# Patient Record
Sex: FEMALE | Race: BLACK OR AFRICAN AMERICAN | NOT HISPANIC OR LATINO | Employment: FULL TIME | URBAN - METROPOLITAN AREA
[De-identification: names, ages, dates, MRNs, and addresses within clinical notes are randomized per-mention and may not be internally consistent; named-entity substitution may affect disease eponyms.]

---

## 2018-02-06 ENCOUNTER — LAB VISIT (OUTPATIENT)
Dept: LAB | Facility: HOSPITAL | Age: 63
End: 2018-02-06
Payer: COMMERCIAL

## 2018-02-06 ENCOUNTER — OFFICE VISIT (OUTPATIENT)
Dept: FAMILY MEDICINE | Facility: CLINIC | Age: 63
End: 2018-02-06
Payer: COMMERCIAL

## 2018-02-06 VITALS
TEMPERATURE: 96 F | HEIGHT: 64 IN | BODY MASS INDEX: 26.49 KG/M2 | WEIGHT: 155.19 LBS | OXYGEN SATURATION: 100 % | SYSTOLIC BLOOD PRESSURE: 119 MMHG | DIASTOLIC BLOOD PRESSURE: 78 MMHG | RESPIRATION RATE: 16 BRPM | HEART RATE: 76 BPM

## 2018-02-06 DIAGNOSIS — Z00.01 ENCOUNTER FOR GENERAL ADULT MEDICAL EXAMINATION WITH ABNORMAL FINDINGS: Primary | ICD-10-CM

## 2018-02-06 DIAGNOSIS — L65.9 HAIR LOSS: ICD-10-CM

## 2018-02-06 DIAGNOSIS — E88.819 INSULIN RESISTANCE: ICD-10-CM

## 2018-02-06 DIAGNOSIS — K21.9 GASTROESOPHAGEAL REFLUX DISEASE, ESOPHAGITIS PRESENCE NOT SPECIFIED: ICD-10-CM

## 2018-02-06 DIAGNOSIS — Z00.01 ENCOUNTER FOR GENERAL ADULT MEDICAL EXAMINATION WITH ABNORMAL FINDINGS: ICD-10-CM

## 2018-02-06 LAB
25(OH)D3+25(OH)D2 SERPL-MCNC: 22 NG/ML
ALBUMIN SERPL BCP-MCNC: 3.8 G/DL
ALP SERPL-CCNC: 100 U/L
ALT SERPL W/O P-5'-P-CCNC: 16 U/L
ANION GAP SERPL CALC-SCNC: 7 MMOL/L
AST SERPL-CCNC: 15 U/L
BASOPHILS # BLD AUTO: 0.03 K/UL
BASOPHILS NFR BLD: 0.3 %
BILIRUB SERPL-MCNC: 0.4 MG/DL
BUN SERPL-MCNC: 20 MG/DL
CALCIUM SERPL-MCNC: 9.7 MG/DL
CHLORIDE SERPL-SCNC: 106 MMOL/L
CHOLEST SERPL-MCNC: 232 MG/DL
CHOLEST/HDLC SERPL: 3.4 {RATIO}
CO2 SERPL-SCNC: 28 MMOL/L
CREAT SERPL-MCNC: 0.7 MG/DL
DIFFERENTIAL METHOD: ABNORMAL
EOSINOPHIL # BLD AUTO: 0.2 K/UL
EOSINOPHIL NFR BLD: 2 %
ERYTHROCYTE [DISTWIDTH] IN BLOOD BY AUTOMATED COUNT: 13.6 %
EST. GFR  (AFRICAN AMERICAN): >60 ML/MIN/1.73 M^2
EST. GFR  (NON AFRICAN AMERICAN): >60 ML/MIN/1.73 M^2
ESTIMATED AVG GLUCOSE: 105 MG/DL
GLUCOSE SERPL-MCNC: 83 MG/DL
HBA1C MFR BLD HPLC: 5.3 %
HCT VFR BLD AUTO: 37 %
HDLC SERPL-MCNC: 68 MG/DL
HDLC SERPL: 29.3 %
HGB BLD-MCNC: 11.7 G/DL
IMM GRANULOCYTES # BLD AUTO: 0.02 K/UL
IMM GRANULOCYTES NFR BLD AUTO: 0.2 %
LDLC SERPL CALC-MCNC: 149.8 MG/DL
LYMPHOCYTES # BLD AUTO: 3.2 K/UL
LYMPHOCYTES NFR BLD: 33.5 %
MCH RBC QN AUTO: 28.2 PG
MCHC RBC AUTO-ENTMCNC: 31.6 G/DL
MCV RBC AUTO: 89 FL
MONOCYTES # BLD AUTO: 0.5 K/UL
MONOCYTES NFR BLD: 5 %
NEUTROPHILS # BLD AUTO: 5.7 K/UL
NEUTROPHILS NFR BLD: 59 %
NONHDLC SERPL-MCNC: 164 MG/DL
NRBC BLD-RTO: 0 /100 WBC
PLATELET # BLD AUTO: 283 K/UL
PMV BLD AUTO: 10 FL
POTASSIUM SERPL-SCNC: 3.8 MMOL/L
PROT SERPL-MCNC: 7.8 G/DL
RBC # BLD AUTO: 4.15 M/UL
SODIUM SERPL-SCNC: 141 MMOL/L
TRIGL SERPL-MCNC: 71 MG/DL
TSH SERPL DL<=0.005 MIU/L-ACNC: 0.67 UIU/ML
VIT B12 SERPL-MCNC: >2000 PG/ML
WBC # BLD AUTO: 9.6 K/UL

## 2018-02-06 PROCEDURE — 82607 VITAMIN B-12: CPT

## 2018-02-06 PROCEDURE — 82306 VITAMIN D 25 HYDROXY: CPT

## 2018-02-06 PROCEDURE — 83036 HEMOGLOBIN GLYCOSYLATED A1C: CPT

## 2018-02-06 PROCEDURE — 84443 ASSAY THYROID STIM HORMONE: CPT

## 2018-02-06 PROCEDURE — 85025 COMPLETE CBC W/AUTO DIFF WBC: CPT

## 2018-02-06 PROCEDURE — 99999 PR PBB SHADOW E&M-NEW PATIENT-LVL III: CPT | Mod: PBBFAC,,, | Performed by: FAMILY MEDICINE

## 2018-02-06 PROCEDURE — 80053 COMPREHEN METABOLIC PANEL: CPT

## 2018-02-06 PROCEDURE — 80061 LIPID PANEL: CPT

## 2018-02-06 PROCEDURE — 36415 COLL VENOUS BLD VENIPUNCTURE: CPT | Mod: PO

## 2018-02-06 PROCEDURE — 99386 PREV VISIT NEW AGE 40-64: CPT | Mod: S$GLB,,, | Performed by: FAMILY MEDICINE

## 2018-02-06 RX ORDER — ASPIRIN 81 MG/1
81 TABLET ORAL
COMMUNITY
End: 2018-06-29

## 2018-02-06 RX ORDER — ESTRADIOL 0.1 MG/G
1 CREAM VAGINAL
COMMUNITY
End: 2018-06-29

## 2018-02-06 RX ORDER — UBIDECARENONE 75 MG
500 CAPSULE ORAL
COMMUNITY
End: 2018-06-29

## 2018-02-06 RX ORDER — METFORMIN HYDROCHLORIDE 500 MG/1
500 TABLET ORAL
COMMUNITY
End: 2018-02-06

## 2018-02-06 RX ORDER — PANTOPRAZOLE SODIUM 40 MG/1
40 TABLET, DELAYED RELEASE ORAL
COMMUNITY
Start: 2017-03-03 | End: 2018-06-29

## 2018-02-06 NOTE — PROGRESS NOTES
Subjective:      Patient ID: Cesario Tamayo is a 62 y.o. female.    Chief Complaint: Establish Care      Past Medical History:   Diagnosis Date    Diabetes mellitus type I     Hypertension      Past Surgical History:   Procedure Laterality Date    ADENOIDECTOMY      CHOLECYSTECTOMY       History reviewed. No pertinent family history.  Social History     Social History    Marital status:      Spouse name: N/A    Number of children: N/A    Years of education: N/A     Occupational History    Not on file.     Social History Main Topics    Smoking status: Never Smoker    Smokeless tobacco: Never Used    Alcohol use Yes      Comment: socially    Drug use: Unknown    Sexual activity: Not on file     Other Topics Concern    Not on file     Social History Narrative    No narrative on file       Current Outpatient Prescriptions:     aspirin (ECOTRIN) 81 MG EC tablet, Take 81 mg by mouth., Disp: , Rfl:     cyanocobalamin 500 MCG tablet, Take 500 mcg by mouth., Disp: , Rfl:     docosahexanoic acid-epa 120-180 mg Cap, Take by mouth., Disp: , Rfl:     estradiol (ESTRACE) 0.01 % (0.1 mg/gram) vaginal cream, Place 1 g vaginally twice a week., Disp: , Rfl:     pantoprazole (PROTONIX) 40 MG tablet, Take 40 mg by mouth., Disp: , Rfl:   Review of patient's allergies indicates:  No Known Allergies    Review of Systems   Constitutional: Negative for chills and fever.   Respiratory: Negative for shortness of breath and wheezing.    Cardiovascular: Negative for chest pain and palpitations.   Gastrointestinal: Negative for abdominal pain and blood in stool.     HPI  Here today for annual exam.  She sees GYN, Dr. Lea yearly and up to date.  Placed at one time on metformin for insulin resistance.  Metformin  Has always worsened her gi distress so she d/c'd in December.   GERD - stable only if she takes protonix.  A few month h/o hair loss/thinning.  Lost 40 pounds since August with focus on exercise/healthy  "diet.  Gradually losing weight.    Objective:   /78 (BP Location: Right arm, Patient Position: Sitting, BP Method: Small (Manual))   Pulse 76   Temp 96.4 °F (35.8 °C) (Tympanic)   Resp 16   Ht 5' 4" (1.626 m)   Wt 70.4 kg (155 lb 3.3 oz)   SpO2 100%   BMI 26.64 kg/m²      Physical Exam   Constitutional: She is oriented to person, place, and time. She is cooperative. No distress.   Eyes: Conjunctivae and EOM are normal.   Cardiovascular: Normal rate, regular rhythm and normal heart sounds.    Pulmonary/Chest: Effort normal and breath sounds normal.   Musculoskeletal: She exhibits no edema.   Neurological: She is alert and oriented to person, place, and time. Coordination and gait normal.   Skin: Skin is warm, dry and intact. No rash noted. She is not diaphoretic. No erythema.   Psychiatric: She has a normal mood and affect. Her speech is normal and behavior is normal.   Nursing note and vitals reviewed.          Assessment:       1. Encounter for general adult medical examination with abnormal findings    2. Gastroesophageal reflux disease, esophagitis presence not specified    3. Insulin resistance    4. Hair loss            Plan:         Encounter for general adult medical examination with abnormal findings  Comments:  Importance of healthy diet and exercise discussed.  She lsot 40 pounds since August 2017.   Exercising and eating healthy.   up to date. Due for labs.  Orders:  -     Vitamin D; Future; Expected date: 02/06/2018  -     Vitamin B12; Future; Expected date: 02/06/2018  -     TSH; Future; Expected date: 02/06/2018  -     Lipid panel; Future; Expected date: 02/06/2018  -     Hemoglobin A1c; Future; Expected date: 02/06/2018  -     CBC auto differential; Future; Expected date: 02/06/2018  -     Comprehensive metabolic panel; Future; Expected date: 02/06/2018    Gastroesophageal reflux disease, esophagitis presence not specified  Comments:  Stable with protonix.  Reviewed dietary " changes.    Insulin resistance  Comments:  H/o insulin resistance - due for recheck.  Lost 40 pounds.  Likely reversed.    Hair loss  Call with labs.      Patient Care Team:  Ruthann Gómez MD as PCP - General (Family Medicine)    Follow-up in about 1 year (around 2/6/2019).

## 2018-02-07 ENCOUNTER — TELEPHONE (OUTPATIENT)
Dept: FAMILY MEDICINE | Facility: CLINIC | Age: 63
End: 2018-02-07

## 2018-02-07 NOTE — TELEPHONE ENCOUNTER
----- Message from Conchita Lobo sent at 2/7/2018  1:46 PM CST -----  Contact: self   Patient returning call. Please call back at .      Thanks,  Conchita Lobo

## 2018-06-15 DIAGNOSIS — Z12.39 BREAST CANCER SCREENING: ICD-10-CM

## 2018-06-29 ENCOUNTER — OFFICE VISIT (OUTPATIENT)
Dept: FAMILY MEDICINE | Facility: CLINIC | Age: 63
End: 2018-06-29
Payer: COMMERCIAL

## 2018-06-29 VITALS
HEART RATE: 76 BPM | WEIGHT: 151.69 LBS | DIASTOLIC BLOOD PRESSURE: 64 MMHG | SYSTOLIC BLOOD PRESSURE: 122 MMHG | HEIGHT: 64 IN | BODY MASS INDEX: 25.9 KG/M2 | OXYGEN SATURATION: 98 % | TEMPERATURE: 98 F

## 2018-06-29 DIAGNOSIS — J01.10 ACUTE NON-RECURRENT FRONTAL SINUSITIS: Primary | ICD-10-CM

## 2018-06-29 DIAGNOSIS — H92.03 OTALGIA OF BOTH EARS: ICD-10-CM

## 2018-06-29 DIAGNOSIS — L84 PRE-ULCERATIVE CORN OR CALLOUS: ICD-10-CM

## 2018-06-29 PROCEDURE — 3008F BODY MASS INDEX DOCD: CPT | Mod: CPTII,S$GLB,, | Performed by: FAMILY MEDICINE

## 2018-06-29 PROCEDURE — 99999 PR PBB SHADOW E&M-EST. PATIENT-LVL III: CPT | Mod: PBBFAC,,, | Performed by: FAMILY MEDICINE

## 2018-06-29 PROCEDURE — 99214 OFFICE O/P EST MOD 30 MIN: CPT | Mod: 25,S$GLB,, | Performed by: FAMILY MEDICINE

## 2018-06-29 PROCEDURE — 96372 THER/PROPH/DIAG INJ SC/IM: CPT | Mod: S$GLB,,, | Performed by: FAMILY MEDICINE

## 2018-06-29 RX ORDER — DOXYCYCLINE HYCLATE 100 MG
100 TABLET ORAL 2 TIMES DAILY
Qty: 14 TABLET | Refills: 0 | Status: SHIPPED | OUTPATIENT
Start: 2018-06-29 | End: 2018-07-06

## 2018-06-29 RX ORDER — BETAMETHASONE SODIUM PHOSPHATE AND BETAMETHASONE ACETATE 3; 3 MG/ML; MG/ML
6 INJECTION, SUSPENSION INTRA-ARTICULAR; INTRALESIONAL; INTRAMUSCULAR; SOFT TISSUE
Status: COMPLETED | OUTPATIENT
Start: 2018-06-29 | End: 2018-06-29

## 2018-06-29 RX ORDER — UREA 1 ML/10ML
1 LOTION TOPICAL 2 TIMES DAILY
Qty: 177 ML | Refills: 0 | COMMUNITY
Start: 2018-06-29 | End: 2018-09-28

## 2018-06-29 RX ADMIN — BETAMETHASONE SODIUM PHOSPHATE AND BETAMETHASONE ACETATE 6 MG: 3; 3 INJECTION, SUSPENSION INTRA-ARTICULAR; INTRALESIONAL; INTRAMUSCULAR; SOFT TISSUE at 02:06

## 2018-06-29 NOTE — PROGRESS NOTES
Subjective:      Patient ID: Cesario Tamayo is a 62 y.o. female.    Chief Complaint: Otalgia; Sore Throat; and Headache      Past Medical History:   Diagnosis Date    Diabetes mellitus type I     Hypertension      Past Surgical History:   Procedure Laterality Date    ADENOIDECTOMY      CHOLECYSTECTOMY       History reviewed. No pertinent family history.  Social History     Social History    Marital status:      Spouse name: N/A    Number of children: N/A    Years of education: N/A     Occupational History    Not on file.     Social History Main Topics    Smoking status: Never Smoker    Smokeless tobacco: Never Used    Alcohol use Yes      Comment: socially    Drug use: Unknown    Sexual activity: Not on file     Other Topics Concern    Not on file     Social History Narrative    No narrative on file       Current Outpatient Prescriptions:     doxycycline (VIBRA-TABS) 100 MG tablet, Take 1 tablet (100 mg total) by mouth 2 (two) times daily. for 7 days, Disp: 14 tablet, Rfl: 0    urea 10 % Lotn, Apply 1 each topically 2 (two) times daily., Disp: 177 mL, Rfl: 0  No current facility-administered medications for this visit.   Review of patient's allergies indicates:  No Known Allergies    Review of Systems   Constitutional: Positive for fatigue. Negative for chills and fever.   HENT: Positive for ear pain, sinus pain and sinus pressure. Negative for hearing loss.    Respiratory: Negative for shortness of breath and wheezing.    Cardiovascular: Negative for chest pain and palpitations.   Gastrointestinal: Negative for abdominal pain and blood in stool.   Neurological: Positive for headaches.     HPI  Her wedding is tomorrow.  One week of cold symptoms, achiness, ear ache, fatigue, headache.  Acutely worsened for the past 2 days.  + frontal headache and ear ache yo R>L.  No stress.  This is her usual sinusitis infection.  + fatigue dn malaise.  Has Callous on the bottom of right foot  Objective:  "  /64   Pulse 76   Temp 97.9 °F (36.6 °C) (Tympanic)   Ht 5' 4" (1.626 m)   Wt 68.8 kg (151 lb 10.8 oz)   SpO2 98%   BMI 26.04 kg/m²      Physical Exam   Constitutional: She is oriented to person, place, and time. She is cooperative. No distress.   HENT:   Right Ear: Hearing, external ear and ear canal normal. Tympanic membrane is erythematous.   Left Ear: Hearing, external ear and ear canal normal. Tympanic membrane is erythematous.   Mouth/Throat: Uvula is midline, oropharynx is clear and moist and mucous membranes are normal.   Eyes: Conjunctivae and EOM are normal.   Neck:   Supple; full range of motion; not tender to palpation   Cardiovascular: Normal rate, regular rhythm and normal heart sounds.    Pulmonary/Chest: Effort normal and breath sounds normal.   Musculoskeletal: She exhibits no edema.   Neurological: She is alert and oriented to person, place, and time. Coordination and gait normal.   Skin: Skin is warm, dry and intact. No rash noted. She is not diaphoretic. No erythema.    Callous on the bottom of right foot - mid plantar 1 cm.     Psychiatric: She has a normal mood and affect. Her speech is normal and behavior is normal.   Nursing note and vitals reviewed.          Assessment:       1. Acute non-recurrent frontal sinusitis    2. Otalgia of both ears    3. Pre-ulcerative corn or callous            Plan:         Acute non-recurrent frontal sinusitis  Comments:  Celestone shot.  Start doxycycline.  Call if any side effects/problems.    Otalgia of both ears    Pre-ulcerative corn or callous  Comments:  Pumice stone along with urea cream left foot.    Other orders  -     betamethasone acetate-betamethasone sodium phosphate injection 6 mg; Inject 1 mL (6 mg total) into the muscle one time.  -     doxycycline (VIBRA-TABS) 100 MG tablet; Take 1 tablet (100 mg total) by mouth 2 (two) times daily. for 7 days  Dispense: 14 tablet; Refill: 0  -     urea 10 % Lotn; Apply 1 each topically 2 (two) " times daily.  Dispense: 177 mL; Refill: 0        Patient Care Team:  Ruthann Gómez MD as PCP - General (Family Medicine)  Carter Lea MD (Obstetrics and Gynecology)    Follow-up if symptoms worsen or fail to improve.

## 2018-09-06 ENCOUNTER — TELEPHONE (OUTPATIENT)
Dept: FAMILY MEDICINE | Facility: CLINIC | Age: 63
End: 2018-09-06

## 2018-09-06 NOTE — TELEPHONE ENCOUNTER
----- Message from Brandyn Wasserman sent at 9/6/2018  2:27 PM CDT -----  Contact: self [phone]  Returning call, please call back at [phone].  Md Julia

## 2018-09-06 NOTE — TELEPHONE ENCOUNTER
----- Message from Joselin Louie sent at 9/6/2018 10:05 AM CDT -----  Contact: pt  The pt states she is returning a missed call, the pt can be reached at [phone] or [phone]///[email or web address]

## 2018-09-06 NOTE — TELEPHONE ENCOUNTER
----- Message from Urvashi Velazco sent at 9/6/2018 10:39 AM CDT -----  Contact: Patient  ##Please call her work number##    Patient returned call. She can be contacted at [phone] work.     Thanks,  Urvashi

## 2018-09-06 NOTE — TELEPHONE ENCOUNTER
----- Message from Flavia Grimaldo sent at 9/6/2018  7:31 AM CDT -----  needs ear inf med refill. pls call in to [facility] before 3 today as pt will be getting off work at that time....[phone]

## 2018-09-28 ENCOUNTER — OFFICE VISIT (OUTPATIENT)
Dept: FAMILY MEDICINE | Facility: CLINIC | Age: 63
End: 2018-09-28
Payer: COMMERCIAL

## 2018-09-28 VITALS
SYSTOLIC BLOOD PRESSURE: 124 MMHG | HEIGHT: 64 IN | DIASTOLIC BLOOD PRESSURE: 68 MMHG | OXYGEN SATURATION: 99 % | TEMPERATURE: 98 F | WEIGHT: 151.69 LBS | BODY MASS INDEX: 25.9 KG/M2 | HEART RATE: 64 BPM

## 2018-09-28 DIAGNOSIS — J34.89 SINUS DRAINAGE: ICD-10-CM

## 2018-09-28 DIAGNOSIS — R05.9 COUGH: ICD-10-CM

## 2018-09-28 DIAGNOSIS — H66.001 ACUTE SUPPURATIVE OTITIS MEDIA OF RIGHT EAR WITHOUT SPONTANEOUS RUPTURE OF TYMPANIC MEMBRANE, RECURRENCE NOT SPECIFIED: Primary | ICD-10-CM

## 2018-09-28 DIAGNOSIS — J34.89 SINUS PRESSURE: ICD-10-CM

## 2018-09-28 PROCEDURE — 3008F BODY MASS INDEX DOCD: CPT | Mod: CPTII,S$GLB,, | Performed by: FAMILY MEDICINE

## 2018-09-28 PROCEDURE — 99214 OFFICE O/P EST MOD 30 MIN: CPT | Mod: 25,S$GLB,, | Performed by: FAMILY MEDICINE

## 2018-09-28 PROCEDURE — 99999 PR PBB SHADOW E&M-EST. PATIENT-LVL III: CPT | Mod: PBBFAC,,, | Performed by: FAMILY MEDICINE

## 2018-09-28 PROCEDURE — 96372 THER/PROPH/DIAG INJ SC/IM: CPT | Mod: S$GLB,,, | Performed by: FAMILY MEDICINE

## 2018-09-28 RX ORDER — TRIAMCINOLONE ACETONIDE 40 MG/ML
40 INJECTION, SUSPENSION INTRA-ARTICULAR; INTRAMUSCULAR
Status: COMPLETED | OUTPATIENT
Start: 2018-09-28 | End: 2018-09-28

## 2018-09-28 RX ORDER — BENZONATATE 100 MG/1
100 CAPSULE ORAL 3 TIMES DAILY PRN
Qty: 30 CAPSULE | Refills: 0 | Status: SHIPPED | OUTPATIENT
Start: 2018-09-28 | End: 2018-10-08

## 2018-09-28 RX ORDER — DOXYCYCLINE HYCLATE 100 MG
100 TABLET ORAL 2 TIMES DAILY
Qty: 14 TABLET | Refills: 0 | Status: SHIPPED | OUTPATIENT
Start: 2018-09-28 | End: 2018-10-05

## 2018-09-28 RX ADMIN — TRIAMCINOLONE ACETONIDE 40 MG: 40 INJECTION, SUSPENSION INTRA-ARTICULAR; INTRAMUSCULAR at 02:09

## 2018-09-28 NOTE — PROGRESS NOTES
Subjective:      Patient ID: Cesario Tamayo is a 63 y.o. female.    Chief Complaint: Generalized Body Aches; Otalgia; Cough; and Sore Throat      Past Medical History:   Diagnosis Date    Diabetes mellitus, type 2     Hypertension      Past Surgical History:   Procedure Laterality Date    ADENOIDECTOMY      CHOLECYSTECTOMY       History reviewed. No pertinent family history.  Social History     Socioeconomic History    Marital status:      Spouse name: Not on file    Number of children: Not on file    Years of education: Not on file    Highest education level: Not on file   Social Needs    Financial resource strain: Not on file    Food insecurity - worry: Not on file    Food insecurity - inability: Not on file    Transportation needs - medical: Not on file    Transportation needs - non-medical: Not on file   Occupational History    Not on file   Tobacco Use    Smoking status: Never Smoker    Smokeless tobacco: Never Used   Substance and Sexual Activity    Alcohol use: Yes     Comment: socially    Drug use: Not on file    Sexual activity: Not on file   Other Topics Concern    Not on file   Social History Narrative    Not on file       Current Outpatient Medications:     benzonatate (TESSALON) 100 MG capsule, Take 1 capsule (100 mg total) by mouth 3 (three) times daily as needed for Cough., Disp: 30 capsule, Rfl: 0    doxycycline (VIBRA-TABS) 100 MG tablet, Take 1 tablet (100 mg total) by mouth 2 (two) times daily. for 7 days, Disp: 14 tablet, Rfl: 0  No current facility-administered medications for this visit.   Review of patient's allergies indicates:  No Known Allergies    Review of Systems   Constitutional: Positive for fatigue. Negative for chills and fever.   HENT: Positive for postnasal drip, rhinorrhea, sinus pressure, sinus pain and sore throat.    Respiratory: Positive for cough. Negative for shortness of breath and wheezing.    Cardiovascular: Negative for chest pain and  "palpitations.   Gastrointestinal: Negative for abdominal pain and blood in stool.     HPI  Symptoms started Sunday, malaise, fatigue, sore throat.  Progressive cough, intense ear pain headaches.  Back pain over posterior chest - muscle strain from coughing hard.  Symptoms persistent and worsening.    Objective:   /68 (BP Location: Right arm, Patient Position: Sitting)   Pulse 64   Temp 98.3 °F (36.8 °C)   Ht 5' 4" (1.626 m)   Wt 68.8 kg (151 lb 10.8 oz)   SpO2 99%   BMI 26.04 kg/m²      Physical Exam   Constitutional: She is oriented to person, place, and time. She is cooperative. No distress.   HENT:   Right Ear: Hearing, external ear and ear canal normal. Tympanic membrane is erythematous. A middle ear effusion is present.   Left Ear: Hearing, external ear and ear canal normal. A middle ear effusion is present.   Mouth/Throat: Uvula is midline and mucous membranes are normal. Posterior oropharyngeal erythema present.   Eyes: Conjunctivae and EOM are normal.   Cardiovascular: Normal rate, regular rhythm and normal heart sounds.   Pulmonary/Chest: Effort normal and breath sounds normal.   Abdominal: There is no tenderness.   Musculoskeletal: She exhibits no edema.   Neurological: She is alert and oriented to person, place, and time. Coordination and gait normal.   Skin: Skin is warm, dry and intact. No rash noted. She is not diaphoretic. No erythema.   Psychiatric: She has a normal mood and affect. Her speech is normal and behavior is normal.   Nursing note and vitals reviewed.          Assessment:       1. Acute suppurative otitis media of right ear without spontaneous rupture of tympanic membrane, recurrence not specified    2. Sinus pressure    3. Cough    4. Sinus drainage            Plan:         Acute suppurative otitis media of right ear without spontaneous rupture of tympanic membrane, recurrence not specified  Comments:  Start doxycycline.  F/u if not resolved in a week or worsening.    Sinus " pressure    Cough  Comments:  Tessalon prn.    Sinus drainage  Comments:  Will give kenalog shot - patient requests.    Other orders  -     doxycycline (VIBRA-TABS) 100 MG tablet; Take 1 tablet (100 mg total) by mouth 2 (two) times daily. for 7 days  Dispense: 14 tablet; Refill: 0  -     benzonatate (TESSALON) 100 MG capsule; Take 1 capsule (100 mg total) by mouth 3 (three) times daily as needed for Cough.  Dispense: 30 capsule; Refill: 0  -     triamcinolone acetonide injection 40 mg; Inject 1 mL (40 mg total) into the muscle one time.        Patient Care Team:  Ruthann Gómez MD as PCP - General (Family Medicine)  Carter Lea MD (Obstetrics and Gynecology)    Follow-up if symptoms worsen or fail to improve.

## 2019-04-23 ENCOUNTER — OFFICE VISIT (OUTPATIENT)
Dept: FAMILY MEDICINE | Facility: CLINIC | Age: 64
End: 2019-04-23
Payer: COMMERCIAL

## 2019-04-23 VITALS
HEART RATE: 69 BPM | SYSTOLIC BLOOD PRESSURE: 142 MMHG | OXYGEN SATURATION: 98 % | WEIGHT: 158.5 LBS | BODY MASS INDEX: 27.06 KG/M2 | DIASTOLIC BLOOD PRESSURE: 92 MMHG | TEMPERATURE: 98 F | HEIGHT: 64 IN

## 2019-04-23 DIAGNOSIS — I10 ESSENTIAL HYPERTENSION: Primary | ICD-10-CM

## 2019-04-23 DIAGNOSIS — E88.819 INSULIN RESISTANCE: ICD-10-CM

## 2019-04-23 DIAGNOSIS — S16.1XXA STRAIN OF NECK MUSCLE, INITIAL ENCOUNTER: ICD-10-CM

## 2019-04-23 PROCEDURE — 99214 PR OFFICE/OUTPT VISIT, EST, LEVL IV, 30-39 MIN: ICD-10-PCS | Mod: S$GLB,,, | Performed by: FAMILY MEDICINE

## 2019-04-23 PROCEDURE — 3008F BODY MASS INDEX DOCD: CPT | Mod: CPTII,S$GLB,, | Performed by: FAMILY MEDICINE

## 2019-04-23 PROCEDURE — 99999 PR PBB SHADOW E&M-EST. PATIENT-LVL III: CPT | Mod: PBBFAC,,, | Performed by: FAMILY MEDICINE

## 2019-04-23 PROCEDURE — 3077F PR MOST RECENT SYSTOLIC BLOOD PRESSURE >= 140 MM HG: ICD-10-PCS | Mod: CPTII,S$GLB,, | Performed by: FAMILY MEDICINE

## 2019-04-23 PROCEDURE — 3080F DIAST BP >= 90 MM HG: CPT | Mod: CPTII,S$GLB,, | Performed by: FAMILY MEDICINE

## 2019-04-23 PROCEDURE — 99214 OFFICE O/P EST MOD 30 MIN: CPT | Mod: S$GLB,,, | Performed by: FAMILY MEDICINE

## 2019-04-23 PROCEDURE — 99999 PR PBB SHADOW E&M-EST. PATIENT-LVL III: ICD-10-PCS | Mod: PBBFAC,,, | Performed by: FAMILY MEDICINE

## 2019-04-23 PROCEDURE — 3008F PR BODY MASS INDEX (BMI) DOCUMENTED: ICD-10-PCS | Mod: CPTII,S$GLB,, | Performed by: FAMILY MEDICINE

## 2019-04-23 PROCEDURE — 3077F SYST BP >= 140 MM HG: CPT | Mod: CPTII,S$GLB,, | Performed by: FAMILY MEDICINE

## 2019-04-23 PROCEDURE — 3080F PR MOST RECENT DIASTOLIC BLOOD PRESSURE >= 90 MM HG: ICD-10-PCS | Mod: CPTII,S$GLB,, | Performed by: FAMILY MEDICINE

## 2019-04-23 RX ORDER — NAPROXEN 500 MG/1
500 TABLET ORAL 2 TIMES DAILY
Qty: 30 TABLET | Refills: 0 | Status: SHIPPED | OUTPATIENT
Start: 2019-04-23 | End: 2019-05-14

## 2019-04-23 RX ORDER — ASPIRIN 81 MG/1
81 TABLET ORAL DAILY
COMMUNITY

## 2019-04-23 RX ORDER — UBIDECARENONE 75 MG
500 CAPSULE ORAL DAILY
COMMUNITY
End: 2019-05-14

## 2019-04-23 RX ORDER — ORPHENADRINE CITRATE 100 MG/1
100 TABLET, EXTENDED RELEASE ORAL 2 TIMES DAILY PRN
Qty: 20 TABLET | Refills: 0 | Status: SHIPPED | OUTPATIENT
Start: 2019-04-23 | End: 2019-05-03

## 2019-04-23 RX ORDER — ORPHENADRINE CITRATE 100 MG/1
100 TABLET, EXTENDED RELEASE ORAL
COMMUNITY
Start: 2019-04-20 | End: 2019-04-23

## 2019-04-23 RX ORDER — VITAMIN E 268 MG
400 CAPSULE ORAL DAILY
COMMUNITY
End: 2020-01-24
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
## 2019-05-14 ENCOUNTER — OFFICE VISIT (OUTPATIENT)
Dept: FAMILY MEDICINE | Facility: CLINIC | Age: 64
End: 2019-05-14
Payer: COMMERCIAL

## 2019-05-14 ENCOUNTER — LAB VISIT (OUTPATIENT)
Dept: LAB | Facility: HOSPITAL | Age: 64
End: 2019-05-14
Payer: COMMERCIAL

## 2019-05-14 VITALS
WEIGHT: 158.31 LBS | SYSTOLIC BLOOD PRESSURE: 126 MMHG | TEMPERATURE: 98 F | DIASTOLIC BLOOD PRESSURE: 80 MMHG | HEART RATE: 72 BPM | OXYGEN SATURATION: 98 % | HEIGHT: 64 IN | BODY MASS INDEX: 27.03 KG/M2

## 2019-05-14 DIAGNOSIS — Z11.59 NEED FOR HEPATITIS C SCREENING TEST: ICD-10-CM

## 2019-05-14 DIAGNOSIS — E88.819 INSULIN RESISTANCE: ICD-10-CM

## 2019-05-14 DIAGNOSIS — Z00.00 ENCOUNTER FOR WELLNESS EXAMINATION: Primary | ICD-10-CM

## 2019-05-14 DIAGNOSIS — N95.2 VAGINAL ATROPHY: ICD-10-CM

## 2019-05-14 DIAGNOSIS — Z00.00 ENCOUNTER FOR WELLNESS EXAMINATION: ICD-10-CM

## 2019-05-14 LAB
ALBUMIN SERPL BCP-MCNC: 3.9 G/DL (ref 3.5–5.2)
ALP SERPL-CCNC: 92 U/L (ref 55–135)
ALT SERPL W/O P-5'-P-CCNC: 16 U/L (ref 10–44)
ANION GAP SERPL CALC-SCNC: 8 MMOL/L (ref 8–16)
AST SERPL-CCNC: 19 U/L (ref 10–40)
BASOPHILS # BLD AUTO: 0.05 K/UL (ref 0–0.2)
BASOPHILS NFR BLD: 0.6 % (ref 0–1.9)
BILIRUB SERPL-MCNC: 0.4 MG/DL (ref 0.1–1)
BUN SERPL-MCNC: 21 MG/DL (ref 8–23)
CALCIUM SERPL-MCNC: 9.9 MG/DL (ref 8.7–10.5)
CHLORIDE SERPL-SCNC: 104 MMOL/L (ref 95–110)
CHOLEST SERPL-MCNC: 231 MG/DL (ref 120–199)
CHOLEST/HDLC SERPL: 3.1 {RATIO} (ref 2–5)
CO2 SERPL-SCNC: 26 MMOL/L (ref 23–29)
CREAT SERPL-MCNC: 0.8 MG/DL (ref 0.5–1.4)
DIFFERENTIAL METHOD: ABNORMAL
EOSINOPHIL # BLD AUTO: 0.2 K/UL (ref 0–0.5)
EOSINOPHIL NFR BLD: 2.9 % (ref 0–8)
ERYTHROCYTE [DISTWIDTH] IN BLOOD BY AUTOMATED COUNT: 13.3 % (ref 11.5–14.5)
EST. GFR  (AFRICAN AMERICAN): >60 ML/MIN/1.73 M^2
EST. GFR  (NON AFRICAN AMERICAN): >60 ML/MIN/1.73 M^2
ESTIMATED AVG GLUCOSE: 111 MG/DL (ref 68–131)
GLUCOSE SERPL-MCNC: 77 MG/DL (ref 70–110)
HBA1C MFR BLD HPLC: 5.5 % (ref 4–5.6)
HCT VFR BLD AUTO: 38 % (ref 37–48.5)
HDLC SERPL-MCNC: 74 MG/DL (ref 40–75)
HDLC SERPL: 32 % (ref 20–50)
HGB BLD-MCNC: 11.9 G/DL (ref 12–16)
IMM GRANULOCYTES # BLD AUTO: 0.02 K/UL (ref 0–0.04)
IMM GRANULOCYTES NFR BLD AUTO: 0.3 % (ref 0–0.5)
LDLC SERPL CALC-MCNC: 144.6 MG/DL (ref 63–159)
LYMPHOCYTES # BLD AUTO: 2.7 K/UL (ref 1–4.8)
LYMPHOCYTES NFR BLD: 34 % (ref 18–48)
MCH RBC QN AUTO: 28.6 PG (ref 27–31)
MCHC RBC AUTO-ENTMCNC: 31.3 G/DL (ref 32–36)
MCV RBC AUTO: 91 FL (ref 82–98)
MONOCYTES # BLD AUTO: 0.4 K/UL (ref 0.3–1)
MONOCYTES NFR BLD: 4.9 % (ref 4–15)
NEUTROPHILS # BLD AUTO: 4.6 K/UL (ref 1.8–7.7)
NEUTROPHILS NFR BLD: 57.3 % (ref 38–73)
NONHDLC SERPL-MCNC: 157 MG/DL
NRBC BLD-RTO: 0 /100 WBC
PLATELET # BLD AUTO: 278 K/UL (ref 150–350)
PMV BLD AUTO: 9.9 FL (ref 9.2–12.9)
POTASSIUM SERPL-SCNC: 4.2 MMOL/L (ref 3.5–5.1)
PROT SERPL-MCNC: 7.6 G/DL (ref 6–8.4)
RBC # BLD AUTO: 4.16 M/UL (ref 4–5.4)
SODIUM SERPL-SCNC: 138 MMOL/L (ref 136–145)
TRIGL SERPL-MCNC: 62 MG/DL (ref 30–150)
WBC # BLD AUTO: 7.94 K/UL (ref 3.9–12.7)

## 2019-05-14 PROCEDURE — 3079F PR MOST RECENT DIASTOLIC BLOOD PRESSURE 80-89 MM HG: ICD-10-PCS | Mod: CPTII,S$GLB,, | Performed by: FAMILY MEDICINE

## 2019-05-14 PROCEDURE — 99999 PR PBB SHADOW E&M-EST. PATIENT-LVL III: ICD-10-PCS | Mod: PBBFAC,,, | Performed by: FAMILY MEDICINE

## 2019-05-14 PROCEDURE — 83036 HEMOGLOBIN GLYCOSYLATED A1C: CPT

## 2019-05-14 PROCEDURE — 85025 COMPLETE CBC W/AUTO DIFF WBC: CPT

## 2019-05-14 PROCEDURE — 36415 COLL VENOUS BLD VENIPUNCTURE: CPT | Mod: PO

## 2019-05-14 PROCEDURE — 99396 PREV VISIT EST AGE 40-64: CPT | Mod: S$GLB,,, | Performed by: FAMILY MEDICINE

## 2019-05-14 PROCEDURE — 99999 PR PBB SHADOW E&M-EST. PATIENT-LVL III: CPT | Mod: PBBFAC,,, | Performed by: FAMILY MEDICINE

## 2019-05-14 PROCEDURE — 86803 HEPATITIS C AB TEST: CPT

## 2019-05-14 PROCEDURE — 80061 LIPID PANEL: CPT

## 2019-05-14 PROCEDURE — 3074F SYST BP LT 130 MM HG: CPT | Mod: CPTII,S$GLB,, | Performed by: FAMILY MEDICINE

## 2019-05-14 PROCEDURE — 99396 PR PREVENTIVE VISIT,EST,40-64: ICD-10-PCS | Mod: S$GLB,,, | Performed by: FAMILY MEDICINE

## 2019-05-14 PROCEDURE — 3074F PR MOST RECENT SYSTOLIC BLOOD PRESSURE < 130 MM HG: ICD-10-PCS | Mod: CPTII,S$GLB,, | Performed by: FAMILY MEDICINE

## 2019-05-14 PROCEDURE — 3079F DIAST BP 80-89 MM HG: CPT | Mod: CPTII,S$GLB,, | Performed by: FAMILY MEDICINE

## 2019-05-14 PROCEDURE — 80053 COMPREHEN METABOLIC PANEL: CPT

## 2019-05-14 RX ORDER — SODIUM, POTASSIUM,MAG SULFATES 17.5-3.13G
1 SOLUTION, RECONSTITUTED, ORAL ORAL DAILY
Qty: 1 KIT | Refills: 0 | Status: CANCELLED | OUTPATIENT
Start: 2019-05-14 | End: 2019-05-16

## 2019-05-14 NOTE — PROGRESS NOTES
Subjective:      Patient ID: Cesario Sahu is a 63 y.o. female.    Chief Complaint: Annual Exam    HPI    Patient here today for annual wellness no complaints except vaginal dryness     Notes some vaginal dryness - [facility] - was using vagifem and cream - not much relief and doesn't like using them often  Recently remarried one year ago and would like to have symptoms better controlled  No discharge, no bleeding - hx of abnormal uterine bleeding in January - fully worked up by Dr. Lea s/p D&C - no cancer      Past Medical History:   Diagnosis Date    Hypertension     Insulin resistance        Past Surgical History:   Procedure Laterality Date    ADENOIDECTOMY      CHOLECYSTECTOMY      DILATION AND CURETTAGE OF UTERUS  10/2018       Family History   Problem Relation Age of Onset    Hypertension Mother     Heart disease Sister     Hyperlipidemia Sister     Hypertension Sister     Diabetes Brother        Social History     Socioeconomic History    Marital status:      Spouse name: Not on file    Number of children: 3    Years of education: Not on file    Highest education level: Not on file   Occupational History     Comment: financial     Social Needs    Financial resource strain: Not on file    Food insecurity:     Worry: Not on file     Inability: Not on file    Transportation needs:     Medical: Not on file     Non-medical: Not on file   Tobacco Use    Smoking status: Never Smoker    Smokeless tobacco: Never Used   Substance and Sexual Activity    Alcohol use: Yes     Frequency: 2-3 times a week     Drinks per session: 1 or 2     Binge frequency: Never     Comment: Socially    Drug use: Never    Sexual activity: Not on file   Lifestyle    Physical activity:     Days per week: Not on file     Minutes per session: Not on file    Stress: Not on file   Relationships    Social connections:     Talks on phone: Not on file     Gets together: Not on  file     Attends [employer] service: Not on file     Active member of club or organization: Not on file     Attends meetings of clubs or organizations: Not on file     Relationship status: Not on file   Other Topics Concern    Not on file   Social History Narrative    The patient remarried in June 2018.  She has 3 adult children.  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       Health Maintenance Topics with due status: Not Due       Topic Last Completion Date    Lipid Panel 02/06/2018    Influenza Vaccine Not Due       Medication List with Changes/Refills   Current Medications    ASPIRIN (ECOTRIN) 81 MG EC TABLET    Take 81 mg by mouth once daily.    VITAMIN E 400 UNIT CAPSULE    Take 400 Units by mouth once daily.   Discontinued Medications    CYANOCOBALAMIN (VITAMIN B-12) 500 MCG TABLET    Take 500 mcg by mouth once daily.    NAPROXEN (NAPROSYN) 500 MG TABLET    Take 1 tablet (500 mg total) by mouth 2 (two) times daily.       Review of patient's allergies indicates:  No Known Allergies    Review of Systems   Constitutional: Negative for fever.   HENT: Negative for congestion.    Eyes: Negative for blurred vision.   Respiratory: Negative for shortness of breath.    Cardiovascular: Negative for chest pain and leg swelling.   Gastrointestinal: Negative for abdominal pain, constipation and diarrhea.   Genitourinary: Negative for dysuria.        Vaginal dryness   Skin: Negative for rash.   Neurological: Negative for headaches.       Objective:     Vitals:    05/14/19 0720   BP: 126/80   Pulse: 72   Temp: 98.1 °F (36.7 °C)     Body mass index is 27.17 kg/m².    Physical Exam   Constitutional: She is oriented to person, place, and time. She appears well-developed and well-nourished. No distress.   HENT:   Head: Normocephalic and atraumatic.   Right Ear: External ear normal.   Left Ear: External ear normal.   Nose: Nose normal.   Mouth/Throat: Oropharynx is clear and moist.   Eyes: Pupils are equal, round, and  reactive to light. Conjunctivae and EOM are normal.   Neck: No thyromegaly present.   Cardiovascular: Normal rate, regular rhythm, normal heart sounds and intact distal pulses.   No murmur heard.  Pulmonary/Chest: Effort normal and breath sounds normal. No respiratory distress. She has no wheezes. She has no rales. She exhibits no tenderness.   Abdominal: Soft. Bowel sounds are normal. She exhibits no distension. There is no tenderness.   Musculoskeletal: She exhibits no edema.   Lymphadenopathy:     She has no cervical adenopathy.   Neurological: She is alert and oriented to person, place, and time.   Skin: Skin is warm and dry.   Psychiatric: She has a normal mood and affect.   Nursing note and vitals reviewed.      Assessment and Plan:     Encounter for wellness examination  -     CBC auto differential; Future; Expected date: 05/14/2019  -     Comprehensive metabolic panel; Future; Expected date: 05/14/2019  -     Hemoglobin A1c; Future; Expected date: 05/14/2019  -     Lipid panel; Future; Expected date: 05/14/2019  -     Hepatitis C antibody; Future; Expected date: 05/14/2019    Need for hepatitis C screening test  -     Hepatitis C antibody; Future; Expected date: 05/14/2019    Insulin resistance  -     Hemoglobin A1c; Future; Expected date: 05/14/2019    Vaginal atrophy  Advised patient consider the estring - will go to obgyn and discuss this - try to get a sample and be taught how to use by Dr. Lea       Follow up in about 6 months (around 11/14/2019).

## 2019-05-15 LAB — HCV AB SERPL QL IA: NEGATIVE

## 2019-05-20 ENCOUNTER — PATIENT OUTREACH (OUTPATIENT)
Dept: ADMINISTRATIVE | Facility: HOSPITAL | Age: 64
End: 2019-05-20

## 2019-05-20 ENCOUNTER — TELEPHONE (OUTPATIENT)
Dept: FAMILY MEDICINE | Facility: CLINIC | Age: 64
End: 2019-05-20

## 2019-05-20 NOTE — PROGRESS NOTES
KERI RECEIVED FROM CLINIC  SENT TO [facility] FOR  RECORD RETRIEVAL       Priscila BLOOD LPN Care Coordinator  Care Coordination Department  [facility]  [phone]

## 2019-05-20 NOTE — TELEPHONE ENCOUNTER
""Please call patient and let them know that their labs are ok except for an increase in her cholesterol levels.  She will need to change to a low fat diet and increase exercise to 3-4 times per week for at least 30-45 min each time." per dr johnson  "

## 2019-05-21 ENCOUNTER — TELEPHONE (OUTPATIENT)
Dept: FAMILY MEDICINE | Facility: CLINIC | Age: 64
End: 2019-05-21

## 2019-05-21 NOTE — TELEPHONE ENCOUNTER
----- Message from Lindy Acosta sent at 5/21/2019  4:20 PM CDT -----  Contact: self [phone]  Type:  Patient Returning Call    Who Called:Cesario Sahu  Who Left Message for Patient:unk  Does the patient know what this is regarding?:lab results  Would the patient rather a call back or a response via [facility]? Call back  Best Call Back Number:[phone]  Additional Information:

## 2019-05-24 ENCOUNTER — PATIENT OUTREACH (OUTPATIENT)
Dept: ADMINISTRATIVE | Facility: HOSPITAL | Age: 64
End: 2019-05-24

## 2019-05-24 NOTE — PROGRESS NOTES
Received most recent mammogram, pap smear, and fecal immunochemical test scanned into chart today.

## 2019-05-24 NOTE — PROGRESS NOTES
Outside Data entered Per Care Coordinator         Priscila BLOOD LPN Care Coordinator  Care Coordination Department  [facility]  [phone]

## 2019-11-19 ENCOUNTER — LAB VISIT (OUTPATIENT)
Dept: LAB | Facility: HOSPITAL | Age: 64
End: 2019-11-19
Attending: FAMILY MEDICINE
Payer: COMMERCIAL

## 2019-11-19 ENCOUNTER — OFFICE VISIT (OUTPATIENT)
Dept: FAMILY MEDICINE | Facility: CLINIC | Age: 64
End: 2019-11-19
Payer: COMMERCIAL

## 2019-11-19 VITALS
HEIGHT: 64 IN | BODY MASS INDEX: 27.85 KG/M2 | WEIGHT: 163.13 LBS | SYSTOLIC BLOOD PRESSURE: 144 MMHG | HEART RATE: 64 BPM | DIASTOLIC BLOOD PRESSURE: 76 MMHG | OXYGEN SATURATION: 97 % | TEMPERATURE: 98 F

## 2019-11-19 DIAGNOSIS — E88.819 INSULIN RESISTANCE: ICD-10-CM

## 2019-11-19 DIAGNOSIS — R53.83 FATIGUE, UNSPECIFIED TYPE: ICD-10-CM

## 2019-11-19 DIAGNOSIS — I10 ESSENTIAL HYPERTENSION: ICD-10-CM

## 2019-11-19 DIAGNOSIS — I10 ESSENTIAL HYPERTENSION: Primary | ICD-10-CM

## 2019-11-19 DIAGNOSIS — R31.9 HEMATURIA, UNSPECIFIED TYPE: ICD-10-CM

## 2019-11-19 DIAGNOSIS — R35.0 INCREASED URINARY FREQUENCY: ICD-10-CM

## 2019-11-19 LAB
ALBUMIN SERPL BCP-MCNC: 4.1 G/DL (ref 3.5–5.2)
ALP SERPL-CCNC: 98 U/L (ref 55–135)
ALT SERPL W/O P-5'-P-CCNC: 18 U/L (ref 10–44)
ANION GAP SERPL CALC-SCNC: 8 MMOL/L (ref 8–16)
AST SERPL-CCNC: 17 U/L (ref 10–40)
BACTERIA #/AREA URNS AUTO: ABNORMAL /HPF
BASOPHILS # BLD AUTO: 0.04 K/UL (ref 0–0.2)
BASOPHILS NFR BLD: 0.4 % (ref 0–1.9)
BILIRUB SERPL-MCNC: 0.4 MG/DL (ref 0.1–1)
BILIRUB SERPL-MCNC: NEGATIVE MG/DL
BLOOD URINE, POC: 250
BUN SERPL-MCNC: 11 MG/DL (ref 8–23)
CALCIUM SERPL-MCNC: 9.3 MG/DL (ref 8.7–10.5)
CHLORIDE SERPL-SCNC: 104 MMOL/L (ref 95–110)
CO2 SERPL-SCNC: 28 MMOL/L (ref 23–29)
COLOR, POC UA: YELLOW
CREAT SERPL-MCNC: 0.8 MG/DL (ref 0.5–1.4)
DIFFERENTIAL METHOD: ABNORMAL
EOSINOPHIL # BLD AUTO: 0.1 K/UL (ref 0–0.5)
EOSINOPHIL NFR BLD: 1.5 % (ref 0–8)
ERYTHROCYTE [DISTWIDTH] IN BLOOD BY AUTOMATED COUNT: 13.3 % (ref 11.5–14.5)
EST. GFR  (AFRICAN AMERICAN): >60 ML/MIN/1.73 M^2
EST. GFR  (NON AFRICAN AMERICAN): >60 ML/MIN/1.73 M^2
GLUCOSE SERPL-MCNC: 83 MG/DL (ref 70–110)
GLUCOSE UR QL STRIP: NORMAL
HCT VFR BLD AUTO: 40.3 % (ref 37–48.5)
HGB BLD-MCNC: 12.4 G/DL (ref 12–16)
IMM GRANULOCYTES # BLD AUTO: 0.02 K/UL (ref 0–0.04)
IMM GRANULOCYTES NFR BLD AUTO: 0.2 % (ref 0–0.5)
KETONES UR QL STRIP: NEGATIVE
LEUKOCYTE ESTERASE URINE, POC: ABNORMAL
LYMPHOCYTES # BLD AUTO: 3.3 K/UL (ref 1–4.8)
LYMPHOCYTES NFR BLD: 36 % (ref 18–48)
MCH RBC QN AUTO: 29.1 PG (ref 27–31)
MCHC RBC AUTO-ENTMCNC: 30.8 G/DL (ref 32–36)
MCV RBC AUTO: 95 FL (ref 82–98)
MICROSCOPIC COMMENT: ABNORMAL
MONOCYTES # BLD AUTO: 0.6 K/UL (ref 0.3–1)
MONOCYTES NFR BLD: 6.6 % (ref 4–15)
NEUTROPHILS # BLD AUTO: 5.1 K/UL (ref 1.8–7.7)
NEUTROPHILS NFR BLD: 55.3 % (ref 38–73)
NITRITE, POC UA: NEGATIVE
NRBC BLD-RTO: 0 /100 WBC
PH, POC UA: 5
PLATELET # BLD AUTO: 273 K/UL (ref 150–350)
PMV BLD AUTO: 9.8 FL (ref 9.2–12.9)
POTASSIUM SERPL-SCNC: 3.5 MMOL/L (ref 3.5–5.1)
PROT SERPL-MCNC: 8 G/DL (ref 6–8.4)
PROTEIN, POC: NEGATIVE
RBC # BLD AUTO: 4.26 M/UL (ref 4–5.4)
RBC #/AREA URNS AUTO: 8 /HPF (ref 0–4)
SODIUM SERPL-SCNC: 140 MMOL/L (ref 136–145)
SPECIFIC GRAVITY, POC UA: 1.01
SQUAMOUS #/AREA URNS AUTO: 7 /HPF
TSH SERPL DL<=0.005 MIU/L-ACNC: 1.11 UIU/ML (ref 0.4–4)
UROBILINOGEN, POC UA: NORMAL
WBC # BLD AUTO: 9.22 K/UL (ref 3.9–12.7)
WBC #/AREA URNS AUTO: 3 /HPF (ref 0–5)

## 2019-11-19 PROCEDURE — 3008F BODY MASS INDEX DOCD: CPT | Mod: CPTII,S$GLB,, | Performed by: FAMILY MEDICINE

## 2019-11-19 PROCEDURE — 3078F PR MOST RECENT DIASTOLIC BLOOD PRESSURE < 80 MM HG: ICD-10-PCS | Mod: CPTII,S$GLB,, | Performed by: FAMILY MEDICINE

## 2019-11-19 PROCEDURE — 36415 COLL VENOUS BLD VENIPUNCTURE: CPT | Mod: PO

## 2019-11-19 PROCEDURE — 84443 ASSAY THYROID STIM HORMONE: CPT

## 2019-11-19 PROCEDURE — 3008F PR BODY MASS INDEX (BMI) DOCUMENTED: ICD-10-PCS | Mod: CPTII,S$GLB,, | Performed by: FAMILY MEDICINE

## 2019-11-19 PROCEDURE — 99214 PR OFFICE/OUTPT VISIT, EST, LEVL IV, 30-39 MIN: ICD-10-PCS | Mod: 25,S$GLB,, | Performed by: FAMILY MEDICINE

## 2019-11-19 PROCEDURE — 3077F SYST BP >= 140 MM HG: CPT | Mod: CPTII,S$GLB,, | Performed by: FAMILY MEDICINE

## 2019-11-19 PROCEDURE — 81001 URINALYSIS AUTO W/SCOPE: CPT | Mod: QW,S$GLB,, | Performed by: FAMILY MEDICINE

## 2019-11-19 PROCEDURE — 85025 COMPLETE CBC W/AUTO DIFF WBC: CPT

## 2019-11-19 PROCEDURE — 99999 PR PBB SHADOW E&M-EST. PATIENT-LVL III: CPT | Mod: PBBFAC,,, | Performed by: FAMILY MEDICINE

## 2019-11-19 PROCEDURE — 99999 PR PBB SHADOW E&M-EST. PATIENT-LVL III: ICD-10-PCS | Mod: PBBFAC,,, | Performed by: FAMILY MEDICINE

## 2019-11-19 PROCEDURE — 81001 URINALYSIS AUTO W/SCOPE: CPT

## 2019-11-19 PROCEDURE — 80053 COMPREHEN METABOLIC PANEL: CPT

## 2019-11-19 PROCEDURE — 99214 OFFICE O/P EST MOD 30 MIN: CPT | Mod: 25,S$GLB,, | Performed by: FAMILY MEDICINE

## 2019-11-19 PROCEDURE — 81001 POCT URINALYSIS, DIPSTICK OR TABLET REAGENT, AUTOMATED, WITH MICROSCOP: ICD-10-PCS | Mod: QW,S$GLB,, | Performed by: FAMILY MEDICINE

## 2019-11-19 PROCEDURE — 3077F PR MOST RECENT SYSTOLIC BLOOD PRESSURE >= 140 MM HG: ICD-10-PCS | Mod: CPTII,S$GLB,, | Performed by: FAMILY MEDICINE

## 2019-11-19 PROCEDURE — 3078F DIAST BP <80 MM HG: CPT | Mod: CPTII,S$GLB,, | Performed by: FAMILY MEDICINE

## 2019-11-19 RX ORDER — CHOLECALCIFEROL (VITAMIN D3) 125 MCG
CAPSULE ORAL
COMMUNITY
End: 2024-02-29

## 2019-11-19 RX ORDER — ESTRADIOL 0.1 MG/G
CREAM VAGINAL
Refills: 4 | COMMUNITY
Start: 2019-10-08 | End: 2022-02-15 | Stop reason: SDUPTHER

## 2019-11-19 RX ORDER — PANTOPRAZOLE SODIUM 40 MG/1
TABLET, DELAYED RELEASE ORAL
COMMUNITY
Start: 2018-02-07 | End: 2020-01-24

## 2019-11-19 RX ORDER — HYDROCHLOROTHIAZIDE 12.5 MG/1
12.5 TABLET ORAL DAILY
Qty: 30 TABLET | Refills: 2 | Status: SHIPPED | OUTPATIENT
Start: 2019-11-19 | End: 2020-01-24 | Stop reason: SDUPTHER
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
## 2019-11-20 ENCOUNTER — TELEPHONE (OUTPATIENT)
Dept: FAMILY MEDICINE | Facility: CLINIC | Age: 64
End: 2019-11-20

## 2019-11-20 DIAGNOSIS — R31.9 HEMATURIA, UNSPECIFIED TYPE: Primary | ICD-10-CM

## 2019-11-20 NOTE — TELEPHONE ENCOUNTER
Patient requesting result not from labs completed yesterday.    ----- Message from Johana Rodgers sent at 11/20/2019  9:43 AM CST -----  Contact: pt  .Type:  Test Results    Who Called:  pt  Name of Test (Lab/Mammo/Etc):  Labs   Date of Test: 11/19  Ordering Provider:    Where the test was performed:    Would the patient rather a call back or a response via [facility]?  Call back   Best Call Back Number:  [phone] (work)  Additional Information:

## 2020-01-17 ENCOUNTER — PATIENT OUTREACH (OUTPATIENT)
Dept: ADMINISTRATIVE | Facility: HOSPITAL | Age: 65
End: 2020-01-17

## 2020-01-17 NOTE — PROGRESS NOTES
Contacted patient to follow up on overdue fit kit. Patient agreed to have a fit kit mailed to her home.

## 2020-01-21 ENCOUNTER — PATIENT OUTREACH (OUTPATIENT)
Dept: ADMINISTRATIVE | Facility: HOSPITAL | Age: 65
End: 2020-01-21

## 2020-01-21 DIAGNOSIS — Z12.11 ENCOUNTER FOR SCREENING COLONOSCOPY: Primary | ICD-10-CM

## 2020-01-24 ENCOUNTER — LAB VISIT (OUTPATIENT)
Dept: LAB | Facility: HOSPITAL | Age: 65
End: 2020-01-24
Attending: FAMILY MEDICINE
Payer: COMMERCIAL

## 2020-01-24 ENCOUNTER — OFFICE VISIT (OUTPATIENT)
Dept: FAMILY MEDICINE | Facility: CLINIC | Age: 65
End: 2020-01-24
Payer: COMMERCIAL

## 2020-01-24 ENCOUNTER — TELEPHONE (OUTPATIENT)
Dept: FAMILY MEDICINE | Facility: CLINIC | Age: 65
End: 2020-01-24

## 2020-01-24 ENCOUNTER — LAB VISIT (OUTPATIENT)
Dept: LAB | Facility: HOSPITAL | Age: 65
End: 2020-01-24
Payer: COMMERCIAL

## 2020-01-24 ENCOUNTER — CLINICAL SUPPORT (OUTPATIENT)
Dept: CARDIOLOGY | Facility: CLINIC | Age: 65
End: 2020-01-24
Payer: COMMERCIAL

## 2020-01-24 VITALS
DIASTOLIC BLOOD PRESSURE: 77 MMHG | SYSTOLIC BLOOD PRESSURE: 131 MMHG | WEIGHT: 161.81 LBS | TEMPERATURE: 98 F | BODY MASS INDEX: 27.63 KG/M2 | OXYGEN SATURATION: 99 % | HEART RATE: 61 BPM | HEIGHT: 64 IN

## 2020-01-24 DIAGNOSIS — Z12.11 SCREENING FOR COLON CANCER: ICD-10-CM

## 2020-01-24 DIAGNOSIS — R00.2 PALPITATIONS: ICD-10-CM

## 2020-01-24 DIAGNOSIS — E88.819 INSULIN RESISTANCE: ICD-10-CM

## 2020-01-24 DIAGNOSIS — R31.9 HEMATURIA, UNSPECIFIED TYPE: ICD-10-CM

## 2020-01-24 DIAGNOSIS — E78.5 HYPERLIPIDEMIA, UNSPECIFIED HYPERLIPIDEMIA TYPE: ICD-10-CM

## 2020-01-24 DIAGNOSIS — I10 ESSENTIAL HYPERTENSION: ICD-10-CM

## 2020-01-24 DIAGNOSIS — I10 ESSENTIAL HYPERTENSION: Primary | ICD-10-CM

## 2020-01-24 LAB
BACTERIA #/AREA URNS HPF: ABNORMAL /HPF
CHOLEST SERPL-MCNC: 246 MG/DL (ref 120–199)
CHOLEST/HDLC SERPL: 3.6 {RATIO} (ref 2–5)
ESTIMATED AVG GLUCOSE: 111 MG/DL (ref 68–131)
HBA1C MFR BLD HPLC: 5.5 % (ref 4–5.6)
HDLC SERPL-MCNC: 68 MG/DL (ref 40–75)
HDLC SERPL: 27.6 % (ref 20–50)
LDLC SERPL CALC-MCNC: 164.2 MG/DL (ref 63–159)
MICROSCOPIC COMMENT: ABNORMAL
NONHDLC SERPL-MCNC: 178 MG/DL
RBC #/AREA URNS HPF: 3 /HPF (ref 0–4)
SQUAMOUS #/AREA URNS HPF: 10 /HPF
TRIGL SERPL-MCNC: 69 MG/DL (ref 30–150)
WBC #/AREA URNS HPF: 6 /HPF (ref 0–5)

## 2020-01-24 PROCEDURE — 99214 OFFICE O/P EST MOD 30 MIN: CPT | Mod: S$GLB,,, | Performed by: FAMILY MEDICINE

## 2020-01-24 PROCEDURE — 83036 HEMOGLOBIN GLYCOSYLATED A1C: CPT

## 2020-01-24 PROCEDURE — 3008F PR BODY MASS INDEX (BMI) DOCUMENTED: ICD-10-PCS | Mod: CPTII,S$GLB,, | Performed by: FAMILY MEDICINE

## 2020-01-24 PROCEDURE — 93010 ELECTROCARDIOGRAM REPORT: CPT | Mod: S$GLB,,, | Performed by: INTERNAL MEDICINE

## 2020-01-24 PROCEDURE — 93010 EKG 12-LEAD: ICD-10-PCS | Mod: S$GLB,,, | Performed by: INTERNAL MEDICINE

## 2020-01-24 PROCEDURE — 93005 EKG 12-LEAD: ICD-10-PCS | Mod: S$GLB,,, | Performed by: FAMILY MEDICINE

## 2020-01-24 PROCEDURE — 81000 URINALYSIS NONAUTO W/SCOPE: CPT

## 2020-01-24 PROCEDURE — 99214 PR OFFICE/OUTPT VISIT, EST, LEVL IV, 30-39 MIN: ICD-10-PCS | Mod: S$GLB,,, | Performed by: FAMILY MEDICINE

## 2020-01-24 PROCEDURE — 36415 COLL VENOUS BLD VENIPUNCTURE: CPT

## 2020-01-24 PROCEDURE — 3078F DIAST BP <80 MM HG: CPT | Mod: CPTII,S$GLB,, | Performed by: FAMILY MEDICINE

## 2020-01-24 PROCEDURE — 99999 PR PBB SHADOW E&M-EST. PATIENT-LVL III: ICD-10-PCS | Mod: PBBFAC,,, | Performed by: FAMILY MEDICINE

## 2020-01-24 PROCEDURE — 3075F SYST BP GE 130 - 139MM HG: CPT | Mod: CPTII,S$GLB,, | Performed by: FAMILY MEDICINE

## 2020-01-24 PROCEDURE — 3008F BODY MASS INDEX DOCD: CPT | Mod: CPTII,S$GLB,, | Performed by: FAMILY MEDICINE

## 2020-01-24 PROCEDURE — 80061 LIPID PANEL: CPT

## 2020-01-24 PROCEDURE — 3075F PR MOST RECENT SYSTOLIC BLOOD PRESS GE 130-139MM HG: ICD-10-PCS | Mod: CPTII,S$GLB,, | Performed by: FAMILY MEDICINE

## 2020-01-24 PROCEDURE — 99999 PR PBB SHADOW E&M-EST. PATIENT-LVL III: CPT | Mod: PBBFAC,,, | Performed by: FAMILY MEDICINE

## 2020-01-24 PROCEDURE — 93005 ELECTROCARDIOGRAM TRACING: CPT | Mod: S$GLB,,, | Performed by: FAMILY MEDICINE

## 2020-01-24 PROCEDURE — 3078F PR MOST RECENT DIASTOLIC BLOOD PRESSURE < 80 MM HG: ICD-10-PCS | Mod: CPTII,S$GLB,, | Performed by: FAMILY MEDICINE

## 2020-01-24 RX ORDER — HYDROCHLOROTHIAZIDE 12.5 MG/1
12.5 TABLET ORAL DAILY
Qty: 30 TABLET | Refills: 11 | Status: SHIPPED | OUTPATIENT
Start: 2020-01-24 | End: 2020-01-27 | Stop reason: SDUPTHER

## 2020-01-24 RX ORDER — NITROFURANTOIN 25; 75 MG/1; MG/1
100 CAPSULE ORAL 2 TIMES DAILY
Qty: 14 CAPSULE | Refills: 0 | Status: SHIPPED | OUTPATIENT
Start: 2020-01-24 | End: 2020-01-31

## 2020-01-24 NOTE — PROGRESS NOTES
Subjective:      Patient ID: Cesario Sahu is a 64 y.o. female.    Chief Complaint: Follow-up (4 wk f/u HTN )    HPI    Patient here today for HTN follow up and insulin resistance - needs A1c  Notes new medication -hctz  Tolerating new medication well   Blood pressure at goal   Has lost weight 2 lbs since last visit   Would like lipid panel today     She notes that she has a hx of blood in her urine had full work up by urology - would like to  Hold off on further testing for this for now     Notes some flutter in her heart. Mostly at night. Not often but 1-2 x per week. No associated sob or dizziness or headache or chest pain. Drinks lots of coffee at work     Past Medical History:   Diagnosis Date    Hypertension     Insulin resistance        Past Surgical History:   Procedure Laterality Date    ADENOIDECTOMY      CHOLECYSTECTOMY      DILATION AND CURETTAGE OF UTERUS  10/2018       Family History   Problem Relation Age of Onset    Hypertension Mother     Heart disease Sister     Hyperlipidemia Sister     Hypertension Sister     Diabetes Brother        Social History     Socioeconomic History    Marital status:      Spouse name: Not on file    Number of children: 3    Years of education: Not on file    Highest education level: Not on file   Occupational History     Comment: financial     Social Needs    Financial resource strain: Not on file    Food insecurity:     Worry: Not on file     Inability: Not on file    Transportation needs:     Medical: Not on file     Non-medical: Not on file   Tobacco Use    Smoking status: Never Smoker    Smokeless tobacco: Never Used   Substance and Sexual Activity    Alcohol use: Yes     Frequency: 2-3 times a week     Drinks per session: 1 or 2     Binge frequency: Never     Comment: Socially    Drug use: Never    Sexual activity: Not on file   Lifestyle    Physical activity:     Days per week: Not on file     Minutes per session:  Not on file    Stress: Not on file   Relationships    Social connections:     Talks on phone: Not on file     Gets together: Not on file     Attends Mandaeism service: Not on file     Active member of club or organization: Not on file     Attends meetings of clubs or organizations: Not on file     Relationship status: Not on file   Other Topics Concern    Not on file   Social History Narrative    The patient remarried in June 2018.  She has 3 adult children.  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       Health Maintenance Topics with due status: Not Due       Topic Last Completion Date    Pap Smear with HPV Cotest 01/08/2019    Mammogram 01/08/2019    Lipid Panel 05/14/2019       Medication List with Changes/Refills   Current Medications    ASPIRIN (ECOTRIN) 81 MG EC TABLET    Take 81 mg by mouth once daily.    BIOTIN 5,000 MCG TBDL    Take by mouth.    ESTRADIOL (ESTRACE) 0.01 % (0.1 MG/GRAM) VAGINAL CREAM    INSERT 1 APPLICATORFUL VAGINALLY THREE TIMES A WEEK   Changed and/or Refilled Medications    Modified Medication Previous Medication    HYDROCHLOROTHIAZIDE (HYDRODIURIL) 12.5 MG TAB hydroCHLOROthiazide (HYDRODIURIL) 12.5 MG Tab       Take 1 tablet (12.5 mg total) by mouth once daily.    Take 1 tablet (12.5 mg total) by mouth once daily.   Discontinued Medications    LACTOBACILLUS RHAMNOSUS GG (CULTURELLE) 10 BILLION CELL CAPSULE    Take 1 capsule by mouth once daily.    PANTOPRAZOLE (PROTONIX) 40 MG TABLET    TAKE ONE TABLET BY MOUTH ONCE DAILY FOR  STOMACH    VITAMIN E 400 UNIT CAPSULE    Take 400 Units by mouth once daily.       Review of patient's allergies indicates:  No Known Allergies    Review of Systems   Constitutional: Negative for chills and fever.   HENT: Negative for ear pain.    Eyes: Negative for blurred vision.   Respiratory: Negative for shortness of breath.    Cardiovascular: Negative for chest pain and leg swelling.   Gastrointestinal: Negative for abdominal pain, constipation  and diarrhea.   Genitourinary: Negative for dysuria.   Neurological: Negative for headaches.       Objective:     Vitals:    01/24/20 0711   BP: 131/77   Pulse: 61   Temp: 97.7 °F (36.5 °C)     Body mass index is 27.78 kg/m².    Physical Exam   Constitutional: She is oriented to person, place, and time. She appears well-developed and well-nourished. No distress.   HENT:   Head: Normocephalic.   Eyes: Conjunctivae are normal.   Cardiovascular: Normal rate, regular rhythm and normal heart sounds.   No murmur heard.  Pulmonary/Chest: Effort normal and breath sounds normal. No respiratory distress. She has no wheezes.   Abdominal: Soft. Bowel sounds are normal. There is no tenderness.   Musculoskeletal: She exhibits no edema.   Neurological: She is alert and oriented to person, place, and time.   Skin: Skin is warm and dry.   Psychiatric: She has a normal mood and affect.   Nursing note and vitals reviewed.      Assessment and Plan:     Essential hypertension  Blood pressure controlled at today's visit   Continue with current medications   Ambulatory logs of blood pressure readings recommended   DASH diet, weight loss, exercise     Insulin resistance  -     Hemoglobin A1c; Future; Expected date: 01/24/2020    Hyperlipidemia, unspecified hyperlipidemia type  -     Lipid panel; Future; Expected date: 01/24/2020    Screening for colon cancer  -     Fecal Immunochemical Test (iFOBT); Future; Expected date: 01/24/2020    Palpitations   Will get EKG   Advised decrease in caffeine     Follow up in about 6 months (around 7/24/2020).    @@

## 2020-01-27 ENCOUNTER — TELEPHONE (OUTPATIENT)
Dept: FAMILY MEDICINE | Facility: CLINIC | Age: 65
End: 2020-01-27

## 2020-01-27 DIAGNOSIS — R00.2 PALPITATIONS: Primary | ICD-10-CM

## 2020-01-27 RX ORDER — HYDROCHLOROTHIAZIDE 12.5 MG/1
12.5 TABLET ORAL DAILY
Qty: 30 TABLET | Refills: 11 | Status: SHIPPED | OUTPATIENT
Start: 2020-01-27 | End: 2021-03-29

## 2020-01-27 NOTE — TELEPHONE ENCOUNTER
----- Message from Yohana Morrell sent at 1/27/2020  1:05 PM CST -----  Contact: Pt  Please give pt a call back .[phone] (home) [phone] (work) she is returning the call

## 2020-01-28 ENCOUNTER — TELEPHONE (OUTPATIENT)
Dept: FAMILY MEDICINE | Facility: CLINIC | Age: 65
End: 2020-01-28

## 2020-01-28 NOTE — TELEPHONE ENCOUNTER
----- Message from Niki Cramer sent at 1/28/2020  8:38 AM CST -----  Contact: Pt  Pt is requesting call back in regards to questions about referral.          Pls call back at [phone]

## 2020-02-05 ENCOUNTER — TELEPHONE (OUTPATIENT)
Dept: FAMILY MEDICINE | Facility: CLINIC | Age: 65
End: 2020-02-05

## 2020-02-05 NOTE — TELEPHONE ENCOUNTER
----- Message from Solange Foster sent at 2/5/2020 12:56 PM CST -----  Contact: pt  States she called [facility] on [address] and they don't have a prescription for her cholesterol medicine. States this will be her first time to take it, she doesn't know the name of the medication. Please call pt [phone]. Thank you

## 2020-02-06 RX ORDER — PRAVASTATIN SODIUM 10 MG/1
10 TABLET ORAL DAILY
Qty: 30 TABLET | Refills: 11 | Status: SHIPPED | OUTPATIENT
Start: 2020-02-06 | End: 2020-07-28

## 2020-02-06 NOTE — TELEPHONE ENCOUNTER
States she called [facility] on [address] and they don't have a prescription for her cholesterol medicine. States this will be her first time to take it, she doesn't know the name of the medication.

## 2020-02-20 ENCOUNTER — OFFICE VISIT (OUTPATIENT)
Dept: CARDIOLOGY | Facility: CLINIC | Age: 65
End: 2020-02-20
Payer: COMMERCIAL

## 2020-02-20 VITALS
BODY MASS INDEX: 27.89 KG/M2 | SYSTOLIC BLOOD PRESSURE: 138 MMHG | HEART RATE: 73 BPM | WEIGHT: 162.5 LBS | OXYGEN SATURATION: 98 % | DIASTOLIC BLOOD PRESSURE: 80 MMHG

## 2020-02-20 DIAGNOSIS — R07.89 OTHER CHEST PAIN: ICD-10-CM

## 2020-02-20 DIAGNOSIS — I10 ESSENTIAL HYPERTENSION: Primary | ICD-10-CM

## 2020-02-20 DIAGNOSIS — Z91.89 AT RISK FOR CORONARY ARTERY DISEASE: ICD-10-CM

## 2020-02-20 DIAGNOSIS — I49.3 PVC (PREMATURE VENTRICULAR CONTRACTION): ICD-10-CM

## 2020-02-20 DIAGNOSIS — E78.2 MIXED HYPERLIPIDEMIA: ICD-10-CM

## 2020-02-20 DIAGNOSIS — R94.31 EKG ABNORMALITIES: ICD-10-CM

## 2020-02-20 PROCEDURE — 99999 PR PBB SHADOW E&M-EST. PATIENT-LVL III: CPT | Mod: PBBFAC,,, | Performed by: INTERNAL MEDICINE

## 2020-02-20 PROCEDURE — 99999 PR PBB SHADOW E&M-EST. PATIENT-LVL III: ICD-10-PCS | Mod: PBBFAC,,, | Performed by: INTERNAL MEDICINE

## 2020-02-20 PROCEDURE — 99245 OFF/OP CONSLTJ NEW/EST HI 55: CPT | Mod: S$GLB,,, | Performed by: INTERNAL MEDICINE

## 2020-02-20 PROCEDURE — 99245 PR OFFICE CONSULTATION,LEVEL V: ICD-10-PCS | Mod: S$GLB,,, | Performed by: INTERNAL MEDICINE

## 2020-02-20 RX ORDER — METOPROLOL SUCCINATE 25 MG/1
25 TABLET, EXTENDED RELEASE ORAL DAILY
Qty: 30 TABLET | Refills: 5 | Status: SHIPPED | OUTPATIENT
Start: 2020-02-20 | End: 2020-07-28

## 2020-02-20 NOTE — PROGRESS NOTES
Subjective:   Patient ID:  Cesario Sahu is a 64 y.o. female who presents for evaluation of No chief complaint on file.      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 No smoking/drinking. Off coffee for 2 weeks  EKG in  NSR, PVC, LVH and stt change  C/o palpitation, worse at night, fluttering sensation.  Some chest tightness.   No dyspnea, dizziness, faint and orthopnea.  Gym exercise 4 times a week, 30 mins on treadmill, but recently felt tired.  Mother healthy 92. Father not know too much.  H/o intolearnce of statin due to nausea. NOT start Pravastatin yet        Past Medical History:   Diagnosis Date    Hypertension     Insulin resistance        Past Surgical History:   Procedure Laterality Date    ADENOIDECTOMY      CHOLECYSTECTOMY      DILATION AND CURETTAGE OF UTERUS  10/2018       Social History     Tobacco Use    Smoking status: Never Smoker    Smokeless tobacco: Never Used   Substance Use Topics    Alcohol use: Yes     Frequency: 2-3 times a week     Drinks per session: 1 or 2     Binge frequency: Never     Comment: Socially    Drug use: Never       Family History   Problem Relation Age of Onset    Hypertension Mother     Heart disease Sister     Hyperlipidemia Sister     Hypertension Sister     Diabetes Brother        Review of Systems   Constitution: Negative for decreased appetite, diaphoresis, fever, malaise/fatigue and night sweats.   HENT: Negative for nosebleeds.    Eyes: Negative for blurred vision and double vision.   Cardiovascular: Positive for chest pain and palpitations. Negative for claudication, dyspnea on exertion, irregular heartbeat, leg swelling, near-syncope, orthopnea, paroxysmal nocturnal dyspnea and syncope.   Respiratory: Negative for cough, shortness of breath, sleep disturbances due to breathing, snoring, sputum production and wheezing.    Endocrine: Negative for cold intolerance and polyuria.   Hematologic/Lymphatic: Does not bruise/bleed  easily.   Skin: Negative for rash.   Musculoskeletal: Negative for back pain, falls, joint pain, joint swelling and neck pain.   Gastrointestinal: Negative for abdominal pain, heartburn, nausea and vomiting.   Genitourinary: Negative for dysuria, frequency and hematuria.   Neurological: Negative for difficulty with concentration, dizziness, focal weakness, headaches, light-headedness, numbness, seizures and weakness.   Psychiatric/Behavioral: Negative for depression, memory loss and substance abuse. The patient does not have insomnia.    Allergic/Immunologic: Negative for HIV exposure and hives.       Objective:   Physical Exam   Constitutional: She is oriented to person, place, and time. She appears well-nourished.   HENT:   Head: Normocephalic.   Eyes: Pupils are equal, round, and reactive to light.   Neck: Normal carotid pulses and no JVD present. Carotid bruit is not present. No thyromegaly present.   Cardiovascular: Normal rate, regular rhythm, normal heart sounds and normal pulses.  No extrasystoles are present. PMI is not displaced. Exam reveals no gallop and no S3.   No murmur heard.  Pulmonary/Chest: Breath sounds normal. No stridor. No respiratory distress.   Abdominal: Soft. Bowel sounds are normal. There is no tenderness. There is no rebound.   Musculoskeletal: Normal range of motion.   Neurological: She is alert and oriented to person, place, and time.   Skin: Skin is intact. No rash noted.   Psychiatric: Her behavior is normal.       Lab Results   Component Value Date    CHOL 246 (H) 01/24/2020    CHOL 231 (H) 05/14/2019    CHOL 232 (H) 02/06/2018     Lab Results   Component Value Date    HDL 68 01/24/2020    HDL 74 05/14/2019    HDL 68 02/06/2018     Lab Results   Component Value Date    LDLCALC 164.2 (H) 01/24/2020    LDLCALC 144.6 05/14/2019    LDLCALC 149.8 02/06/2018     Lab Results   Component Value Date    TRIG 69 01/24/2020    TRIG 62 05/14/2019    TRIG 71 02/06/2018     Lab Results   Component  Value Date    CHOLHDL 27.6 01/24/2020    CHOLHDL 32.0 05/14/2019    CHOLHDL 29.3 02/06/2018       Chemistry        Component Value Date/Time     11/19/2019 0800    K 3.5 11/19/2019 0800     11/19/2019 0800    CO2 28 11/19/2019 0800    BUN 11 11/19/2019 0800    CREATININE 0.8 11/19/2019 0800    GLU 83 11/19/2019 0800        Component Value Date/Time    CALCIUM 9.3 11/19/2019 0800    ALKPHOS 98 11/19/2019 0800    AST 17 11/19/2019 0800    ALT 18 11/19/2019 0800    BILITOT 0.4 11/19/2019 0800    ESTGFRAFRICA >60.0 11/19/2019 0800    EGFRNONAA >60.0 11/19/2019 0800          Lab Results   Component Value Date    HGBA1C 5.5 01/24/2020     Lab Results   Component Value Date    TSH 1.113 11/19/2019     No results found for: INR, PROTIME  Lab Results   Component Value Date    WBC 9.22 11/19/2019    HGB 12.4 11/19/2019    HCT 40.3 11/19/2019    MCV 95 11/19/2019     11/19/2019     BNP  @LABRCNTIP(BNP,BNPTRIAGEBLO)@  CrCl cannot be calculated (Patient's most recent lab result is older than the maximum 7 days allowed.).  No results found in the last 24 hours.  No results found in the last 24 hours.  No results found in the last 24 hours.    Assessment:      1. Essential hypertension    2. Mixed hyperlipidemia    3. PVC (premature ventricular contraction)    4. EKG abnormalities    5. Other chest pain      ASCVD 11.7% based on no DM.     Plan:   Exercise stress echo . baseline EKG STT change and atypical CP.  holter for PVC load  Add ToprolXL 25 mg daily after finishes the tests  ABIs and calciums score tests to evaluate if needs statin Rx.  Counseled DASH  Recommend heart-healthy diet, weight control and regular exercise.  Tiffanie. Risk modification.   F/u with pcp    I have reviewed all pertinent labs and cardiac studies. Plans and recommendations have been formulated under my direct supervision. All questions answered and patient voiced understanding. Patient to continue current medications.

## 2020-02-20 NOTE — LETTER
February 23, 2020      Rosemarie Meeks MD  [address]  [place] [address]           [facility]  [address]  [place] [address]  Phone: [phone]  Fax: [phone]          Patient: Cesario Sahu   MR Number: [id number]   YOB: 1955   Date of Visit: 2/20/2020       Dear Dr. Rosemarie Meeks:    Thank you for referring Cesario Sahu to me for evaluation. Attached you will find relevant portions of my assessment and plan of care.    If you have questions, please do not hesitate to call me. I look forward to following Cesario Sahu along with you.    Sincerely,    Rene Vanessa MD    Enclosure  CC:  No Recipients    If you would like to receive this communication electronically, please contact [email or web address] or [phone] to request more information on [address] [employer] access.    For providers and/or their staff who would like to refer a patient to [facility], please contact us through our one-stop-shop provider referral line, [facility] , at [phone].    If you feel you have received this communication in error or would no longer like to receive these types of communications, please e-mail [email or web address]

## 2020-02-27 ENCOUNTER — LAB VISIT (OUTPATIENT)
Dept: LAB | Facility: HOSPITAL | Age: 65
End: 2020-02-27
Attending: FAMILY MEDICINE
Payer: COMMERCIAL

## 2020-02-27 DIAGNOSIS — Z12.11 SCREENING FOR COLON CANCER: ICD-10-CM

## 2020-02-27 PROCEDURE — 82274 ASSAY TEST FOR BLOOD FECAL: CPT

## 2020-03-06 LAB — HEMOCCULT STL QL IA: NEGATIVE

## 2020-03-20 ENCOUNTER — TELEPHONE (OUTPATIENT)
Dept: CARDIOLOGY | Facility: CLINIC | Age: 65
End: 2020-03-20

## 2020-03-20 NOTE — TELEPHONE ENCOUNTER
----- Message from Unique Juan, RT sent at 3/20/2020 12:56 PM CDT -----  Contact: Radiology  Due to concerns associated with Covid19 the radiology team is seeking to reschedule outpatient diagnostic exams between 3/21 - 4/21 that have been ordered prior to 3/19.  (patient name) is scheduled for (exam) on (date) at (location). Please respond to this message if you believe it is safe to postpone this exam and the radiology team will reschedule and update you on the patient's response.  If you have concerns that postponement is not safe (urgent or time sensitive diagnostic study),then reply and it will be performed as ordered.   If further discussion needed, please provide a contact number and a Radiologist will reach out to you shortly.

## 2020-05-27 ENCOUNTER — TELEPHONE (OUTPATIENT)
Dept: RADIOLOGY | Facility: HOSPITAL | Age: 65
End: 2020-05-27

## 2020-07-21 ENCOUNTER — OFFICE VISIT (OUTPATIENT)
Dept: FAMILY MEDICINE | Facility: CLINIC | Age: 65
End: 2020-07-21
Payer: COMMERCIAL

## 2020-07-21 DIAGNOSIS — Z20.822 EXPOSURE TO COVID-19 VIRUS: Primary | ICD-10-CM

## 2020-07-21 PROCEDURE — 99213 OFFICE O/P EST LOW 20 MIN: CPT | Mod: 95,,, | Performed by: FAMILY MEDICINE

## 2020-07-21 PROCEDURE — 1101F PT FALLS ASSESS-DOCD LE1/YR: CPT | Mod: CPTII,,, | Performed by: FAMILY MEDICINE

## 2020-07-21 PROCEDURE — 99213 PR OFFICE/OUTPT VISIT, EST, LEVL III, 20-29 MIN: ICD-10-PCS | Mod: 95,,, | Performed by: FAMILY MEDICINE

## 2020-07-21 PROCEDURE — 1101F PR PT FALLS ASSESS DOC 0-1 FALLS W/OUT INJ PAST YR: ICD-10-PCS | Mod: CPTII,,, | Performed by: FAMILY MEDICINE

## 2020-07-21 RX ORDER — PROMETHAZINE HYDROCHLORIDE AND DEXTROMETHORPHAN HYDROBROMIDE 6.25; 15 MG/5ML; MG/5ML
5 SYRUP ORAL EVERY 8 HOURS PRN
Qty: 180 ML | Refills: 0 | Status: SHIPPED | OUTPATIENT
Start: 2020-07-21 | End: 2020-07-31

## 2020-07-21 RX ORDER — AMOXICILLIN AND CLAVULANATE POTASSIUM 875; 125 MG/1; MG/1
1 TABLET, FILM COATED ORAL EVERY 12 HOURS
Qty: 20 TABLET | Refills: 0 | Status: SHIPPED | OUTPATIENT
Start: 2020-07-21 | End: 2020-07-28

## 2020-07-21 NOTE — PROGRESS NOTES
Subjective:      Patient ID: Cesario Sahu is a 65 y.o. female.    Patient seen today on virtual visit     Chief Complaint: No chief complaint on file.    HPI    Patient seen today for virtual visit. Notes she has a sore throat.     Sore throat started last night. Hurts to swallow and throat feels raw. Has had this in the past and was told it strep. Headache. Feel achy, cough, mild congestion, and fatigue. Unsure if she had a temperature. Patient with unknown exposure to covid. Could have been. Is still going to work. No loss of smell or taste. No diarrhea, no nausea, vomiting, no sob, no chest pain, no wheezing.   Has used ibuprofen and theraflu. - didn't help     Past Medical History:   Diagnosis Date    Hypertension     Insulin resistance        Past Surgical History:   Procedure Laterality Date    ADENOIDECTOMY      CHOLECYSTECTOMY      DILATION AND CURETTAGE OF UTERUS  10/2018       Family History   Problem Relation Age of Onset    Hypertension Mother     Heart disease Sister     Hyperlipidemia Sister     Hypertension Sister     Diabetes Brother        Social History     Socioeconomic History    Marital status:      Spouse name: Not on file    Number of children: 3    Years of education: Not on file    Highest education level: Not on file   Occupational History     Comment: financial     Social Needs    Financial resource strain: Not on file    Food insecurity     Worry: Not on file     Inability: Not on file    Transportation needs     Medical: Not on file     Non-medical: Not on file   Tobacco Use    Smoking status: Never Smoker    Smokeless tobacco: Never Used   Substance and Sexual Activity    Alcohol use: Yes     Frequency: 2-3 times a week     Drinks per session: 1 or 2     Binge frequency: Never     Comment: Socially    Drug use: Never    Sexual activity: Not on file   Lifestyle    Physical activity     Days per week: Not on file     Minutes per session:  Not on file    Stress: Not on file   Relationships    Social connections     Talks on phone: Not on file     Gets together: Not on file     Attends Mormonism service: Not on file     Active member of club or organization: Not on file     Attends meetings of clubs or organizations: Not on file     Relationship status: Not on file   Other Topics Concern    Not on file   Social History Narrative    The patient remarried in June 2018.  She has 3 adult children.  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       Health Maintenance Topics with due status: Not Due       Topic Last Completion Date    Mammogram 01/08/2019    Lipid Panel 01/24/2020    Colorectal Cancer Screening 03/06/2020    Influenza Vaccine Not Due       Medication List with Changes/Refills   New Medications    AMOXICILLIN-CLAVULANATE 875-125MG (AUGMENTIN) 875-125 MG PER TABLET    Take 1 tablet by mouth every 12 (twelve) hours. for 10 days    PROMETHAZINE-DEXTROMETHORPHAN (PROMETHAZINE-DM) 6.25-15 MG/5 ML SYRP    Take 5 mLs by mouth every 8 (eight) hours as needed.   Current Medications    ASPIRIN (ECOTRIN) 81 MG EC TABLET    Take 81 mg by mouth once daily.    BIOTIN 5,000 MCG TBDL    Take by mouth.    ESTRADIOL (ESTRACE) 0.01 % (0.1 MG/GRAM) VAGINAL CREAM    INSERT 1 APPLICATORFUL VAGINALLY THREE TIMES A WEEK    HYDROCHLOROTHIAZIDE (HYDRODIURIL) 12.5 MG TAB    Take 1 tablet (12.5 mg total) by mouth once daily.    METOPROLOL SUCCINATE (TOPROL-XL) 25 MG 24 HR TABLET    Take 1 tablet (25 mg total) by mouth once daily.    PRAVASTATIN (PRAVACHOL) 10 MG TABLET    Take 1 tablet (10 mg total) by mouth once daily.       Review of patient's allergies indicates:  No Known Allergies    Review of Systems   Constitutional: Positive for malaise/fatigue. Negative for fever.   HENT: Positive for ear pain and sore throat. Negative for congestion and ear discharge.    Respiratory: Positive for cough. Negative for shortness of breath and stridor.    Cardiovascular:  Negative for chest pain and palpitations.   Gastrointestinal: Negative for abdominal pain, diarrhea, nausea and vomiting.   Musculoskeletal: Positive for myalgias and neck pain.   Neurological: Positive for headaches.       Objective:     There were no vitals filed for this visit.  There is no height or weight on file to calculate BMI.    Physical Exam  Constitutional:       Appearance: She is well-developed.   Pulmonary:      Effort: Pulmonary effort is normal.   Neurological:      Mental Status: She is alert and oriented to person, place, and time.         Complete physical exam deferred due to virtual visit     Assessment and Plan:     Exposure to Covid-19 Virus  -     COVID-19 Routine Screening; Future; Expected date: 07/21/2020  -     COVID-19 Home Symptom Monitoring  - Duration (days): 14    Other orders  -     promethazine-dextromethorphan (PROMETHAZINE-DM) 6.25-15 mg/5 mL Syrp; Take 5 mLs by mouth every 8 (eight) hours as needed.  Dispense: 180 mL; Refill: 0  -     amoxicillin-clavulanate 875-125mg (AUGMENTIN) 875-125 mg per tablet; Take 1 tablet by mouth every 12 (twelve) hours. for 10 days  Dispense: 20 tablet; Refill: 0      Test for covid   Given hx of strep and similar symptoms in the past - will provide augment   Tylenol, rest, hydrate, as needed cough medication, quarantine  Advised sob or worsening of symptoms go to ER     No follow-ups on file.    The patient location is: home  The chief complaint leading to consultation is: sore throat  Visit type: Virtual visit with synchronous audio and video  Total time spent with patient: 15 min  Each patient to whom he or she provides medical services by telemedicine is:  (1) informed of the relationship between the physician and patient and the respective role of any other health care provider with respect to management of the patient; and (2) notified that he or she may decline to receive medical services by telemedicine and may withdraw from such care at  any time.

## 2020-07-22 ENCOUNTER — TELEPHONE (OUTPATIENT)
Dept: FAMILY MEDICINE | Facility: CLINIC | Age: 65
End: 2020-07-22

## 2020-07-22 ENCOUNTER — LAB VISIT (OUTPATIENT)
Dept: INTERNAL MEDICINE | Facility: CLINIC | Age: 65
End: 2020-07-22
Payer: COMMERCIAL

## 2020-07-22 DIAGNOSIS — J02.9 SORE THROAT: Primary | ICD-10-CM

## 2020-07-22 DIAGNOSIS — Z20.822 EXPOSURE TO COVID-19 VIRUS: ICD-10-CM

## 2020-07-22 PROCEDURE — U0003 INFECTIOUS AGENT DETECTION BY NUCLEIC ACID (DNA OR RNA); SEVERE ACUTE RESPIRATORY SYNDROME CORONAVIRUS 2 (SARS-COV-2) (CORONAVIRUS DISEASE [COVID-19]), AMPLIFIED PROBE TECHNIQUE, MAKING USE OF HIGH THROUGHPUT TECHNOLOGIES AS DESCRIBED BY CMS-2020-01-R: HCPCS

## 2020-07-22 NOTE — TELEPHONE ENCOUNTER
----- Message from Rosemarie Meeks MD sent at 7/21/2020  6:48 PM CDT -----  Schedule patient for virtual visit with me in one week - follow up possible covid

## 2020-07-23 ENCOUNTER — TELEPHONE (OUTPATIENT)
Dept: FAMILY MEDICINE | Facility: CLINIC | Age: 65
End: 2020-07-23

## 2020-07-23 NOTE — TELEPHONE ENCOUNTER
----- Message from Rosemarie Meeks MD sent at 7/21/2020  6:44 PM CDT -----  Please provide patient with work excuse through my chart for one week.

## 2020-07-24 LAB — SARS-COV-2 RNA RESP QL NAA+PROBE: NOT DETECTED

## 2020-07-28 ENCOUNTER — HOSPITAL ENCOUNTER (OUTPATIENT)
Dept: RADIOLOGY | Facility: HOSPITAL | Age: 65
Discharge: HOME | End: 2020-07-28
Attending: FAMILY MEDICINE
Payer: COMMERCIAL

## 2020-07-28 ENCOUNTER — OFFICE VISIT (OUTPATIENT)
Dept: FAMILY MEDICINE | Facility: CLINIC | Age: 65
End: 2020-07-28
Payer: COMMERCIAL

## 2020-07-28 VITALS
SYSTOLIC BLOOD PRESSURE: 128 MMHG | WEIGHT: 167.56 LBS | OXYGEN SATURATION: 99 % | BODY MASS INDEX: 28.6 KG/M2 | TEMPERATURE: 99 F | DIASTOLIC BLOOD PRESSURE: 76 MMHG | HEART RATE: 69 BPM | HEIGHT: 64 IN

## 2020-07-28 DIAGNOSIS — R05.9 COUGH: ICD-10-CM

## 2020-07-28 DIAGNOSIS — R53.83 FATIGUE, UNSPECIFIED TYPE: ICD-10-CM

## 2020-07-28 DIAGNOSIS — I10 ESSENTIAL HYPERTENSION: ICD-10-CM

## 2020-07-28 DIAGNOSIS — R35.0 FREQUENCY OF URINATION: ICD-10-CM

## 2020-07-28 DIAGNOSIS — E78.2 MIXED HYPERLIPIDEMIA: ICD-10-CM

## 2020-07-28 DIAGNOSIS — J32.9 SINUSITIS, UNSPECIFIED CHRONICITY, UNSPECIFIED LOCATION: Primary | ICD-10-CM

## 2020-07-28 DIAGNOSIS — E88.819 INSULIN RESISTANCE: ICD-10-CM

## 2020-07-28 PROCEDURE — 71046 XR CHEST PA AND LATERAL: ICD-10-PCS | Mod: 26,,, | Performed by: RADIOLOGY

## 2020-07-28 PROCEDURE — 96372 PR INJECTION,THERAP/PROPH/DIAG2ST, IM OR SUBCUT: ICD-10-PCS | Mod: S$GLB,,, | Performed by: FAMILY MEDICINE

## 2020-07-28 PROCEDURE — 3074F SYST BP LT 130 MM HG: CPT | Mod: CPTII,S$GLB,, | Performed by: FAMILY MEDICINE

## 2020-07-28 PROCEDURE — 1101F PR PT FALLS ASSESS DOC 0-1 FALLS W/OUT INJ PAST YR: ICD-10-PCS | Mod: CPTII,S$GLB,, | Performed by: FAMILY MEDICINE

## 2020-07-28 PROCEDURE — 71046 X-RAY EXAM CHEST 2 VIEWS: CPT | Mod: TC,FY,PO

## 2020-07-28 PROCEDURE — 3074F PR MOST RECENT SYSTOLIC BLOOD PRESSURE < 130 MM HG: ICD-10-PCS | Mod: CPTII,S$GLB,, | Performed by: FAMILY MEDICINE

## 2020-07-28 PROCEDURE — 71046 X-RAY EXAM CHEST 2 VIEWS: CPT | Mod: 26,,, | Performed by: RADIOLOGY

## 2020-07-28 PROCEDURE — 1101F PT FALLS ASSESS-DOCD LE1/YR: CPT | Mod: CPTII,S$GLB,, | Performed by: FAMILY MEDICINE

## 2020-07-28 PROCEDURE — 99214 PR OFFICE/OUTPT VISIT, EST, LEVL IV, 30-39 MIN: ICD-10-PCS | Mod: 25,S$GLB,, | Performed by: FAMILY MEDICINE

## 2020-07-28 PROCEDURE — 99999 PR PBB SHADOW E&M-EST. PATIENT-LVL IV: CPT | Mod: PBBFAC,,, | Performed by: FAMILY MEDICINE

## 2020-07-28 PROCEDURE — 3078F PR MOST RECENT DIASTOLIC BLOOD PRESSURE < 80 MM HG: ICD-10-PCS | Mod: CPTII,S$GLB,, | Performed by: FAMILY MEDICINE

## 2020-07-28 PROCEDURE — 3078F DIAST BP <80 MM HG: CPT | Mod: CPTII,S$GLB,, | Performed by: FAMILY MEDICINE

## 2020-07-28 PROCEDURE — 3008F BODY MASS INDEX DOCD: CPT | Mod: CPTII,S$GLB,, | Performed by: FAMILY MEDICINE

## 2020-07-28 PROCEDURE — 96372 THER/PROPH/DIAG INJ SC/IM: CPT | Mod: S$GLB,,, | Performed by: FAMILY MEDICINE

## 2020-07-28 PROCEDURE — 99999 PR PBB SHADOW E&M-EST. PATIENT-LVL IV: ICD-10-PCS | Mod: PBBFAC,,, | Performed by: FAMILY MEDICINE

## 2020-07-28 PROCEDURE — 3008F PR BODY MASS INDEX (BMI) DOCUMENTED: ICD-10-PCS | Mod: CPTII,S$GLB,, | Performed by: FAMILY MEDICINE

## 2020-07-28 PROCEDURE — 99214 OFFICE O/P EST MOD 30 MIN: CPT | Mod: 25,S$GLB,, | Performed by: FAMILY MEDICINE

## 2020-07-28 RX ORDER — PREDNISONE 20 MG/1
20 TABLET ORAL DAILY
Qty: 4 TABLET | Refills: 0 | Status: SHIPPED | OUTPATIENT
Start: 2020-07-28 | End: 2020-08-01

## 2020-07-28 RX ORDER — AZELASTINE 1 MG/ML
1 SPRAY, METERED NASAL 2 TIMES DAILY
Qty: 30 ML | Refills: 0 | Status: SHIPPED | OUTPATIENT
Start: 2020-07-28 | End: 2021-06-01

## 2020-07-28 RX ORDER — METHYLPREDNISOLONE ACETATE 80 MG/ML
80 INJECTION, SUSPENSION INTRA-ARTICULAR; INTRALESIONAL; INTRAMUSCULAR; SOFT TISSUE
Status: COMPLETED | OUTPATIENT
Start: 2020-07-28 | End: 2020-07-28

## 2020-07-28 RX ADMIN — METHYLPREDNISOLONE ACETATE 80 MG: 80 INJECTION, SUSPENSION INTRA-ARTICULAR; INTRALESIONAL; INTRAMUSCULAR; SOFT TISSUE at 09:07

## 2020-07-28 NOTE — PROGRESS NOTES
Subjective:      Patient ID: Cesario Sahu is a 65 y.o. female.    Chief Complaint: Sore Throat    HPI    Patient here today for follow up of sore throat  Recent COVID testing for these symptoms negative  Notes 1-2 weeks ago started with fatigue, sore throat, cough (mild), some congestion, headache, abdominal pain, facial congestion and pressure in ears,  increased in urinary frequency.   Denies diarrhea, nausea, fevers.   Was placed on promethazine for cough and Augmentin in the last 1 week     Past Medical History:   Diagnosis Date    Hypertension     Insulin resistance        Past Surgical History:   Procedure Laterality Date    ADENOIDECTOMY      CHOLECYSTECTOMY      DILATION AND CURETTAGE OF UTERUS  10/2018       Family History   Problem Relation Age of Onset    Hypertension Mother     Heart disease Sister     Hyperlipidemia Sister     Hypertension Sister     Diabetes Brother        Social History     Socioeconomic History    Marital status:      Spouse name: Not on file    Number of children: 3    Years of education: Not on file    Highest education level: Not on file   Occupational History     Comment: financial     Social Needs    Financial resource strain: Not on file    Food insecurity     Worry: Not on file     Inability: Not on file    Transportation needs     Medical: Not on file     Non-medical: Not on file   Tobacco Use    Smoking status: Never Smoker    Smokeless tobacco: Never Used   Substance and Sexual Activity    Alcohol use: Yes     Frequency: 2-3 times a week     Drinks per session: 1 or 2     Binge frequency: Never     Comment: Socially    Drug use: Never    Sexual activity: Not on file   Lifestyle    Physical activity     Days per week: Not on file     Minutes per session: Not on file    Stress: Not on file   Relationships    Social connections     Talks on phone: Not on file     Gets together: Not on file     Attends Pentecostalism service: Not  on file     Active member of club or organization: Not on file     Attends meetings of clubs or organizations: Not on file     Relationship status: Not on file   Other Topics Concern    Not on file   Social History Narrative    The patient remarried in June 2018.  She has 3 adult children.  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       Health Maintenance Topics with due status: Not Due       Topic Last Completion Date    Mammogram 01/08/2019    Lipid Panel 01/24/2020    Colorectal Cancer Screening 03/06/2020    Influenza Vaccine Not Due       Medication List with Changes/Refills   New Medications    PREDNISONE (DELTASONE) 20 MG TABLET    Take 1 tablet (20 mg total) by mouth once daily. for 4 days   Current Medications    ASPIRIN (ECOTRIN) 81 MG EC TABLET    Take 81 mg by mouth once daily.    BIOTIN 5,000 MCG TBDL    Take by mouth.    ESTRADIOL (ESTRACE) 0.01 % (0.1 MG/GRAM) VAGINAL CREAM    INSERT 1 APPLICATORFUL VAGINALLY THREE TIMES A WEEK    HYDROCHLOROTHIAZIDE (HYDRODIURIL) 12.5 MG TAB    Take 1 tablet (12.5 mg total) by mouth once daily.    PROMETHAZINE-DEXTROMETHORPHAN (PROMETHAZINE-DM) 6.25-15 MG/5 ML SYRP    Take 5 mLs by mouth every 8 (eight) hours as needed.   Discontinued Medications    AMOXICILLIN-CLAVULANATE 875-125MG (AUGMENTIN) 875-125 MG PER TABLET    Take 1 tablet by mouth every 12 (twelve) hours. for 10 days    METOPROLOL SUCCINATE (TOPROL-XL) 25 MG 24 HR TABLET    Take 1 tablet (25 mg total) by mouth once daily.    PRAVASTATIN (PRAVACHOL) 10 MG TABLET    Take 1 tablet (10 mg total) by mouth once daily.       Review of patient's allergies indicates:  No Known Allergies    Review of Systems   Constitutional: Positive for malaise/fatigue. Negative for fever and weight loss.   HENT: Positive for congestion, ear pain, sinus pain and sore throat. Negative for ear discharge and tinnitus.    Eyes: Negative for blurred vision and pain.   Respiratory: Positive for cough. Negative for shortness of  breath.    Cardiovascular: Negative for chest pain and leg swelling.   Gastrointestinal: Positive for abdominal pain. Negative for constipation, diarrhea and nausea.   Genitourinary: Positive for frequency and urgency. Negative for dysuria and hematuria.   Musculoskeletal: Negative for myalgias.   Skin: Negative for rash.   Neurological: Negative for headaches.       Objective:     Vitals:    07/28/20 0827   BP: 128/76   Pulse: 69   Temp: 98.6 °F (37 °C)     Body mass index is 28.76 kg/m².    Physical Exam  Vitals signs and nursing note reviewed.   Constitutional:       General: She is not in acute distress.     Appearance: She is well-developed.   HENT:      Head: Normocephalic and atraumatic.      Right Ear: External ear normal.      Left Ear: External ear normal.      Nose: Nose normal.   Eyes:      Conjunctiva/sclera: Conjunctivae normal.      Pupils: Pupils are equal, round, and reactive to light.   Neck:      Thyroid: No thyromegaly.   Cardiovascular:      Rate and Rhythm: Normal rate and regular rhythm.      Heart sounds: Normal heart sounds. No murmur.   Pulmonary:      Effort: Pulmonary effort is normal. No respiratory distress.      Breath sounds: Normal breath sounds. No wheezing or rales.   Chest:      Chest wall: No tenderness.   Abdominal:      General: Bowel sounds are normal. There is no distension.      Palpations: Abdomen is soft.      Tenderness: There is no abdominal tenderness.   Lymphadenopathy:      Cervical: No cervical adenopathy.   Skin:     General: Skin is warm and dry.   Neurological:      Mental Status: She is alert and oriented to person, place, and time.         Assessment and Plan:     Sinusitis, unspecified chronicity, unspecified location  -     methylPREDNISolone acetate injection 80 mg    Insulin resistance    Mixed hyperlipidemia  -     Lipid Panel; Future; Expected date: 07/28/2020    Essential hypertension    Fatigue, unspecified type  -     CBC auto differential; Future;  Expected date: 07/28/2020  -     Comprehensive metabolic panel; Future; Expected date: 07/28/2020  -     Hemoglobin A1C; Future; Expected date: 07/28/2020  -     TSH; Future; Expected date: 07/28/2020  -     Lipid Panel; Future; Expected date: 07/28/2020  -     Urinalysis; Future; Expected date: 07/28/2020  -     X-Ray Chest PA And Lateral; Future; Expected date: 07/28/2020    Frequency of urination  -     Urinalysis; Future; Expected date: 07/28/2020    Cough  -     X-Ray Chest PA And Lateral; Future; Expected date: 07/28/2020    Other orders  -     predniSONE (DELTASONE) 20 MG tablet; Take 1 tablet (20 mg total) by mouth once daily. for 4 days  Dispense: 4 tablet; Refill: 0        Follow up if symptoms worsen or fail to improve.

## 2020-07-30 RX ORDER — PRAVASTATIN SODIUM 20 MG/1
20 TABLET ORAL DAILY
Qty: 30 TABLET | Refills: 11 | Status: SHIPPED | OUTPATIENT
Start: 2020-07-30 | End: 2021-05-27 | Stop reason: SDUPTHER

## 2020-08-28 ENCOUNTER — TELEPHONE (OUTPATIENT)
Dept: FAMILY MEDICINE | Facility: CLINIC | Age: 65
End: 2020-08-28

## 2020-08-28 NOTE — TELEPHONE ENCOUNTER
----- Message from Rickey Polanco sent at 8/28/2020  1:23 PM CDT -----  Regarding: pt  .Type:  Patient Returning Call    Who Called:pt   Who Left Message for Patient:Unsure   Does the patient know what this is regarding?:Unsure   Would the patient rather a call back or a response via [facility]? Call back   Best Call Back Number:.[phone] (home)   Additional Information:       Thank you,   Rickey Polanco

## 2020-08-31 ENCOUNTER — OFFICE VISIT (OUTPATIENT)
Dept: FAMILY MEDICINE | Facility: CLINIC | Age: 65
End: 2020-08-31
Payer: COMMERCIAL

## 2020-08-31 VITALS
TEMPERATURE: 98 F | BODY MASS INDEX: 28.04 KG/M2 | OXYGEN SATURATION: 98 % | DIASTOLIC BLOOD PRESSURE: 86 MMHG | HEIGHT: 64 IN | WEIGHT: 164.25 LBS | HEART RATE: 98 BPM | SYSTOLIC BLOOD PRESSURE: 122 MMHG

## 2020-08-31 DIAGNOSIS — E78.2 MIXED HYPERLIPIDEMIA: ICD-10-CM

## 2020-08-31 DIAGNOSIS — I10 ESSENTIAL HYPERTENSION: ICD-10-CM

## 2020-08-31 DIAGNOSIS — J32.9 SINUSITIS, UNSPECIFIED CHRONICITY, UNSPECIFIED LOCATION: ICD-10-CM

## 2020-08-31 DIAGNOSIS — R79.89 ELEVATED LFTS: Primary | ICD-10-CM

## 2020-08-31 PROCEDURE — 99999 PR PBB SHADOW E&M-EST. PATIENT-LVL III: ICD-10-PCS | Mod: PBBFAC,,, | Performed by: FAMILY MEDICINE

## 2020-08-31 PROCEDURE — 1101F PR PT FALLS ASSESS DOC 0-1 FALLS W/OUT INJ PAST YR: ICD-10-PCS | Mod: CPTII,S$GLB,, | Performed by: FAMILY MEDICINE

## 2020-08-31 PROCEDURE — 3074F PR MOST RECENT SYSTOLIC BLOOD PRESSURE < 130 MM HG: ICD-10-PCS | Mod: CPTII,S$GLB,, | Performed by: FAMILY MEDICINE

## 2020-08-31 PROCEDURE — 99214 OFFICE O/P EST MOD 30 MIN: CPT | Mod: S$GLB,,, | Performed by: FAMILY MEDICINE

## 2020-08-31 PROCEDURE — 3008F BODY MASS INDEX DOCD: CPT | Mod: CPTII,S$GLB,, | Performed by: FAMILY MEDICINE

## 2020-08-31 PROCEDURE — 3079F DIAST BP 80-89 MM HG: CPT | Mod: CPTII,S$GLB,, | Performed by: FAMILY MEDICINE

## 2020-08-31 PROCEDURE — 99214 PR OFFICE/OUTPT VISIT, EST, LEVL IV, 30-39 MIN: ICD-10-PCS | Mod: S$GLB,,, | Performed by: FAMILY MEDICINE

## 2020-08-31 PROCEDURE — 3008F PR BODY MASS INDEX (BMI) DOCUMENTED: ICD-10-PCS | Mod: CPTII,S$GLB,, | Performed by: FAMILY MEDICINE

## 2020-08-31 PROCEDURE — 99999 PR PBB SHADOW E&M-EST. PATIENT-LVL III: CPT | Mod: PBBFAC,,, | Performed by: FAMILY MEDICINE

## 2020-08-31 PROCEDURE — 3079F PR MOST RECENT DIASTOLIC BLOOD PRESSURE 80-89 MM HG: ICD-10-PCS | Mod: CPTII,S$GLB,, | Performed by: FAMILY MEDICINE

## 2020-08-31 PROCEDURE — 3074F SYST BP LT 130 MM HG: CPT | Mod: CPTII,S$GLB,, | Performed by: FAMILY MEDICINE

## 2020-08-31 PROCEDURE — 1101F PT FALLS ASSESS-DOCD LE1/YR: CPT | Mod: CPTII,S$GLB,, | Performed by: FAMILY MEDICINE

## 2020-08-31 RX ORDER — BENZONATATE 200 MG/1
200 CAPSULE ORAL 3 TIMES DAILY PRN
Qty: 45 CAPSULE | Refills: 0 | Status: SHIPPED | OUTPATIENT
Start: 2020-08-31 | End: 2020-09-10

## 2020-08-31 RX ORDER — AMOXICILLIN AND CLAVULANATE POTASSIUM 875; 125 MG/1; MG/1
1 TABLET, FILM COATED ORAL EVERY 12 HOURS
Qty: 20 TABLET | Refills: 0 | Status: SHIPPED | OUTPATIENT
Start: 2020-08-31 | End: 2020-09-10

## 2020-08-31 RX ORDER — PROMETHAZINE HYDROCHLORIDE AND DEXTROMETHORPHAN HYDROBROMIDE 6.25; 15 MG/5ML; MG/5ML
5 SYRUP ORAL EVERY 8 HOURS PRN
Qty: 180 ML | Refills: 0 | Status: SHIPPED | OUTPATIENT
Start: 2020-08-31 | End: 2020-09-10

## 2020-08-31 NOTE — PROGRESS NOTES
Subjective:      Patient ID: Cesario Sahu is a 65 y.o. female.    Chief Complaint: Follow-up    HPI    Patient seen today for follow up of elevated LFT, HLD   On topical estrogen - just recently started on oral estrogren - took one pill that's   Tolerating statin well     Notes that 3 days ago she started getting congestion, coughing and runny nose. Headache. Feels like fluid in left ear. Feels like wearing the mask is making it worse. Using over the counter theraflu.. No chills. No fever. NO abdominal pain. No loss or change of smell. Patient does suffer with allergies. 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  was sick with similar symptoms last week.     Past Medical History:   Diagnosis Date    Hypertension     Insulin resistance        Past Surgical History:   Procedure Laterality Date    ADENOIDECTOMY      CHOLECYSTECTOMY      DILATION AND CURETTAGE OF UTERUS  10/2018       Family History   Problem Relation Age of Onset    Hypertension Mother     Heart disease Sister     Hyperlipidemia Sister     Hypertension Sister     Diabetes Brother        Social History     Socioeconomic History    Marital status:      Spouse name: Not on file    Number of children: 3    Years of education: Not on file    Highest education level: Not on file   Occupational History     Comment: financial     Social Needs    Financial resource strain: Not on file    Food insecurity     Worry: Not on file     Inability: Not on file    Transportation needs     Medical: Not on file     Non-medical: Not on file   Tobacco Use    Smoking status: Never Smoker    Smokeless tobacco: Never Used   Substance and Sexual Activity    Alcohol use: Yes     Frequency: 2-3 times a week     Drinks per session: 1 or 2     Binge frequency: Never     Comment: Socially    Drug use: Never    Sexual activity: Not on file   Lifestyle    Physical activity     Days per  week: Not on file     Minutes per session: Not on file    Stress: Not on file   Relationships    Social connections     Talks on phone: Not on file     Gets together: Not on file     Attends Mormonism service: Not on file     Active member of club or organization: Not on file     Attends meetings of clubs or organizations: Not on file     Relationship status: Not on file   Other Topics Concern    Not on file   Social History Narrative    The patient remarried in June 2018.  She has 3 adult children.  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 for 10 days    BENZONATATE (TESSALON) 200 MG CAPSULE    Take 1 capsule (200 mg total) by mouth 3 (three) times daily as needed.    PROMETHAZINE-DEXTROMETHORPHAN (PROMETHAZINE-DM) 6.25-15 MG/5 ML SYRP    Take 5 mLs by mouth every 8 (eight) hours as needed.   Current Medications    ASPIRIN (ECOTRIN) 81 MG EC TABLET    Take 81 mg by mouth once daily.    AZELASTINE (ASTELIN) 137 MCG (0.1 %) NASAL SPRAY    1 spray (137 mcg total) by Nasal route 2 (two) times daily.    BIOTIN 5,000 MCG TBDL    Take by mouth.    ESTRADIOL (ESTRACE) 0.01 % (0.1 MG/GRAM) VAGINAL CREAM    INSERT 1 APPLICATORFUL VAGINALLY THREE TIMES A WEEK    HYDROCHLOROTHIAZIDE (HYDRODIURIL) 12.5 MG TAB    Take 1 tablet (12.5 mg total) by mouth once daily.    PRAVASTATIN (PRAVACHOL) 20 MG TABLET    Take 1 tablet (20 mg total) by mouth once daily.       Review of patient's allergies indicates:  No Known Allergies    Review of Systems   Constitutional: Negative for chills, fever and malaise/fatigue.   HENT: Positive for  congestion and ear pain. Negative for ear discharge, hearing loss and tinnitus.         Ear pressure    Eyes: Negative for blurred vision.   Respiratory: Positive for cough. Negative for shortness of breath.    Cardiovascular: Negative for chest pain and leg swelling.   Gastrointestinal: Negative for abdominal pain, constipation, diarrhea and nausea.   Genitourinary: Negative for dysuria.   Musculoskeletal: Positive for myalgias.   Skin: Negative for rash.   Neurological: Negative for headaches.       Objective:     Vitals:    08/31/20 1739   BP: 122/86   Pulse: 98   Temp: 98.1 °F (36.7 °C)     Body mass index is 28.19 kg/m².    Physical Exam  Vitals signs and nursing note reviewed.   Constitutional:       General: She is not in acute distress.     Appearance: She is well-developed.   HENT:      Head: Normocephalic.      Right Ear: Tympanic membrane, ear canal and external ear normal.      Left Ear: Ear canal and external ear normal. Tenderness present. A middle ear effusion is present.   Cardiovascular:      Rate and Rhythm: Normal rate and regular rhythm.      Heart sounds: Normal heart sounds. No murmur.   Pulmonary:      Effort: Pulmonary effort is normal. No respiratory distress.      Breath sounds: Normal breath sounds. No wheezing.   Abdominal:      General: Bowel sounds are normal.      Palpations: Abdomen is soft.      Tenderness: There is no abdominal tenderness.   Skin:     General: Skin is warm and dry.   Neurological:      Mental Status: She is alert and oriented to person, place, and time.         Assessment and Plan:     Elevated LFTs  -     Comprehensive metabolic panel; Future; Expected date: 08/31/2020  -     Hepatitis Panel, Acute; Future; Expected date: 08/31/2020  May need to hold statin   Advised stopping estrogen pill as well   Repeat LFT     Essential hypertension  Blood pressure controlled at today's visit   Continue with current medications   Ambulatory logs of blood pressure readings  recommended   DASH diet, weight loss, exercise     Mixed hyperlipidemia  Statin for now     Sinusitis, unspecified chronicity, unspecified location  Will get rapid test for covid today or tomorrow. If negative. Ok to return to work.  -     amoxicillin-clavulanate 875-125mg (AUGMENTIN) 875-125 mg per tablet; Take 1 tablet by mouth every 12 (twelve) hours. for 10 days  Dispense: 20 tablet; Refill: 0  -     promethazine-dextromethorphan (PROMETHAZINE-DM) 6.25-15 mg/5 mL Syrp; Take 5 mLs by mouth every 8 (eight) hours as needed.  Dispense: 180 mL; Refill: 0  -     benzonatate (TESSALON) 200 MG capsule; Take 1 capsule (200 mg total) by mouth 3 (three) times daily as needed.  Dispense: 45 capsule; Refill: 0        No follow-ups on file.

## 2021-01-06 DIAGNOSIS — Z12.31 OTHER SCREENING MAMMOGRAM: ICD-10-CM

## 2021-01-29 ENCOUNTER — PATIENT MESSAGE (OUTPATIENT)
Dept: ADMINISTRATIVE | Facility: HOSPITAL | Age: 66
End: 2021-01-29

## 2021-03-01 ENCOUNTER — TELEPHONE (OUTPATIENT)
Dept: FAMILY MEDICINE | Facility: CLINIC | Age: 66
End: 2021-03-01

## 2021-03-01 DIAGNOSIS — R79.89 ELEVATED LFTS: Primary | ICD-10-CM

## 2021-03-01 DIAGNOSIS — E78.2 MIXED HYPERLIPIDEMIA: ICD-10-CM

## 2021-03-04 ENCOUNTER — TELEPHONE (OUTPATIENT)
Dept: FAMILY MEDICINE | Facility: CLINIC | Age: 66
End: 2021-03-04

## 2021-03-05 ENCOUNTER — LAB VISIT (OUTPATIENT)
Dept: LAB | Facility: HOSPITAL | Age: 66
End: 2021-03-05
Attending: FAMILY MEDICINE
Payer: COMMERCIAL

## 2021-03-05 DIAGNOSIS — R79.89 ELEVATED LFTS: ICD-10-CM

## 2021-03-05 DIAGNOSIS — E78.2 MIXED HYPERLIPIDEMIA: ICD-10-CM

## 2021-03-05 LAB
ALBUMIN SERPL BCP-MCNC: 4 G/DL (ref 3.5–5.2)
ALP SERPL-CCNC: 70 U/L (ref 55–135)
ALT SERPL W/O P-5'-P-CCNC: 12 U/L (ref 10–44)
AST SERPL-CCNC: 16 U/L (ref 10–40)
BILIRUB DIRECT SERPL-MCNC: 0.1 MG/DL (ref 0.1–0.3)
BILIRUB SERPL-MCNC: 0.2 MG/DL (ref 0.1–1)
CHOLEST SERPL-MCNC: 228 MG/DL (ref 120–199)
CHOLEST/HDLC SERPL: 3.2 {RATIO} (ref 2–5)
HDLC SERPL-MCNC: 71 MG/DL (ref 40–75)
HDLC SERPL: 31.1 % (ref 20–50)
LDLC SERPL CALC-MCNC: 140.8 MG/DL (ref 63–159)
NONHDLC SERPL-MCNC: 157 MG/DL
PROT SERPL-MCNC: 8 G/DL (ref 6–8.4)
TRIGL SERPL-MCNC: 81 MG/DL (ref 30–150)

## 2021-03-05 PROCEDURE — 80076 HEPATIC FUNCTION PANEL: CPT | Performed by: FAMILY MEDICINE

## 2021-03-05 PROCEDURE — 36415 COLL VENOUS BLD VENIPUNCTURE: CPT | Mod: PO | Performed by: FAMILY MEDICINE

## 2021-03-05 PROCEDURE — 80074 ACUTE HEPATITIS PANEL: CPT | Performed by: FAMILY MEDICINE

## 2021-03-05 PROCEDURE — 80061 LIPID PANEL: CPT | Performed by: FAMILY MEDICINE

## 2021-03-09 ENCOUNTER — TELEPHONE (OUTPATIENT)
Dept: FAMILY MEDICINE | Facility: CLINIC | Age: 66
End: 2021-03-09

## 2021-03-09 LAB
HAV IGM SERPL QL IA: NEGATIVE
HBV CORE IGM SERPL QL IA: NEGATIVE
HBV SURFACE AG SERPL QL IA: NEGATIVE
HCV AB SERPL QL IA: NEGATIVE

## 2021-04-28 ENCOUNTER — PATIENT MESSAGE (OUTPATIENT)
Dept: RESEARCH | Facility: HOSPITAL | Age: 66
End: 2021-04-28

## 2021-05-07 ENCOUNTER — PATIENT OUTREACH (OUTPATIENT)
Dept: ADMINISTRATIVE | Facility: HOSPITAL | Age: 66
End: 2021-05-07

## 2021-05-27 RX ORDER — PRAVASTATIN SODIUM 20 MG/1
20 TABLET ORAL DAILY
Qty: 30 TABLET | Refills: 0 | Status: SHIPPED | OUTPATIENT
Start: 2021-05-27 | End: 2021-09-10

## 2021-05-27 RX ORDER — HYDROCHLOROTHIAZIDE 12.5 MG/1
12.5 TABLET ORAL DAILY
Qty: 90 TABLET | Refills: 0 | Status: SHIPPED | OUTPATIENT
Start: 2021-05-27 | End: 2021-06-30

## 2021-06-01 ENCOUNTER — OFFICE VISIT (OUTPATIENT)
Dept: PRIMARY CARE CLINIC | Facility: CLINIC | Age: 66
End: 2021-06-01
Payer: MEDICARE

## 2021-06-01 ENCOUNTER — PATIENT MESSAGE (OUTPATIENT)
Dept: ENDOSCOPY | Facility: HOSPITAL | Age: 66
End: 2021-06-01

## 2021-06-01 ENCOUNTER — LAB VISIT (OUTPATIENT)
Dept: LAB | Facility: HOSPITAL | Age: 66
End: 2021-06-01
Attending: REGISTERED NURSE
Payer: MEDICARE

## 2021-06-01 VITALS
BODY MASS INDEX: 29.27 KG/M2 | WEIGHT: 171.44 LBS | TEMPERATURE: 98 F | DIASTOLIC BLOOD PRESSURE: 70 MMHG | RESPIRATION RATE: 18 BRPM | SYSTOLIC BLOOD PRESSURE: 112 MMHG | HEART RATE: 81 BPM | HEIGHT: 64 IN | OXYGEN SATURATION: 99 %

## 2021-06-01 DIAGNOSIS — R19.8 DIGESTIVE SYMPTOMS: ICD-10-CM

## 2021-06-01 DIAGNOSIS — K58.1 IRRITABLE BOWEL SYNDROME WITH CONSTIPATION: Primary | ICD-10-CM

## 2021-06-01 DIAGNOSIS — R53.83 FATIGUE, UNSPECIFIED TYPE: ICD-10-CM

## 2021-06-01 DIAGNOSIS — Z12.11 COLON CANCER SCREENING: ICD-10-CM

## 2021-06-01 DIAGNOSIS — E66.3 OVERWEIGHT (BMI 25.0-29.9): ICD-10-CM

## 2021-06-01 DIAGNOSIS — E88.819 INSULIN RESISTANCE: ICD-10-CM

## 2021-06-01 DIAGNOSIS — K21.9 GASTROESOPHAGEAL REFLUX DISEASE, UNSPECIFIED WHETHER ESOPHAGITIS PRESENT: ICD-10-CM

## 2021-06-01 LAB
25(OH)D3+25(OH)D2 SERPL-MCNC: 26 NG/ML (ref 30–96)
ERYTHROCYTE [DISTWIDTH] IN BLOOD BY AUTOMATED COUNT: 13.8 % (ref 11.5–14.5)
HCT VFR BLD AUTO: 37.5 % (ref 37–48.5)
HGB BLD-MCNC: 11.8 G/DL (ref 12–16)
INSULIN COLLECTION INTERVAL: NORMAL
INSULIN SERPL-ACNC: 9 UU/ML
MCH RBC QN AUTO: 28.6 PG (ref 27–31)
MCHC RBC AUTO-ENTMCNC: 31.5 G/DL (ref 32–36)
MCV RBC AUTO: 91 FL (ref 82–98)
PLATELET # BLD AUTO: 273 K/UL (ref 150–450)
PMV BLD AUTO: 9.6 FL (ref 9.2–12.9)
RBC # BLD AUTO: 4.12 M/UL (ref 4–5.4)
VIT B12 SERPL-MCNC: 954 PG/ML (ref 210–950)
WBC # BLD AUTO: 8.33 K/UL (ref 3.9–12.7)

## 2021-06-01 PROCEDURE — 82607 VITAMIN B-12: CPT | Performed by: REGISTERED NURSE

## 2021-06-01 PROCEDURE — 85027 COMPLETE CBC AUTOMATED: CPT | Performed by: REGISTERED NURSE

## 2021-06-01 PROCEDURE — 99499 RISK ADDL DX/OHS AUDIT: ICD-10-PCS | Mod: S$GLB,,, | Performed by: REGISTERED NURSE

## 2021-06-01 PROCEDURE — 36415 COLL VENOUS BLD VENIPUNCTURE: CPT | Mod: PN | Performed by: REGISTERED NURSE

## 2021-06-01 PROCEDURE — 99999 PR PBB SHADOW E&M-EST. PATIENT-LVL III: ICD-10-PCS | Mod: PBBFAC,,, | Performed by: REGISTERED NURSE

## 2021-06-01 PROCEDURE — 83525 ASSAY OF INSULIN: CPT | Performed by: REGISTERED NURSE

## 2021-06-01 PROCEDURE — 99213 OFFICE O/P EST LOW 20 MIN: CPT | Mod: PBBFAC,PN | Performed by: REGISTERED NURSE

## 2021-06-01 PROCEDURE — 99214 PR OFFICE/OUTPT VISIT, EST, LEVL IV, 30-39 MIN: ICD-10-PCS | Mod: S$GLB,,, | Performed by: REGISTERED NURSE

## 2021-06-01 PROCEDURE — 80053 COMPREHEN METABOLIC PANEL: CPT | Performed by: REGISTERED NURSE

## 2021-06-01 PROCEDURE — 99499 UNLISTED E&M SERVICE: CPT | Mod: S$GLB,,, | Performed by: REGISTERED NURSE

## 2021-06-01 PROCEDURE — 99999 PR PBB SHADOW E&M-EST. PATIENT-LVL III: CPT | Mod: PBBFAC,,, | Performed by: REGISTERED NURSE

## 2021-06-01 PROCEDURE — 83036 HEMOGLOBIN GLYCOSYLATED A1C: CPT | Performed by: REGISTERED NURSE

## 2021-06-01 PROCEDURE — 99214 OFFICE O/P EST MOD 30 MIN: CPT | Mod: S$GLB,,, | Performed by: REGISTERED NURSE

## 2021-06-01 PROCEDURE — 82306 VITAMIN D 25 HYDROXY: CPT | Performed by: REGISTERED NURSE

## 2021-06-01 PROCEDURE — 86677 HELICOBACTER PYLORI ANTIBODY: CPT | Performed by: REGISTERED NURSE

## 2021-06-01 RX ORDER — PANTOPRAZOLE SODIUM 40 MG/1
40 TABLET, DELAYED RELEASE ORAL DAILY PRN
Qty: 30 TABLET | Refills: 6 | Status: SHIPPED | OUTPATIENT
Start: 2021-06-01 | End: 2021-07-19

## 2021-06-01 RX ORDER — DICYCLOMINE HYDROCHLORIDE 20 MG/1
20 TABLET ORAL
Qty: 120 TABLET | Refills: 2 | Status: SHIPPED | OUTPATIENT
Start: 2021-06-01 | End: 2021-07-29

## 2021-06-02 LAB
ALBUMIN SERPL BCP-MCNC: 4.1 G/DL (ref 3.5–5.2)
ALP SERPL-CCNC: 68 U/L (ref 55–135)
ALT SERPL W/O P-5'-P-CCNC: 16 U/L (ref 10–44)
ANION GAP SERPL CALC-SCNC: 9 MMOL/L (ref 8–16)
AST SERPL-CCNC: 17 U/L (ref 10–40)
BILIRUB SERPL-MCNC: 0.3 MG/DL (ref 0.1–1)
BUN SERPL-MCNC: 14 MG/DL (ref 8–23)
CALCIUM SERPL-MCNC: 9.8 MG/DL (ref 8.7–10.5)
CHLORIDE SERPL-SCNC: 104 MMOL/L (ref 95–110)
CO2 SERPL-SCNC: 26 MMOL/L (ref 23–29)
CREAT SERPL-MCNC: 0.7 MG/DL (ref 0.5–1.4)
EST. GFR  (AFRICAN AMERICAN): >60 ML/MIN/1.73 M^2
EST. GFR  (NON AFRICAN AMERICAN): >60 ML/MIN/1.73 M^2
ESTIMATED AVG GLUCOSE: 111 MG/DL (ref 68–131)
GLUCOSE SERPL-MCNC: 70 MG/DL (ref 70–110)
H PYLORI IGG SERPL QL IA: NEGATIVE
HBA1C MFR BLD: 5.5 % (ref 4–5.6)
POTASSIUM SERPL-SCNC: 4 MMOL/L (ref 3.5–5.1)
PROT SERPL-MCNC: 7.9 G/DL (ref 6–8.4)
SODIUM SERPL-SCNC: 139 MMOL/L (ref 136–145)

## 2021-06-04 ENCOUNTER — TELEPHONE (OUTPATIENT)
Dept: ENDOSCOPY | Facility: HOSPITAL | Age: 66
End: 2021-06-04

## 2021-06-04 ENCOUNTER — TELEPHONE (OUTPATIENT)
Dept: PRIMARY CARE CLINIC | Facility: CLINIC | Age: 66
End: 2021-06-04

## 2021-06-07 ENCOUNTER — TELEPHONE (OUTPATIENT)
Dept: PREADMISSION TESTING | Facility: HOSPITAL | Age: 66
End: 2021-06-07

## 2021-06-07 ENCOUNTER — PATIENT MESSAGE (OUTPATIENT)
Dept: PREADMISSION TESTING | Facility: HOSPITAL | Age: 66
End: 2021-06-07

## 2021-06-07 DIAGNOSIS — Z01.818 PREOP EXAMINATION: Primary | ICD-10-CM

## 2021-06-07 RX ORDER — SODIUM, POTASSIUM,MAG SULFATES 17.5-3.13G
1 SOLUTION, RECONSTITUTED, ORAL ORAL DAILY
Qty: 1 KIT | Refills: 0 | Status: SHIPPED | OUTPATIENT
Start: 2021-06-07 | End: 2021-06-09

## 2021-06-08 ENCOUNTER — LAB VISIT (OUTPATIENT)
Dept: OTOLARYNGOLOGY | Facility: CLINIC | Age: 66
End: 2021-06-08
Payer: MEDICARE

## 2021-06-08 ENCOUNTER — TELEPHONE (OUTPATIENT)
Dept: PREADMISSION TESTING | Facility: HOSPITAL | Age: 66
End: 2021-06-08

## 2021-06-08 DIAGNOSIS — Z01.818 PREOP EXAMINATION: ICD-10-CM

## 2021-06-08 PROCEDURE — U0005 INFEC AGEN DETEC AMPLI PROBE: HCPCS | Performed by: NURSE PRACTITIONER

## 2021-06-08 PROCEDURE — U0003 INFECTIOUS AGENT DETECTION BY NUCLEIC ACID (DNA OR RNA); SEVERE ACUTE RESPIRATORY SYNDROME CORONAVIRUS 2 (SARS-COV-2) (CORONAVIRUS DISEASE [COVID-19]), AMPLIFIED PROBE TECHNIQUE, MAKING USE OF HIGH THROUGHPUT TECHNOLOGIES AS DESCRIBED BY CMS-2020-01-R: HCPCS | Performed by: NURSE PRACTITIONER

## 2021-06-09 LAB — SARS-COV-2 RNA RESP QL NAA+PROBE: NOT DETECTED

## 2021-06-10 ENCOUNTER — ANESTHESIA EVENT (OUTPATIENT)
Dept: ENDOSCOPY | Facility: HOSPITAL | Age: 66
End: 2021-06-10
Payer: MEDICARE

## 2021-06-11 ENCOUNTER — HOSPITAL ENCOUNTER (OUTPATIENT)
Facility: HOSPITAL | Age: 66
Discharge: HOME | End: 2021-06-11
Attending: INTERNAL MEDICINE | Admitting: INTERNAL MEDICINE
Payer: MEDICARE

## 2021-06-11 ENCOUNTER — ANESTHESIA (OUTPATIENT)
Dept: ENDOSCOPY | Facility: HOSPITAL | Age: 66
End: 2021-06-11
Payer: MEDICARE

## 2021-06-11 VITALS
HEIGHT: 64 IN | OXYGEN SATURATION: 100 % | HEART RATE: 53 BPM | BODY MASS INDEX: 27.92 KG/M2 | TEMPERATURE: 98 F | SYSTOLIC BLOOD PRESSURE: 143 MMHG | WEIGHT: 163.56 LBS | DIASTOLIC BLOOD PRESSURE: 63 MMHG | RESPIRATION RATE: 16 BRPM

## 2021-06-11 DIAGNOSIS — Z12.11 ENCOUNTER FOR SCREENING COLONOSCOPY: ICD-10-CM

## 2021-06-11 DIAGNOSIS — Z86.010 PERSONAL HISTORY OF COLONIC POLYPS: Primary | ICD-10-CM

## 2021-06-11 PROBLEM — Z86.0100 PERSONAL HISTORY OF COLONIC POLYPS: Status: ACTIVE | Noted: 2021-06-11

## 2021-06-11 PROCEDURE — D9220A PRA ANESTHESIA: Mod: PT,ANES,, | Performed by: ANESTHESIOLOGY

## 2021-06-11 PROCEDURE — 37000009 HC ANESTHESIA EA ADD 15 MINS: Performed by: INTERNAL MEDICINE

## 2021-06-11 PROCEDURE — 25000003 PHARM REV CODE 250: Performed by: NURSE ANESTHETIST, CERTIFIED REGISTERED

## 2021-06-11 PROCEDURE — 45380 COLONOSCOPY AND BIOPSY: CPT | Performed by: INTERNAL MEDICINE

## 2021-06-11 PROCEDURE — 88305 TISSUE EXAM BY PATHOLOGIST: ICD-10-PCS | Mod: 26,,, | Performed by: PATHOLOGY

## 2021-06-11 PROCEDURE — 88305 TISSUE EXAM BY PATHOLOGIST: CPT | Mod: 26,,, | Performed by: PATHOLOGY

## 2021-06-11 PROCEDURE — D9220A PRA ANESTHESIA: Mod: PT,CRNA,, | Performed by: NURSE ANESTHETIST, CERTIFIED REGISTERED

## 2021-06-11 PROCEDURE — D9220A PRA ANESTHESIA: ICD-10-PCS | Mod: PT,CRNA,, | Performed by: NURSE ANESTHETIST, CERTIFIED REGISTERED

## 2021-06-11 PROCEDURE — 88305 TISSUE EXAM BY PATHOLOGIST: CPT | Mod: 59 | Performed by: PATHOLOGY

## 2021-06-11 PROCEDURE — 37000008 HC ANESTHESIA 1ST 15 MINUTES: Performed by: INTERNAL MEDICINE

## 2021-06-11 PROCEDURE — 45380 PR COLONOSCOPY,BIOPSY: ICD-10-PCS | Mod: PT,,, | Performed by: INTERNAL MEDICINE

## 2021-06-11 PROCEDURE — 45380 COLONOSCOPY AND BIOPSY: CPT | Mod: PT,,, | Performed by: INTERNAL MEDICINE

## 2021-06-11 PROCEDURE — 27201012 HC FORCEPS, HOT/COLD, DISP: Performed by: INTERNAL MEDICINE

## 2021-06-11 PROCEDURE — D9220A PRA ANESTHESIA: ICD-10-PCS | Mod: PT,ANES,, | Performed by: ANESTHESIOLOGY

## 2021-06-11 PROCEDURE — 63600175 PHARM REV CODE 636 W HCPCS: Performed by: NURSE ANESTHETIST, CERTIFIED REGISTERED

## 2021-06-11 RX ORDER — LIDOCAINE HYDROCHLORIDE 20 MG/ML
INJECTION INTRAVENOUS
Status: DISCONTINUED | OUTPATIENT
Start: 2021-06-11 | End: 2021-06-11

## 2021-06-11 RX ORDER — SODIUM CHLORIDE 0.9 % (FLUSH) 0.9 %
10 SYRINGE (ML) INJECTION
Status: DISCONTINUED | OUTPATIENT
Start: 2021-06-11 | End: 2021-06-11 | Stop reason: HOSPADM

## 2021-06-11 RX ORDER — SODIUM CHLORIDE, SODIUM LACTATE, POTASSIUM CHLORIDE, CALCIUM CHLORIDE 600; 310; 30; 20 MG/100ML; MG/100ML; MG/100ML; MG/100ML
INJECTION, SOLUTION INTRAVENOUS CONTINUOUS
Status: DISCONTINUED | OUTPATIENT
Start: 2021-06-11 | End: 2021-06-11 | Stop reason: HOSPADM

## 2021-06-11 RX ORDER — PROPOFOL 10 MG/ML
VIAL (ML) INTRAVENOUS
Status: DISCONTINUED | OUTPATIENT
Start: 2021-06-11 | End: 2021-06-11

## 2021-06-11 RX ADMIN — PROPOFOL 30 MG: 10 INJECTION, EMULSION INTRAVENOUS at 01:06

## 2021-06-11 RX ADMIN — PROPOFOL 100 MG: 10 INJECTION, EMULSION INTRAVENOUS at 01:06

## 2021-06-11 RX ADMIN — Medication 100 MG: at 01:06

## 2021-06-11 RX ADMIN — PROPOFOL 50 MG: 10 INJECTION, EMULSION INTRAVENOUS at 01:06

## 2021-06-11 RX ADMIN — PROPOFOL 40 MG: 10 INJECTION, EMULSION INTRAVENOUS at 01:06

## 2021-06-14 PROBLEM — K57.30 DIVERTICULOSIS OF SIGMOID COLON: Status: ACTIVE | Noted: 2021-06-14

## 2021-06-21 LAB
FINAL PATHOLOGIC DIAGNOSIS: NORMAL
Lab: NORMAL

## 2021-07-19 ENCOUNTER — OFFICE VISIT (OUTPATIENT)
Dept: FAMILY MEDICINE | Facility: CLINIC | Age: 66
End: 2021-07-19
Payer: MEDICARE

## 2021-07-19 ENCOUNTER — LAB VISIT (OUTPATIENT)
Dept: LAB | Facility: HOSPITAL | Age: 66
End: 2021-07-19
Attending: FAMILY MEDICINE
Payer: MEDICARE

## 2021-07-19 VITALS
BODY MASS INDEX: 28.81 KG/M2 | TEMPERATURE: 98 F | SYSTOLIC BLOOD PRESSURE: 130 MMHG | WEIGHT: 168.75 LBS | DIASTOLIC BLOOD PRESSURE: 75 MMHG | HEIGHT: 64 IN | OXYGEN SATURATION: 97 % | HEART RATE: 72 BPM

## 2021-07-19 DIAGNOSIS — K59.00 CONSTIPATION, UNSPECIFIED CONSTIPATION TYPE: ICD-10-CM

## 2021-07-19 DIAGNOSIS — R10.9 ABDOMINAL PAIN, UNSPECIFIED ABDOMINAL LOCATION: Primary | ICD-10-CM

## 2021-07-19 DIAGNOSIS — R13.10 DYSPHAGIA, UNSPECIFIED TYPE: ICD-10-CM

## 2021-07-19 DIAGNOSIS — R10.12 LEFT UPPER QUADRANT PAIN: ICD-10-CM

## 2021-07-19 DIAGNOSIS — R10.9 ABDOMINAL PAIN, UNSPECIFIED ABDOMINAL LOCATION: ICD-10-CM

## 2021-07-19 DIAGNOSIS — K21.9 GASTROESOPHAGEAL REFLUX DISEASE, UNSPECIFIED WHETHER ESOPHAGITIS PRESENT: ICD-10-CM

## 2021-07-19 DIAGNOSIS — K64.9 HEMORRHOIDS, UNSPECIFIED HEMORRHOID TYPE: ICD-10-CM

## 2021-07-19 DIAGNOSIS — R10.13 EPIGASTRIC PAIN: ICD-10-CM

## 2021-07-19 LAB
ALBUMIN SERPL BCP-MCNC: 4.3 G/DL (ref 3.5–5.2)
ALP SERPL-CCNC: 76 U/L (ref 55–135)
ALT SERPL W/O P-5'-P-CCNC: 13 U/L (ref 10–44)
AMYLASE SERPL-CCNC: 64 U/L (ref 20–110)
ANION GAP SERPL CALC-SCNC: 11 MMOL/L (ref 8–16)
AST SERPL-CCNC: 17 U/L (ref 10–40)
BILIRUB SERPL-MCNC: 0.4 MG/DL (ref 0.1–1)
BUN SERPL-MCNC: 13 MG/DL (ref 8–23)
CALCIUM SERPL-MCNC: 11.7 MG/DL (ref 8.7–10.5)
CHLORIDE SERPL-SCNC: 99 MMOL/L (ref 95–110)
CO2 SERPL-SCNC: 29 MMOL/L (ref 23–29)
CREAT SERPL-MCNC: 0.8 MG/DL (ref 0.5–1.4)
CREAT SERPL-MCNC: 0.8 MG/DL (ref 0.5–1.4)
ERYTHROCYTE [DISTWIDTH] IN BLOOD BY AUTOMATED COUNT: 13.3 % (ref 11.5–14.5)
EST. GFR  (AFRICAN AMERICAN): >60 ML/MIN/1.73 M^2
EST. GFR  (AFRICAN AMERICAN): >60 ML/MIN/1.73 M^2
EST. GFR  (NON AFRICAN AMERICAN): >60 ML/MIN/1.73 M^2
EST. GFR  (NON AFRICAN AMERICAN): >60 ML/MIN/1.73 M^2
GLUCOSE SERPL-MCNC: 84 MG/DL (ref 70–110)
HCT VFR BLD AUTO: 39 % (ref 37–48.5)
HGB BLD-MCNC: 12.6 G/DL (ref 12–16)
LIPASE SERPL-CCNC: 9 U/L (ref 4–60)
MCH RBC QN AUTO: 29 PG (ref 27–31)
MCHC RBC AUTO-ENTMCNC: 32.3 G/DL (ref 32–36)
MCV RBC AUTO: 90 FL (ref 82–98)
PLATELET # BLD AUTO: 289 K/UL (ref 150–450)
PMV BLD AUTO: 9.9 FL (ref 9.2–12.9)
POTASSIUM SERPL-SCNC: 3.5 MMOL/L (ref 3.5–5.1)
PROT SERPL-MCNC: 8.6 G/DL (ref 6–8.4)
RBC # BLD AUTO: 4.35 M/UL (ref 4–5.4)
SODIUM SERPL-SCNC: 139 MMOL/L (ref 136–145)
TSH SERPL DL<=0.005 MIU/L-ACNC: 2.34 UIU/ML (ref 0.4–4)
WBC # BLD AUTO: 9.19 K/UL (ref 3.9–12.7)

## 2021-07-19 PROCEDURE — 3008F BODY MASS INDEX DOCD: CPT | Mod: CPTII,S$GLB,, | Performed by: FAMILY MEDICINE

## 2021-07-19 PROCEDURE — 99214 PR OFFICE/OUTPT VISIT, EST, LEVL IV, 30-39 MIN: ICD-10-PCS | Mod: S$GLB,,, | Performed by: FAMILY MEDICINE

## 2021-07-19 PROCEDURE — 99499 RISK ADDL DX/OHS AUDIT: ICD-10-PCS | Mod: HCNC,S$GLB,, | Performed by: FAMILY MEDICINE

## 2021-07-19 PROCEDURE — 99214 OFFICE O/P EST MOD 30 MIN: CPT | Mod: S$GLB,,, | Performed by: FAMILY MEDICINE

## 2021-07-19 PROCEDURE — 99999 PR PBB SHADOW E&M-EST. PATIENT-LVL IV: CPT | Mod: PBBFAC,,, | Performed by: FAMILY MEDICINE

## 2021-07-19 PROCEDURE — 80053 COMPREHEN METABOLIC PANEL: CPT | Performed by: FAMILY MEDICINE

## 2021-07-19 PROCEDURE — 36415 COLL VENOUS BLD VENIPUNCTURE: CPT | Mod: PO | Performed by: FAMILY MEDICINE

## 2021-07-19 PROCEDURE — 83690 ASSAY OF LIPASE: CPT | Performed by: FAMILY MEDICINE

## 2021-07-19 PROCEDURE — 99499 UNLISTED E&M SERVICE: CPT | Mod: HCNC,S$GLB,, | Performed by: FAMILY MEDICINE

## 2021-07-19 PROCEDURE — 84443 ASSAY THYROID STIM HORMONE: CPT | Performed by: FAMILY MEDICINE

## 2021-07-19 PROCEDURE — 1101F PT FALLS ASSESS-DOCD LE1/YR: CPT | Mod: CPTII,S$GLB,, | Performed by: FAMILY MEDICINE

## 2021-07-19 PROCEDURE — 99999 PR PBB SHADOW E&M-EST. PATIENT-LVL IV: ICD-10-PCS | Mod: PBBFAC,,, | Performed by: FAMILY MEDICINE

## 2021-07-19 PROCEDURE — 1125F PR PAIN SEVERITY QUANTIFIED, PAIN PRESENT: ICD-10-PCS | Mod: CPTII,S$GLB,, | Performed by: FAMILY MEDICINE

## 2021-07-19 PROCEDURE — 1159F MED LIST DOCD IN RCRD: CPT | Mod: CPTII,S$GLB,, | Performed by: FAMILY MEDICINE

## 2021-07-19 PROCEDURE — 3008F PR BODY MASS INDEX (BMI) DOCUMENTED: ICD-10-PCS | Mod: CPTII,S$GLB,, | Performed by: FAMILY MEDICINE

## 2021-07-19 PROCEDURE — 1159F PR MEDICATION LIST DOCUMENTED IN MEDICAL RECORD: ICD-10-PCS | Mod: CPTII,S$GLB,, | Performed by: FAMILY MEDICINE

## 2021-07-19 PROCEDURE — 1101F PR PT FALLS ASSESS DOC 0-1 FALLS W/OUT INJ PAST YR: ICD-10-PCS | Mod: CPTII,S$GLB,, | Performed by: FAMILY MEDICINE

## 2021-07-19 PROCEDURE — 85027 COMPLETE CBC AUTOMATED: CPT | Performed by: FAMILY MEDICINE

## 2021-07-19 PROCEDURE — 1125F AMNT PAIN NOTED PAIN PRSNT: CPT | Mod: CPTII,S$GLB,, | Performed by: FAMILY MEDICINE

## 2021-07-19 PROCEDURE — 3288F FALL RISK ASSESSMENT DOCD: CPT | Mod: CPTII,S$GLB,, | Performed by: FAMILY MEDICINE

## 2021-07-19 PROCEDURE — 3288F PR FALLS RISK ASSESSMENT DOCUMENTED: ICD-10-PCS | Mod: CPTII,S$GLB,, | Performed by: FAMILY MEDICINE

## 2021-07-19 PROCEDURE — 82150 ASSAY OF AMYLASE: CPT | Performed by: FAMILY MEDICINE

## 2021-07-19 RX ORDER — PANTOPRAZOLE SODIUM 40 MG/1
40 TABLET, DELAYED RELEASE ORAL 2 TIMES DAILY
Qty: 60 TABLET | Refills: 11 | Status: SHIPPED | OUTPATIENT
Start: 2021-07-19 | End: 2021-09-10

## 2021-07-19 RX ORDER — FAMOTIDINE 20 MG/1
20 TABLET, FILM COATED ORAL 2 TIMES DAILY
Qty: 60 TABLET | Refills: 11 | Status: SHIPPED | OUTPATIENT
Start: 2021-07-19 | End: 2021-09-10

## 2021-07-19 RX ORDER — OXYBUTYNIN CHLORIDE 5 MG/1
TABLET ORAL
COMMUNITY
Start: 2021-06-29 | End: 2022-02-15 | Stop reason: SDUPTHER

## 2021-07-21 ENCOUNTER — TELEPHONE (OUTPATIENT)
Dept: RADIOLOGY | Facility: HOSPITAL | Age: 66
End: 2021-07-21

## 2021-07-21 DIAGNOSIS — E83.52 SERUM CALCIUM ELEVATED: Primary | ICD-10-CM

## 2021-07-21 RX ORDER — OLMESARTAN MEDOXOMIL 20 MG/1
20 TABLET ORAL DAILY
Qty: 30 TABLET | Refills: 5 | Status: SHIPPED | OUTPATIENT
Start: 2021-07-21 | End: 2021-08-16

## 2021-07-22 ENCOUNTER — HOSPITAL ENCOUNTER (OUTPATIENT)
Dept: RADIOLOGY | Facility: HOSPITAL | Age: 66
Discharge: HOME | End: 2021-07-22
Attending: FAMILY MEDICINE
Payer: MEDICARE

## 2021-07-22 DIAGNOSIS — K21.9 GASTROESOPHAGEAL REFLUX DISEASE, UNSPECIFIED WHETHER ESOPHAGITIS PRESENT: ICD-10-CM

## 2021-07-22 DIAGNOSIS — R10.12 LEFT UPPER QUADRANT PAIN: ICD-10-CM

## 2021-07-22 DIAGNOSIS — R10.9 ABDOMINAL PAIN, UNSPECIFIED ABDOMINAL LOCATION: ICD-10-CM

## 2021-07-22 DIAGNOSIS — K59.00 CONSTIPATION, UNSPECIFIED CONSTIPATION TYPE: ICD-10-CM

## 2021-07-22 DIAGNOSIS — R10.13 EPIGASTRIC PAIN: ICD-10-CM

## 2021-07-22 PROCEDURE — 74177 CT ABDOMEN PELVIS WITH CONTRAST: ICD-10-PCS | Mod: 26,,, | Performed by: RADIOLOGY

## 2021-07-22 PROCEDURE — 74177 CT ABD & PELVIS W/CONTRAST: CPT | Mod: 26,,, | Performed by: RADIOLOGY

## 2021-07-22 PROCEDURE — 74177 CT ABD & PELVIS W/CONTRAST: CPT | Mod: TC

## 2021-07-22 PROCEDURE — 25500020 PHARM REV CODE 255: Performed by: FAMILY MEDICINE

## 2021-07-22 RX ADMIN — IOHEXOL 30 ML: 350 INJECTION, SOLUTION INTRAVENOUS at 11:07

## 2021-07-22 RX ADMIN — IOHEXOL 75 ML: 350 INJECTION, SOLUTION INTRAVENOUS at 12:07

## 2021-07-23 ENCOUNTER — TELEPHONE (OUTPATIENT)
Dept: FAMILY MEDICINE | Facility: CLINIC | Age: 66
End: 2021-07-23

## 2021-07-23 ENCOUNTER — PATIENT OUTREACH (OUTPATIENT)
Dept: ADMINISTRATIVE | Facility: OTHER | Age: 66
End: 2021-07-23

## 2021-07-23 ENCOUNTER — OFFICE VISIT (OUTPATIENT)
Dept: FAMILY MEDICINE | Facility: CLINIC | Age: 66
End: 2021-07-23
Payer: MEDICARE

## 2021-07-23 VITALS
HEART RATE: 78 BPM | WEIGHT: 168.56 LBS | SYSTOLIC BLOOD PRESSURE: 125 MMHG | BODY MASS INDEX: 28.78 KG/M2 | DIASTOLIC BLOOD PRESSURE: 76 MMHG | HEIGHT: 64 IN | OXYGEN SATURATION: 98 % | TEMPERATURE: 98 F

## 2021-07-23 DIAGNOSIS — M94.0 COSTOCHONDRITIS: ICD-10-CM

## 2021-07-23 DIAGNOSIS — R07.9 CHEST PAIN, UNSPECIFIED TYPE: Primary | ICD-10-CM

## 2021-07-23 DIAGNOSIS — H92.02 EAR PAIN, LEFT: ICD-10-CM

## 2021-07-23 DIAGNOSIS — Z76.89 ENCOUNTER TO ESTABLISH CARE: ICD-10-CM

## 2021-07-23 PROCEDURE — 3078F PR MOST RECENT DIASTOLIC BLOOD PRESSURE < 80 MM HG: ICD-10-PCS | Mod: CPTII,S$GLB,, | Performed by: FAMILY MEDICINE

## 2021-07-23 PROCEDURE — 1125F AMNT PAIN NOTED PAIN PRSNT: CPT | Mod: CPTII,S$GLB,, | Performed by: FAMILY MEDICINE

## 2021-07-23 PROCEDURE — 99214 OFFICE O/P EST MOD 30 MIN: CPT | Mod: S$GLB,,, | Performed by: FAMILY MEDICINE

## 2021-07-23 PROCEDURE — 99214 PR OFFICE/OUTPT VISIT, EST, LEVL IV, 30-39 MIN: ICD-10-PCS | Mod: S$GLB,,, | Performed by: FAMILY MEDICINE

## 2021-07-23 PROCEDURE — 3008F BODY MASS INDEX DOCD: CPT | Mod: CPTII,S$GLB,, | Performed by: FAMILY MEDICINE

## 2021-07-23 PROCEDURE — 3288F FALL RISK ASSESSMENT DOCD: CPT | Mod: CPTII,S$GLB,, | Performed by: FAMILY MEDICINE

## 2021-07-23 PROCEDURE — 3074F PR MOST RECENT SYSTOLIC BLOOD PRESSURE < 130 MM HG: ICD-10-PCS | Mod: CPTII,S$GLB,, | Performed by: FAMILY MEDICINE

## 2021-07-23 PROCEDURE — 1101F PR PT FALLS ASSESS DOC 0-1 FALLS W/OUT INJ PAST YR: ICD-10-PCS | Mod: CPTII,S$GLB,, | Performed by: FAMILY MEDICINE

## 2021-07-23 PROCEDURE — 1159F MED LIST DOCD IN RCRD: CPT | Mod: CPTII,S$GLB,, | Performed by: FAMILY MEDICINE

## 2021-07-23 PROCEDURE — 3074F SYST BP LT 130 MM HG: CPT | Mod: CPTII,S$GLB,, | Performed by: FAMILY MEDICINE

## 2021-07-23 PROCEDURE — 3288F PR FALLS RISK ASSESSMENT DOCUMENTED: ICD-10-PCS | Mod: CPTII,S$GLB,, | Performed by: FAMILY MEDICINE

## 2021-07-23 PROCEDURE — 1159F PR MEDICATION LIST DOCUMENTED IN MEDICAL RECORD: ICD-10-PCS | Mod: CPTII,S$GLB,, | Performed by: FAMILY MEDICINE

## 2021-07-23 PROCEDURE — 3008F PR BODY MASS INDEX (BMI) DOCUMENTED: ICD-10-PCS | Mod: CPTII,S$GLB,, | Performed by: FAMILY MEDICINE

## 2021-07-23 PROCEDURE — 1125F PR PAIN SEVERITY QUANTIFIED, PAIN PRESENT: ICD-10-PCS | Mod: CPTII,S$GLB,, | Performed by: FAMILY MEDICINE

## 2021-07-23 PROCEDURE — 99999 PR PBB SHADOW E&M-EST. PATIENT-LVL V: CPT | Mod: PBBFAC,,, | Performed by: FAMILY MEDICINE

## 2021-07-23 PROCEDURE — 99999 PR PBB SHADOW E&M-EST. PATIENT-LVL V: ICD-10-PCS | Mod: PBBFAC,,, | Performed by: FAMILY MEDICINE

## 2021-07-23 PROCEDURE — 1101F PT FALLS ASSESS-DOCD LE1/YR: CPT | Mod: CPTII,S$GLB,, | Performed by: FAMILY MEDICINE

## 2021-07-23 PROCEDURE — 3078F DIAST BP <80 MM HG: CPT | Mod: CPTII,S$GLB,, | Performed by: FAMILY MEDICINE

## 2021-07-26 ENCOUNTER — TELEPHONE (OUTPATIENT)
Dept: FAMILY MEDICINE | Facility: CLINIC | Age: 66
End: 2021-07-26

## 2021-07-26 ENCOUNTER — OFFICE VISIT (OUTPATIENT)
Dept: GASTROENTEROLOGY | Facility: CLINIC | Age: 66
End: 2021-07-26
Payer: MEDICARE

## 2021-07-26 VITALS
HEART RATE: 65 BPM | DIASTOLIC BLOOD PRESSURE: 70 MMHG | WEIGHT: 170.63 LBS | OXYGEN SATURATION: 97 % | BODY MASS INDEX: 29.13 KG/M2 | HEIGHT: 64 IN | SYSTOLIC BLOOD PRESSURE: 118 MMHG

## 2021-07-26 DIAGNOSIS — R13.10 DYSPHAGIA, UNSPECIFIED TYPE: ICD-10-CM

## 2021-07-26 DIAGNOSIS — K21.9 GASTROESOPHAGEAL REFLUX DISEASE, UNSPECIFIED WHETHER ESOPHAGITIS PRESENT: ICD-10-CM

## 2021-07-26 DIAGNOSIS — K59.00 CONSTIPATION, UNSPECIFIED CONSTIPATION TYPE: ICD-10-CM

## 2021-07-26 PROCEDURE — 1126F AMNT PAIN NOTED NONE PRSNT: CPT | Mod: CPTII,S$GLB,, | Performed by: NURSE PRACTITIONER

## 2021-07-26 PROCEDURE — 1159F MED LIST DOCD IN RCRD: CPT | Mod: CPTII,S$GLB,, | Performed by: NURSE PRACTITIONER

## 2021-07-26 PROCEDURE — 3074F SYST BP LT 130 MM HG: CPT | Mod: CPTII,S$GLB,, | Performed by: NURSE PRACTITIONER

## 2021-07-26 PROCEDURE — 1160F PR REVIEW ALL MEDS BY PRESCRIBER/CLIN PHARMACIST DOCUMENTED: ICD-10-PCS | Mod: CPTII,S$GLB,, | Performed by: NURSE PRACTITIONER

## 2021-07-26 PROCEDURE — 1159F PR MEDICATION LIST DOCUMENTED IN MEDICAL RECORD: ICD-10-PCS | Mod: CPTII,S$GLB,, | Performed by: NURSE PRACTITIONER

## 2021-07-26 PROCEDURE — 3074F PR MOST RECENT SYSTOLIC BLOOD PRESSURE < 130 MM HG: ICD-10-PCS | Mod: CPTII,S$GLB,, | Performed by: NURSE PRACTITIONER

## 2021-07-26 PROCEDURE — 1101F PT FALLS ASSESS-DOCD LE1/YR: CPT | Mod: CPTII,S$GLB,, | Performed by: NURSE PRACTITIONER

## 2021-07-26 PROCEDURE — 99999 PR PBB SHADOW E&M-EST. PATIENT-LVL V: CPT | Mod: PBBFAC,,, | Performed by: NURSE PRACTITIONER

## 2021-07-26 PROCEDURE — 3288F FALL RISK ASSESSMENT DOCD: CPT | Mod: CPTII,S$GLB,, | Performed by: NURSE PRACTITIONER

## 2021-07-26 PROCEDURE — 99999 PR PBB SHADOW E&M-EST. PATIENT-LVL V: ICD-10-PCS | Mod: PBBFAC,,, | Performed by: NURSE PRACTITIONER

## 2021-07-26 PROCEDURE — 3078F PR MOST RECENT DIASTOLIC BLOOD PRESSURE < 80 MM HG: ICD-10-PCS | Mod: CPTII,S$GLB,, | Performed by: NURSE PRACTITIONER

## 2021-07-26 PROCEDURE — 1126F PR PAIN SEVERITY QUANTIFIED, NO PAIN PRESENT: ICD-10-PCS | Mod: CPTII,S$GLB,, | Performed by: NURSE PRACTITIONER

## 2021-07-26 PROCEDURE — 3078F DIAST BP <80 MM HG: CPT | Mod: CPTII,S$GLB,, | Performed by: NURSE PRACTITIONER

## 2021-07-26 PROCEDURE — 1160F RVW MEDS BY RX/DR IN RCRD: CPT | Mod: CPTII,S$GLB,, | Performed by: NURSE PRACTITIONER

## 2021-07-26 PROCEDURE — 99204 PR OFFICE/OUTPT VISIT, NEW, LEVL IV, 45-59 MIN: ICD-10-PCS | Mod: S$GLB,,, | Performed by: NURSE PRACTITIONER

## 2021-07-26 PROCEDURE — 1101F PR PT FALLS ASSESS DOC 0-1 FALLS W/OUT INJ PAST YR: ICD-10-PCS | Mod: CPTII,S$GLB,, | Performed by: NURSE PRACTITIONER

## 2021-07-26 PROCEDURE — 99204 OFFICE O/P NEW MOD 45 MIN: CPT | Mod: S$GLB,,, | Performed by: NURSE PRACTITIONER

## 2021-07-26 PROCEDURE — 3008F BODY MASS INDEX DOCD: CPT | Mod: CPTII,S$GLB,, | Performed by: NURSE PRACTITIONER

## 2021-07-26 PROCEDURE — 3008F PR BODY MASS INDEX (BMI) DOCUMENTED: ICD-10-PCS | Mod: CPTII,S$GLB,, | Performed by: NURSE PRACTITIONER

## 2021-07-26 PROCEDURE — 3288F PR FALLS RISK ASSESSMENT DOCUMENTED: ICD-10-PCS | Mod: CPTII,S$GLB,, | Performed by: NURSE PRACTITIONER

## 2021-07-29 ENCOUNTER — OFFICE VISIT (OUTPATIENT)
Dept: OBSTETRICS AND GYNECOLOGY | Facility: CLINIC | Age: 66
End: 2021-07-29
Payer: MEDICARE

## 2021-07-29 VITALS
BODY MASS INDEX: 29.17 KG/M2 | DIASTOLIC BLOOD PRESSURE: 70 MMHG | WEIGHT: 170.88 LBS | HEIGHT: 64 IN | SYSTOLIC BLOOD PRESSURE: 122 MMHG

## 2021-07-29 DIAGNOSIS — D21.9 FIBROIDS: ICD-10-CM

## 2021-07-29 DIAGNOSIS — Z76.89 ENCOUNTER TO ESTABLISH CARE: ICD-10-CM

## 2021-07-29 DIAGNOSIS — Z01.419 WELL WOMAN EXAM WITH ROUTINE GYNECOLOGICAL EXAM: Primary | ICD-10-CM

## 2021-07-29 DIAGNOSIS — N95.2 VAGINAL ATROPHY: ICD-10-CM

## 2021-07-29 PROCEDURE — 3044F HG A1C LEVEL LT 7.0%: CPT | Mod: CPTII,S$GLB,, | Performed by: OBSTETRICS & GYNECOLOGY

## 2021-07-29 PROCEDURE — 88175 CYTOPATH C/V AUTO FLUID REDO: CPT | Performed by: OBSTETRICS & GYNECOLOGY

## 2021-07-29 PROCEDURE — 3008F BODY MASS INDEX DOCD: CPT | Mod: CPTII,S$GLB,, | Performed by: OBSTETRICS & GYNECOLOGY

## 2021-07-29 PROCEDURE — 87624 HPV HI-RISK TYP POOLED RSLT: CPT | Performed by: OBSTETRICS & GYNECOLOGY

## 2021-07-29 PROCEDURE — 99999 PR PBB SHADOW E&M-EST. PATIENT-LVL III: CPT | Mod: PBBFAC,,, | Performed by: OBSTETRICS & GYNECOLOGY

## 2021-07-29 PROCEDURE — 1159F PR MEDICATION LIST DOCUMENTED IN MEDICAL RECORD: ICD-10-PCS | Mod: CPTII,S$GLB,, | Performed by: OBSTETRICS & GYNECOLOGY

## 2021-07-29 PROCEDURE — 3288F PR FALLS RISK ASSESSMENT DOCUMENTED: ICD-10-PCS | Mod: CPTII,S$GLB,, | Performed by: OBSTETRICS & GYNECOLOGY

## 2021-07-29 PROCEDURE — 3008F PR BODY MASS INDEX (BMI) DOCUMENTED: ICD-10-PCS | Mod: CPTII,S$GLB,, | Performed by: OBSTETRICS & GYNECOLOGY

## 2021-07-29 PROCEDURE — 1101F PT FALLS ASSESS-DOCD LE1/YR: CPT | Mod: CPTII,S$GLB,, | Performed by: OBSTETRICS & GYNECOLOGY

## 2021-07-29 PROCEDURE — 3288F FALL RISK ASSESSMENT DOCD: CPT | Mod: CPTII,S$GLB,, | Performed by: OBSTETRICS & GYNECOLOGY

## 2021-07-29 PROCEDURE — 1126F PR PAIN SEVERITY QUANTIFIED, NO PAIN PRESENT: ICD-10-PCS | Mod: CPTII,S$GLB,, | Performed by: OBSTETRICS & GYNECOLOGY

## 2021-07-29 PROCEDURE — 99999 PR PBB SHADOW E&M-EST. PATIENT-LVL III: ICD-10-PCS | Mod: PBBFAC,,, | Performed by: OBSTETRICS & GYNECOLOGY

## 2021-07-29 PROCEDURE — 1101F PR PT FALLS ASSESS DOC 0-1 FALLS W/OUT INJ PAST YR: ICD-10-PCS | Mod: CPTII,S$GLB,, | Performed by: OBSTETRICS & GYNECOLOGY

## 2021-07-29 PROCEDURE — 1159F MED LIST DOCD IN RCRD: CPT | Mod: CPTII,S$GLB,, | Performed by: OBSTETRICS & GYNECOLOGY

## 2021-07-29 PROCEDURE — 3044F PR MOST RECENT HEMOGLOBIN A1C LEVEL <7.0%: ICD-10-PCS | Mod: CPTII,S$GLB,, | Performed by: OBSTETRICS & GYNECOLOGY

## 2021-07-29 PROCEDURE — G0101 CA SCREEN;PELVIC/BREAST EXAM: HCPCS | Mod: S$GLB,,, | Performed by: OBSTETRICS & GYNECOLOGY

## 2021-07-29 PROCEDURE — 1126F AMNT PAIN NOTED NONE PRSNT: CPT | Mod: CPTII,S$GLB,, | Performed by: OBSTETRICS & GYNECOLOGY

## 2021-07-29 PROCEDURE — G0101 PR CA SCREEN;PELVIC/BREAST EXAM: ICD-10-PCS | Mod: S$GLB,,, | Performed by: OBSTETRICS & GYNECOLOGY

## 2021-07-29 RX ORDER — ESTRADIOL 0.1 MG/G
1 CREAM VAGINAL DAILY
Qty: 42.5 G | Refills: 1 | Status: SHIPPED | OUTPATIENT
Start: 2021-07-29 | End: 2021-10-22

## 2021-08-01 ENCOUNTER — TELEPHONE (OUTPATIENT)
Dept: OBSTETRICS AND GYNECOLOGY | Facility: HOSPITAL | Age: 66
End: 2021-08-01

## 2021-08-01 RX ORDER — PROGESTERONE 100 MG/1
100 CAPSULE ORAL DAILY
Qty: 30 CAPSULE | Refills: 11 | Status: SHIPPED | OUTPATIENT
Start: 2021-08-01 | End: 2022-03-23

## 2021-08-01 RX ORDER — ESTRADIOL 0.5 MG/1
0.5 TABLET ORAL DAILY
Qty: 30 TABLET | Refills: 11 | Status: SHIPPED | OUTPATIENT
Start: 2021-08-01 | End: 2022-03-23

## 2021-08-04 ENCOUNTER — TELEPHONE (OUTPATIENT)
Dept: RADIOLOGY | Facility: HOSPITAL | Age: 66
End: 2021-08-04

## 2021-08-05 ENCOUNTER — HOSPITAL ENCOUNTER (OUTPATIENT)
Dept: RADIOLOGY | Facility: HOSPITAL | Age: 66
Discharge: HOME | End: 2021-08-05
Attending: OBSTETRICS & GYNECOLOGY
Payer: MEDICARE

## 2021-08-05 ENCOUNTER — PATIENT MESSAGE (OUTPATIENT)
Dept: OBSTETRICS AND GYNECOLOGY | Facility: CLINIC | Age: 66
End: 2021-08-05

## 2021-08-05 DIAGNOSIS — D21.9 FIBROIDS: ICD-10-CM

## 2021-08-05 LAB
HPV HR 12 DNA SPEC QL NAA+PROBE: NEGATIVE
HPV16 AG SPEC QL: NEGATIVE
HPV18 DNA SPEC QL NAA+PROBE: NEGATIVE

## 2021-08-05 PROCEDURE — 76830 US PELVIS COMP WITH TRANSVAG NON-OB (XPD): ICD-10-PCS | Mod: 26,,, | Performed by: RADIOLOGY

## 2021-08-05 PROCEDURE — 76830 TRANSVAGINAL US NON-OB: CPT | Mod: 26,,, | Performed by: RADIOLOGY

## 2021-08-05 PROCEDURE — 76856 US PELVIS COMP WITH TRANSVAG NON-OB (XPD): ICD-10-PCS | Mod: 26,,, | Performed by: RADIOLOGY

## 2021-08-05 PROCEDURE — 76856 US EXAM PELVIC COMPLETE: CPT | Mod: TC

## 2021-08-05 PROCEDURE — 76856 US EXAM PELVIC COMPLETE: CPT | Mod: 26,,, | Performed by: RADIOLOGY

## 2021-08-07 LAB
FINAL PATHOLOGIC DIAGNOSIS: NORMAL
Lab: NORMAL

## 2021-08-16 ENCOUNTER — TELEPHONE (OUTPATIENT)
Dept: FAMILY MEDICINE | Facility: CLINIC | Age: 66
End: 2021-08-16

## 2021-08-16 RX ORDER — AMLODIPINE BESYLATE 5 MG/1
5 TABLET ORAL DAILY
Qty: 30 TABLET | Refills: 2 | Status: SHIPPED | OUTPATIENT
Start: 2021-08-16 | End: 2021-08-16 | Stop reason: SDUPTHER

## 2021-08-16 RX ORDER — AMLODIPINE BESYLATE 5 MG/1
5 TABLET ORAL DAILY
Qty: 30 TABLET | Refills: 2 | Status: SHIPPED | OUTPATIENT
Start: 2021-08-16 | End: 2021-11-16

## 2021-09-16 ENCOUNTER — PROCEDURE VISIT (OUTPATIENT)
Dept: OBSTETRICS AND GYNECOLOGY | Facility: CLINIC | Age: 66
End: 2021-09-16
Payer: MEDICARE

## 2021-09-16 VITALS
BODY MASS INDEX: 30.11 KG/M2 | DIASTOLIC BLOOD PRESSURE: 80 MMHG | WEIGHT: 176.38 LBS | HEIGHT: 64 IN | SYSTOLIC BLOOD PRESSURE: 142 MMHG

## 2021-09-16 DIAGNOSIS — Z79.899 CURRENT USE OF ESTROGEN THERAPY: Primary | ICD-10-CM

## 2021-09-16 PROCEDURE — 58100 BIOPSY OF UTERUS LINING: CPT | Mod: S$GLB,,, | Performed by: OBSTETRICS & GYNECOLOGY

## 2021-09-16 PROCEDURE — 88305 TISSUE EXAM BY PATHOLOGIST: CPT | Mod: 26,,, | Performed by: PATHOLOGY

## 2021-09-16 PROCEDURE — 88305 TISSUE EXAM BY PATHOLOGIST: CPT | Performed by: PATHOLOGY

## 2021-09-16 PROCEDURE — 99499 UNLISTED E&M SERVICE: CPT | Mod: S$GLB,,, | Performed by: OBSTETRICS & GYNECOLOGY

## 2021-09-16 PROCEDURE — 88305 TISSUE EXAM BY PATHOLOGIST: ICD-10-PCS | Mod: 26,,, | Performed by: PATHOLOGY

## 2021-09-16 PROCEDURE — 99499 NO LOS: ICD-10-PCS | Mod: S$GLB,,, | Performed by: OBSTETRICS & GYNECOLOGY

## 2021-09-16 PROCEDURE — 58100 PR BIOPSY OF UTERUS LINING: ICD-10-PCS | Mod: S$GLB,,, | Performed by: OBSTETRICS & GYNECOLOGY

## 2021-09-22 LAB
COMMENT: NORMAL
FINAL PATHOLOGIC DIAGNOSIS: NORMAL
GROSS: NORMAL
Lab: NORMAL

## 2021-09-29 ENCOUNTER — TELEPHONE (OUTPATIENT)
Dept: OBSTETRICS AND GYNECOLOGY | Facility: CLINIC | Age: 66
End: 2021-09-29

## 2021-09-29 ENCOUNTER — PATIENT MESSAGE (OUTPATIENT)
Dept: OBSTETRICS AND GYNECOLOGY | Facility: CLINIC | Age: 66
End: 2021-09-29

## 2021-11-15 RX ORDER — OLMESARTAN MEDOXOMIL 20 MG/1
TABLET ORAL
Qty: 90 TABLET | Refills: 0 | OUTPATIENT
Start: 2021-11-15

## 2021-11-23 ENCOUNTER — TELEPHONE (OUTPATIENT)
Dept: FAMILY MEDICINE | Facility: CLINIC | Age: 66
End: 2021-11-23
Payer: MEDICARE

## 2021-12-01 ENCOUNTER — NURSE TRIAGE (OUTPATIENT)
Dept: ADMINISTRATIVE | Facility: CLINIC | Age: 66
End: 2021-12-01
Payer: MEDICARE

## 2021-12-01 ENCOUNTER — HOSPITAL ENCOUNTER (EMERGENCY)
Facility: HOSPITAL | Age: 66
Discharge: HOME | End: 2021-12-01
Attending: EMERGENCY MEDICINE
Payer: MEDICARE

## 2021-12-01 VITALS
HEART RATE: 65 BPM | TEMPERATURE: 98 F | DIASTOLIC BLOOD PRESSURE: 62 MMHG | OXYGEN SATURATION: 100 % | SYSTOLIC BLOOD PRESSURE: 129 MMHG | BODY MASS INDEX: 29.52 KG/M2 | WEIGHT: 172 LBS | RESPIRATION RATE: 18 BRPM

## 2021-12-01 DIAGNOSIS — R00.2 PALPITATIONS: ICD-10-CM

## 2021-12-01 DIAGNOSIS — M79.605 LEFT LEG PAIN: ICD-10-CM

## 2021-12-01 DIAGNOSIS — E87.6 HYPOKALEMIA: Primary | ICD-10-CM

## 2021-12-01 DIAGNOSIS — M54.2 NECK PAIN: ICD-10-CM

## 2021-12-01 DIAGNOSIS — R07.9 CHEST PAIN: ICD-10-CM

## 2021-12-01 DIAGNOSIS — Z76.89 ENCOUNTER FOR ASSESSMENT FOR DEEP VEIN THROMBOSIS (DVT): ICD-10-CM

## 2021-12-01 LAB
ALBUMIN SERPL BCP-MCNC: 4.1 G/DL (ref 3.5–5.2)
ALP SERPL-CCNC: 82 U/L (ref 55–135)
ALT SERPL W/O P-5'-P-CCNC: 18 U/L (ref 10–44)
ANION GAP SERPL CALC-SCNC: 12 MMOL/L (ref 8–16)
AST SERPL-CCNC: 20 U/L (ref 10–40)
BASOPHILS # BLD AUTO: 0.08 K/UL (ref 0–0.2)
BASOPHILS NFR BLD: 0.9 % (ref 0–1.9)
BILIRUB SERPL-MCNC: 0.3 MG/DL (ref 0.1–1)
BNP SERPL-MCNC: 27 PG/ML (ref 0–99)
BUN SERPL-MCNC: 17 MG/DL (ref 8–23)
CALCIUM SERPL-MCNC: 9.7 MG/DL (ref 8.7–10.5)
CHLORIDE SERPL-SCNC: 106 MMOL/L (ref 95–110)
CO2 SERPL-SCNC: 23 MMOL/L (ref 23–29)
CREAT SERPL-MCNC: 0.7 MG/DL (ref 0.5–1.4)
D DIMER PPP IA.FEU-MCNC: 0.45 MG/L FEU
DIFFERENTIAL METHOD: ABNORMAL
EOSINOPHIL # BLD AUTO: 0.3 K/UL (ref 0–0.5)
EOSINOPHIL NFR BLD: 3.4 % (ref 0–8)
ERYTHROCYTE [DISTWIDTH] IN BLOOD BY AUTOMATED COUNT: 12.8 % (ref 11.5–14.5)
EST. GFR  (AFRICAN AMERICAN): >60 ML/MIN/1.73 M^2
EST. GFR  (NON AFRICAN AMERICAN): >60 ML/MIN/1.73 M^2
GLUCOSE SERPL-MCNC: 87 MG/DL (ref 70–110)
HCT VFR BLD AUTO: 37.2 % (ref 37–48.5)
HGB BLD-MCNC: 12.1 G/DL (ref 12–16)
IMM GRANULOCYTES # BLD AUTO: 0.02 K/UL (ref 0–0.04)
IMM GRANULOCYTES NFR BLD AUTO: 0.2 % (ref 0–0.5)
LYMPHOCYTES # BLD AUTO: 3 K/UL (ref 1–4.8)
LYMPHOCYTES NFR BLD: 33.4 % (ref 18–48)
MCH RBC QN AUTO: 28.7 PG (ref 27–31)
MCHC RBC AUTO-ENTMCNC: 32.5 G/DL (ref 32–36)
MCV RBC AUTO: 88 FL (ref 82–98)
MONOCYTES # BLD AUTO: 0.5 K/UL (ref 0.3–1)
MONOCYTES NFR BLD: 5.8 % (ref 4–15)
NEUTROPHILS # BLD AUTO: 5.1 K/UL (ref 1.8–7.7)
NEUTROPHILS NFR BLD: 56.3 % (ref 38–73)
NRBC BLD-RTO: 0 /100 WBC
PLATELET # BLD AUTO: 275 K/UL (ref 150–450)
PMV BLD AUTO: 9 FL (ref 9.2–12.9)
POTASSIUM SERPL-SCNC: 3 MMOL/L (ref 3.5–5.1)
PROT SERPL-MCNC: 8.2 G/DL (ref 6–8.4)
RBC # BLD AUTO: 4.21 M/UL (ref 4–5.4)
SODIUM SERPL-SCNC: 141 MMOL/L (ref 136–145)
TROPONIN I SERPL DL<=0.01 NG/ML-MCNC: 0.02 NG/ML (ref 0–0.03)
WBC # BLD AUTO: 9.09 K/UL (ref 3.9–12.7)

## 2021-12-01 PROCEDURE — 93010 EKG 12-LEAD: ICD-10-PCS | Mod: HCNC,,, | Performed by: INTERNAL MEDICINE

## 2021-12-01 PROCEDURE — 80053 COMPREHEN METABOLIC PANEL: CPT | Mod: HCNC | Performed by: EMERGENCY MEDICINE

## 2021-12-01 PROCEDURE — 93010 ELECTROCARDIOGRAM REPORT: CPT | Mod: HCNC,,, | Performed by: INTERNAL MEDICINE

## 2021-12-01 PROCEDURE — 93005 ELECTROCARDIOGRAM TRACING: CPT | Mod: HCNC

## 2021-12-01 PROCEDURE — 99285 EMERGENCY DEPT VISIT HI MDM: CPT | Mod: 25,HCNC

## 2021-12-01 PROCEDURE — 85379 FIBRIN DEGRADATION QUANT: CPT | Mod: HCNC | Performed by: EMERGENCY MEDICINE

## 2021-12-01 PROCEDURE — 85025 COMPLETE CBC W/AUTO DIFF WBC: CPT | Mod: HCNC | Performed by: EMERGENCY MEDICINE

## 2021-12-01 PROCEDURE — 84484 ASSAY OF TROPONIN QUANT: CPT | Mod: HCNC | Performed by: EMERGENCY MEDICINE

## 2021-12-01 PROCEDURE — 83880 ASSAY OF NATRIURETIC PEPTIDE: CPT | Mod: HCNC | Performed by: EMERGENCY MEDICINE

## 2021-12-01 PROCEDURE — 25000003 PHARM REV CODE 250: Mod: HCNC | Performed by: EMERGENCY MEDICINE

## 2021-12-01 RX ORDER — POTASSIUM CHLORIDE 20 MEQ/1
40 TABLET, EXTENDED RELEASE ORAL
Status: COMPLETED | OUTPATIENT
Start: 2021-12-01 | End: 2021-12-01

## 2021-12-01 RX ADMIN — POTASSIUM CHLORIDE 40 MEQ: 20 TABLET, EXTENDED RELEASE ORAL at 02:12

## 2021-12-03 ENCOUNTER — LAB VISIT (OUTPATIENT)
Dept: LAB | Facility: HOSPITAL | Age: 66
End: 2021-12-03
Payer: MEDICARE

## 2021-12-03 ENCOUNTER — OFFICE VISIT (OUTPATIENT)
Dept: FAMILY MEDICINE | Facility: CLINIC | Age: 66
End: 2021-12-03
Payer: MEDICARE

## 2021-12-03 VITALS
HEIGHT: 64 IN | TEMPERATURE: 98 F | BODY MASS INDEX: 29.6 KG/M2 | WEIGHT: 173.38 LBS | SYSTOLIC BLOOD PRESSURE: 129 MMHG | DIASTOLIC BLOOD PRESSURE: 61 MMHG | HEART RATE: 92 BPM | OXYGEN SATURATION: 98 %

## 2021-12-03 DIAGNOSIS — M62.838 MUSCLE SPASMS OF BOTH LOWER EXTREMITIES: ICD-10-CM

## 2021-12-03 DIAGNOSIS — M79.605 PAIN OF LEFT LOWER EXTREMITY: ICD-10-CM

## 2021-12-03 DIAGNOSIS — I49.3 PVC (PREMATURE VENTRICULAR CONTRACTION): ICD-10-CM

## 2021-12-03 DIAGNOSIS — E87.6 HYPOKALEMIA: ICD-10-CM

## 2021-12-03 DIAGNOSIS — E87.6 HYPOKALEMIA: Primary | ICD-10-CM

## 2021-12-03 LAB — POTASSIUM SERPL-SCNC: 2.9 MMOL/L (ref 3.5–5.1)

## 2021-12-03 PROCEDURE — 99214 OFFICE O/P EST MOD 30 MIN: CPT | Mod: HCNC,S$GLB,, | Performed by: FAMILY MEDICINE

## 2021-12-03 PROCEDURE — 99999 PR PBB SHADOW E&M-EST. PATIENT-LVL IV: ICD-10-PCS | Mod: PBBFAC,HCNC,, | Performed by: FAMILY MEDICINE

## 2021-12-03 PROCEDURE — 99999 PR PBB SHADOW E&M-EST. PATIENT-LVL IV: CPT | Mod: PBBFAC,HCNC,, | Performed by: FAMILY MEDICINE

## 2021-12-03 PROCEDURE — 4010F PR ACE/ARB THEARPY RXD/TAKEN: ICD-10-PCS | Mod: HCNC,CPTII,S$GLB, | Performed by: FAMILY MEDICINE

## 2021-12-03 PROCEDURE — 84132 ASSAY OF SERUM POTASSIUM: CPT | Mod: HCNC | Performed by: FAMILY MEDICINE

## 2021-12-03 PROCEDURE — 4010F ACE/ARB THERAPY RXD/TAKEN: CPT | Mod: HCNC,CPTII,S$GLB, | Performed by: FAMILY MEDICINE

## 2021-12-03 PROCEDURE — 99214 PR OFFICE/OUTPT VISIT, EST, LEVL IV, 30-39 MIN: ICD-10-PCS | Mod: HCNC,S$GLB,, | Performed by: FAMILY MEDICINE

## 2021-12-03 PROCEDURE — 36415 COLL VENOUS BLD VENIPUNCTURE: CPT | Mod: HCNC,PO | Performed by: FAMILY MEDICINE

## 2021-12-03 RX ORDER — ESTRADIOL 0.1 MG/G
CREAM VAGINAL
Qty: 43 G | Refills: 11 | Status: SHIPPED | OUTPATIENT
Start: 2021-12-03 | End: 2022-02-15 | Stop reason: SDUPTHER

## 2021-12-03 RX ORDER — METOPROLOL SUCCINATE 25 MG/1
25 TABLET, EXTENDED RELEASE ORAL DAILY
Qty: 30 TABLET | Refills: 11 | Status: SHIPPED | OUTPATIENT
Start: 2021-12-03 | End: 2022-02-15 | Stop reason: SDUPTHER

## 2021-12-07 DIAGNOSIS — E87.6 HYPOKALEMIA: Primary | ICD-10-CM

## 2021-12-07 RX ORDER — POTASSIUM CHLORIDE 20 MEQ/1
TABLET, EXTENDED RELEASE ORAL
Qty: 33 TABLET | Refills: 1 | Status: SHIPPED | OUTPATIENT
Start: 2021-12-07 | End: 2022-03-23

## 2021-12-13 ENCOUNTER — LAB VISIT (OUTPATIENT)
Dept: LAB | Facility: HOSPITAL | Age: 66
End: 2021-12-13
Attending: FAMILY MEDICINE
Payer: MEDICARE

## 2021-12-13 DIAGNOSIS — E87.6 HYPOKALEMIA: ICD-10-CM

## 2021-12-13 LAB — POTASSIUM SERPL-SCNC: 4 MMOL/L (ref 3.5–5.1)

## 2021-12-13 PROCEDURE — 84132 ASSAY OF SERUM POTASSIUM: CPT | Mod: HCNC | Performed by: FAMILY MEDICINE

## 2021-12-13 PROCEDURE — 36415 COLL VENOUS BLD VENIPUNCTURE: CPT | Mod: HCNC,PO | Performed by: FAMILY MEDICINE

## 2021-12-15 ENCOUNTER — PES CALL (OUTPATIENT)
Dept: ADMINISTRATIVE | Facility: CLINIC | Age: 66
End: 2021-12-15
Payer: MEDICARE

## 2021-12-30 ENCOUNTER — LAB VISIT (OUTPATIENT)
Dept: PRIMARY CARE CLINIC | Facility: OTHER | Age: 66
End: 2021-12-30
Attending: INTERNAL MEDICINE
Payer: MEDICARE

## 2021-12-30 ENCOUNTER — TELEPHONE (OUTPATIENT)
Dept: FAMILY MEDICINE | Facility: CLINIC | Age: 66
End: 2021-12-30
Payer: MEDICARE

## 2021-12-30 DIAGNOSIS — R11.0 NAUSEA: ICD-10-CM

## 2021-12-30 DIAGNOSIS — R50.9 FEVER: ICD-10-CM

## 2021-12-30 DIAGNOSIS — Z20.822 ENCOUNTER FOR LABORATORY TESTING FOR COVID-19 VIRUS: ICD-10-CM

## 2021-12-30 DIAGNOSIS — R05.9 COUGH: ICD-10-CM

## 2021-12-30 PROCEDURE — U0003 INFECTIOUS AGENT DETECTION BY NUCLEIC ACID (DNA OR RNA); SEVERE ACUTE RESPIRATORY SYNDROME CORONAVIRUS 2 (SARS-COV-2) (CORONAVIRUS DISEASE [COVID-19]), AMPLIFIED PROBE TECHNIQUE, MAKING USE OF HIGH THROUGHPUT TECHNOLOGIES AS DESCRIBED BY CMS-2020-01-R: HCPCS | Mod: HCNC | Performed by: INTERNAL MEDICINE

## 2022-01-01 ENCOUNTER — PATIENT MESSAGE (OUTPATIENT)
Dept: ADMINISTRATIVE | Facility: OTHER | Age: 67
End: 2022-01-01
Payer: MEDICARE

## 2022-01-01 LAB
SARS-COV-2 RNA RESP QL NAA+PROBE: DETECTED
SARS-COV-2- CYCLE NUMBER: 23

## 2022-01-03 ENCOUNTER — NURSE TRIAGE (OUTPATIENT)
Dept: ADMINISTRATIVE | Facility: CLINIC | Age: 67
End: 2022-01-03
Payer: MEDICARE

## 2022-01-03 DIAGNOSIS — U07.1 COVID-19 VIRUS DETECTED: ICD-10-CM

## 2022-01-03 NOTE — TELEPHONE ENCOUNTER
[facility] pt wanted to know how long she should isolate. RN was able to answer question.      Reason for Disposition   Information only question and nurse able to answer    Additional Information   Negative: Nursing judgment   Negative: Nursing judgment   Negative: Nursing judgment   Negative: Nursing judgment    Protocols used: INFORMATION ONLY CALL - NO TRIAGE-A-[place]

## 2022-01-10 ENCOUNTER — TELEPHONE (OUTPATIENT)
Dept: PRIMARY CARE CLINIC | Facility: CLINIC | Age: 67
End: 2022-01-10
Payer: MEDICARE

## 2022-01-10 NOTE — TELEPHONE ENCOUNTER
----- Message from Conchita Lobo sent at 1/10/2022 12:03 PM CST -----  Regarding: Same ay Appointment  Contact: Luly- Daughter in [place]  Type:  Same Day Appointment Request    Caller is requesting a same day appointment.  Caller declined first available appointment listed below.    Name of Caller:Sherry Greene-Daughter in law  When is the first available appointment?2/18  Symptoms:weight loss/body pain/ not able to walk  Best Call Back Number:[phone]  Additional Information:

## 2022-01-10 NOTE — TELEPHONE ENCOUNTER
In correct patient. Call center placed a message stating that she needed to be called regarding scheduling a same day appointment. After gathering more information. The correct patient was contacted.

## 2022-01-12 ENCOUNTER — PATIENT OUTREACH (OUTPATIENT)
Dept: ADMINISTRATIVE | Facility: OTHER | Age: 67
End: 2022-01-12
Payer: MEDICARE

## 2022-01-13 ENCOUNTER — OFFICE VISIT (OUTPATIENT)
Dept: CARDIOLOGY | Facility: CLINIC | Age: 67
End: 2022-01-13
Payer: MEDICARE

## 2022-01-13 VITALS
SYSTOLIC BLOOD PRESSURE: 122 MMHG | OXYGEN SATURATION: 99 % | WEIGHT: 177.69 LBS | BODY MASS INDEX: 30.5 KG/M2 | DIASTOLIC BLOOD PRESSURE: 78 MMHG | HEART RATE: 65 BPM

## 2022-01-13 DIAGNOSIS — I10 ESSENTIAL HYPERTENSION: Primary | ICD-10-CM

## 2022-01-13 DIAGNOSIS — E78.2 MIXED HYPERLIPIDEMIA: ICD-10-CM

## 2022-01-13 DIAGNOSIS — R00.2 PALPITATION: ICD-10-CM

## 2022-01-13 DIAGNOSIS — I70.0 AORTIC ATHEROSCLEROSIS: ICD-10-CM

## 2022-01-13 DIAGNOSIS — I49.3 PVC (PREMATURE VENTRICULAR CONTRACTION): ICD-10-CM

## 2022-01-13 PROCEDURE — 3008F PR BODY MASS INDEX (BMI) DOCUMENTED: ICD-10-PCS | Mod: HCNC,CPTII,S$GLB, | Performed by: INTERNAL MEDICINE

## 2022-01-13 PROCEDURE — 3008F BODY MASS INDEX DOCD: CPT | Mod: HCNC,CPTII,S$GLB, | Performed by: INTERNAL MEDICINE

## 2022-01-13 PROCEDURE — 1159F PR MEDICATION LIST DOCUMENTED IN MEDICAL RECORD: ICD-10-PCS | Mod: HCNC,CPTII,S$GLB, | Performed by: INTERNAL MEDICINE

## 2022-01-13 PROCEDURE — 3074F SYST BP LT 130 MM HG: CPT | Mod: HCNC,CPTII,S$GLB, | Performed by: INTERNAL MEDICINE

## 2022-01-13 PROCEDURE — 99999 PR PBB SHADOW E&M-EST. PATIENT-LVL IV: CPT | Mod: PBBFAC,HCNC,, | Performed by: INTERNAL MEDICINE

## 2022-01-13 PROCEDURE — 99499 RISK ADDL DX/OHS AUDIT: ICD-10-PCS | Mod: S$GLB,,, | Performed by: INTERNAL MEDICINE

## 2022-01-13 PROCEDURE — 99499 UNLISTED E&M SERVICE: CPT | Mod: S$GLB,,, | Performed by: INTERNAL MEDICINE

## 2022-01-13 PROCEDURE — 99214 OFFICE O/P EST MOD 30 MIN: CPT | Mod: HCNC,S$GLB,, | Performed by: INTERNAL MEDICINE

## 2022-01-13 PROCEDURE — 1159F MED LIST DOCD IN RCRD: CPT | Mod: HCNC,CPTII,S$GLB, | Performed by: INTERNAL MEDICINE

## 2022-01-13 PROCEDURE — 1160F RVW MEDS BY RX/DR IN RCRD: CPT | Mod: HCNC,CPTII,S$GLB, | Performed by: INTERNAL MEDICINE

## 2022-01-13 PROCEDURE — 3078F DIAST BP <80 MM HG: CPT | Mod: HCNC,CPTII,S$GLB, | Performed by: INTERNAL MEDICINE

## 2022-01-13 PROCEDURE — 3074F PR MOST RECENT SYSTOLIC BLOOD PRESSURE < 130 MM HG: ICD-10-PCS | Mod: HCNC,CPTII,S$GLB, | Performed by: INTERNAL MEDICINE

## 2022-01-13 PROCEDURE — 1160F PR REVIEW ALL MEDS BY PRESCRIBER/CLIN PHARMACIST DOCUMENTED: ICD-10-PCS | Mod: HCNC,CPTII,S$GLB, | Performed by: INTERNAL MEDICINE

## 2022-01-13 PROCEDURE — 99214 PR OFFICE/OUTPT VISIT, EST, LEVL IV, 30-39 MIN: ICD-10-PCS | Mod: HCNC,S$GLB,, | Performed by: INTERNAL MEDICINE

## 2022-01-13 PROCEDURE — 99999 PR PBB SHADOW E&M-EST. PATIENT-LVL IV: ICD-10-PCS | Mod: PBBFAC,HCNC,, | Performed by: INTERNAL MEDICINE

## 2022-01-13 PROCEDURE — 3078F PR MOST RECENT DIASTOLIC BLOOD PRESSURE < 80 MM HG: ICD-10-PCS | Mod: HCNC,CPTII,S$GLB, | Performed by: INTERNAL MEDICINE

## 2022-01-13 NOTE — PROGRESS NOTES
Subjective:   Patient ID:  Cesario Sahu is a 66 y.o. female who presents for follow up of Palpitations      67 yo female, came in for palpitation. Retired   PMH PVCs, HTN, HLD. No smoking/drinking. H/o COVID 19 on 12/30/2022   EKG in  NSR, PVC, LVH and stt change  C/o palpitation, worse at night, fluttering sensation.  Some chest tightness.   No dyspnea, dizziness, faint and orthopnea.  Gym exercise 4 times a week, 30 mins on treadmill, but recently felt tired.  Mother healthy 92. Father not know too much.  H/o intolearnce of statin due to nausea. NOT start Pravastatin yet    Interval history  Last visit in 02/2020 and not started ToprolXL.. pt states that her palpitation improved and became worse after MCC recently  ER visit showed K 2.9 and after K supplement felt better.  Per PCP recommendation, already took ToprolXL over a month.  Felt palpitation at nights. Likely fluttering sensation. Worse if ate too much  Occasional coffee.              Past Medical History:   Diagnosis Date    Arthritis     Digestive disorder     Hypertension     Insulin resistance        Past Surgical History:   Procedure Laterality Date    ADENOIDECTOMY      CHOLECYSTECTOMY      COLONOSCOPY N/A 6/11/2021    Procedure: COLONOSCOPY;  Surgeon: Serenity Ramey MD;  Location: [facility] [place], [place];  Service: Endoscopy;  Laterality: N/A;    DILATION AND CURETTAGE OF UTERUS  10/2018       Social History     Tobacco Use    Smoking status: Never Smoker    Smokeless tobacco: Never Used   Substance Use Topics    Alcohol use: Yes     Comment: Socially    Drug use: Never       Family History   Problem Relation Age of Onset    Hypertension Mother     Heart disease Sister     Hyperlipidemia Sister     Hypertension Sister     Diabetes Brother          Review of Systems   Constitutional: Negative for decreased appetite, diaphoresis, fever, malaise/fatigue and night sweats.   HENT: Negative for nosebleeds.    Eyes: Negative for  blurred vision and double vision.   Cardiovascular: Positive for palpitations. Negative for chest pain, claudication, dyspnea on exertion, irregular heartbeat, leg swelling, near-syncope, orthopnea, paroxysmal nocturnal dyspnea and syncope.   Respiratory: Positive for cough and shortness of breath. Negative for sleep disturbances due to breathing, snoring, sputum production and wheezing.    Endocrine: Negative for cold intolerance and polyuria.   Hematologic/Lymphatic: Does not bruise/bleed easily.   Skin: Negative for rash.   Musculoskeletal: Negative for back pain, falls, joint pain, joint swelling and neck pain.   Gastrointestinal: Negative for abdominal pain, heartburn, nausea and vomiting.   Genitourinary: Negative for dysuria, frequency and hematuria.   Neurological: Negative for difficulty with concentration, dizziness, focal weakness, headaches, light-headedness, numbness, seizures and weakness.   Psychiatric/Behavioral: Negative for depression, memory loss and substance abuse. The patient does not have insomnia.    Allergic/Immunologic: Negative for HIV exposure and hives.       Objective:   Physical Exam  HENT:      Head: Normocephalic.   Eyes:      Pupils: Pupils are equal, round, and reactive to light.   Neck:      Thyroid: No thyromegaly.      Vascular: Normal carotid pulses. No carotid bruit or JVD.   Cardiovascular:      Rate and Rhythm: Normal rate and regular rhythm.  No extrasystoles are present.     Chest Wall: PMI is not displaced.      Pulses: Normal pulses.      Heart sounds: Normal heart sounds. No murmur heard.  No gallop. No S3 sounds.    Pulmonary:      Effort: No respiratory distress.      Breath sounds: Normal breath sounds. No stridor.   Abdominal:      General: Bowel sounds are normal.      Palpations: Abdomen is soft.      Tenderness: There is no abdominal tenderness. There is no rebound.   Musculoskeletal:         General: Normal range of motion.   Skin:     Findings: No rash.    Neurological:      Mental Status: She is alert and oriented to person, place, and time.   Psychiatric:         Behavior: Behavior normal.         Lab Results   Component Value Date    CHOL 228 (H) 03/05/2021    CHOL 262 (H) 07/28/2020    CHOL 246 (H) 01/24/2020     Lab Results   Component Value Date    HDL 71 03/05/2021    HDL 70 07/28/2020    HDL 68 01/24/2020     Lab Results   Component Value Date    LDLCALC 140.8 03/05/2021    LDLCALC 169.0 (H) 07/28/2020    LDLCALC 164.2 (H) 01/24/2020     Lab Results   Component Value Date    TRIG 81 03/05/2021    TRIG 115 07/28/2020    TRIG 69 01/24/2020     Lab Results   Component Value Date    CHOLHDL 31.1 03/05/2021    CHOLHDL 26.7 07/28/2020    CHOLHDL 27.6 01/24/2020       Chemistry        Component Value Date/Time     12/01/2021 1118    K 4.0 12/13/2021 1202     12/01/2021 1118    CO2 23 12/01/2021 1118    BUN 17 12/01/2021 1118    CREATININE 0.7 12/01/2021 1118    GLU 87 12/01/2021 1118        Component Value Date/Time    CALCIUM 9.7 12/01/2021 1118    ALKPHOS 82 12/01/2021 1118    AST 20 12/01/2021 1118    ALT 18 12/01/2021 1118    BILITOT 0.3 12/01/2021 1118    ESTGFRAFRICA >60 12/01/2021 1118    EGFRNONAA >60 12/01/2021 1118          Lab Results   Component Value Date    HGBA1C 5.5 06/01/2021     Lab Results   Component Value Date    TSH 2.342 07/19/2021     No results found for: INR, PROTIME  Lab Results   Component Value Date    WBC 9.09 12/01/2021    HGB 12.1 12/01/2021    HCT 37.2 12/01/2021    MCV 88 12/01/2021     12/01/2021     BMP  Sodium   Date Value Ref Range Status   12/01/2021 141 136 - 145 mmol/L Final     Potassium   Date Value Ref Range Status   12/13/2021 4.0 3.5 - 5.1 mmol/L Final     Chloride   Date Value Ref Range Status   12/01/2021 106 95 - 110 mmol/L Final     CO2   Date Value Ref Range Status   12/01/2021 23 23 - 29 mmol/L Final     BUN   Date Value Ref Range Status   12/01/2021 17 8 - 23 mg/dL Final     Creatinine   Date  Value Ref Range Status   12/01/2021 0.7 0.5 - 1.4 mg/dL Final     Calcium   Date Value Ref Range Status   12/01/2021 9.7 8.7 - 10.5 mg/dL Final     Anion Gap   Date Value Ref Range Status   12/01/2021 12 8 - 16 mmol/L Final     eGFR if    Date Value Ref Range Status   12/01/2021 >60 >60 mL/min/1.73 m^2 Final     eGFR if non    Date Value Ref Range Status   12/01/2021 >60 >60 mL/min/1.73 m^2 Final     Comment:     Calculation used to obtain the estimated glomerular filtration  rate (eGFR) is the CKD-EPI equation.        BNP  @LABRCNTIP(BNP,BNPTRIAGEBLO)@  @LABRCNTIP(troponini)@  CrCl cannot be calculated (Patient's most recent lab result is older than the maximum 7 days allowed.).  No results found in the last 24 hours.  No results found in the last 24 hours.  No results found in the last 24 hours.    Assessment:      1. Essential hypertension    2. Mixed hyperlipidemia    3. PVC (premature ventricular contraction)    4. Aortic atherosclerosis        Plan:   Echo and HOLTER for PVCs and palpitation  Continue toprolXL amlodipine and statin  DM Rx per PCP  Counseled DASH  Check Lipid profile in 6 months  Recommend heart-healthy diet, weight control and regular exercise.  Tiffanie. Risk modification.   I have reviewed all pertinent labs and cardiac studies independently. Plans and recommendations have been formulated under my direct supervision. All questions answered and patient voiced understanding.   If symptoms persist go to the ED  RTC as needed

## 2022-01-18 ENCOUNTER — OFFICE VISIT (OUTPATIENT)
Dept: OPHTHALMOLOGY | Facility: CLINIC | Age: 67
End: 2022-01-18
Payer: MEDICARE

## 2022-01-18 DIAGNOSIS — H25.12 NUCLEAR SCLEROSIS OF LEFT EYE: ICD-10-CM

## 2022-01-18 DIAGNOSIS — I10 ESSENTIAL HYPERTENSION: ICD-10-CM

## 2022-01-18 DIAGNOSIS — H25.11 NUCLEAR SCLEROSIS OF RIGHT EYE: Primary | ICD-10-CM

## 2022-01-18 DIAGNOSIS — H52.7 REFRACTIVE DISORDER: ICD-10-CM

## 2022-01-18 DIAGNOSIS — M35.01 KERATOCONJUNCTIVITIS SICCA: ICD-10-CM

## 2022-01-18 PROCEDURE — 1160F RVW MEDS BY RX/DR IN RCRD: CPT | Mod: HCNC,CPTII,S$GLB, | Performed by: STUDENT IN AN ORGANIZED HEALTH CARE EDUCATION/TRAINING PROGRAM

## 2022-01-18 PROCEDURE — 1159F MED LIST DOCD IN RCRD: CPT | Mod: HCNC,CPTII,S$GLB, | Performed by: STUDENT IN AN ORGANIZED HEALTH CARE EDUCATION/TRAINING PROGRAM

## 2022-01-18 PROCEDURE — 1126F PR PAIN SEVERITY QUANTIFIED, NO PAIN PRESENT: ICD-10-PCS | Mod: HCNC,CPTII,S$GLB, | Performed by: STUDENT IN AN ORGANIZED HEALTH CARE EDUCATION/TRAINING PROGRAM

## 2022-01-18 PROCEDURE — 92015 DETERMINE REFRACTIVE STATE: CPT | Mod: HCNC,S$GLB,, | Performed by: STUDENT IN AN ORGANIZED HEALTH CARE EDUCATION/TRAINING PROGRAM

## 2022-01-18 PROCEDURE — 4010F ACE/ARB THERAPY RXD/TAKEN: CPT | Mod: HCNC,CPTII,S$GLB, | Performed by: STUDENT IN AN ORGANIZED HEALTH CARE EDUCATION/TRAINING PROGRAM

## 2022-01-18 PROCEDURE — 1160F PR REVIEW ALL MEDS BY PRESCRIBER/CLIN PHARMACIST DOCUMENTED: ICD-10-PCS | Mod: HCNC,CPTII,S$GLB, | Performed by: STUDENT IN AN ORGANIZED HEALTH CARE EDUCATION/TRAINING PROGRAM

## 2022-01-18 PROCEDURE — 99499 UNLISTED E&M SERVICE: CPT | Mod: HCNC,S$GLB,, | Performed by: STUDENT IN AN ORGANIZED HEALTH CARE EDUCATION/TRAINING PROGRAM

## 2022-01-18 PROCEDURE — 92004 PR EYE EXAM, NEW PATIENT,COMPREHESV: ICD-10-PCS | Mod: HCNC,S$GLB,, | Performed by: STUDENT IN AN ORGANIZED HEALTH CARE EDUCATION/TRAINING PROGRAM

## 2022-01-18 PROCEDURE — 92015 PR REFRACTION: ICD-10-PCS | Mod: HCNC,S$GLB,, | Performed by: STUDENT IN AN ORGANIZED HEALTH CARE EDUCATION/TRAINING PROGRAM

## 2022-01-18 PROCEDURE — 1159F PR MEDICATION LIST DOCUMENTED IN MEDICAL RECORD: ICD-10-PCS | Mod: HCNC,CPTII,S$GLB, | Performed by: STUDENT IN AN ORGANIZED HEALTH CARE EDUCATION/TRAINING PROGRAM

## 2022-01-18 PROCEDURE — 92004 COMPRE OPH EXAM NEW PT 1/>: CPT | Mod: HCNC,S$GLB,, | Performed by: STUDENT IN AN ORGANIZED HEALTH CARE EDUCATION/TRAINING PROGRAM

## 2022-01-18 PROCEDURE — 99999 PR PBB SHADOW E&M-EST. PATIENT-LVL II: CPT | Mod: PBBFAC,HCNC,, | Performed by: STUDENT IN AN ORGANIZED HEALTH CARE EDUCATION/TRAINING PROGRAM

## 2022-01-18 PROCEDURE — 1126F AMNT PAIN NOTED NONE PRSNT: CPT | Mod: HCNC,CPTII,S$GLB, | Performed by: STUDENT IN AN ORGANIZED HEALTH CARE EDUCATION/TRAINING PROGRAM

## 2022-01-18 PROCEDURE — 99499 RISK ADDL DX/OHS AUDIT: ICD-10-PCS | Mod: HCNC,S$GLB,, | Performed by: STUDENT IN AN ORGANIZED HEALTH CARE EDUCATION/TRAINING PROGRAM

## 2022-01-18 PROCEDURE — 99999 PR PBB SHADOW E&M-EST. PATIENT-LVL II: ICD-10-PCS | Mod: PBBFAC,HCNC,, | Performed by: STUDENT IN AN ORGANIZED HEALTH CARE EDUCATION/TRAINING PROGRAM

## 2022-01-18 PROCEDURE — 4010F PR ACE/ARB THEARPY RXD/TAKEN: ICD-10-PCS | Mod: HCNC,CPTII,S$GLB, | Performed by: STUDENT IN AN ORGANIZED HEALTH CARE EDUCATION/TRAINING PROGRAM

## 2022-01-18 RX ORDER — CYANOCOBALAMIN (VITAMIN B-12) 500 MCG
TABLET ORAL
COMMUNITY
End: 2022-03-23

## 2022-01-18 RX ORDER — OLMESARTAN MEDOXOMIL 20 MG/1
TABLET ORAL
COMMUNITY
Start: 2021-10-21 | End: 2022-03-23

## 2022-01-18 NOTE — PROGRESS NOTES
HPI     NP. PT here for annual exam. Pt states VA has decreased in both near and   distance. Pt states not having any eye pain or discomfort. Pt denies any   other ocular issues at this time    1.HTN    Last edited by Helen Amato on 1/18/2022 11:26 AM. (History)            Assessment /Plan     For exam results, see Encounter Report.    Nuclear sclerosis of right eye  Nuclear sclerosis of left eye  NVS, monitor    Keratoconjunctivitis sicca  - ATs QID and lid hygiene w/ baby shampoo  - [place] 3 Fish Oils    Essential hypertension  Strict BP control  Follow up with PCP      RTC 1 year for DFE

## 2022-01-28 ENCOUNTER — HOSPITAL ENCOUNTER (OUTPATIENT)
Dept: CARDIOLOGY | Facility: HOSPITAL | Age: 67
Discharge: HOME | End: 2022-01-28
Attending: INTERNAL MEDICINE
Payer: MEDICARE

## 2022-01-28 VITALS
DIASTOLIC BLOOD PRESSURE: 78 MMHG | HEIGHT: 64 IN | SYSTOLIC BLOOD PRESSURE: 122 MMHG | BODY MASS INDEX: 30.22 KG/M2 | WEIGHT: 177 LBS

## 2022-01-28 DIAGNOSIS — I49.3 PVC (PREMATURE VENTRICULAR CONTRACTION): ICD-10-CM

## 2022-01-28 DIAGNOSIS — I10 ESSENTIAL HYPERTENSION: ICD-10-CM

## 2022-01-28 DIAGNOSIS — R00.2 PALPITATION: ICD-10-CM

## 2022-01-28 LAB
AORTIC ROOT ANNULUS: 3.03 CM
AV INDEX (PROSTH): 0.69
AV MEAN GRADIENT: 3 MMHG
AV PEAK GRADIENT: 6 MMHG
AV VALVE AREA: 2.2 CM2
AV VELOCITY RATIO: 0.71
BSA FOR ECHO PROCEDURE: 1.9 M2
CV ECHO LV RWT: 0.37 CM
DOP CALC AO PEAK VEL: 1.27 M/S
DOP CALC AO VTI: 31.1 CM
DOP CALC LVOT AREA: 3.2 CM2
DOP CALC LVOT DIAMETER: 2.01 CM
DOP CALC LVOT PEAK VEL: 0.9 M/S
DOP CALC LVOT STROKE VOLUME: 68.5 CM3
DOP CALC RVOT PEAK VEL: 0.67 M/S
DOP CALC RVOT VTI: 16.3 CM
DOP CALCLVOT PEAK VEL VTI: 21.6 CM
E WAVE DECELERATION TIME: 168.43 MSEC
E/A RATIO: 0.82
E/E' RATIO: 10 M/S
ECHO EF ESTIMATED: 66 %
ECHO LV POSTERIOR WALL: 0.95 CM (ref 0.6–1.1)
EJECTION FRACTION: 55 %
FRACTIONAL SHORTENING: 36 % (ref 28–44)
INTERVENTRICULAR SEPTUM: 0.88 CM (ref 0.6–1.1)
IVRT: 97.05 MSEC
LA MAJOR: 4.4 CM
LA MINOR: 4.46 CM
LA WIDTH: 3.41 CM
LEFT ATRIUM SIZE: 3.28 CM
LEFT ATRIUM VOLUME INDEX MOD: 19.7 ML/M2
LEFT ATRIUM VOLUME INDEX: 22.6 ML/M2
LEFT ATRIUM VOLUME MOD: 36.59 CM3
LEFT ATRIUM VOLUME: 42.11 CM3
LEFT INTERNAL DIMENSION IN SYSTOLE: 3.28 CM (ref 2.1–4)
LEFT VENTRICLE DIASTOLIC VOLUME INDEX: 67.66 ML/M2
LEFT VENTRICLE DIASTOLIC VOLUME: 125.84 ML
LEFT VENTRICLE MASS INDEX: 91 G/M2
LEFT VENTRICLE SYSTOLIC VOLUME INDEX: 23.3 ML/M2
LEFT VENTRICLE SYSTOLIC VOLUME: 43.34 ML
LEFT VENTRICULAR INTERNAL DIMENSION IN DIASTOLE: 5.14 CM (ref 3.5–6)
LEFT VENTRICULAR MASS: 169.34 G
LV LATERAL E/E' RATIO: 10.71 M/S
LV SEPTAL E/E' RATIO: 9.38 M/S
LVOT MG: 1.62 MMHG
LVOT MV: 0.59 CM/S
MV PEAK A VEL: 0.92 M/S
MV PEAK E VEL: 0.75 M/S
PISA TR MAX VEL: 2.5 M/S
PULM VEIN S/D RATIO: 1.68
PV MEAN GRADIENT: 1.02 MMHG
PV MV: 0.73 M/S
PV PEAK D VEL: 0.37 M/S
PV PEAK S VEL: 0.62 M/S
PV PEAK VELOCITY: 1 CM/S
RA MAJOR: 4.16 CM
RA PRESSURE: 3 MMHG
RA WIDTH: 2.93 CM
RIGHT VENTRICULAR END-DIASTOLIC DIMENSION: 2.58 CM
SINUS: 2.88 CM
STJ: 2.8 CM
TDI LATERAL: 0.07 M/S
TDI SEPTAL: 0.08 M/S
TDI: 0.08 M/S
TR MAX PG: 25 MMHG
TRICUSPID ANNULAR PLANE SYSTOLIC EXCURSION: 2.19 CM
TV REST PULMONARY ARTERY PRESSURE: 28 MMHG

## 2022-01-28 PROCEDURE — 93227 XTRNL ECG REC<48 HR R&I: CPT | Mod: HCNC,,, | Performed by: INTERNAL MEDICINE

## 2022-01-28 PROCEDURE — 93227 HOLTER MONITOR - 48 HOUR (CUPID ONLY): ICD-10-PCS | Mod: HCNC,,, | Performed by: INTERNAL MEDICINE

## 2022-01-28 PROCEDURE — 93306 ECHO (CUPID ONLY): ICD-10-PCS | Mod: 26,HCNC,, | Performed by: INTERNAL MEDICINE

## 2022-01-28 PROCEDURE — 93306 TTE W/DOPPLER COMPLETE: CPT | Mod: HCNC

## 2022-01-28 PROCEDURE — 93306 TTE W/DOPPLER COMPLETE: CPT | Mod: 26,HCNC,, | Performed by: INTERNAL MEDICINE

## 2022-01-28 PROCEDURE — 93225 XTRNL ECG REC<48 HRS REC: CPT | Mod: HCNC

## 2022-01-31 DIAGNOSIS — E87.6 HYPOKALEMIA: Primary | ICD-10-CM

## 2022-01-31 LAB
OHS CV EVENT MONITOR DAY: 2
OHS CV HOLTER LENGTH DECIMAL HOURS: 96
OHS CV HOLTER LENGTH HOURS: 48
OHS CV HOLTER LENGTH MINUTES: 0
OHS CV HOLTER SINUS AVERAGE HR: 66
OHS CV HOLTER SINUS MAX HR: 105
OHS CV HOLTER SINUS MIN HR: 46

## 2022-01-31 RX ORDER — METOPROLOL SUCCINATE 25 MG/1
TABLET, EXTENDED RELEASE ORAL
Qty: 90 TABLET | Refills: 0 | OUTPATIENT
Start: 2022-01-31

## 2022-01-31 RX ORDER — POTASSIUM CHLORIDE 20 MEQ/1
TABLET, EXTENDED RELEASE ORAL
Qty: 99 TABLET | Refills: 0 | OUTPATIENT
Start: 2022-01-31

## 2022-01-31 NOTE — TELEPHONE ENCOUNTER
[facility] Note  Quick DC. Inappropriate Request   Refill request requires further review by MD: NO   Medication Therapy Plan: Pharmacy is requesting new script(s) for the following medications without required information, (sig/ frequency/qty/etc)     [facility] action(s):  Quick Discontinue      Duplicate Pended Encounter(s)/ Last Prescribed Details:    Pharmacies have been requesting medications for patients without required information, (sig, frequency, qty, etc.). In addition, requests are sent for medication(s) pt. are currently not taking, and medications patients have never taken.    We have spoken to the pharmacies about these request types and advised their teams previously that we are unable to assess these New Script requests and require all details for these requests. This is a known issue and has been reported.        Medication related problems are not assessed for QDC.   Medication Reconciliation Completed? NO Were there pending details that required adjustment? NO     Automatic Epic Generated Protocol Data Below:   Requested Prescriptions   Pending Prescriptions Disp Refills    potassium chloride SA (K-DUR,KLOR-CON) 20 MEQ tablet [Pharmacy Med Name: POTASSIUM CL ER 20MEQ MICRO TAB] 99 tablet 0              Appointments      Date Provider   Last Visit   12/3/2021 Rosemarie Meeks MD   Next Visit   Visit date not found Rosemarie Meeks MD        Note composed:1:47 PM 01/31/2022

## 2022-01-31 NOTE — TELEPHONE ENCOUNTER
[facility] Note  Quick DC. Inappropriate Request   Refill request requires further review by MD: NO   Medication Therapy Plan: Pharmacy is requesting new script(s) for the following medications without required information, (sig/ frequency/qty/etc)     [facility] action(s):  Quick Discontinue      Duplicate Pended Encounter(s)/ Last Prescribed Details:    Pharmacies have been requesting medications for patients without required information, (sig, frequency, qty, etc.). In addition, requests are sent for medication(s) pt. are currently not taking, and medications patients have never taken.    We have spoken to the pharmacies about these request types and advised their teams previously that we are unable to assess these New Script requests and require all details for these requests. This is a known issue and has been reported.        Medication related problems are not assessed for QDC.   Medication Reconciliation Completed? NO Were there pending details that required adjustment? NO     Automatic Epic Generated Protocol Data Below:   Requested Prescriptions   Pending Prescriptions Disp Refills    metoprolol succinate (TOPROL-XL) 25 MG 24 hr tablet [Pharmacy Med Name: METOPROLOL ER(SUCC)  25MG TAB] 90 tablet 0              Appointments      Date Provider   Last Visit   12/3/2021 Rosemarie Meeks MD   Next Visit   1/31/2022 Rosemarie Meeks MD        Note composed:1:47 PM 01/31/2022

## 2022-02-01 ENCOUNTER — TELEPHONE (OUTPATIENT)
Dept: CARDIOLOGY | Facility: CLINIC | Age: 67
End: 2022-02-01
Payer: MEDICARE

## 2022-02-01 NOTE — TELEPHONE ENCOUNTER
Patient contacted and LVM to return the calll to the clinic to get results..Echo showed normal function

## 2022-02-01 NOTE — TELEPHONE ENCOUNTER
Pt contacted about results, pt verbalized understanding            ----- Message from Nicole Chua sent at 2/1/2022  9:23 AM CST -----  Pt is returning nurse call. Pt can be reached at [phone] (home)     Echo showed normal function

## 2022-02-03 ENCOUNTER — TELEPHONE (OUTPATIENT)
Dept: CARDIOLOGY | Facility: CLINIC | Age: 67
End: 2022-02-03
Payer: MEDICARE

## 2022-02-03 NOTE — TELEPHONE ENCOUNTER
Pt contacted about results, pt verbalized understanding.            ----- Message from Rene Vanessa MD sent at 2/2/2022  4:05 PM CST -----  Holter showed occasional PVCs and PACs  How is the palpitation?

## 2022-02-04 ENCOUNTER — LAB VISIT (OUTPATIENT)
Dept: LAB | Facility: HOSPITAL | Age: 67
End: 2022-02-04
Attending: FAMILY MEDICINE
Payer: MEDICARE

## 2022-02-04 DIAGNOSIS — E83.52 SERUM CALCIUM ELEVATED: ICD-10-CM

## 2022-02-04 DIAGNOSIS — E87.6 HYPOKALEMIA: ICD-10-CM

## 2022-02-04 LAB
ANION GAP SERPL CALC-SCNC: 10 MMOL/L (ref 8–16)
BUN SERPL-MCNC: 18 MG/DL (ref 8–23)
CALCIUM SERPL-MCNC: 9.8 MG/DL (ref 8.7–10.5)
CHLORIDE SERPL-SCNC: 105 MMOL/L (ref 95–110)
CO2 SERPL-SCNC: 23 MMOL/L (ref 23–29)
CREAT SERPL-MCNC: 0.8 MG/DL (ref 0.5–1.4)
EST. GFR  (AFRICAN AMERICAN): >60 ML/MIN/1.73 M^2
EST. GFR  (NON AFRICAN AMERICAN): >60 ML/MIN/1.73 M^2
GLUCOSE SERPL-MCNC: 85 MG/DL (ref 70–110)
POTASSIUM SERPL-SCNC: 3.8 MMOL/L (ref 3.5–5.1)
POTASSIUM SERPL-SCNC: 3.8 MMOL/L (ref 3.5–5.1)
PTH-INTACT SERPL-MCNC: 145.1 PG/ML (ref 9–77)
SODIUM SERPL-SCNC: 138 MMOL/L (ref 136–145)

## 2022-02-04 PROCEDURE — 36415 COLL VENOUS BLD VENIPUNCTURE: CPT | Mod: HCNC,PO | Performed by: FAMILY MEDICINE

## 2022-02-04 PROCEDURE — 80048 BASIC METABOLIC PNL TOTAL CA: CPT | Mod: HCNC | Performed by: FAMILY MEDICINE

## 2022-02-04 PROCEDURE — 83970 ASSAY OF PARATHORMONE: CPT | Mod: HCNC | Performed by: FAMILY MEDICINE

## 2022-02-14 ENCOUNTER — TELEPHONE (OUTPATIENT)
Dept: FAMILY MEDICINE | Facility: CLINIC | Age: 67
End: 2022-02-14
Payer: MEDICARE

## 2022-02-14 NOTE — PROGRESS NOTES
Subjective:       Patient ID: Cesario Sahu is a 66 y.o. female.      Chief Complaint   Patient presents with    Pain underneath breast        HPI    Mrs. Sahu reports pain for past few weeks.  Located under LT breast ~ LUQ of stomach, radiating to LT flank and LLQ stomach.  On Linzess for past few months for chronic constipation issues.  BM not daily.  Reports chronic gassiness and bloating.  Linzess dose was recently increased but she has not started the higher dose.        Review of Systems   Constitutional: Negative.    Respiratory: Negative.    Cardiovascular: Negative.    Gastrointestinal: Positive for abdominal distention, abdominal pain and constipation. Negative for anal bleeding, blood in stool, diarrhea, nausea, rectal pain and vomiting.   Genitourinary: Positive for flank pain and frequency. Negative for dysuria, genital sores and hematuria.   Neurological: Negative.    Psychiatric/Behavioral: Positive for sleep disturbance (req sleep aid to help  ).         Review of patient's allergies indicates:  No Known Allergies      Patient Active Problem List   Diagnosis    Essential hypertension    Insulin resistance    Mixed hyperlipidemia    PVC (premature ventricular contraction)    Aortic atherosclerosis    Personal history of colonic polyps    Diverticulosis of sigmoid colon           Current Outpatient Medications:     amLODIPine (NORVASC) 5 MG tablet, TAKE 1 TABLET EVERY DAY, Disp: 90 tablet, Rfl: 1    ascorbic acid (VITAMIN C ORAL), , Disp: , Rfl:     aspirin (ECOTRIN) 81 MG EC tablet, Take 81 mg by mouth once daily., Disp: , Rfl:     biotin 5,000 mcg TbDL, Take by mouth., Disp: , Rfl:     cholecalciferol, vitamin D3, (VITAMIN D3) 10 mcg (400 unit) Tab, Vitamin D3 Take No date recorded No form recorded No frequency recorded No route recorded No set duration recorded No set duration amount recorded active No dosage strength recorded No dosage strength units of measure recorded, Disp:  , Rfl:     estradioL (ESTRACE) 0.01 % (0.1 mg/gram) vaginal cream, PLACE 1 GRAM VAGINALLY ONCE DAILY, Disp: 43 g, Rfl: 0    estradioL (ESTRACE) 0.5 MG tablet, Take 1 tablet (0.5 mg total) by mouth once daily., Disp: 30 tablet, Rfl: 11    famotidine (PEPCID) 20 MG tablet, Take 1 tablet (20 mg total) by mouth 2 (two) times daily as needed for Heartburn., Disp: 180 tablet, Rfl: 1    Lactobacillus acidophilus (PROBIOTIC ORAL), , Disp: , Rfl:     linaCLOtide (LINZESS) 145 mcg Cap capsule, Take 1 capsule (145 mcg total) by mouth before breakfast., Disp: 90 capsule, Rfl: 3    metoprolol succinate (TOPROL-XL) 50 MG 24 hr tablet, Take 1 tablet (50 mg total) by mouth once daily., Disp: 30 tablet, Rfl: 11    olmesartan (BENICAR) 20 MG tablet, , Disp: , Rfl:     OMEPRAZOLE ORAL, , Disp: , Rfl:     oxybutynin (DITROPAN-XL) 5 MG TR24, Take 1 tablet (5 mg total) by mouth once daily., Disp: 90 tablet, Rfl: 3    pantoprazole (PROTONIX) 40 MG tablet, Take 1 tablet (40 mg total) by mouth once daily., Disp: 180 tablet, Rfl: 2    potassium 99 mg Tab, Potassium Take No date recorded No form recorded No frequency recorded No route recorded No set duration recorded No set duration amount recorded suspended No dosage strength recorded No dosage strength units of measure recorded, Disp: , Rfl:     potassium chloride SA (K-DUR,KLOR-CON) 20 MEQ tablet, Take 1 tablet twice daily by mouth for the next three days and then take 1 tablet by mouth daily, Disp: 33 tablet, Rfl: 1    pravastatin (PRAVACHOL) 20 MG tablet, Take 1 tablet (20 mg total) by mouth once daily., Disp: 90 tablet, Rfl: 3    progesterone (PROMETRIUM) 100 MG capsule, Take 1 capsule (100 mg total) by mouth once daily., Disp: 30 capsule, Rfl: 11        Past medical, surgical, family and social histories have been reviewed today.      Objective:     Vitals:    02/15/22 1412   BP: 126/70   Pulse: 88   Temp: 97.6 °F (36.4 °C)   SpO2: 97%   Weight: 79.9 kg (176 lb 0.6 oz)  "  Height: 5' 4" (1.626 m)   PainSc:   7           Physical Exam  Constitutional:       General: She is not in acute distress.  HENT:      Head: Normocephalic and atraumatic.   Cardiovascular:      Rate and Rhythm: Normal rate and regular rhythm.   Pulmonary:      Effort: Pulmonary effort is normal.      Breath sounds: Normal breath sounds.   Abdominal:      General: Bowel sounds are normal. There is no distension.      Palpations: Abdomen is soft.      Tenderness: There is abdominal tenderness. There is no right CVA tenderness, left CVA tenderness, guarding or rebound.       Skin:     Capillary Refill: Capillary refill takes less than 2 seconds.   Neurological:      Mental Status: She is alert and oriented to person, place, and time.   Psychiatric:         Mood and Affect: Mood normal.         Behavior: Behavior normal.         Thought Content: Thought content normal.         Judgment: Judgment normal.           Assessment       ICD-10-CM ICD-9-CM    1. Constipation, unspecified constipation type  K59.00 564.00 linaCLOtide (LINZESS) 145 mcg Cap capsule      X-Ray Abdomen Flat And Erect  Chronic issue, check xray.  Med refilled at current ordered dose.  Water, fiber.   2. Insomnia, unspecified type  G47.00 780.52 traZODone (DESYREL) 50 MG tablet  Trial of trazodone.  Sleep habits, reduce stress if an issue.   3. Urinary frequency  R35.0 788.41 Urinalysis, Reflex to Urine Culture Urine, Clean Catch        Follow-up     Lab and xray pending.  Return to clinic as needed.      GABO Rome  [facility]     "

## 2022-02-14 NOTE — TELEPHONE ENCOUNTER
----- Message from Bob Lancaster sent at 2/14/2022  8:31 AM CST -----  Contact: self/[phone]  Type:  Sooner Apoointment Request    Caller is requesting a sooner appointment.  Caller declined first available appointment listed below.  Caller will not accept being placed on the waitlist and is requesting a message be sent to doctor.  Name of Caller:Shirhunter   When is the first available appointment?3/2022   Symptoms:pain underneath breast   Would the patient rather a call back or a response via [facility]? Call back   Best Call Back Number:[phone]  Additional Information:

## 2022-02-15 ENCOUNTER — OFFICE VISIT (OUTPATIENT)
Dept: FAMILY MEDICINE | Facility: CLINIC | Age: 67
End: 2022-02-15
Payer: MEDICARE

## 2022-02-15 ENCOUNTER — HOSPITAL ENCOUNTER (OUTPATIENT)
Dept: RADIOLOGY | Facility: HOSPITAL | Age: 67
Discharge: HOME | End: 2022-02-15
Attending: REGISTERED NURSE
Payer: MEDICARE

## 2022-02-15 ENCOUNTER — TELEPHONE (OUTPATIENT)
Dept: CARDIOLOGY | Facility: CLINIC | Age: 67
End: 2022-02-15
Payer: MEDICARE

## 2022-02-15 ENCOUNTER — PATIENT MESSAGE (OUTPATIENT)
Dept: FAMILY MEDICINE | Facility: CLINIC | Age: 67
End: 2022-02-15
Payer: MEDICARE

## 2022-02-15 VITALS
SYSTOLIC BLOOD PRESSURE: 126 MMHG | WEIGHT: 176.06 LBS | HEART RATE: 88 BPM | BODY MASS INDEX: 30.06 KG/M2 | HEIGHT: 64 IN | DIASTOLIC BLOOD PRESSURE: 70 MMHG | OXYGEN SATURATION: 97 % | TEMPERATURE: 98 F

## 2022-02-15 DIAGNOSIS — K59.00 CONSTIPATION, UNSPECIFIED CONSTIPATION TYPE: ICD-10-CM

## 2022-02-15 DIAGNOSIS — G47.00 INSOMNIA, UNSPECIFIED TYPE: ICD-10-CM

## 2022-02-15 DIAGNOSIS — R35.0 URINARY FREQUENCY: ICD-10-CM

## 2022-02-15 DIAGNOSIS — K59.00 CONSTIPATION, UNSPECIFIED CONSTIPATION TYPE: Primary | ICD-10-CM

## 2022-02-15 LAB
BACTERIA #/AREA URNS AUTO: ABNORMAL /HPF
BILIRUB UR QL STRIP: NEGATIVE
CLARITY UR REFRACT.AUTO: ABNORMAL
COLOR UR AUTO: ABNORMAL
GLUCOSE UR QL STRIP: NEGATIVE
HGB UR QL STRIP: NEGATIVE
KETONES UR QL STRIP: NEGATIVE
LEUKOCYTE ESTERASE UR QL STRIP: ABNORMAL
MICROSCOPIC COMMENT: ABNORMAL
NITRITE UR QL STRIP: POSITIVE
PH UR STRIP: 5 [PH] (ref 5–8)
PROT UR QL STRIP: NEGATIVE
RBC #/AREA URNS AUTO: 3 /HPF (ref 0–4)
SP GR UR STRIP: 1.02 (ref 1–1.03)
SQUAMOUS #/AREA URNS AUTO: 4 /HPF
URN SPEC COLLECT METH UR: ABNORMAL
WBC #/AREA URNS AUTO: 47 /HPF (ref 0–5)

## 2022-02-15 PROCEDURE — 3074F PR MOST RECENT SYSTOLIC BLOOD PRESSURE < 130 MM HG: ICD-10-PCS | Mod: HCNC,CPTII,S$GLB, | Performed by: REGISTERED NURSE

## 2022-02-15 PROCEDURE — 99999 PR PBB SHADOW E&M-EST. PATIENT-LVL V: ICD-10-PCS | Mod: PBBFAC,HCNC,, | Performed by: REGISTERED NURSE

## 2022-02-15 PROCEDURE — 99999 PR PBB SHADOW E&M-EST. PATIENT-LVL V: CPT | Mod: PBBFAC,HCNC,, | Performed by: REGISTERED NURSE

## 2022-02-15 PROCEDURE — 3074F SYST BP LT 130 MM HG: CPT | Mod: HCNC,CPTII,S$GLB, | Performed by: REGISTERED NURSE

## 2022-02-15 PROCEDURE — 81001 URINALYSIS AUTO W/SCOPE: CPT | Mod: HCNC | Performed by: REGISTERED NURSE

## 2022-02-15 PROCEDURE — 3078F PR MOST RECENT DIASTOLIC BLOOD PRESSURE < 80 MM HG: ICD-10-PCS | Mod: HCNC,CPTII,S$GLB, | Performed by: REGISTERED NURSE

## 2022-02-15 PROCEDURE — 99214 PR OFFICE/OUTPT VISIT, EST, LEVL IV, 30-39 MIN: ICD-10-PCS | Mod: HCNC,S$GLB,, | Performed by: REGISTERED NURSE

## 2022-02-15 PROCEDURE — 3008F PR BODY MASS INDEX (BMI) DOCUMENTED: ICD-10-PCS | Mod: HCNC,CPTII,S$GLB, | Performed by: REGISTERED NURSE

## 2022-02-15 PROCEDURE — 74019 XR ABDOMEN FLAT AND ERECT: ICD-10-PCS | Mod: 26,HCNC,, | Performed by: RADIOLOGY

## 2022-02-15 PROCEDURE — 1125F PR PAIN SEVERITY QUANTIFIED, PAIN PRESENT: ICD-10-PCS | Mod: HCNC,CPTII,S$GLB, | Performed by: REGISTERED NURSE

## 2022-02-15 PROCEDURE — 74019 RADEX ABDOMEN 2 VIEWS: CPT | Mod: TC,HCNC,FY,PO

## 2022-02-15 PROCEDURE — 1159F PR MEDICATION LIST DOCUMENTED IN MEDICAL RECORD: ICD-10-PCS | Mod: HCNC,CPTII,S$GLB, | Performed by: REGISTERED NURSE

## 2022-02-15 PROCEDURE — 4010F ACE/ARB THERAPY RXD/TAKEN: CPT | Mod: HCNC,CPTII,S$GLB, | Performed by: REGISTERED NURSE

## 2022-02-15 PROCEDURE — 99214 OFFICE O/P EST MOD 30 MIN: CPT | Mod: HCNC,S$GLB,, | Performed by: REGISTERED NURSE

## 2022-02-15 PROCEDURE — 87088 URINE BACTERIA CULTURE: CPT | Mod: HCNC | Performed by: REGISTERED NURSE

## 2022-02-15 PROCEDURE — 4010F PR ACE/ARB THEARPY RXD/TAKEN: ICD-10-PCS | Mod: HCNC,CPTII,S$GLB, | Performed by: REGISTERED NURSE

## 2022-02-15 PROCEDURE — 1159F MED LIST DOCD IN RCRD: CPT | Mod: HCNC,CPTII,S$GLB, | Performed by: REGISTERED NURSE

## 2022-02-15 PROCEDURE — 1125F AMNT PAIN NOTED PAIN PRSNT: CPT | Mod: HCNC,CPTII,S$GLB, | Performed by: REGISTERED NURSE

## 2022-02-15 PROCEDURE — 87086 URINE CULTURE/COLONY COUNT: CPT | Mod: HCNC | Performed by: REGISTERED NURSE

## 2022-02-15 PROCEDURE — 74019 RADEX ABDOMEN 2 VIEWS: CPT | Mod: 26,HCNC,, | Performed by: RADIOLOGY

## 2022-02-15 PROCEDURE — 87186 SC STD MICRODIL/AGAR DIL: CPT | Mod: HCNC | Performed by: REGISTERED NURSE

## 2022-02-15 PROCEDURE — 87077 CULTURE AEROBIC IDENTIFY: CPT | Mod: HCNC | Performed by: REGISTERED NURSE

## 2022-02-15 PROCEDURE — 3008F BODY MASS INDEX DOCD: CPT | Mod: HCNC,CPTII,S$GLB, | Performed by: REGISTERED NURSE

## 2022-02-15 PROCEDURE — 3078F DIAST BP <80 MM HG: CPT | Mod: HCNC,CPTII,S$GLB, | Performed by: REGISTERED NURSE

## 2022-02-15 RX ORDER — TRAZODONE HYDROCHLORIDE 50 MG/1
50 TABLET ORAL NIGHTLY PRN
Qty: 90 TABLET | Refills: 0 | Status: SHIPPED | OUTPATIENT
Start: 2022-02-15 | End: 2023-01-10

## 2022-02-15 RX ORDER — METOPROLOL SUCCINATE 50 MG/1
50 TABLET, EXTENDED RELEASE ORAL DAILY
Qty: 30 TABLET | Refills: 11 | Status: SHIPPED | OUTPATIENT
Start: 2022-02-15 | End: 2022-08-24

## 2022-02-15 NOTE — TELEPHONE ENCOUNTER
----- Message from Nia Polanco MA sent at 2/14/2022 12:32 PM CST -----  Contact: ARGELIA SMITH [[id number]]  Spoke with pt, pt stated that he palpation comes and goes only when she lay down. Please advise.  ----- Message -----  From: Gia Lyons  Sent: 2/14/2022   9:53 AM CST  To: [facility] Staff    .Type:  Test Results    Who Called: ARGELIA SMITH [[id number]]  Name of Test (Lab/Mammo/Etc): labs   Date of Test: 01/28/2022  Ordering Provider: [facility]   Where the test was performed:   Would the patient rather a call back or a response via [facility]? Call   Best Call Back Number: .[phone] (home)    Additional Information:  Pt is req a call back to discuss her results she states that she did speak with someone who advised they were going to call her back but they never did.....

## 2022-02-15 NOTE — TELEPHONE ENCOUNTER
Pt contacted and lvm to let her know that Dr. barba     Increase metoprolol from 25 mg to 50 mg daily

## 2022-02-17 ENCOUNTER — PATIENT MESSAGE (OUTPATIENT)
Dept: FAMILY MEDICINE | Facility: CLINIC | Age: 67
End: 2022-02-17
Payer: MEDICARE

## 2022-02-18 LAB — BACTERIA UR CULT: ABNORMAL

## 2022-02-20 ENCOUNTER — PATIENT MESSAGE (OUTPATIENT)
Dept: FAMILY MEDICINE | Facility: CLINIC | Age: 67
End: 2022-02-20
Payer: MEDICARE

## 2022-02-20 RX ORDER — CEPHALEXIN 500 MG/1
500 CAPSULE ORAL EVERY 12 HOURS
Qty: 20 CAPSULE | Refills: 0 | Status: SHIPPED | OUTPATIENT
Start: 2022-02-20 | End: 2022-03-02

## 2022-02-24 ENCOUNTER — PATIENT MESSAGE (OUTPATIENT)
Dept: RESEARCH | Facility: HOSPITAL | Age: 67
End: 2022-02-24
Payer: MEDICARE

## 2022-03-16 ENCOUNTER — PATIENT MESSAGE (OUTPATIENT)
Dept: FAMILY MEDICINE | Facility: CLINIC | Age: 67
End: 2022-03-16
Payer: MEDICARE

## 2022-03-16 DIAGNOSIS — R10.9 ABDOMINAL PAIN, UNSPECIFIED ABDOMINAL LOCATION: Primary | ICD-10-CM

## 2022-03-17 ENCOUNTER — PATIENT OUTREACH (OUTPATIENT)
Dept: ADMINISTRATIVE | Facility: OTHER | Age: 67
End: 2022-03-17
Payer: MEDICARE

## 2022-03-17 ENCOUNTER — PATIENT MESSAGE (OUTPATIENT)
Dept: FAMILY MEDICINE | Facility: CLINIC | Age: 67
End: 2022-03-17
Payer: MEDICARE

## 2022-03-18 ENCOUNTER — OFFICE VISIT (OUTPATIENT)
Dept: GASTROENTEROLOGY | Facility: CLINIC | Age: 67
End: 2022-03-18
Payer: MEDICARE

## 2022-03-18 VITALS
BODY MASS INDEX: 29.66 KG/M2 | WEIGHT: 173.75 LBS | SYSTOLIC BLOOD PRESSURE: 140 MMHG | HEIGHT: 64 IN | DIASTOLIC BLOOD PRESSURE: 78 MMHG

## 2022-03-18 DIAGNOSIS — K58.1 IRRITABLE BOWEL SYNDROME WITH CONSTIPATION: Primary | ICD-10-CM

## 2022-03-18 DIAGNOSIS — R10.9 ABDOMINAL PAIN, UNSPECIFIED ABDOMINAL LOCATION: ICD-10-CM

## 2022-03-18 DIAGNOSIS — K21.9 GASTROESOPHAGEAL REFLUX DISEASE, UNSPECIFIED WHETHER ESOPHAGITIS PRESENT: ICD-10-CM

## 2022-03-18 PROCEDURE — 99214 OFFICE O/P EST MOD 30 MIN: CPT | Mod: S$GLB,,, | Performed by: INTERNAL MEDICINE

## 2022-03-18 PROCEDURE — 1125F PR PAIN SEVERITY QUANTIFIED, PAIN PRESENT: ICD-10-PCS | Mod: CPTII,S$GLB,, | Performed by: INTERNAL MEDICINE

## 2022-03-18 PROCEDURE — 3078F DIAST BP <80 MM HG: CPT | Mod: CPTII,S$GLB,, | Performed by: INTERNAL MEDICINE

## 2022-03-18 PROCEDURE — 4010F ACE/ARB THERAPY RXD/TAKEN: CPT | Mod: CPTII,S$GLB,, | Performed by: INTERNAL MEDICINE

## 2022-03-18 PROCEDURE — 99999 PR PBB SHADOW E&M-EST. PATIENT-LVL IV: CPT | Mod: PBBFAC,,, | Performed by: INTERNAL MEDICINE

## 2022-03-18 PROCEDURE — 1125F AMNT PAIN NOTED PAIN PRSNT: CPT | Mod: CPTII,S$GLB,, | Performed by: INTERNAL MEDICINE

## 2022-03-18 PROCEDURE — 3008F PR BODY MASS INDEX (BMI) DOCUMENTED: ICD-10-PCS | Mod: CPTII,S$GLB,, | Performed by: INTERNAL MEDICINE

## 2022-03-18 PROCEDURE — 4010F PR ACE/ARB THEARPY RXD/TAKEN: ICD-10-PCS | Mod: CPTII,S$GLB,, | Performed by: INTERNAL MEDICINE

## 2022-03-18 PROCEDURE — 99214 PR OFFICE/OUTPT VISIT, EST, LEVL IV, 30-39 MIN: ICD-10-PCS | Mod: S$GLB,,, | Performed by: INTERNAL MEDICINE

## 2022-03-18 PROCEDURE — 3288F PR FALLS RISK ASSESSMENT DOCUMENTED: ICD-10-PCS | Mod: CPTII,S$GLB,, | Performed by: INTERNAL MEDICINE

## 2022-03-18 PROCEDURE — 99999 PR PBB SHADOW E&M-EST. PATIENT-LVL IV: ICD-10-PCS | Mod: PBBFAC,,, | Performed by: INTERNAL MEDICINE

## 2022-03-18 PROCEDURE — 1101F PT FALLS ASSESS-DOCD LE1/YR: CPT | Mod: CPTII,S$GLB,, | Performed by: INTERNAL MEDICINE

## 2022-03-18 PROCEDURE — 3078F PR MOST RECENT DIASTOLIC BLOOD PRESSURE < 80 MM HG: ICD-10-PCS | Mod: CPTII,S$GLB,, | Performed by: INTERNAL MEDICINE

## 2022-03-18 PROCEDURE — 3288F FALL RISK ASSESSMENT DOCD: CPT | Mod: CPTII,S$GLB,, | Performed by: INTERNAL MEDICINE

## 2022-03-18 PROCEDURE — 3008F BODY MASS INDEX DOCD: CPT | Mod: CPTII,S$GLB,, | Performed by: INTERNAL MEDICINE

## 2022-03-18 PROCEDURE — 1101F PR PT FALLS ASSESS DOC 0-1 FALLS W/OUT INJ PAST YR: ICD-10-PCS | Mod: CPTII,S$GLB,, | Performed by: INTERNAL MEDICINE

## 2022-03-18 PROCEDURE — 3077F PR MOST RECENT SYSTOLIC BLOOD PRESSURE >= 140 MM HG: ICD-10-PCS | Mod: CPTII,S$GLB,, | Performed by: INTERNAL MEDICINE

## 2022-03-18 PROCEDURE — 3077F SYST BP >= 140 MM HG: CPT | Mod: CPTII,S$GLB,, | Performed by: INTERNAL MEDICINE

## 2022-03-18 RX ORDER — SODIUM, POTASSIUM,MAG SULFATES 17.5-3.13G
1 SOLUTION, RECONSTITUTED, ORAL ORAL DAILY
Qty: 1 KIT | Refills: 0 | Status: SHIPPED | OUTPATIENT
Start: 2022-03-18 | End: 2022-03-20

## 2022-03-18 NOTE — PROGRESS NOTES
Health Maintenance Due   Topic Date Due    TETANUS VACCINE  Never done    High Dose Statin  Never done    Pneumococcal Vaccines (Age 65+) (1 of 1 - PPSV23) Never done    Shingles Vaccine (2 of 2) 12/17/2020    Influenza Vaccine (1) 09/01/2021     Updates were requested from care everywhere.  Chart was reviewed for overdue Proactive [facility] ([facility]) topics (CRS, Breast Cancer Screening, Eye exam)  Health Maintenance has been updated.  LIN[place] immunization registry triggered.  Immunizations were reconciled.

## 2022-03-18 NOTE — PROGRESS NOTES
Subjective:       Patient ID: Cesario Sahu is a 66 y.o. female.    Chief Complaint: Abdominal Pain, Constipation, and Hemorrhoids    The patient is here with complaint of generalized abdominal pain associated with constipation over the past week. Symptoms have been leading up to this for the last month. She had a Colonoscopy in June last year with a diagnosis made of Diverticulosis. She has had a KUB last month which confirmed constipation. She was instructed to do a clean out but she had a BM that day so she did not. She has been on daily Linzess, but her dose was only increase to 145 over the last month. She has early satiety as well.     There is occasional nausea but no vomiting. Her appetite is good but no weight loss. There is no aversion to the sight/smell of food. There is no melena; there is some BRB on tissue with wiping if she has too strain. She has history of GERD and frequently has food come back up after meals when recumbent. She is on PPI and Pepcid but feels it does not help enough. Seems it is worse with increased abdominal pressure for constipation; thiis likely does not reflect failure of PPI but incompence of LES as she does not have heartburn.     Review of Systems   Constitutional: Positive for fatigue. Negative for activity change, appetite change, chills, diaphoresis, fever and unexpected weight change.   HENT: Positive for ear pain and voice change. Negative for congestion, ear discharge, hearing loss, nosebleeds, postnasal drip and tinnitus.    Eyes: Negative for photophobia and visual disturbance.   Respiratory: Positive for cough and shortness of breath. Negative for apnea, choking, chest tightness and wheezing.    Cardiovascular: Negative for chest pain, palpitations and leg swelling.   Gastrointestinal: Positive for abdominal pain, constipation and nausea. Negative for abdominal distention, anal bleeding, blood in stool, diarrhea, rectal pain and vomiting.        Bloating  Gas    Genitourinary: Negative for difficulty urinating, dyspareunia, dysuria, flank pain, frequency, hematuria, menstrual problem, pelvic pain, urgency, vaginal bleeding and vaginal discharge.   Musculoskeletal: Positive for back pain. Negative for arthralgias, gait problem, joint swelling, myalgias and neck stiffness.        Joint stiffness   Skin: Negative for pallor and rash.   Neurological: Positive for dizziness and headaches. Negative for tremors, seizures, syncope, speech difficulty, weakness and numbness.   Hematological: Negative for adenopathy.   Psychiatric/Behavioral: Negative for agitation, confusion, hallucinations, sleep disturbance and suicidal ideas.       Objective:      Physical Exam  Vitals reviewed.   Constitutional:       Appearance: She is well-developed.   HENT:      Head: Normocephalic and atraumatic.   Eyes:      General: No scleral icterus.        Right eye: No discharge.         Left eye: No discharge.      Conjunctiva/sclera: Conjunctivae normal.      Pupils: Pupils are equal, round, and reactive to light.   Neck:      Thyroid: No thyromegaly.      Vascular: No JVD.   Cardiovascular:      Rate and Rhythm: Normal rate and regular rhythm.      Heart sounds: Normal heart sounds. No murmur heard.    No friction rub. No gallop.   Pulmonary:      Effort: Pulmonary effort is normal. No respiratory distress.      Breath sounds: Normal breath sounds. No wheezing or rales.   Chest:      Chest wall: No tenderness.   Abdominal:      General: Bowel sounds are normal. There is no distension.      Palpations: Abdomen is soft. There is no mass.      Tenderness: There is abdominal tenderness. There is no guarding or rebound.      Comments: Tender mid epigastrium   Musculoskeletal:         General: Normal range of motion.      Cervical back: Normal range of motion and neck supple.   Lymphadenopathy:      Cervical: No cervical adenopathy.   Skin:     General: Skin is warm and dry.      Coloration: Skin is not  pale.      Findings: No erythema or rash.   Neurological:      Mental Status: She is alert and oriented to person, place, and time.      Motor: No abnormal muscle tone.      Coordination: Coordination normal.      Deep Tendon Reflexes: Reflexes are normal and symmetric.   Psychiatric:         Behavior: Behavior normal.         Thought Content: Thought content normal.         Judgment: Judgment normal.         Assessment:   Irritable bowel syndrome with constipation  -     sodium,potassium,mag sulfates (SUPREP BOWEL PREP KIT) 17.5-3.13-1.6 gram SolR; Take 177 mLs by mouth once daily. for 2 days  Dispense: 1 kit; Refill: 0    Abdominal pain, unspecified abdominal location  -     Ambulatory referral/consult to Gastroenterology    Gastroesophageal reflux disease, unspecified whether esophagitis present  -     Case Request Endoscopy: EGD (ESOPHAGOGASTRODUODENOSCOPY)             Plan:   As above.

## 2022-03-21 ENCOUNTER — ANESTHESIA EVENT (OUTPATIENT)
Dept: ENDOSCOPY | Facility: HOSPITAL | Age: 67
End: 2022-03-21
Payer: MEDICARE

## 2022-03-21 ENCOUNTER — TELEPHONE (OUTPATIENT)
Dept: PREADMISSION TESTING | Facility: HOSPITAL | Age: 67
End: 2022-03-21
Payer: MEDICARE

## 2022-03-21 NOTE — ANESTHESIA PREPROCEDURE EVALUATION
03/21/2022  Cesario Sahu is a 66 y.o., female.  Past Medical History:   Diagnosis Date    Arthritis     Digestive disorder     Hypertension     Insulin resistance      Past Surgical History:   Procedure Laterality Date    ADENOIDECTOMY      CHOLECYSTECTOMY      COLONOSCOPY N/A 6/11/2021    Procedure: COLONOSCOPY;  Surgeon: Serenity Ramey MD;  Location: [facility];  Service: Endoscopy;  Laterality: N/A;    DILATION AND CURETTAGE OF UTERUS  10/2018   No current facility-administered medications for this encounter.    Current Outpatient Medications:     amLODIPine (NORVASC) 5 MG tablet, TAKE 1 TABLET EVERY DAY, Disp: 90 tablet, Rfl: 1    ascorbic acid (VITAMIN C ORAL), , Disp: , Rfl:     aspirin (ECOTRIN) 81 MG EC tablet, Take 81 mg by mouth once daily., Disp: , Rfl:     biotin 5,000 mcg TbDL, Take by mouth., Disp: , Rfl:     cholecalciferol, vitamin D3, (VITAMIN D3) 10 mcg (400 unit) Tab, Vitamin D3 Take No date recorded No form recorded No frequency recorded No route recorded No set duration recorded No set duration amount recorded active No dosage strength recorded No dosage strength units of measure recorded, Disp: , Rfl:     estradioL (ESTRACE) 0.01 % (0.1 mg/gram) vaginal cream, PLACE 1 GRAM VAGINALLY ONCE DAILY (Patient not taking: Reported on 3/18/2022), Disp: 43 g, Rfl: 0    estradioL (ESTRACE) 0.5 MG tablet, Take 1 tablet (0.5 mg total) by mouth once daily. (Patient not taking: Reported on 3/18/2022), Disp: 30 tablet, Rfl: 11    famotidine (PEPCID) 20 MG tablet, Take 1 tablet (20 mg total) by mouth 2 (two) times daily as needed for Heartburn., Disp: 180 tablet, Rfl: 1    Lactobacillus acidophilus (PROBIOTIC ORAL), , Disp: , Rfl:     linaCLOtide (LINZESS) 145 mcg Cap capsule, Take 1 capsule (145 mcg total) by mouth before breakfast., Disp: 90 capsule, Rfl: 0    metoprolol  succinate (TOPROL-XL) 50 MG 24 hr tablet, Take 1 tablet (50 mg total) by mouth once daily., Disp: 30 tablet, Rfl: 11    olmesartan (BENICAR) 20 MG tablet, , Disp: , Rfl:     OMEPRAZOLE ORAL, , Disp: , Rfl:     oxybutynin (DITROPAN-XL) 5 MG TR24, Take 1 tablet (5 mg total) by mouth once daily., Disp: 90 tablet, Rfl: 3    pantoprazole (PROTONIX) 40 MG tablet, Take 1 tablet (40 mg total) by mouth once daily., Disp: 180 tablet, Rfl: 2    potassium 99 mg Tab, Potassium Take No date recorded No form recorded No frequency recorded No route recorded No set duration recorded No set duration amount recorded suspended No dosage strength recorded No dosage strength units of measure recorded, Disp: , Rfl:     potassium chloride SA (K-DUR,KLOR-CON) 20 MEQ tablet, Take 1 tablet twice daily by mouth for the next three days and then take 1 tablet by mouth daily, Disp: 33 tablet, Rfl: 1    pravastatin (PRAVACHOL) 20 MG tablet, Take 1 tablet (20 mg total) by mouth once daily., Disp: 90 tablet, Rfl: 3    progesterone (PROMETRIUM) 100 MG capsule, Take 1 capsule (100 mg total) by mouth once daily. (Patient not taking: Reported on 3/18/2022), Disp: 30 capsule, Rfl: 11    traZODone (DESYREL) 50 MG tablet, Take 1 tablet (50 mg total) by mouth nightly as needed for Insomnia., Disp: 90 tablet, Rfl: 0   Echo 1/28/22  · Normal systolic function.  · The estimated ejection fraction is 55%.  · Normal left ventricular diastolic function.  · With normal right ventricular systolic function.  · Normal central venous pressure (3 mmHg).  · The estimated PA systolic pressure is 28 mmHg.         Normal sinus rhythm   Moderate voltage criteria for LVH, may be normal variant ( R in aVL ,   [place] product )   Nonspecific T wave abnormality   Abnormal ECG   When compared with ECG of 24-JAN-2020 08:27,   Premature ventricular complexes are no longer Present   Confirmed by TIFFANIE LUNA MD ([id number]) on 12/3/2021 4:13:29 PM       Pre-op Assessment    I have  reviewed the Patient Summary Reports.     I have reviewed the Nursing Notes. I have reviewed the NPO Status.   I have reviewed the Medications.     Review of Systems  Anesthesia Hx:  No problems with previous Anesthesia PVCs Neg history of prior surgery. Denies Family Hx of Anesthesia complications.   Denies Personal Hx of Anesthesia complications.   Social:  Non-Smoker, Social Alcohol Use    Cardiovascular:   Hypertension hyperlipidemia    Hepatic/GI:   GERD    Endocrine:   Pre diabetic       Physical Exam  General: Well nourished    Airway:  Mallampati: II / II  Mouth Opening: Normal  TM Distance: Normal  Tongue: Normal  Neck ROM: Normal ROM    Dental:  Intact        Anesthesia Plan  Type of Anesthesia, risks & benefits discussed:    Anesthesia Type: Gen Natural Airway  Intra-op Monitoring Plan: Standard ASA Monitors  Post Op Pain Control Plan: multimodal analgesia  Induction:  IV  Informed Consent: Informed consent signed with the Patient and all parties understand the risks and agree with anesthesia plan.  All questions answered.   ASA Score: 2  Day of Surgery Review of History & Physical: H&P Update referred to the surgeon/provider.    Ready For Surgery From Anesthesia Perspective.     .

## 2022-03-21 NOTE — TELEPHONE ENCOUNTER
PAT call completed.  Patient educated on procedure instructions.  Medical history discussed and patient informed of arrival time of 8:00 AM on Wednesday, March 23, 2022 at the [facility], and was made aware of the limited-visitor policy, and that  is to remain during the entire visit.  All questions and concerns addressed.  Endoscopy instructions reviewed. Instructed nothing to eat or drink after midnight the night before procedure.  Pre-procedure covid testing not needed, patient is covid vaccinated.  Patient verbalized understanding of all instructions.

## 2022-03-21 NOTE — PRE-PROCEDURE INSTRUCTIONS
PAT call completed.  Patient educated on procedure instructions.  Medical history discussed and patient informed of arrival time of 8:00 AM on Wednesday, March 23, 2022 at the [facility], and was made aware of the limited-visitor policy, and that  is to remain during the entire visit.  All questions and concerns addressed.  Endoscopy instructions reviewed. Instructed nothing to eat or drink after midnight the night before procedure.  Pre-procedure covid testing not needed, patient is covid vaccinated.  Patient verbalized understanding of all instructions.

## 2022-03-22 ENCOUNTER — ANESTHESIA (OUTPATIENT)
Dept: ENDOSCOPY | Facility: HOSPITAL | Age: 67
End: 2022-03-22
Payer: MEDICARE

## 2022-03-22 ENCOUNTER — TELEPHONE (OUTPATIENT)
Dept: GASTROENTEROLOGY | Facility: CLINIC | Age: 67
End: 2022-03-22
Payer: MEDICARE

## 2022-03-22 NOTE — TELEPHONE ENCOUNTER
----- Message from Lorenza Rushing sent at 3/22/2022  3:30 PM CDT -----  Regarding: Question Regarding Procedure  Type: Question Regarding Procedure    Name of Caller:Patient need to speak with nurse   Patient has procedure scheduled for tomorrow morning .   Patient states have a questions.   When is the first available appointment?  Symptoms:  Best Call Back Number:[phone]  Additional Information:

## 2022-03-23 ENCOUNTER — HOSPITAL ENCOUNTER (OUTPATIENT)
Facility: HOSPITAL | Age: 67
Discharge: HOME | End: 2022-03-23
Attending: INTERNAL MEDICINE | Admitting: INTERNAL MEDICINE
Payer: MEDICARE

## 2022-03-23 VITALS
DIASTOLIC BLOOD PRESSURE: 66 MMHG | HEART RATE: 69 BPM | RESPIRATION RATE: 18 BRPM | OXYGEN SATURATION: 100 % | TEMPERATURE: 98 F | SYSTOLIC BLOOD PRESSURE: 132 MMHG | BODY MASS INDEX: 28.98 KG/M2 | WEIGHT: 169.75 LBS | HEIGHT: 64 IN

## 2022-03-23 DIAGNOSIS — K21.9 GASTROESOPHAGEAL REFLUX DISEASE WITHOUT ESOPHAGITIS: Primary | ICD-10-CM

## 2022-03-23 PROCEDURE — 25000003 PHARM REV CODE 250: Performed by: NURSE ANESTHETIST, CERTIFIED REGISTERED

## 2022-03-23 PROCEDURE — 27201012 HC FORCEPS, HOT/COLD, DISP: Performed by: INTERNAL MEDICINE

## 2022-03-23 PROCEDURE — 43239 EGD BIOPSY SINGLE/MULTIPLE: CPT | Mod: ,,, | Performed by: INTERNAL MEDICINE

## 2022-03-23 PROCEDURE — 88305 TISSUE EXAM BY PATHOLOGIST: CPT | Mod: 26,,, | Performed by: STUDENT IN AN ORGANIZED HEALTH CARE EDUCATION/TRAINING PROGRAM

## 2022-03-23 PROCEDURE — 88342 CHG IMMUNOCYTOCHEMISTRY: ICD-10-PCS | Mod: 26,,, | Performed by: STUDENT IN AN ORGANIZED HEALTH CARE EDUCATION/TRAINING PROGRAM

## 2022-03-23 PROCEDURE — D9220A PRA ANESTHESIA: ICD-10-PCS | Mod: CRNA,,, | Performed by: NURSE ANESTHETIST, CERTIFIED REGISTERED

## 2022-03-23 PROCEDURE — D9220A PRA ANESTHESIA: Mod: ANES,,, | Performed by: ANESTHESIOLOGY

## 2022-03-23 PROCEDURE — 43239 EGD BIOPSY SINGLE/MULTIPLE: CPT | Performed by: INTERNAL MEDICINE

## 2022-03-23 PROCEDURE — 88342 IMHCHEM/IMCYTCHM 1ST ANTB: CPT | Mod: 26,,, | Performed by: STUDENT IN AN ORGANIZED HEALTH CARE EDUCATION/TRAINING PROGRAM

## 2022-03-23 PROCEDURE — 43239 PR EGD, FLEX, W/BIOPSY, SGL/MULTI: ICD-10-PCS | Mod: ,,, | Performed by: INTERNAL MEDICINE

## 2022-03-23 PROCEDURE — 88305 TISSUE EXAM BY PATHOLOGIST: ICD-10-PCS | Mod: 26,,, | Performed by: STUDENT IN AN ORGANIZED HEALTH CARE EDUCATION/TRAINING PROGRAM

## 2022-03-23 PROCEDURE — 63600175 PHARM REV CODE 636 W HCPCS: Performed by: NURSE ANESTHETIST, CERTIFIED REGISTERED

## 2022-03-23 PROCEDURE — 63600175 PHARM REV CODE 636 W HCPCS: Performed by: INTERNAL MEDICINE

## 2022-03-23 PROCEDURE — D9220A PRA ANESTHESIA: ICD-10-PCS | Mod: ANES,,, | Performed by: ANESTHESIOLOGY

## 2022-03-23 PROCEDURE — 37000008 HC ANESTHESIA 1ST 15 MINUTES: Performed by: INTERNAL MEDICINE

## 2022-03-23 PROCEDURE — D9220A PRA ANESTHESIA: Mod: CRNA,,, | Performed by: NURSE ANESTHETIST, CERTIFIED REGISTERED

## 2022-03-23 PROCEDURE — 88342 IMHCHEM/IMCYTCHM 1ST ANTB: CPT | Performed by: STUDENT IN AN ORGANIZED HEALTH CARE EDUCATION/TRAINING PROGRAM

## 2022-03-23 PROCEDURE — 88305 TISSUE EXAM BY PATHOLOGIST: CPT | Performed by: STUDENT IN AN ORGANIZED HEALTH CARE EDUCATION/TRAINING PROGRAM

## 2022-03-23 RX ORDER — LIDOCAINE HCL/PF 100 MG/5ML
SYRINGE (ML) INTRAVENOUS
Status: DISCONTINUED | OUTPATIENT
Start: 2022-03-23 | End: 2022-03-23

## 2022-03-23 RX ORDER — SODIUM CHLORIDE, SODIUM LACTATE, POTASSIUM CHLORIDE, CALCIUM CHLORIDE 600; 310; 30; 20 MG/100ML; MG/100ML; MG/100ML; MG/100ML
INJECTION, SOLUTION INTRAVENOUS CONTINUOUS
Status: DISCONTINUED | OUTPATIENT
Start: 2022-03-23 | End: 2022-03-23 | Stop reason: HOSPADM

## 2022-03-23 RX ORDER — SODIUM CHLORIDE, SODIUM LACTATE, POTASSIUM CHLORIDE, CALCIUM CHLORIDE 600; 310; 30; 20 MG/100ML; MG/100ML; MG/100ML; MG/100ML
INJECTION, SOLUTION INTRAVENOUS CONTINUOUS
Status: DISCONTINUED | OUTPATIENT
Start: 2022-03-23 | End: 2022-11-29

## 2022-03-23 RX ORDER — PROPOFOL 10 MG/ML
VIAL (ML) INTRAVENOUS
Status: DISCONTINUED | OUTPATIENT
Start: 2022-03-23 | End: 2022-03-23

## 2022-03-23 RX ADMIN — Medication 80 MG: at 09:03

## 2022-03-23 RX ADMIN — GLYCOPYRROLATE 0.2 MG: 0.2 INJECTION, SOLUTION INTRAMUSCULAR; INTRAVITREAL at 09:03

## 2022-03-23 RX ADMIN — SODIUM CHLORIDE, SODIUM LACTATE, POTASSIUM CHLORIDE, AND CALCIUM CHLORIDE: .6; .31; .03; .02 INJECTION, SOLUTION INTRAVENOUS at 08:03

## 2022-03-23 RX ADMIN — PROPOFOL 150 MG: 10 INJECTION, EMULSION INTRAVENOUS at 09:03

## 2022-03-23 NOTE — H&P
PRE PROCEDURE H&P    Patient Name: Cesario Sahu  MRN: [id number]  : 1955  Date of Procedure:  3/23/2022  Referring Physician: Rodolfo Peralta III, *  Primary Physician: Rosemarie Meeks MD  Procedure Physician: Abby Green MD       Planned Procedure: EGD  Diagnosis: gerd  Chief Complaint: Same as above    HPI: Patient is an 66 y.o. female is here for the above.       Past Medical History:   Past Medical History:   Diagnosis Date    Arthritis     Digestive disorder     Hypertension     Insulin resistance         Past Surgical History:  Past Surgical History:   Procedure Laterality Date    ADENOIDECTOMY      CHOLECYSTECTOMY      COLONOSCOPY N/A 2021    Procedure: COLONOSCOPY;  Surgeon: Serenity Ramey MD;  Location: [facility];  Service: Endoscopy;  Laterality: N/A;    DILATION AND CURETTAGE OF UTERUS  10/2018        Home Medications:  Prior to Admission medications    Medication Sig Start Date End Date Taking? Authorizing Provider   aspirin (ECOTRIN) 81 MG EC tablet Take 81 mg by mouth once daily.   Yes Historical Provider   biotin 5,000 mcg TbDL Take by mouth.   Yes Historical Provider   famotidine (PEPCID) 20 MG tablet Take 1 tablet (20 mg total) by mouth 2 (two) times daily as needed for Heartburn. 9/10/21  Yes Rosemarie Meeks MD   Lactobacillus acidophilus (PROBIOTIC ORAL)    Yes Historical Provider   linaCLOtide (LINZESS) 145 mcg Cap capsule Take 1 capsule (145 mcg total) by mouth before breakfast. 2/15/22  Yes Jorge Burks, KRISTINA   metoprolol succinate (TOPROL-XL) 50 MG 24 hr tablet Take 1 tablet (50 mg total) by mouth once daily. 2/15/22 2/15/23 Yes Rene Vanessa MD   oxybutynin (DITROPAN-XL) 5 MG TR24 Take 1 tablet (5 mg total) by mouth once daily. 9/10/21  Yes Rosemarie Meeks MD   pantoprazole (PROTONIX) 40 MG tablet Take 1 tablet (40 mg total) by mouth once daily. 9/10/21  Yes Rosemarie Meeks MD   pravastatin (PRAVACHOL) 20 MG tablet Take 1 tablet (20 mg total) by  mouth once daily. 9/10/21  Yes Rosemarie Meeks MD   traZODone (DESYREL) 50 MG tablet Take 1 tablet (50 mg total) by mouth nightly as needed for Insomnia. 2/15/22 2/15/23 Yes Jorge Burks, KRISTINA   amLODIPine (NORVASC) 5 MG tablet TAKE 1 TABLET EVERY DAY 11/16/21   Rosemarie Meeks MD   ascorbic acid (VITAMIN C ORAL)     Historical Provider   cholecalciferol, vitamin D3, (VITAMIN D3) 10 mcg (400 unit) Tab Vitamin D3 Take No date recorded No form recorded No frequency recorded No route recorded No set duration recorded No set duration amount recorded active No dosage strength recorded No dosage strength units of measure recorded    Historical Provider   estradioL (ESTRACE) 0.01 % (0.1 mg/gram) vaginal cream PLACE 1 GRAM VAGINALLY ONCE DAILY  Patient not taking: Reported on 3/18/2022 12/7/21   Judy Graves MD   estradioL (ESTRACE) 0.5 MG tablet Take 1 tablet (0.5 mg total) by mouth once daily.  Patient not taking: Reported on 3/18/2022 8/1/21 8/1/22  Judy Graves MD   olmesartan (BENICAR) 20 MG tablet  10/21/21   Historical Provider   OMEPRAZOLE ORAL     Historical Provider   potassium 99 mg Tab Potassium Take No date recorded No form recorded No frequency recorded No route recorded No set duration recorded No set duration amount recorded suspended No dosage strength recorded No dosage strength units of measure recorded    Historical Provider   potassium chloride SA (K-DUR,KLOR-CON) 20 MEQ tablet Take 1 tablet twice daily by mouth for the next three days and then take 1 tablet by mouth daily 12/7/21   Rosemarie Meeks MD   progesterone (PROMETRIUM) 100 MG capsule Take 1 capsule (100 mg total) by mouth once daily.  Patient not taking: Reported on 3/18/2022 8/1/21 8/1/22  Judy Graves MD        Allergies:  Review of patient's allergies indicates:  No Known Allergies     Social History:   Social History     Socioeconomic History    Marital status:     Number of children: 3  "  Occupational History     Comment: financial     Tobacco Use    Smoking status: Never Smoker    Smokeless tobacco: Never Used   Substance and Sexual Activity    Alcohol use: Yes     Comment: Socially    Drug use: Never    Sexual activity: Yes     Partners: Male   Social History Narrative    The patient remarried in June 2018.  She has 3 adult children.  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       Family History:  Family History   Problem Relation Age of Onset    Hypertension Mother     Heart disease Sister     Hyperlipidemia Sister     Hypertension Sister     Diabetes Brother        ROS: No acute cardiac events, no acute respiratory complaints.     Physical Exam (all patients):    BP (!) 145/70   Pulse 60   Temp 97.5 °F (36.4 °C) (Temporal)   Resp 18   Ht 5' 4" (1.626 m)   Wt 77 kg (169 lb 12.1 oz)   SpO2 100%   Breastfeeding No   BMI 29.14 kg/m²   Lungs: Clear to auscultation bilaterally, respirations unlabored  Heart: Regular rate and rhythm, S1 and S2 normal, no obvious murmurs  Abdomen:         Soft, non-tender, bowel sounds normal, no masses, no organomegaly    Lab Results   Component Value Date    WBC 9.09 12/01/2021    MCV 88 12/01/2021    RDW 12.8 12/01/2021     12/01/2021    GLU 85 02/04/2022    HGBA1C 5.5 06/01/2021    BUN 18 02/04/2022     02/04/2022    K 3.8 02/04/2022    K 3.8 02/04/2022     02/04/2022        SEDATION PLAN: per anesthesia      History reviewed, vital signs satisfactory, cardiopulmonary status satisfactory, sedation options, risks and plans have been discussed with the patient  All their questions were answered and the patient agrees to the sedation procedures as planned and the patient is deemed an appropriate candidate for the sedation as planned.    Procedure explained to patient, informed consent obtained and placed in chart.    Abby Green  3/23/2022  9:46 AM   "

## 2022-03-23 NOTE — ANESTHESIA POSTPROCEDURE EVALUATION
Anesthesia Post Evaluation    Patient: Cesario Sahu    Procedure(s) Performed: Procedure(s) (LRB):  EGD (ESOPHAGOGASTRODUODENOSCOPY) (N/A)    Final Anesthesia Type: general      Patient location during evaluation: PACU  Patient participation: Yes- Able to Participate  Level of consciousness: awake and alert and oriented  Post-procedure vital signs: reviewed and stable  Pain management: adequate  Airway patency: patent    PONV status at discharge: No PONV  Anesthetic complications: no      Cardiovascular status: blood pressure returned to baseline, stable and hemodynamically stable  Respiratory status: unassisted  Hydration status: euvolemic  Follow-up not needed.          Vitals Value Taken Time   /66 03/23/22 1028   Temp 36.6 °C (97.9 °F) 03/23/22 1000   Pulse 69 03/23/22 1028   Resp 18 03/23/22 1028   SpO2 100 % 03/23/22 1028         No case tracking events are documented in the log.      Pain/Martir Score: Martir Score: 10 (3/23/2022 10:28 AM)

## 2022-03-23 NOTE — PLAN OF CARE
Discharge instructions reviewed with pt and family, handouts given, verbalized understanding with no further questions at this time. Doctor spoke to pt at bedside, reviewed procedure and answered questions, telephone number provided per AVS sheet. No pain or nausea noted, tolerating po fluids.

## 2022-03-23 NOTE — TRANSFER OF CARE
"Anesthesia Transfer of Care Note    Patient: Cesario Sahu    Procedure(s) Performed: Procedure(s) (LRB):  EGD (ESOPHAGOGASTRODUODENOSCOPY) (N/A)    Patient location: PACU    Anesthesia Type: general    Transport from OR: Transported from OR on room air with adequate spontaneous ventilation    Post pain: adequate analgesia    Post assessment: no apparent anesthetic complications    Post vital signs: stable    Level of consciousness: sedated    Nausea/Vomiting: no nausea/vomiting    Complications: none    Transfer of care protocol was followed      Last vitals:   Visit Vitals  BP (!) 145/70   Pulse 60   Temp 36.4 °C (97.5 °F) (Temporal)   Resp 18   Ht 5' 4" (1.626 m)   Wt 77 kg (169 lb 12.1 oz)   SpO2 100%   Breastfeeding No   BMI 29.14 kg/m²     "

## 2022-03-30 LAB
FINAL PATHOLOGIC DIAGNOSIS: NORMAL
Lab: NORMAL

## 2022-05-02 ENCOUNTER — PATIENT MESSAGE (OUTPATIENT)
Dept: FAMILY MEDICINE | Facility: CLINIC | Age: 67
End: 2022-05-02
Payer: MEDICARE

## 2022-05-10 NOTE — TELEPHONE ENCOUNTER
Care Due:                  Date            Visit Type   Department     Provider  --------------------------------------------------------------------------------                                EP -                              PRIMARY      [facility]  Last Visit: 12-      CARE ([facility])   MEDICINE       Rosemarie Meeks  Next Visit: None Scheduled  None         None Found                                                            Last  Test          Frequency    Reason                     Performed    Due Date  --------------------------------------------------------------------------------    Lipid Panel.  12 months..  pravastatin..............  03- 03-    [facility] Embedded Care Gaps. Reference number: [id number]. 5/10/2022   1:46:36 PM CDT

## 2022-05-10 NOTE — TELEPHONE ENCOUNTER
Refill Routing Note   Medication(s) are not appropriate for processing by [facility] for the following reason(s):      - Patient has been seen in the ED/Hospital since the last PCP visit    [facility] action(s):  Route          Medication reconciliation completed: No     Appointments  past 12m or future 3m with PCP    Date Provider   Last Visit   12/3/2021 Rosemarie Meeks MD   Next Visit   Visit date not found Rosemarie Meeks MD   ED visits in past 90 days: 0        Note composed:1:59 PM 05/10/2022

## 2022-05-11 RX ORDER — AMLODIPINE BESYLATE 5 MG/1
TABLET ORAL
Qty: 90 TABLET | Refills: 3 | Status: SHIPPED | OUTPATIENT
Start: 2022-05-11 | End: 2023-03-29

## 2022-05-12 DIAGNOSIS — Z12.31 ENCOUNTER FOR SCREENING MAMMOGRAM FOR BREAST CANCER: Primary | ICD-10-CM

## 2022-05-12 DIAGNOSIS — Z13.820 ENCOUNTER FOR OSTEOPOROSIS SCREENING IN ASYMPTOMATIC POSTMENOPAUSAL PATIENT: ICD-10-CM

## 2022-05-12 DIAGNOSIS — Z78.0 ENCOUNTER FOR OSTEOPOROSIS SCREENING IN ASYMPTOMATIC POSTMENOPAUSAL PATIENT: ICD-10-CM

## 2022-05-17 ENCOUNTER — TELEPHONE (OUTPATIENT)
Dept: FAMILY MEDICINE | Facility: CLINIC | Age: 67
End: 2022-05-17
Payer: MEDICARE

## 2022-05-17 NOTE — TELEPHONE ENCOUNTER
----- Message from Ruthannmoshe De La Torree sent at 5/17/2022  3:25 PM CDT -----  Contact: self/[phone]  Type:  Same Day Appointment Request    Caller is requesting a same day appointment.  Caller declined first available appointment listed below.    Name of Caller:patient   When is the first available appointment? 07/20/22  Symptoms:pain in top of head and chill and fever  Best Call Back Number:[phone]  Additional Information:She said she called on yesterday    Thanks KL

## 2022-05-18 ENCOUNTER — TELEPHONE (OUTPATIENT)
Dept: GASTROENTEROLOGY | Facility: CLINIC | Age: 67
End: 2022-05-18
Payer: MEDICARE

## 2022-05-18 NOTE — TELEPHONE ENCOUNTER
----- Message from Solangeksenia Foster sent at 5/18/2022  1:14 PM CDT -----  States she calling regarding her test results from 3/23. States she never heard from anyone. States she is starting to have some issues again. Please call pt [phone]. Thank you

## 2022-05-23 ENCOUNTER — PATIENT MESSAGE (OUTPATIENT)
Dept: FAMILY MEDICINE | Facility: CLINIC | Age: 67
End: 2022-05-23
Payer: MEDICARE

## 2022-05-25 ENCOUNTER — PATIENT MESSAGE (OUTPATIENT)
Dept: GASTROENTEROLOGY | Facility: CLINIC | Age: 67
End: 2022-05-25
Payer: MEDICARE

## 2022-05-31 ENCOUNTER — HOSPITAL ENCOUNTER (OUTPATIENT)
Dept: RADIOLOGY | Facility: HOSPITAL | Age: 67
Discharge: HOME | End: 2022-05-31
Attending: INTERNAL MEDICINE
Payer: MEDICARE

## 2022-05-31 ENCOUNTER — OFFICE VISIT (OUTPATIENT)
Dept: GASTROENTEROLOGY | Facility: CLINIC | Age: 67
End: 2022-05-31
Payer: MEDICARE

## 2022-05-31 VITALS
HEART RATE: 64 BPM | WEIGHT: 169.06 LBS | OXYGEN SATURATION: 98 % | HEIGHT: 64 IN | SYSTOLIC BLOOD PRESSURE: 126 MMHG | BODY MASS INDEX: 28.86 KG/M2 | DIASTOLIC BLOOD PRESSURE: 74 MMHG

## 2022-05-31 DIAGNOSIS — K59.09 CHRONIC CONSTIPATION: Primary | ICD-10-CM

## 2022-05-31 DIAGNOSIS — K59.09 CHRONIC CONSTIPATION: ICD-10-CM

## 2022-05-31 DIAGNOSIS — K21.9 GASTROESOPHAGEAL REFLUX DISEASE, UNSPECIFIED WHETHER ESOPHAGITIS PRESENT: ICD-10-CM

## 2022-05-31 PROCEDURE — 99213 PR OFFICE/OUTPT VISIT, EST, LEVL III, 20-29 MIN: ICD-10-PCS | Mod: S$GLB,,, | Performed by: INTERNAL MEDICINE

## 2022-05-31 PROCEDURE — 99999 PR PBB SHADOW E&M-EST. PATIENT-LVL IV: ICD-10-PCS | Mod: PBBFAC,,, | Performed by: INTERNAL MEDICINE

## 2022-05-31 PROCEDURE — 3008F PR BODY MASS INDEX (BMI) DOCUMENTED: ICD-10-PCS | Mod: CPTII,S$GLB,, | Performed by: INTERNAL MEDICINE

## 2022-05-31 PROCEDURE — 1160F RVW MEDS BY RX/DR IN RCRD: CPT | Mod: CPTII,S$GLB,, | Performed by: INTERNAL MEDICINE

## 2022-05-31 PROCEDURE — 3288F FALL RISK ASSESSMENT DOCD: CPT | Mod: CPTII,S$GLB,, | Performed by: INTERNAL MEDICINE

## 2022-05-31 PROCEDURE — 74019 RADEX ABDOMEN 2 VIEWS: CPT | Mod: 26,,, | Performed by: RADIOLOGY

## 2022-05-31 PROCEDURE — 74019 XR ABDOMEN FLAT AND ERECT: ICD-10-PCS | Mod: 26,,, | Performed by: RADIOLOGY

## 2022-05-31 PROCEDURE — 1159F PR MEDICATION LIST DOCUMENTED IN MEDICAL RECORD: ICD-10-PCS | Mod: CPTII,S$GLB,, | Performed by: INTERNAL MEDICINE

## 2022-05-31 PROCEDURE — 1160F PR REVIEW ALL MEDS BY PRESCRIBER/CLIN PHARMACIST DOCUMENTED: ICD-10-PCS | Mod: CPTII,S$GLB,, | Performed by: INTERNAL MEDICINE

## 2022-05-31 PROCEDURE — 3074F PR MOST RECENT SYSTOLIC BLOOD PRESSURE < 130 MM HG: ICD-10-PCS | Mod: CPTII,S$GLB,, | Performed by: INTERNAL MEDICINE

## 2022-05-31 PROCEDURE — 1125F PR PAIN SEVERITY QUANTIFIED, PAIN PRESENT: ICD-10-PCS | Mod: CPTII,S$GLB,, | Performed by: INTERNAL MEDICINE

## 2022-05-31 PROCEDURE — 99999 PR PBB SHADOW E&M-EST. PATIENT-LVL IV: CPT | Mod: PBBFAC,,, | Performed by: INTERNAL MEDICINE

## 2022-05-31 PROCEDURE — 74019 RADEX ABDOMEN 2 VIEWS: CPT | Mod: TC

## 2022-05-31 PROCEDURE — 3008F BODY MASS INDEX DOCD: CPT | Mod: CPTII,S$GLB,, | Performed by: INTERNAL MEDICINE

## 2022-05-31 PROCEDURE — 1159F MED LIST DOCD IN RCRD: CPT | Mod: CPTII,S$GLB,, | Performed by: INTERNAL MEDICINE

## 2022-05-31 PROCEDURE — 99213 OFFICE O/P EST LOW 20 MIN: CPT | Mod: S$GLB,,, | Performed by: INTERNAL MEDICINE

## 2022-05-31 PROCEDURE — 3074F SYST BP LT 130 MM HG: CPT | Mod: CPTII,S$GLB,, | Performed by: INTERNAL MEDICINE

## 2022-05-31 PROCEDURE — 3078F DIAST BP <80 MM HG: CPT | Mod: CPTII,S$GLB,, | Performed by: INTERNAL MEDICINE

## 2022-05-31 PROCEDURE — 3288F PR FALLS RISK ASSESSMENT DOCUMENTED: ICD-10-PCS | Mod: CPTII,S$GLB,, | Performed by: INTERNAL MEDICINE

## 2022-05-31 PROCEDURE — 1101F PT FALLS ASSESS-DOCD LE1/YR: CPT | Mod: CPTII,S$GLB,, | Performed by: INTERNAL MEDICINE

## 2022-05-31 PROCEDURE — 3078F PR MOST RECENT DIASTOLIC BLOOD PRESSURE < 80 MM HG: ICD-10-PCS | Mod: CPTII,S$GLB,, | Performed by: INTERNAL MEDICINE

## 2022-05-31 PROCEDURE — 1101F PR PT FALLS ASSESS DOC 0-1 FALLS W/OUT INJ PAST YR: ICD-10-PCS | Mod: CPTII,S$GLB,, | Performed by: INTERNAL MEDICINE

## 2022-05-31 PROCEDURE — 1125F AMNT PAIN NOTED PAIN PRSNT: CPT | Mod: CPTII,S$GLB,, | Performed by: INTERNAL MEDICINE

## 2022-05-31 NOTE — PROGRESS NOTES
Subjective:       Patient ID: Cesario Sahu is a 66 y.o. female.    Chief Complaint: Hemorrhoids (Pt. reported of a her hemorrhoid flare up, back spasm that radiates with pain under the left side of her breast bone.)    The patient is here for follow-up visit.  She was last seen on March 18th regarding IBS constipation.  She was also having epigastric pain and heartburn.  This was believed to be exacerbated by her IBS, but EGD was ordered to be certain there were no other significant abnormalities present.    The EGD was performed on March 23rd.  There was a small hiatal hernia found, but otherwise the study was endoscopically normal.  Biopsies were positive for reactive gastropathy but there was no H pylori.  The patient had a previous colonoscopy done in June of last year.  Minimal diverticulosis was found as well as small colon polyps.  She will not need another study until June 2028.    The patient has had some issues with Linzess, in feels she has done better as of late with MiraLax.  She states she has seen some blood on her tissue with wiping after bowel movements but usually when she has to strain.  There has been no blood in her stool.  Patient states when her diet is good her bowels work better. She also states when she takes Linzess she has more bloating. Lately she has noted back spasm but she has also been exercising more so that may be involved in her complaint. Will check KUB today to see where we are in her stool picture and see how aggressive we need to be in clearing her colon.     Review of Systems   Constitutional: Positive for fatigue. Negative for activity change, appetite change, chills, diaphoresis, fever and unexpected weight change.   HENT: Positive for ear pain. Negative for congestion, ear discharge, hearing loss, nosebleeds, postnasal drip and tinnitus.    Eyes: Negative for photophobia and visual disturbance.   Respiratory: Positive for cough and shortness of breath. Negative for  apnea, choking, chest tightness and wheezing.    Cardiovascular: Positive for chest pain. Negative for palpitations and leg swelling.   Gastrointestinal: Positive for abdominal pain and constipation. Negative for abdominal distention, anal bleeding, blood in stool, diarrhea, nausea, rectal pain and vomiting.        Bloating   Gas  UMESH   Genitourinary: Negative for difficulty urinating, dyspareunia, dysuria, flank pain, frequency, hematuria, menstrual problem, pelvic pain, urgency, vaginal bleeding and vaginal discharge.   Musculoskeletal: Positive for back pain. Negative for arthralgias, gait problem, joint swelling, myalgias and neck stiffness.   Skin: Negative for pallor and rash.   Neurological: Positive for headaches. Negative for dizziness, tremors, seizures, syncope, speech difficulty, weakness and numbness.   Hematological: Negative for adenopathy.   Psychiatric/Behavioral: Negative for agitation, confusion, hallucinations, sleep disturbance and suicidal ideas.       Objective:      Physical Exam  Vitals reviewed.         Assessment:     1. Chronic constipation  X-Ray Abdomen Flat And Erect        Plan:   As above.

## 2022-06-14 ENCOUNTER — PATIENT MESSAGE (OUTPATIENT)
Dept: FAMILY MEDICINE | Facility: CLINIC | Age: 67
End: 2022-06-14
Payer: MEDICARE

## 2022-06-14 DIAGNOSIS — N30.00 ACUTE CYSTITIS WITHOUT HEMATURIA: Primary | ICD-10-CM

## 2022-06-14 RX ORDER — NITROFURANTOIN 25; 75 MG/1; MG/1
100 CAPSULE ORAL 2 TIMES DAILY
Qty: 14 CAPSULE | Refills: 0 | Status: SHIPPED | OUTPATIENT
Start: 2022-06-14 | End: 2022-06-21

## 2022-07-13 ENCOUNTER — OFFICE VISIT (OUTPATIENT)
Dept: OBSTETRICS AND GYNECOLOGY | Facility: CLINIC | Age: 67
End: 2022-07-13
Payer: MEDICARE

## 2022-07-13 ENCOUNTER — HOSPITAL ENCOUNTER (OUTPATIENT)
Dept: RADIOLOGY | Facility: HOSPITAL | Age: 67
Discharge: HOME | End: 2022-07-13
Attending: FAMILY MEDICINE
Payer: MEDICARE

## 2022-07-13 VITALS — WEIGHT: 173.94 LBS | BODY MASS INDEX: 29.7 KG/M2 | HEIGHT: 64 IN

## 2022-07-13 VITALS
SYSTOLIC BLOOD PRESSURE: 120 MMHG | WEIGHT: 173.94 LBS | DIASTOLIC BLOOD PRESSURE: 68 MMHG | BODY MASS INDEX: 29.86 KG/M2

## 2022-07-13 DIAGNOSIS — Z01.419 ENCOUNTER FOR GYNECOLOGICAL EXAMINATION WITHOUT ABNORMAL FINDING: Primary | ICD-10-CM

## 2022-07-13 DIAGNOSIS — Z12.31 ENCOUNTER FOR SCREENING MAMMOGRAM FOR BREAST CANCER: ICD-10-CM

## 2022-07-13 DIAGNOSIS — N95.2 VAGINAL ATROPHY: ICD-10-CM

## 2022-07-13 DIAGNOSIS — R39.15 URINARY URGENCY: ICD-10-CM

## 2022-07-13 PROCEDURE — 77067 SCR MAMMO BI INCL CAD: CPT | Mod: TC

## 2022-07-13 PROCEDURE — 1126F PR PAIN SEVERITY QUANTIFIED, NO PAIN PRESENT: ICD-10-PCS | Mod: CPTII,S$GLB,, | Performed by: OBSTETRICS & GYNECOLOGY

## 2022-07-13 PROCEDURE — 77067 MAMMO DIGITAL SCREENING BILAT WITH TOMO: ICD-10-PCS | Mod: 26,,, | Performed by: RADIOLOGY

## 2022-07-13 PROCEDURE — 1159F PR MEDICATION LIST DOCUMENTED IN MEDICAL RECORD: ICD-10-PCS | Mod: CPTII,S$GLB,, | Performed by: OBSTETRICS & GYNECOLOGY

## 2022-07-13 PROCEDURE — 99999 PR PBB SHADOW E&M-EST. PATIENT-LVL III: ICD-10-PCS | Mod: PBBFAC,,, | Performed by: OBSTETRICS & GYNECOLOGY

## 2022-07-13 PROCEDURE — 1101F PT FALLS ASSESS-DOCD LE1/YR: CPT | Mod: CPTII,S$GLB,, | Performed by: OBSTETRICS & GYNECOLOGY

## 2022-07-13 PROCEDURE — 3078F PR MOST RECENT DIASTOLIC BLOOD PRESSURE < 80 MM HG: ICD-10-PCS | Mod: CPTII,S$GLB,, | Performed by: OBSTETRICS & GYNECOLOGY

## 2022-07-13 PROCEDURE — G0101 PR CA SCREEN;PELVIC/BREAST EXAM: ICD-10-PCS | Mod: S$GLB,,, | Performed by: OBSTETRICS & GYNECOLOGY

## 2022-07-13 PROCEDURE — 3074F PR MOST RECENT SYSTOLIC BLOOD PRESSURE < 130 MM HG: ICD-10-PCS | Mod: CPTII,S$GLB,, | Performed by: OBSTETRICS & GYNECOLOGY

## 2022-07-13 PROCEDURE — G0101 CA SCREEN;PELVIC/BREAST EXAM: HCPCS | Mod: S$GLB,,, | Performed by: OBSTETRICS & GYNECOLOGY

## 2022-07-13 PROCEDURE — 77067 SCR MAMMO BI INCL CAD: CPT | Mod: 26,,, | Performed by: RADIOLOGY

## 2022-07-13 PROCEDURE — 3078F DIAST BP <80 MM HG: CPT | Mod: CPTII,S$GLB,, | Performed by: OBSTETRICS & GYNECOLOGY

## 2022-07-13 PROCEDURE — 77063 BREAST TOMOSYNTHESIS BI: CPT | Mod: 26,,, | Performed by: RADIOLOGY

## 2022-07-13 PROCEDURE — 1159F MED LIST DOCD IN RCRD: CPT | Mod: CPTII,S$GLB,, | Performed by: OBSTETRICS & GYNECOLOGY

## 2022-07-13 PROCEDURE — 77063 MAMMO DIGITAL SCREENING BILAT WITH TOMO: ICD-10-PCS | Mod: 26,,, | Performed by: RADIOLOGY

## 2022-07-13 PROCEDURE — 99999 PR PBB SHADOW E&M-EST. PATIENT-LVL III: CPT | Mod: PBBFAC,,, | Performed by: OBSTETRICS & GYNECOLOGY

## 2022-07-13 PROCEDURE — 1126F AMNT PAIN NOTED NONE PRSNT: CPT | Mod: CPTII,S$GLB,, | Performed by: OBSTETRICS & GYNECOLOGY

## 2022-07-13 PROCEDURE — 1101F PR PT FALLS ASSESS DOC 0-1 FALLS W/OUT INJ PAST YR: ICD-10-PCS | Mod: CPTII,S$GLB,, | Performed by: OBSTETRICS & GYNECOLOGY

## 2022-07-13 PROCEDURE — 3288F PR FALLS RISK ASSESSMENT DOCUMENTED: ICD-10-PCS | Mod: CPTII,S$GLB,, | Performed by: OBSTETRICS & GYNECOLOGY

## 2022-07-13 PROCEDURE — 3008F PR BODY MASS INDEX (BMI) DOCUMENTED: ICD-10-PCS | Mod: CPTII,S$GLB,, | Performed by: OBSTETRICS & GYNECOLOGY

## 2022-07-13 PROCEDURE — 3008F BODY MASS INDEX DOCD: CPT | Mod: CPTII,S$GLB,, | Performed by: OBSTETRICS & GYNECOLOGY

## 2022-07-13 PROCEDURE — 3288F FALL RISK ASSESSMENT DOCD: CPT | Mod: CPTII,S$GLB,, | Performed by: OBSTETRICS & GYNECOLOGY

## 2022-07-13 PROCEDURE — 3074F SYST BP LT 130 MM HG: CPT | Mod: CPTII,S$GLB,, | Performed by: OBSTETRICS & GYNECOLOGY

## 2022-07-13 RX ORDER — ESTRADIOL 0.1 MG/G
1 CREAM VAGINAL DAILY
Qty: 85 G | Refills: 3 | Status: SHIPPED | OUTPATIENT
Start: 2022-07-13 | End: 2022-09-23

## 2022-07-13 RX ORDER — ESTRADIOL 0.1 MG/G
1 CREAM VAGINAL DAILY
Qty: 42.5 G | Refills: 0 | Status: SHIPPED | OUTPATIENT
Start: 2022-07-13 | End: 2022-08-04 | Stop reason: SDUPTHER

## 2022-07-13 NOTE — PROGRESS NOTES
CC: Well woman exam    Cesario Sahu is a 66 y.o. female  presents for a well woman exam.  LMP: No LMP recorded. Patient is postmenopausal.. C/o urinary urgency/frequency, nocturia. Takes ditropan in AM. Admits to drinking a lot more water for health benefits. Has had vaginal dryness in past, frequent UTI symptoms    Past Medical History:   Diagnosis Date    Arthritis     Digestive disorder     Hypertension     Insulin resistance      Past Surgical History:   Procedure Laterality Date    ADENOIDECTOMY      CHOLECYSTECTOMY      COLONOSCOPY N/A 2021    Procedure: COLONOSCOPY;  Surgeon: Serenity Ramey MD;  Location: [facility];  Service: Endoscopy;  Laterality: N/A;    DILATION AND CURETTAGE OF UTERUS  10/2018    ESOPHAGOGASTRODUODENOSCOPY N/A 3/23/2022    Procedure: EGD (ESOPHAGOGASTRODUODENOSCOPY);  Surgeon: Abby Green MD;  Location: [facility];  Service: Endoscopy;  Laterality: N/A;     Social History     Socioeconomic History    Marital status:     Number of children: 3   Occupational History     Comment: financial     Tobacco Use    Smoking status: Never Smoker    Smokeless tobacco: Never Used   Substance and Sexual Activity    Alcohol use: Yes     Comment: Socially    Drug use: Never    Sexual activity: Yes     Partners: Male   Social History Narrative    The patient remarried in 2018.  She has 3 adult children.  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     Family History   Problem Relation Age of Onset    Hypertension Mother     Heart disease Sister     Hyperlipidemia Sister     Hypertension Sister     Diabetes Brother      OB History        3    Para   3    Term   3            AB        Living   3       SAB        IAB        Ectopic        Multiple        Live Births   3                 Current Outpatient Medications:     amLODIPine (NORVASC) 5 MG tablet, TAKE 1 TABLET EVERY DAY, Disp: 90 tablet, Rfl: 3     ascorbic acid (VITAMIN C ORAL), , Disp: , Rfl:     aspirin (ECOTRIN) 81 MG EC tablet, Take 81 mg by mouth once daily., Disp: , Rfl:     biotin 5,000 mcg TbDL, Take by mouth., Disp: , Rfl:     Lactobacillus acidophilus (PROBIOTIC ORAL), , Disp: , Rfl:     metoprolol succinate (TOPROL-XL) 50 MG 24 hr tablet, Take 1 tablet (50 mg total) by mouth once daily., Disp: 30 tablet, Rfl: 11    oxybutynin (DITROPAN-XL) 5 MG TR24, Take 1 tablet (5 mg total) by mouth once daily., Disp: 90 tablet, Rfl: 3    pravastatin (PRAVACHOL) 20 MG tablet, Take 1 tablet (20 mg total) by mouth once daily., Disp: 90 tablet, Rfl: 3    estradioL (ESTRACE) 0.01 % (0.1 mg/gram) vaginal cream, Place 1 g vaginally once daily., Disp: 85 g, Rfl: 3    estradioL (ESTRACE) 0.01 % (0.1 mg/gram) vaginal cream, Place 1 g vaginally once daily., Disp: 42.5 g, Rfl: 0    famotidine (PEPCID) 20 MG tablet, Take 1 tablet (20 mg total) by mouth 2 (two) times daily as needed for Heartburn. (Patient not taking: Reported on 7/13/2022), Disp: 180 tablet, Rfl: 1    linaCLOtide (LINZESS) 145 mcg Cap capsule, Take 1 capsule (145 mcg total) by mouth before breakfast. (Patient not taking: Reported on 7/13/2022), Disp: 90 capsule, Rfl: 0    OMEPRAZOLE ORAL, , Disp: , Rfl:     pantoprazole (PROTONIX) 40 MG tablet, Take 1 tablet (40 mg total) by mouth once daily. (Patient not taking: Reported on 7/13/2022), Disp: 180 tablet, Rfl: 2    traZODone (DESYREL) 50 MG tablet, Take 1 tablet (50 mg total) by mouth nightly as needed for Insomnia. (Patient not taking: Reported on 7/13/2022), Disp: 90 tablet, Rfl: 0  No current facility-administered medications for this visit.    Facility-Administered Medications Ordered in Other Visits:     lactated ringers infusion, , Intravenous, Continuous, Abby Green MD, Last Rate: 100 mL/hr at 03/23/22 0947, Rate Change at 03/23/22 0947    GYNECOLOGY HISTORY:  No abnormal pap/std    DATA REVIEWED:  Last pap: normal, neg HPV Date:  2021  Last mmg: normal Date: 5/2020  Last colonoscopy: polyp Date: 2021    /68   Wt 78.9 kg (173 lb 15.1 oz)   BMI 29.86 kg/m²     ROS:  GENERAL: Denies weight gain or weight loss. Feeling well overall.   SKIN: Denies rash or lesions.   HEAD: Denies head injury or headache.   NODES: Denies enlarged lymph nodes.   CHEST: Denies chest pain or shortness of breath.   CARDIOVASCULAR: Denies palpitations or left sided chest pain.   ABDOMEN: No abdominal pain, constipation, diarrhea, nausea, vomiting or rectal bleeding.   URINARY: No frequency, dysuria, hematuria, or burning on urination.  REPRODUCTIVE: See HPI.   BREASTS: The patient denies pain, lumps, or nipple discharge.   HEMATOLOGIC: No easy bruisability or excessive bleeding.   MUSCULOSKELETAL: Denies joint pain or swelling.   NEUROLOGIC: Denies syncope or weakness.   PSYCHIATRIC: Denies depression, anxiety or mood swings.    PHYSICAL EXAM:    APPEARANCE: Well nourished, well developed, in no acute distress.  AFFECT: WNL, alert and oriented x 3  SKIN: No acne or hirsutism  NECK: Neck symmetric without masses or thyromegaly  NODES: No inguinal, cervical, axillary, or femoral lymph node enlargement  CHEST: Good respiratory effect  ABDOMEN: Soft.  No tenderness or masses.  No hepatosplenomegaly.  No hernias.  BREASTS: Symmetrical, no skin changes or visible lesions.  No palpable masses, nipple discharge bilaterally.  PELVIC: Normal external genitalia without lesions.  Normal hair distribution.  Adequate perineal body, normal urethral meatus.  Vagina atrophic without lesions or discharge.  Cervix pink, without lesions, discharge or tenderness.  No significant cystocele or rectocele.  Bimanual exam shows uterus to be normal size, regular, mobile and nontender.  Adnexa without masses or tenderness.   EXTREMITIES: No edema.    Encounter for gynecological examination without abnormal finding    Urinary urgency    Vaginal atrophy    Other orders  -     estradioL  (ESTRACE) 0.01 % (0.1 mg/gram) vaginal cream; Place 1 g vaginally once daily.  Dispense: 85 g; Refill: 3  -     estradioL (ESTRACE) 0.01 % (0.1 mg/gram) vaginal cream; Place 1 g vaginally once daily.  Dispense: 42.5 g; Refill: 0    rec changing ditropan to pm dosing & limiting fluid intake at night    Patient was counseled today on A.C.S. Pap guidelines (q3) and recommendations for yearly pelvic exams, yearly mammograms starting age 40, and clinical breast exams; to see her PCP for other health maintenance.

## 2022-07-15 ENCOUNTER — PATIENT MESSAGE (OUTPATIENT)
Dept: OBSTETRICS AND GYNECOLOGY | Facility: HOSPITAL | Age: 67
End: 2022-07-15
Payer: MEDICARE

## 2022-07-25 ENCOUNTER — TELEPHONE (OUTPATIENT)
Dept: OBSTETRICS AND GYNECOLOGY | Facility: CLINIC | Age: 67
End: 2022-07-25
Payer: MEDICARE

## 2022-07-25 NOTE — TELEPHONE ENCOUNTER
----- Message from Frida Ochoa MD sent at 7/23/2022  1:09 PM CDT -----  Contact: Sophia  Every night for 2 weeks then 2-3 times/week  ----- Message -----  From: Mickey Souza LPN  Sent: 7/22/2022  11:20 AM CDT  To: Frida Ochoa MD    Hi Dr. Ochoa       Pharmacy is  clarifying directions on Estradiol Vaginal Cream. States Cream is usually prescribed 1-3 x/qweek.     Please advised.   ----- Message -----  From: Gia Lyons  Sent: 7/22/2022  11:13 AM CDT  To: Gabriela BROWN Staff    .Type:  Pharmacy Calling to Clarify an RX    Name of Caller:Sophia  Pharmacy Name:[facility]   Prescription Name:estradioL   What do they need to clarify?:the direction   Best Call Back Number:[phone]  Additional Information: Sophia is req a call back to clarify the direction on this medication.. Thanks AW

## 2022-08-01 ENCOUNTER — PATIENT MESSAGE (OUTPATIENT)
Dept: OBSTETRICS AND GYNECOLOGY | Facility: CLINIC | Age: 67
End: 2022-08-01
Payer: MEDICARE

## 2022-08-04 RX ORDER — ESTRADIOL 0.1 MG/G
1 CREAM VAGINAL DAILY
Qty: 42.5 G | Refills: 0 | Status: SHIPPED | OUTPATIENT
Start: 2022-08-04 | End: 2022-09-23

## 2022-08-09 ENCOUNTER — TELEPHONE (OUTPATIENT)
Dept: FAMILY MEDICINE | Facility: CLINIC | Age: 67
End: 2022-08-09
Payer: MEDICARE

## 2022-08-09 DIAGNOSIS — N30.00 ACUTE CYSTITIS WITHOUT HEMATURIA: Primary | ICD-10-CM

## 2022-08-09 NOTE — TELEPHONE ENCOUNTER
----- Message from Lindy Acosta sent at 8/9/2022  7:27 AM CDT -----  Regarding: order request  Contact: Pt  Pt is requesting an order for a UA. Pt thinks she has a UTI. Please call back at [phone]//thank you acc

## 2022-08-09 NOTE — TELEPHONE ENCOUNTER
Called Pt. Pt. States she notices frequency and burning. States she knows she takes medication for frequency. Notices a little flank pain. Wants to know if she can give a urine sample.

## 2022-08-09 NOTE — TELEPHONE ENCOUNTER
----- Message from Yamilka Abdalla sent at 8/9/2022  4:48 PM CDT -----  Pt would like to know if she can get medication for a UTI sent to the pharmacy or does she need to come in. Please call .[phone]

## 2022-08-10 ENCOUNTER — LAB VISIT (OUTPATIENT)
Dept: LAB | Facility: HOSPITAL | Age: 67
End: 2022-08-10
Attending: FAMILY MEDICINE
Payer: MEDICARE

## 2022-08-10 DIAGNOSIS — N30.00 ACUTE CYSTITIS WITHOUT HEMATURIA: ICD-10-CM

## 2022-08-10 LAB
BILIRUB UR QL STRIP: NEGATIVE
CLARITY UR REFRACT.AUTO: CLEAR
COLOR UR AUTO: YELLOW
GLUCOSE UR QL STRIP: NEGATIVE
HGB UR QL STRIP: NEGATIVE
KETONES UR QL STRIP: NEGATIVE
LEUKOCYTE ESTERASE UR QL STRIP: NEGATIVE
NITRITE UR QL STRIP: NEGATIVE
PH UR STRIP: 7 [PH] (ref 5–8)
PROT UR QL STRIP: NEGATIVE
SP GR UR STRIP: 1.01 (ref 1–1.03)
URN SPEC COLLECT METH UR: NORMAL

## 2022-08-10 PROCEDURE — 87186 SC STD MICRODIL/AGAR DIL: CPT | Performed by: FAMILY MEDICINE

## 2022-08-10 PROCEDURE — 87077 CULTURE AEROBIC IDENTIFY: CPT | Performed by: FAMILY MEDICINE

## 2022-08-10 PROCEDURE — 81003 URINALYSIS AUTO W/O SCOPE: CPT | Performed by: FAMILY MEDICINE

## 2022-08-10 PROCEDURE — 87088 URINE BACTERIA CULTURE: CPT | Performed by: FAMILY MEDICINE

## 2022-08-10 PROCEDURE — 87086 URINE CULTURE/COLONY COUNT: CPT | Performed by: FAMILY MEDICINE

## 2022-08-10 RX ORDER — NITROFURANTOIN 25; 75 MG/1; MG/1
100 CAPSULE ORAL 2 TIMES DAILY
Qty: 14 CAPSULE | Refills: 0 | Status: SHIPPED | OUTPATIENT
Start: 2022-08-10 | End: 2022-08-17

## 2022-08-10 NOTE — TELEPHONE ENCOUNTER
Yes get her to come by the clinic and provide a urine sample. I will l place orders. I will also send antibiotics. But I want her to leave a sample before she starts taking the meds.

## 2022-08-13 LAB — BACTERIA UR CULT: ABNORMAL

## 2022-08-24 RX ORDER — METOPROLOL SUCCINATE 50 MG/1
50 TABLET, EXTENDED RELEASE ORAL DAILY
Qty: 90 TABLET | Refills: 3 | Status: SHIPPED | OUTPATIENT
Start: 2022-08-24 | End: 2022-08-24 | Stop reason: SDUPTHER

## 2022-08-24 RX ORDER — METOPROLOL SUCCINATE 50 MG/1
50 TABLET, EXTENDED RELEASE ORAL DAILY
Qty: 90 TABLET | Refills: 3 | Status: SHIPPED | OUTPATIENT
Start: 2022-08-24 | End: 2023-06-14

## 2022-09-19 ENCOUNTER — TELEPHONE (OUTPATIENT)
Dept: FAMILY MEDICINE | Facility: CLINIC | Age: 67
End: 2022-09-19

## 2022-09-19 NOTE — TELEPHONE ENCOUNTER
----- Message from Cleve Lan sent at 9/19/2022  7:40 AM CDT -----  Contact: Pt  Type:  Same Day Appointment Request    Caller is requesting a same day appointment.  Caller declined first available appointment listed below.        Name of Caller: pt [phone]  When is the first available appointment?12/2022  Symptoms: pain in right arm / left leg swollen pain/ all does with her stomach issues/  Best Call Back Number: [phone]  Additional Information:           seizure

## 2022-09-19 NOTE — TELEPHONE ENCOUNTER
Pt states she had a terrible weekend, thought she was having blood clots. Pt states her right arm has been hurting really bad. Pt states it was cramping really bad and it worried her potassium might be low. Pt states her  left leg is now cramping/tight, but her arm is better.    Pt hasn't been seen by PCP since 12/3/21 for annual. Pt would like to see PCP, not NP. Please advise on appt for pt.   Rash started 4 hours after the second covid shot --possibly due to the shot and is still present a month later   Has been on claritin and that helps the itching somewhat   ? If pityriasis rosea possibly but no herald patch and it was temporally related to the second covid shot    See Dermatologist --Dr Gina Dillig, Dr Farfan--[phone]

## 2022-09-23 ENCOUNTER — LAB VISIT (OUTPATIENT)
Dept: LAB | Facility: HOSPITAL | Age: 67
End: 2022-09-23
Attending: FAMILY MEDICINE
Payer: MEDICARE

## 2022-09-23 ENCOUNTER — OFFICE VISIT (OUTPATIENT)
Dept: FAMILY MEDICINE | Facility: CLINIC | Age: 67
End: 2022-09-23
Payer: MEDICARE

## 2022-09-23 VITALS
OXYGEN SATURATION: 98 % | BODY MASS INDEX: 30.01 KG/M2 | WEIGHT: 174.81 LBS | DIASTOLIC BLOOD PRESSURE: 72 MMHG | TEMPERATURE: 97 F | HEART RATE: 70 BPM | SYSTOLIC BLOOD PRESSURE: 128 MMHG

## 2022-09-23 DIAGNOSIS — E88.819 INSULIN RESISTANCE: ICD-10-CM

## 2022-09-23 DIAGNOSIS — I10 ESSENTIAL HYPERTENSION: ICD-10-CM

## 2022-09-23 DIAGNOSIS — E78.2 MIXED HYPERLIPIDEMIA: ICD-10-CM

## 2022-09-23 DIAGNOSIS — Z23 NEED FOR PNEUMOCOCCAL VACCINATION: ICD-10-CM

## 2022-09-23 DIAGNOSIS — Z00.00 ENCOUNTER FOR WELLNESS EXAMINATION: ICD-10-CM

## 2022-09-23 DIAGNOSIS — Z00.00 ENCOUNTER FOR WELLNESS EXAMINATION: Primary | ICD-10-CM

## 2022-09-23 DIAGNOSIS — I49.3 PVC (PREMATURE VENTRICULAR CONTRACTION): ICD-10-CM

## 2022-09-23 DIAGNOSIS — K59.09 CHRONIC CONSTIPATION: ICD-10-CM

## 2022-09-23 DIAGNOSIS — R25.2 LEG CRAMPS: ICD-10-CM

## 2022-09-23 DIAGNOSIS — R32 URINARY INCONTINENCE, UNSPECIFIED TYPE: ICD-10-CM

## 2022-09-23 DIAGNOSIS — R79.89 INCREASED PTH LEVEL: ICD-10-CM

## 2022-09-23 DIAGNOSIS — N95.2 VAGINAL ATROPHY: ICD-10-CM

## 2022-09-23 LAB
BILIRUB UR QL STRIP: NEGATIVE
CLARITY UR REFRACT.AUTO: CLEAR
COLOR UR AUTO: YELLOW
GLUCOSE UR QL STRIP: NEGATIVE
HGB UR QL STRIP: NEGATIVE
KETONES UR QL STRIP: NEGATIVE
LEUKOCYTE ESTERASE UR QL STRIP: NEGATIVE
NITRITE UR QL STRIP: NEGATIVE
PH UR STRIP: 6 [PH] (ref 5–8)
PROT UR QL STRIP: NEGATIVE
SP GR UR STRIP: 1.02 (ref 1–1.03)
URN SPEC COLLECT METH UR: NORMAL

## 2022-09-23 PROCEDURE — 99397 PR PREVENTIVE VISIT,EST,65 & OVER: ICD-10-PCS | Mod: S$GLB,,, | Performed by: FAMILY MEDICINE

## 2022-09-23 PROCEDURE — 1101F PT FALLS ASSESS-DOCD LE1/YR: CPT | Mod: CPTII,S$GLB,, | Performed by: FAMILY MEDICINE

## 2022-09-23 PROCEDURE — 3288F FALL RISK ASSESSMENT DOCD: CPT | Mod: CPTII,S$GLB,, | Performed by: FAMILY MEDICINE

## 2022-09-23 PROCEDURE — 99397 PER PM REEVAL EST PAT 65+ YR: CPT | Mod: S$GLB,,, | Performed by: FAMILY MEDICINE

## 2022-09-23 PROCEDURE — 3044F PR MOST RECENT HEMOGLOBIN A1C LEVEL <7.0%: ICD-10-PCS | Mod: CPTII,S$GLB,, | Performed by: FAMILY MEDICINE

## 2022-09-23 PROCEDURE — G0009 PNEUMOCOCCAL CONJUGATE VACCINE 20-VALENT: ICD-10-PCS | Mod: S$GLB,,, | Performed by: FAMILY MEDICINE

## 2022-09-23 PROCEDURE — 99999 PR PBB SHADOW E&M-EST. PATIENT-LVL IV: CPT | Mod: PBBFAC,,, | Performed by: FAMILY MEDICINE

## 2022-09-23 PROCEDURE — 3074F PR MOST RECENT SYSTOLIC BLOOD PRESSURE < 130 MM HG: ICD-10-PCS | Mod: CPTII,S$GLB,, | Performed by: FAMILY MEDICINE

## 2022-09-23 PROCEDURE — 3044F HG A1C LEVEL LT 7.0%: CPT | Mod: CPTII,S$GLB,, | Performed by: FAMILY MEDICINE

## 2022-09-23 PROCEDURE — 3078F PR MOST RECENT DIASTOLIC BLOOD PRESSURE < 80 MM HG: ICD-10-PCS | Mod: CPTII,S$GLB,, | Performed by: FAMILY MEDICINE

## 2022-09-23 PROCEDURE — 3288F PR FALLS RISK ASSESSMENT DOCUMENTED: ICD-10-PCS | Mod: CPTII,S$GLB,, | Performed by: FAMILY MEDICINE

## 2022-09-23 PROCEDURE — 3074F SYST BP LT 130 MM HG: CPT | Mod: CPTII,S$GLB,, | Performed by: FAMILY MEDICINE

## 2022-09-23 PROCEDURE — 90694 FLU VACCINE - QUADRIVALENT - ADJUVANTED: ICD-10-PCS | Mod: S$GLB,,, | Performed by: FAMILY MEDICINE

## 2022-09-23 PROCEDURE — 3008F PR BODY MASS INDEX (BMI) DOCUMENTED: ICD-10-PCS | Mod: CPTII,S$GLB,, | Performed by: FAMILY MEDICINE

## 2022-09-23 PROCEDURE — 3078F DIAST BP <80 MM HG: CPT | Mod: CPTII,S$GLB,, | Performed by: FAMILY MEDICINE

## 2022-09-23 PROCEDURE — G0008 ADMIN INFLUENZA VIRUS VAC: HCPCS | Mod: S$GLB,,, | Performed by: FAMILY MEDICINE

## 2022-09-23 PROCEDURE — 1126F PR PAIN SEVERITY QUANTIFIED, NO PAIN PRESENT: ICD-10-PCS | Mod: CPTII,S$GLB,, | Performed by: FAMILY MEDICINE

## 2022-09-23 PROCEDURE — 1159F PR MEDICATION LIST DOCUMENTED IN MEDICAL RECORD: ICD-10-PCS | Mod: CPTII,S$GLB,, | Performed by: FAMILY MEDICINE

## 2022-09-23 PROCEDURE — 90677 PCV20 VACCINE IM: CPT | Mod: S$GLB,,, | Performed by: FAMILY MEDICINE

## 2022-09-23 PROCEDURE — 99999 PR PBB SHADOW E&M-EST. PATIENT-LVL IV: ICD-10-PCS | Mod: PBBFAC,,, | Performed by: FAMILY MEDICINE

## 2022-09-23 PROCEDURE — 81003 URINALYSIS AUTO W/O SCOPE: CPT | Performed by: FAMILY MEDICINE

## 2022-09-23 PROCEDURE — 1126F AMNT PAIN NOTED NONE PRSNT: CPT | Mod: CPTII,S$GLB,, | Performed by: FAMILY MEDICINE

## 2022-09-23 PROCEDURE — 1159F MED LIST DOCD IN RCRD: CPT | Mod: CPTII,S$GLB,, | Performed by: FAMILY MEDICINE

## 2022-09-23 PROCEDURE — 3008F BODY MASS INDEX DOCD: CPT | Mod: CPTII,S$GLB,, | Performed by: FAMILY MEDICINE

## 2022-09-23 PROCEDURE — G0009 ADMIN PNEUMOCOCCAL VACCINE: HCPCS | Mod: S$GLB,,, | Performed by: FAMILY MEDICINE

## 2022-09-23 PROCEDURE — G0008 FLU VACCINE - QUADRIVALENT - ADJUVANTED: ICD-10-PCS | Mod: S$GLB,,, | Performed by: FAMILY MEDICINE

## 2022-09-23 PROCEDURE — 1101F PR PT FALLS ASSESS DOC 0-1 FALLS W/OUT INJ PAST YR: ICD-10-PCS | Mod: CPTII,S$GLB,, | Performed by: FAMILY MEDICINE

## 2022-09-23 PROCEDURE — 90694 VACC AIIV4 NO PRSRV 0.5ML IM: CPT | Mod: S$GLB,,, | Performed by: FAMILY MEDICINE

## 2022-09-23 PROCEDURE — 90677 PNEUMOCOCCAL CONJUGATE VACCINE 20-VALENT: ICD-10-PCS | Mod: S$GLB,,, | Performed by: FAMILY MEDICINE

## 2022-09-23 RX ORDER — ESTRADIOL 0.1 MG/G
1 CREAM VAGINAL DAILY
Qty: 85 G | Refills: 3 | Status: SHIPPED | OUTPATIENT
Start: 2022-09-23 | End: 2023-02-20 | Stop reason: SDUPTHER

## 2022-09-23 NOTE — PROGRESS NOTES
Subjective:      Patient ID: Cesario Sahu is a 67 y.o. female.    Chief Complaint: leg cramps and Arm Pain (Pt states its yo arms but mostly right arm)    HPI      Patient with pmhx of insulin resistance, urinary incont, chronic constipation, and HTN here today for wellness visit. Notes leg cramps and arm cramps. History of hypokalemia. Stopped linzess due to abdominal cramps. Did have have full work up for constipation. Recently saw Dr. Peralta a few months ago.      Past Medical History:   Diagnosis Date    Arthritis     Digestive disorder     Hypertension     Insulin resistance        Past Surgical History:   Procedure Laterality Date    ADENOIDECTOMY      CHOLECYSTECTOMY      COLONOSCOPY N/A 6/11/2021    Procedure: COLONOSCOPY;  Surgeon: Serenity Ramey MD;  Location: [facility];  Service: Endoscopy;  Laterality: N/A;    DILATION AND CURETTAGE OF UTERUS  10/2018    ESOPHAGOGASTRODUODENOSCOPY N/A 3/23/2022    Procedure: EGD (ESOPHAGOGASTRODUODENOSCOPY);  Surgeon: Abby Green MD;  Location: [facility];  Service: Endoscopy;  Laterality: N/A;       Family History   Problem Relation Age of Onset    Hypertension Mother     Heart disease Sister     Hyperlipidemia Sister     Hypertension Sister     Diabetes Brother        Social History     Socioeconomic History    Marital status:     Number of children: 3   Occupational History     Comment: financial     Tobacco Use    Smoking status: Never    Smokeless tobacco: Never   Substance and Sexual Activity    Alcohol use: Yes     Comment: Socially    Drug use: Never    Sexual activity: Yes     Partners: Male   Social History Narrative    The patient remarried in June 2018.  She has 3 adult children.  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       Health Maintenance Topics with due status: Not Due       Topic Last Completion Date    Colorectal Cancer Screening 06/11/2021    Mammogram 07/13/2022    DEXA Scan 07/13/2022    Lipid  Panel 09/23/2022       Medication List with Changes/Refills   Current Medications    AMLODIPINE (NORVASC) 5 MG TABLET    TAKE 1 TABLET EVERY DAY    ASCORBIC ACID (VITAMIN C ORAL)        ASPIRIN (ECOTRIN) 81 MG EC TABLET    Take 81 mg by mouth once daily.    BIOTIN 5,000 MCG TBDL    Take by mouth.    FAMOTIDINE (PEPCID) 20 MG TABLET    Take 1 tablet (20 mg total) by mouth 2 (two) times daily as needed for Heartburn.    LACTOBACILLUS ACIDOPHILUS (PROBIOTIC ORAL)        METOPROLOL SUCCINATE (TOPROL-XL) 50 MG 24 HR TABLET    Take 1 tablet (50 mg total) by mouth once daily.    OXYBUTYNIN (DITROPAN-XL) 5 MG TR24    Take 1 tablet (5 mg total) by mouth once daily.    PANTOPRAZOLE (PROTONIX) 40 MG TABLET    Take 1 tablet (40 mg total) by mouth once daily.    PRAVASTATIN (PRAVACHOL) 20 MG TABLET    Take 1 tablet (20 mg total) by mouth once daily.    TRAZODONE (DESYREL) 50 MG TABLET    Take 1 tablet (50 mg total) by mouth nightly as needed for Insomnia.   Changed and/or Refilled Medications    Modified Medication Previous Medication    ESTRADIOL (ESTRACE) 0.01 % (0.1 MG/GRAM) VAGINAL CREAM estradioL (ESTRACE) 0.01 % (0.1 mg/gram) vaginal cream       Place 1 g vaginally once daily.    Place 1 g vaginally once daily.   Discontinued Medications    ESTRADIOL (ESTRACE) 0.01 % (0.1 MG/GRAM) VAGINAL CREAM    Place 1 g vaginally once daily.    LINACLOTIDE (LINZESS) 145 MCG CAP CAPSULE    Take 1 capsule (145 mcg total) by mouth before breakfast.    OMEPRAZOLE ORAL           Review of patient's allergies indicates:  No Known Allergies    Review of Systems   Constitutional:  Negative for fever.   HENT:  Negative for congestion.    Eyes:  Negative for blurred vision.   Respiratory:  Negative for shortness of breath.    Cardiovascular:  Negative for chest pain and leg swelling.   Gastrointestinal:  Positive for constipation. Negative for abdominal pain and diarrhea.   Genitourinary:  Negative for dysuria.        Muscle cramps   Skin:   Negative for rash.   Neurological:  Negative for headaches.     Objective:     Vitals:    09/23/22 0732   BP: 128/72   Pulse: 70   Temp: 96.9 °F (36.1 °C)     Body mass index is 30.01 kg/m².    Physical Exam  Vitals and nursing note reviewed.   Constitutional:       General: She is not in acute distress.     Appearance: She is well-developed.   HENT:      Head: Normocephalic and atraumatic.      Right Ear: External ear normal.      Left Ear: External ear normal.      Nose: Nose normal.   Eyes:      Conjunctiva/sclera: Conjunctivae normal.      Pupils: Pupils are equal, round, and reactive to light.   Neck:      Thyroid: No thyromegaly.   Cardiovascular:      Rate and Rhythm: Normal rate and regular rhythm.      Heart sounds: Normal heart sounds. No murmur heard.  Pulmonary:      Effort: Pulmonary effort is normal. No respiratory distress.      Breath sounds: Normal breath sounds. No wheezing or rales.   Chest:      Chest wall: No tenderness.   Abdominal:      General: Bowel sounds are normal. There is no distension.      Palpations: Abdomen is soft.      Tenderness: There is no abdominal tenderness.   Lymphadenopathy:      Cervical: No cervical adenopathy.   Skin:     General: Skin is warm and dry.   Neurological:      Mental Status: She is alert and oriented to person, place, and time.       Assessment and Plan:     Encounter for wellness examination  -     Pneumococcal Conjugate Vaccine (20 Valent) (IM)  -     CBC Without Differential; Future; Expected date: 09/23/2022  -     Comprehensive Metabolic Panel; Future; Expected date: 09/23/2022  -     Hemoglobin A1C; Future; Expected date: 09/23/2022  -     Lipid Panel; Future; Expected date: 09/23/2022  -     Vitamin D; Future; Expected date: 09/23/2022  -     TSH; Future; Expected date: 09/23/2022  -     PTH, intact; Future; Expected date: 09/23/2022  -     Magnesium; Future; Expected date: 09/23/2022  -     Insulin, random; Future; Expected date: 09/23/2022  -      Urinalysis; Future; Expected date: 09/23/2022  Age appropriate counseling  Vitamin D supplementation - will monitor calcium levels  Continue with exercise    Need for pneumococcal vaccination  -     Pneumococcal Conjugate Vaccine (20 Valent) (IM)    Essential hypertension  Blood pressure controlled at today's visit   Continue with current medications   Ambulatory logs of blood pressure readings recommended   DASH diet, weight loss, exercise     Mixed hyperlipidemia  -     Lipid Panel; Future; Expected date: 09/23/2022    PVC (premature ventricular contraction)  Stable    Insulin resistance  -     Insulin, random; Future; Expected date: 09/23/2022    Increased PTH level  -     PTH, intact; Future; Expected date: 09/23/2022    Leg cramps  -     Magnesium; Future; Expected date: 09/23/2022    Chronic constipation  Advised speaking with gastro - possibly trying trulance  Increase water, stool softener    Urinary incontinence, unspecified type  Advised this medication can cause constipation - will hold for now if able to and see if constipation resolves.    Vaginal atrophy  -     estradioL (ESTRACE) 0.01 % (0.1 mg/gram) vaginal cream; Place 1 g vaginally once daily.  Dispense: 85 g; Refill: 3      No follow-ups on file.

## 2022-09-24 ENCOUNTER — PATIENT MESSAGE (OUTPATIENT)
Dept: FAMILY MEDICINE | Facility: CLINIC | Age: 67
End: 2022-09-24
Payer: MEDICARE

## 2022-09-26 ENCOUNTER — PATIENT MESSAGE (OUTPATIENT)
Dept: FAMILY MEDICINE | Facility: CLINIC | Age: 67
End: 2022-09-26
Payer: MEDICARE

## 2022-11-07 ENCOUNTER — TELEPHONE (OUTPATIENT)
Dept: FAMILY MEDICINE | Facility: CLINIC | Age: 67
End: 2022-11-07

## 2022-11-07 NOTE — TELEPHONE ENCOUNTER
Attempted to call pt and no answer. Lvm advising PCP is fully booked until maternity leave but we can schedule with another provider if pt calls back

## 2022-11-07 NOTE — TELEPHONE ENCOUNTER
----- Message from Lindsay Carrizales sent at 11/7/2022  8:49 AM CST -----  Contact: Cesario  Type:  Sooner Apoointment Request    Caller is requesting a sooner appointment.  Caller declined first available appointment listed below.  Caller will not accept being placed on the waitlist and is requesting a message be sent to doctor.  Name of Caller:Cesario   When is the first available appointment?11/17/22 w/Jorge Burks NP  Symptoms:Lower back, left leg pain, stomach issues  Would the patient rather a call back or a response via [facility]? Call back   Best Call Back Number:[phone]  Additional Information: patient would like to see when she can see , didn't want the appointment with np when offered.  Thanks

## 2022-11-22 ENCOUNTER — TELEPHONE (OUTPATIENT)
Dept: FAMILY MEDICINE | Facility: CLINIC | Age: 67
End: 2022-11-22

## 2022-11-22 NOTE — TELEPHONE ENCOUNTER
Pt mother cancelled appt because she is no longer able to come. Advised pt someone already took the slot, but pt has appt tomorrow with Cothern she will come to.

## 2022-11-22 NOTE — TELEPHONE ENCOUNTER
----- Message from Margo Lucio sent at 11/22/2022  8:20 AM CST -----  Contact: [phone]  Pt is calling in regards to rescheduling her appt to a earlier time today. Pt mother (Anai Hwang) appt was no longer needed today. And pt is requesting to be seen in the 11:40 slot. Please call her back at [phone]. Thanks KB

## 2022-11-28 NOTE — PROGRESS NOTES
Subjective:      Cesario Sahu is a 67 y.o. female here today for:  Back Pain      PCP:  Rosemarie Meeks MD     HPI    Reports intermittent back pain and muscle spasms.  Tx with muscle relaxer, unsure if really helped or not.  Possibly due to frequent and prolonged standing.  OTC pain rx helps at times.    Also with c/o constipation, Linzess has not helped.  Tx with Metamucil, better results.  Constipation has made hemorrhoids worse d/t increased pressure/straining.  Drinking plenty of water, exercises regularly.      Review of Systems   Constitutional: Negative.    Respiratory: Negative.     Cardiovascular: Negative.    Gastrointestinal:  Positive for abdominal pain and constipation. Negative for abdominal distention, anal bleeding, blood in stool, diarrhea, nausea, rectal pain and vomiting.        GERD issues, constipation.   Genitourinary: Negative.    Musculoskeletal:  Positive for back pain and myalgias. Negative for gait problem and joint swelling.   Neurological: Negative.        Review of patient's allergies indicates:  No Known Allergies    Patient Active Problem List   Diagnosis    Essential hypertension    Insulin resistance    Mixed hyperlipidemia    PVC (premature ventricular contraction)    Aortic atherosclerosis    Personal history of colonic polyps    Diverticulosis of sigmoid colon         Current Outpatient Medications:     amLODIPine (NORVASC) 5 MG tablet, TAKE 1 TABLET EVERY DAY, Disp: 90 tablet, Rfl: 3    ascorbic acid (VITAMIN C ORAL), , Disp: , Rfl:     aspirin (ECOTRIN) 81 MG EC tablet, Take 81 mg by mouth once daily., Disp: , Rfl:     biotin 5,000 mcg TbDL, Take by mouth., Disp: , Rfl:     estradioL (ESTRACE) 0.01 % (0.1 mg/gram) vaginal cream, Place 1 g vaginally once daily., Disp: 85 g, Rfl: 3    famotidine (PEPCID) 20 MG tablet, Take 1 tablet (20 mg total) by mouth 2 (two) times daily as needed for Heartburn., Disp: 180 tablet, Rfl: 1    Lactobacillus acidophilus (PROBIOTIC ORAL),  ", Disp: , Rfl:     metoprolol succinate (TOPROL-XL) 50 MG 24 hr tablet, Take 1 tablet (50 mg total) by mouth once daily., Disp: 90 tablet, Rfl: 3    oxybutynin (DITROPAN-XL) 5 MG TR24, Take 1 tablet (5 mg total) by mouth once daily., Disp: 90 tablet, Rfl: 3    pantoprazole (PROTONIX) 40 MG tablet, Take 1 tablet (40 mg total) by mouth once daily., Disp: 180 tablet, Rfl: 2    pravastatin (PRAVACHOL) 20 MG tablet, Take 1 tablet (20 mg total) by mouth once daily., Disp: 90 tablet, Rfl: 3    traZODone (DESYREL) 50 MG tablet, Take 1 tablet (50 mg total) by mouth nightly as needed for Insomnia., Disp: 90 tablet, Rfl: 0  No current facility-administered medications for this visit.    Facility-Administered Medications Ordered in Other Visits:     lactated ringers infusion, , Intravenous, Continuous, Abby Green MD, Last Rate: 100 mL/hr at 03/23/22 0947, Rate Change at 03/23/22 0947      Past medical, surgical, family and social histories have been reviewed today.      Objective:     Vitals:    11/29/22 0813   BP: 104/70   Pulse: 63   Temp: 96.8 °F (36 °C)   SpO2: 97%   Weight: 81.1 kg (178 lb 14.5 oz)   Height: 5' 4" (1.626 m)   PainSc:   3   PainLoc: Back       Physical Exam  Vitals reviewed.   Constitutional:       General: She is not in acute distress.  HENT:      Head: Normocephalic and atraumatic.   Eyes:      Pupils: Pupils are equal, round, and reactive to light.   Cardiovascular:      Rate and Rhythm: Normal rate and regular rhythm.      Pulses: Normal pulses.      Heart sounds: Normal heart sounds.   Pulmonary:      Effort: Pulmonary effort is normal.      Breath sounds: Normal breath sounds.   Abdominal:      General: Bowel sounds are normal.      Palpations: Abdomen is soft.      Tenderness: There is generalized abdominal tenderness. There is no right CVA tenderness, left CVA tenderness, guarding or rebound.   Genitourinary:     Comments: Declines exam  Musculoskeletal:         General: Normal range of " motion.      Cervical back: Normal range of motion and neck supple. No rigidity.      Thoracic back: Edema, spasms and tenderness present. No swelling or deformity. Normal range of motion.      Lumbar back: Spasms and tenderness present. No swelling, edema or deformity. Normal range of motion.      Right lower leg: No edema.      Left lower leg: No edema.   Skin:     Capillary Refill: Capillary refill takes less than 2 seconds.   Neurological:      Mental Status: She is alert and oriented to person, place, and time.      Motor: No weakness.      Gait: Gait normal.   Psychiatric:         Mood and Affect: Mood normal.         Behavior: Behavior normal.         Thought Content: Thought content normal.         Judgment: Judgment normal.       Diagnosis/Assessment:     1. Muscle spasm of back --- pain relief and comfort measures, back care  - tiZANidine (ZANAFLEX) 4 MG tablet; Take 1 tablet (4 mg total) by mouth 3 (three) times daily as needed (muscle spasms). Caution on sedation  Dispense: 45 tablet; Refill: 0    2. Constipation, unspecified constipation type --- water, fiber, exercise ---- Linzess daily (take consistently)  - X-Ray Abdomen AP 1 View; Future    3. Hemorrhoids, unspecified hemorrhoid type --- due to # 2  - Ambulatory referral/consult to General Surgery; Future    4. Gastroesophageal reflux disease, unspecified whether esophagitis present  - esomeprazole (NEXIUM) 40 MG capsule; Take 1 capsule (40 mg total) by mouth before breakfast.  Dispense: 30 capsule; Refill: 2  - famotidine (PEPCID) 20 MG tablet; Take 1 tablet (20 mg total) by mouth 2 (two) times daily as needed for Heartburn.  Dispense: 60 tablet; Refill: 2       Follow-up:     Pending XR.  RTC as directed or on prn basis.        GABO Rome  [facility]     30 minutes of total time spent on the encounter, which includes face to face time and non-face to face time preparing to see the patient (eg, review of  tests), obtaining and/or reviewing separately obtained history, and documenting clinical information in the electronic or other health record.  Also includes independent interpretation of results (not separately reported) and communicating results to the patient/family/caregiver, with care coordination (not separately reported).

## 2022-11-29 ENCOUNTER — HOSPITAL ENCOUNTER (OUTPATIENT)
Dept: RADIOLOGY | Facility: HOSPITAL | Age: 67
Discharge: HOME | End: 2022-11-29
Attending: REGISTERED NURSE
Payer: MEDICARE

## 2022-11-29 ENCOUNTER — OFFICE VISIT (OUTPATIENT)
Dept: FAMILY MEDICINE | Facility: CLINIC | Age: 67
End: 2022-11-29
Payer: MEDICARE

## 2022-11-29 VITALS
WEIGHT: 178.88 LBS | DIASTOLIC BLOOD PRESSURE: 70 MMHG | HEIGHT: 64 IN | BODY MASS INDEX: 30.54 KG/M2 | HEART RATE: 63 BPM | SYSTOLIC BLOOD PRESSURE: 104 MMHG | TEMPERATURE: 97 F | OXYGEN SATURATION: 97 %

## 2022-11-29 DIAGNOSIS — K59.00 CONSTIPATION, UNSPECIFIED CONSTIPATION TYPE: ICD-10-CM

## 2022-11-29 DIAGNOSIS — K64.9 HEMORRHOIDS, UNSPECIFIED HEMORRHOID TYPE: ICD-10-CM

## 2022-11-29 DIAGNOSIS — M62.830 MUSCLE SPASM OF BACK: Primary | ICD-10-CM

## 2022-11-29 DIAGNOSIS — K21.9 GASTROESOPHAGEAL REFLUX DISEASE, UNSPECIFIED WHETHER ESOPHAGITIS PRESENT: ICD-10-CM

## 2022-11-29 PROCEDURE — 3288F PR FALLS RISK ASSESSMENT DOCUMENTED: ICD-10-PCS | Mod: CPTII,S$GLB,, | Performed by: REGISTERED NURSE

## 2022-11-29 PROCEDURE — 1159F MED LIST DOCD IN RCRD: CPT | Mod: CPTII,S$GLB,, | Performed by: REGISTERED NURSE

## 2022-11-29 PROCEDURE — 3044F PR MOST RECENT HEMOGLOBIN A1C LEVEL <7.0%: ICD-10-PCS | Mod: CPTII,S$GLB,, | Performed by: REGISTERED NURSE

## 2022-11-29 PROCEDURE — 1125F AMNT PAIN NOTED PAIN PRSNT: CPT | Mod: CPTII,S$GLB,, | Performed by: REGISTERED NURSE

## 2022-11-29 PROCEDURE — 3074F PR MOST RECENT SYSTOLIC BLOOD PRESSURE < 130 MM HG: ICD-10-PCS | Mod: CPTII,S$GLB,, | Performed by: REGISTERED NURSE

## 2022-11-29 PROCEDURE — 99999 PR PBB SHADOW E&M-EST. PATIENT-LVL III: CPT | Mod: PBBFAC,,, | Performed by: REGISTERED NURSE

## 2022-11-29 PROCEDURE — 74018 RADEX ABDOMEN 1 VIEW: CPT | Mod: 26,,, | Performed by: RADIOLOGY

## 2022-11-29 PROCEDURE — 3008F PR BODY MASS INDEX (BMI) DOCUMENTED: ICD-10-PCS | Mod: CPTII,S$GLB,, | Performed by: REGISTERED NURSE

## 2022-11-29 PROCEDURE — 1125F PR PAIN SEVERITY QUANTIFIED, PAIN PRESENT: ICD-10-PCS | Mod: CPTII,S$GLB,, | Performed by: REGISTERED NURSE

## 2022-11-29 PROCEDURE — 3044F HG A1C LEVEL LT 7.0%: CPT | Mod: CPTII,S$GLB,, | Performed by: REGISTERED NURSE

## 2022-11-29 PROCEDURE — 74018 RADEX ABDOMEN 1 VIEW: CPT | Mod: TC,FY,PO

## 2022-11-29 PROCEDURE — 1101F PT FALLS ASSESS-DOCD LE1/YR: CPT | Mod: CPTII,S$GLB,, | Performed by: REGISTERED NURSE

## 2022-11-29 PROCEDURE — 3074F SYST BP LT 130 MM HG: CPT | Mod: CPTII,S$GLB,, | Performed by: REGISTERED NURSE

## 2022-11-29 PROCEDURE — 3008F BODY MASS INDEX DOCD: CPT | Mod: CPTII,S$GLB,, | Performed by: REGISTERED NURSE

## 2022-11-29 PROCEDURE — 99999 PR PBB SHADOW E&M-EST. PATIENT-LVL III: ICD-10-PCS | Mod: PBBFAC,,, | Performed by: REGISTERED NURSE

## 2022-11-29 PROCEDURE — 99214 PR OFFICE/OUTPT VISIT, EST, LEVL IV, 30-39 MIN: ICD-10-PCS | Mod: S$GLB,,, | Performed by: REGISTERED NURSE

## 2022-11-29 PROCEDURE — 3288F FALL RISK ASSESSMENT DOCD: CPT | Mod: CPTII,S$GLB,, | Performed by: REGISTERED NURSE

## 2022-11-29 PROCEDURE — 99214 OFFICE O/P EST MOD 30 MIN: CPT | Mod: S$GLB,,, | Performed by: REGISTERED NURSE

## 2022-11-29 PROCEDURE — 1101F PR PT FALLS ASSESS DOC 0-1 FALLS W/OUT INJ PAST YR: ICD-10-PCS | Mod: CPTII,S$GLB,, | Performed by: REGISTERED NURSE

## 2022-11-29 PROCEDURE — 1159F PR MEDICATION LIST DOCUMENTED IN MEDICAL RECORD: ICD-10-PCS | Mod: CPTII,S$GLB,, | Performed by: REGISTERED NURSE

## 2022-11-29 PROCEDURE — 3078F DIAST BP <80 MM HG: CPT | Mod: CPTII,S$GLB,, | Performed by: REGISTERED NURSE

## 2022-11-29 PROCEDURE — 74018 XR ABDOMEN AP 1 VIEW: ICD-10-PCS | Mod: 26,,, | Performed by: RADIOLOGY

## 2022-11-29 PROCEDURE — 3078F PR MOST RECENT DIASTOLIC BLOOD PRESSURE < 80 MM HG: ICD-10-PCS | Mod: CPTII,S$GLB,, | Performed by: REGISTERED NURSE

## 2022-11-29 RX ORDER — TIZANIDINE 4 MG/1
4 TABLET ORAL 3 TIMES DAILY PRN
Qty: 45 TABLET | Refills: 0 | Status: SHIPPED | OUTPATIENT
Start: 2022-11-29 | End: 2023-01-11

## 2022-11-29 RX ORDER — FAMOTIDINE 20 MG/1
20 TABLET, FILM COATED ORAL 2 TIMES DAILY PRN
Qty: 60 TABLET | Refills: 2 | Status: SHIPPED | OUTPATIENT
Start: 2022-11-29 | End: 2023-02-24

## 2022-11-29 RX ORDER — ESOMEPRAZOLE MAGNESIUM 40 MG/1
40 CAPSULE, DELAYED RELEASE ORAL
Qty: 30 CAPSULE | Refills: 2 | Status: SHIPPED | OUTPATIENT
Start: 2022-11-29 | End: 2023-01-10

## 2023-01-04 ENCOUNTER — OFFICE VISIT (OUTPATIENT)
Dept: INTERNAL MEDICINE | Facility: CLINIC | Age: 68
End: 2023-01-04
Payer: MEDICARE

## 2023-01-04 ENCOUNTER — LAB VISIT (OUTPATIENT)
Dept: LAB | Facility: HOSPITAL | Age: 68
End: 2023-01-04
Attending: FAMILY MEDICINE
Payer: MEDICARE

## 2023-01-04 VITALS
DIASTOLIC BLOOD PRESSURE: 80 MMHG | OXYGEN SATURATION: 98 % | HEART RATE: 74 BPM | BODY MASS INDEX: 29.62 KG/M2 | HEIGHT: 64 IN | TEMPERATURE: 98 F | SYSTOLIC BLOOD PRESSURE: 128 MMHG | WEIGHT: 173.5 LBS

## 2023-01-04 DIAGNOSIS — K59.00 CONSTIPATION, UNSPECIFIED CONSTIPATION TYPE: ICD-10-CM

## 2023-01-04 DIAGNOSIS — M54.50 LUMBAR BACK PAIN: ICD-10-CM

## 2023-01-04 DIAGNOSIS — R79.89 INCREASED PTH LEVEL: ICD-10-CM

## 2023-01-04 DIAGNOSIS — M54.50 LUMBAR BACK PAIN: Primary | ICD-10-CM

## 2023-01-04 DIAGNOSIS — R10.9 ABDOMINAL PAIN, UNSPECIFIED ABDOMINAL LOCATION: ICD-10-CM

## 2023-01-04 DIAGNOSIS — E87.6 HYPOKALEMIA: ICD-10-CM

## 2023-01-04 LAB
BASOPHILS # BLD AUTO: 0.05 K/UL (ref 0–0.2)
BASOPHILS NFR BLD: 0.5 % (ref 0–1.9)
DIFFERENTIAL METHOD: NORMAL
EOSINOPHIL # BLD AUTO: 0.2 K/UL (ref 0–0.5)
EOSINOPHIL NFR BLD: 2.4 % (ref 0–8)
ERYTHROCYTE [DISTWIDTH] IN BLOOD BY AUTOMATED COUNT: 13.4 % (ref 11.5–14.5)
HCT VFR BLD AUTO: 37.9 % (ref 37–48.5)
HGB BLD-MCNC: 12.2 G/DL (ref 12–16)
IMM GRANULOCYTES # BLD AUTO: 0.02 K/UL (ref 0–0.04)
IMM GRANULOCYTES NFR BLD AUTO: 0.2 % (ref 0–0.5)
LYMPHOCYTES # BLD AUTO: 3.3 K/UL (ref 1–4.8)
LYMPHOCYTES NFR BLD: 33.6 % (ref 18–48)
MCH RBC QN AUTO: 28.9 PG (ref 27–31)
MCHC RBC AUTO-ENTMCNC: 32.2 G/DL (ref 32–36)
MCV RBC AUTO: 90 FL (ref 82–98)
MONOCYTES # BLD AUTO: 0.7 K/UL (ref 0.3–1)
MONOCYTES NFR BLD: 7 % (ref 4–15)
NEUTROPHILS # BLD AUTO: 5.6 K/UL (ref 1.8–7.7)
NEUTROPHILS NFR BLD: 56.3 % (ref 38–73)
NRBC BLD-RTO: 0 /100 WBC
PLATELET # BLD AUTO: 279 K/UL (ref 150–450)
PMV BLD AUTO: 10 FL (ref 9.2–12.9)
RBC # BLD AUTO: 4.22 M/UL (ref 4–5.4)
WBC # BLD AUTO: 9.94 K/UL (ref 3.9–12.7)

## 2023-01-04 PROCEDURE — 36415 COLL VENOUS BLD VENIPUNCTURE: CPT | Mod: HCNC | Performed by: FAMILY MEDICINE

## 2023-01-04 PROCEDURE — 1125F AMNT PAIN NOTED PAIN PRSNT: CPT | Mod: HCNC,CPTII,S$GLB, | Performed by: FAMILY MEDICINE

## 2023-01-04 PROCEDURE — 1101F PR PT FALLS ASSESS DOC 0-1 FALLS W/OUT INJ PAST YR: ICD-10-PCS | Mod: HCNC,CPTII,S$GLB, | Performed by: FAMILY MEDICINE

## 2023-01-04 PROCEDURE — 3008F PR BODY MASS INDEX (BMI) DOCUMENTED: ICD-10-PCS | Mod: HCNC,CPTII,S$GLB, | Performed by: FAMILY MEDICINE

## 2023-01-04 PROCEDURE — 1159F PR MEDICATION LIST DOCUMENTED IN MEDICAL RECORD: ICD-10-PCS | Mod: HCNC,CPTII,S$GLB, | Performed by: FAMILY MEDICINE

## 2023-01-04 PROCEDURE — 3288F FALL RISK ASSESSMENT DOCD: CPT | Mod: HCNC,CPTII,S$GLB, | Performed by: FAMILY MEDICINE

## 2023-01-04 PROCEDURE — 85025 COMPLETE CBC W/AUTO DIFF WBC: CPT | Mod: HCNC | Performed by: FAMILY MEDICINE

## 2023-01-04 PROCEDURE — 1125F PR PAIN SEVERITY QUANTIFIED, PAIN PRESENT: ICD-10-PCS | Mod: HCNC,CPTII,S$GLB, | Performed by: FAMILY MEDICINE

## 2023-01-04 PROCEDURE — 99999 PR PBB SHADOW E&M-EST. PATIENT-LVL IV: CPT | Mod: PBBFAC,HCNC,, | Performed by: FAMILY MEDICINE

## 2023-01-04 PROCEDURE — 3074F SYST BP LT 130 MM HG: CPT | Mod: HCNC,CPTII,S$GLB, | Performed by: FAMILY MEDICINE

## 2023-01-04 PROCEDURE — 84439 ASSAY OF FREE THYROXINE: CPT | Mod: HCNC | Performed by: FAMILY MEDICINE

## 2023-01-04 PROCEDURE — 83970 ASSAY OF PARATHORMONE: CPT | Mod: HCNC | Performed by: FAMILY MEDICINE

## 2023-01-04 PROCEDURE — 3079F PR MOST RECENT DIASTOLIC BLOOD PRESSURE 80-89 MM HG: ICD-10-PCS | Mod: HCNC,CPTII,S$GLB, | Performed by: FAMILY MEDICINE

## 2023-01-04 PROCEDURE — 3008F BODY MASS INDEX DOCD: CPT | Mod: HCNC,CPTII,S$GLB, | Performed by: FAMILY MEDICINE

## 2023-01-04 PROCEDURE — 3074F PR MOST RECENT SYSTOLIC BLOOD PRESSURE < 130 MM HG: ICD-10-PCS | Mod: HCNC,CPTII,S$GLB, | Performed by: FAMILY MEDICINE

## 2023-01-04 PROCEDURE — 99999 PR PBB SHADOW E&M-EST. PATIENT-LVL IV: ICD-10-PCS | Mod: PBBFAC,HCNC,, | Performed by: FAMILY MEDICINE

## 2023-01-04 PROCEDURE — 99214 PR OFFICE/OUTPT VISIT, EST, LEVL IV, 30-39 MIN: ICD-10-PCS | Mod: HCNC,S$GLB,, | Performed by: FAMILY MEDICINE

## 2023-01-04 PROCEDURE — 1159F MED LIST DOCD IN RCRD: CPT | Mod: HCNC,CPTII,S$GLB, | Performed by: FAMILY MEDICINE

## 2023-01-04 PROCEDURE — 3288F PR FALLS RISK ASSESSMENT DOCUMENTED: ICD-10-PCS | Mod: HCNC,CPTII,S$GLB, | Performed by: FAMILY MEDICINE

## 2023-01-04 PROCEDURE — 82397 CHEMILUMINESCENT ASSAY: CPT | Mod: HCNC | Performed by: FAMILY MEDICINE

## 2023-01-04 PROCEDURE — 99214 OFFICE O/P EST MOD 30 MIN: CPT | Mod: HCNC,S$GLB,, | Performed by: FAMILY MEDICINE

## 2023-01-04 PROCEDURE — 1101F PT FALLS ASSESS-DOCD LE1/YR: CPT | Mod: HCNC,CPTII,S$GLB, | Performed by: FAMILY MEDICINE

## 2023-01-04 PROCEDURE — 3079F DIAST BP 80-89 MM HG: CPT | Mod: HCNC,CPTII,S$GLB, | Performed by: FAMILY MEDICINE

## 2023-01-04 PROCEDURE — 84443 ASSAY THYROID STIM HORMONE: CPT | Mod: HCNC | Performed by: FAMILY MEDICINE

## 2023-01-04 PROCEDURE — 83690 ASSAY OF LIPASE: CPT | Mod: HCNC | Performed by: FAMILY MEDICINE

## 2023-01-04 PROCEDURE — 80053 COMPREHEN METABOLIC PANEL: CPT | Mod: HCNC | Performed by: FAMILY MEDICINE

## 2023-01-04 PROCEDURE — 83735 ASSAY OF MAGNESIUM: CPT | Mod: HCNC | Performed by: FAMILY MEDICINE

## 2023-01-04 NOTE — PROGRESS NOTES
Subjective:      Patient ID: Cesario Sahu is a 67 y.o. female.    Chief Complaint: Back Pain and Abdominal Pain    Pt having upper back down to lumbar back pain.  Also having abdominal discomfort underneath her left breast.  Also started having abdominal pain.  Was having night sweats last night.   Increased urinary frequency,   Does endorse constipation.   No hx of kidney stones.     Review of chart. Pt has a lung nodule that needs repeat imaging.   Pt has elevated PTH that appeared to be trending down.   No vision changes  No new muscle spasms       The patient's Health Maintenance was reviewed and the following appears to be due at this time:   Health Maintenance Due   Topic Date Due    TETANUS VACCINE  Never done    High Dose Statin  Never done    Shingles Vaccine (2 of 2) 12/17/2020        Past Medical History:  Past Medical History:   Diagnosis Date    Arthritis     Digestive disorder     Hypertension     Insulin resistance      Past Surgical History:   Procedure Laterality Date    ADENOIDECTOMY      CHOLECYSTECTOMY      COLONOSCOPY N/A 6/11/2021    Procedure: COLONOSCOPY;  Surgeon: Serenity Ramey MD;  Location: [facility];  Service: Endoscopy;  Laterality: N/A;    DILATION AND CURETTAGE OF UTERUS  10/2018    ESOPHAGOGASTRODUODENOSCOPY N/A 3/23/2022    Procedure: EGD (ESOPHAGOGASTRODUODENOSCOPY);  Surgeon: Abby Green MD;  Location: [facility];  Service: Endoscopy;  Laterality: N/A;     Review of patient's allergies indicates:  No Known Allergies  Social History     Socioeconomic History    Marital status:     Number of children: 3   Occupational History     Comment: financial     Tobacco Use    Smoking status: Never    Smokeless tobacco: Never   Substance and Sexual Activity    Alcohol use: Yes     Comment: Socially    Drug use: Never    Sexual activity: Yes     Partners: Male   Social History Narrative    The patient remarried in June 2018.  She has 3 adult children.   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     Family History   Problem Relation Age of Onset    Hypertension Mother     Heart disease Sister     Hyperlipidemia Sister     Hypertension Sister     Diabetes Brother        Review of Systems   Constitutional:  Negative for chills and fever.   Respiratory:  Negative for cough and shortness of breath.    Gastrointestinal:  Positive for constipation. Negative for nausea and vomiting.   Musculoskeletal:  Positive for back pain (see HPI).   Neurological:  Negative for weakness.      Objective:   /80 (BP Location: Left arm, Patient Position: Sitting, BP Method: Large (Manual))   Pulse 74   Temp 98.2 °F (36.8 °C)   Ht 5' 4" (1.626 m)   Wt 78.7 kg (173 lb 8 oz)   SpO2 98%   BMI 29.78 kg/m²     Physical Exam  Vitals and nursing note reviewed.   Constitutional:       Appearance: Normal appearance.   HENT:      Head: Normocephalic.   Eyes:      Conjunctiva/sclera: Conjunctivae normal.   Cardiovascular:      Rate and Rhythm: Normal rate and regular rhythm.   Pulmonary:      Effort: Pulmonary effort is normal.      Breath sounds: Normal breath sounds.   Abdominal:      General: Abdomen is flat. There is no distension.      Palpations: Abdomen is soft.   Musculoskeletal:      Comments: No spinal tenderness; negative CVA tenderness; somewhat limited ROM due to discomfort   Skin:     General: Skin is warm and dry.   Neurological:      Mental Status: She is alert and oriented to person, place, and time. Mental status is at baseline.   Psychiatric:         Mood and Affect: Mood normal.         Behavior: Behavior normal.         Thought Content: Thought content normal.       1. Lumbar back pain    2. Abdominal pain, unspecified abdominal location    3. Constipation, unspecified constipation type    4. Hypokalemia    5. Increased PTH level      Plan:       1. Lumbar back pain  Comments:  unclear etiology, checking imaging and labs and will follow up with " results  Orders:  -     Urinalysis; Future; Expected date: 01/04/2023  -     Comprehensive Metabolic Panel; Future; Expected date: 01/04/2023    2. Abdominal pain, unspecified abdominal location  -     CBC W/ AUTO DIFFERENTIAL; Future; Expected date: 01/04/2023  -     LIPASE; Future; Expected date: 01/04/2023    3. Constipation, unspecified constipation type  Comments:  increased fiber intake, increase water intake; checking KUB  Orders:  -     TSH; Future; Expected date: 01/04/2023  -     T4, FREE; Future; Expected date: 01/04/2023  -     PTH, Intact; Future; Expected date: 01/04/2023    4. Hypokalemia  Comments:  rechecking value  Orders:  -     Magnesium; Future; Expected date: 01/04/2023    5. Increased PTH level  Comments:  unclear etiology, checking again to make sure trending down; concerns that pt could have endocrine abnormality causing symptoms  Orders:  -     PTH, Intact; Future; Expected date: 01/04/2023  -     PTH-RELATED PEPTIDE; Future; Expected date: 01/04/2023         Medication List with Changes/Refills   Current Medications    AMLODIPINE (NORVASC) 5 MG TABLET    TAKE 1 TABLET EVERY DAY    ASCORBIC ACID (VITAMIN C ORAL)        ASPIRIN (ECOTRIN) 81 MG EC TABLET    Take 81 mg by mouth once daily.    BIOTIN 5,000 MCG TBDL    Take by mouth.    ESOMEPRAZOLE (NEXIUM) 40 MG CAPSULE    Take 1 capsule (40 mg total) by mouth before breakfast.    ESTRADIOL (ESTRACE) 0.01 % (0.1 MG/GRAM) VAGINAL CREAM    Place 1 g vaginally once daily.    FAMOTIDINE (PEPCID) 20 MG TABLET    Take 1 tablet (20 mg total) by mouth 2 (two) times daily as needed for Heartburn.    FAMOTIDINE (PEPCID) 20 MG TABLET    Take 1 tablet (20 mg total) by mouth 2 (two) times daily as needed for Heartburn.    LACTOBACILLUS ACIDOPHILUS (PROBIOTIC ORAL)        METOPROLOL SUCCINATE (TOPROL-XL) 50 MG 24 HR TABLET    Take 1 tablet (50 mg total) by mouth once daily.    PANTOPRAZOLE (PROTONIX) 40 MG TABLET    Take 1 tablet (40 mg total) by mouth once  daily.    TIZANIDINE (ZANAFLEX) 4 MG TABLET    Take 1 tablet (4 mg total) by mouth 3 (three) times daily as needed (muscle spasms). Caution on sedation    TRAZODONE (DESYREL) 50 MG TABLET    Take 1 tablet (50 mg total) by mouth nightly as needed for Insomnia.                Follow up in about 3 weeks (around 1/25/2023) for F/U symptoms.

## 2023-01-05 ENCOUNTER — TELEPHONE (OUTPATIENT)
Dept: INTERNAL MEDICINE | Facility: CLINIC | Age: 68
End: 2023-01-05
Payer: MEDICARE

## 2023-01-05 DIAGNOSIS — M54.50 LUMBAR BACK PAIN: Primary | ICD-10-CM

## 2023-01-05 DIAGNOSIS — R31.9 HEMATURIA, UNSPECIFIED TYPE: ICD-10-CM

## 2023-01-05 LAB
ALBUMIN SERPL BCP-MCNC: 4.3 G/DL (ref 3.5–5.2)
ALP SERPL-CCNC: 97 U/L (ref 55–135)
ALT SERPL W/O P-5'-P-CCNC: 12 U/L (ref 10–44)
ANION GAP SERPL CALC-SCNC: 8 MMOL/L (ref 8–16)
AST SERPL-CCNC: 15 U/L (ref 10–40)
BILIRUB SERPL-MCNC: 0.3 MG/DL (ref 0.1–1)
BUN SERPL-MCNC: 14 MG/DL (ref 8–23)
CALCIUM SERPL-MCNC: 10.1 MG/DL (ref 8.7–10.5)
CHLORIDE SERPL-SCNC: 103 MMOL/L (ref 95–110)
CO2 SERPL-SCNC: 27 MMOL/L (ref 23–29)
CREAT SERPL-MCNC: 0.8 MG/DL (ref 0.5–1.4)
EST. GFR  (NO RACE VARIABLE): >60 ML/MIN/1.73 M^2
GLUCOSE SERPL-MCNC: 75 MG/DL (ref 70–110)
LIPASE SERPL-CCNC: 17 U/L (ref 4–60)
MAGNESIUM SERPL-MCNC: 2.4 MG/DL (ref 1.6–2.6)
POTASSIUM SERPL-SCNC: 4.1 MMOL/L (ref 3.5–5.1)
PROT SERPL-MCNC: 8.3 G/DL (ref 6–8.4)
PTH-INTACT SERPL-MCNC: 95.4 PG/ML (ref 9–77)
SODIUM SERPL-SCNC: 138 MMOL/L (ref 136–145)
T4 FREE SERPL-MCNC: 0.86 NG/DL (ref 0.71–1.51)
TSH SERPL DL<=0.005 MIU/L-ACNC: 0.05 UIU/ML (ref 0.4–4)

## 2023-01-05 NOTE — TELEPHONE ENCOUNTER
I have placed the order for lumbar spine. I would also like to do a KUB to check for kidney stones based on her urine. Can we please facilitate patient getting scheduled? Thank you!  -. K

## 2023-01-05 NOTE — TELEPHONE ENCOUNTER
----- Message from Claudia Raza sent at 1/5/2023  8:04 AM CST -----  Contact: soumya  Patient is calling to speak with the nurse regarding X-ray. Reports was suppose to do a xray on her back but the orders haven't been put in the system to schedule. Please give the patient a call back at .[phone]   Thanks antonio

## 2023-01-06 ENCOUNTER — HOSPITAL ENCOUNTER (OUTPATIENT)
Dept: RADIOLOGY | Facility: HOSPITAL | Age: 68
Discharge: HOME | End: 2023-01-06
Attending: FAMILY MEDICINE
Payer: MEDICARE

## 2023-01-06 ENCOUNTER — PATIENT MESSAGE (OUTPATIENT)
Dept: INTERNAL MEDICINE | Facility: CLINIC | Age: 68
End: 2023-01-06
Payer: MEDICARE

## 2023-01-06 DIAGNOSIS — R31.9 HEMATURIA, UNSPECIFIED TYPE: ICD-10-CM

## 2023-01-06 DIAGNOSIS — R79.89 ABNORMAL TSH: Primary | ICD-10-CM

## 2023-01-06 DIAGNOSIS — R79.89 INCREASED PTH LEVEL: ICD-10-CM

## 2023-01-06 DIAGNOSIS — M54.50 LUMBAR BACK PAIN: ICD-10-CM

## 2023-01-06 PROCEDURE — 74018 RADEX ABDOMEN 1 VIEW: CPT | Mod: TC,HCNC,FY,PO

## 2023-01-06 PROCEDURE — 74018 XR ABDOMEN AP 1 VIEW: ICD-10-PCS | Mod: 26,HCNC,, | Performed by: RADIOLOGY

## 2023-01-06 PROCEDURE — 72110 XR LUMBAR SPINE COMPLETE 5 VIEW: ICD-10-PCS | Mod: 26,HCNC,, | Performed by: RADIOLOGY

## 2023-01-06 PROCEDURE — 74018 RADEX ABDOMEN 1 VIEW: CPT | Mod: 26,HCNC,, | Performed by: RADIOLOGY

## 2023-01-06 PROCEDURE — 72110 X-RAY EXAM L-2 SPINE 4/>VWS: CPT | Mod: TC,HCNC,FY,PO

## 2023-01-06 PROCEDURE — 72110 X-RAY EXAM L-2 SPINE 4/>VWS: CPT | Mod: 26,HCNC,, | Performed by: RADIOLOGY

## 2023-01-09 ENCOUNTER — HOSPITAL ENCOUNTER (OUTPATIENT)
Dept: RADIOLOGY | Facility: HOSPITAL | Age: 68
Discharge: HOME | End: 2023-01-09
Attending: FAMILY MEDICINE
Payer: MEDICARE

## 2023-01-09 DIAGNOSIS — R79.89 INCREASED PTH LEVEL: ICD-10-CM

## 2023-01-09 DIAGNOSIS — R79.89 ABNORMAL TSH: ICD-10-CM

## 2023-01-09 PROCEDURE — 76536 US EXAM OF HEAD AND NECK: CPT | Mod: 26,HCNC,, | Performed by: RADIOLOGY

## 2023-01-09 PROCEDURE — 76536 US EXAM OF HEAD AND NECK: CPT | Mod: TC,HCNC

## 2023-01-09 PROCEDURE — 76536 US SOFT TISSUE HEAD NECK THYROID: ICD-10-PCS | Mod: 26,HCNC,, | Performed by: RADIOLOGY

## 2023-01-10 ENCOUNTER — PATIENT MESSAGE (OUTPATIENT)
Dept: INTERNAL MEDICINE | Facility: CLINIC | Age: 68
End: 2023-01-10
Payer: MEDICARE

## 2023-01-10 LAB — PTH RELATED PROT SERPL-SCNC: <0.4 PMOL/L

## 2023-01-10 NOTE — TELEPHONE ENCOUNTER
Zen good pt schedule appt to follow up with harsha mcqueen for 1/11/23 at 1:20 pm and gastro appt with robinson warren  1-13-23 at 8:30am

## 2023-01-10 NOTE — TELEPHONE ENCOUNTER
Refill Routing Note   Medication(s) are not appropriate for processing by [facility] for the following reason(s):      - Medication not active on medication list    [facility] action(s):  Defer          Medication reconciliation completed: No     Appointments  past 12m or future 3m with PCP    Date Provider   Last Visit   9/23/2022 Rosemarie Meeks MD   Next Visit   Visit date not found Rosemarie Meeks MD   ED visits in past 90 days: 0        Note composed:11:50 AM 01/10/2023

## 2023-01-10 NOTE — PROGRESS NOTES
Subjective:      Cesario Sahu is a 67 y.o. female, here today for follow-up of:  Follow-up      PCP: Rosemarie Meeks MD     HPI:    Mrs. Sahu is here for follow-up visit.  Was seen recently by Dr. Wadsworth on 1/4/2023.    Per chart note from that visit:  Pt having upper back down to lumbar back pain.  Also having abdominal discomfort underneath her left breast.  Also started having abdominal pain.  Was having night sweats last night.   Increased urinary frequency,   Does endorse constipation.   No hx of kidney stones.     Imaging:  XR Abdomen:  Moderate constipation  Possible RT distal ureteral calculi  XR Lumbar:  Lumbar spondylosis  DDD from L4 to S1      Component      Latest Ref Rng & Units 1/4/2023   WBC      3.90 - 12.70 K/uL 9.94   RBC      4.00 - 5.40 M/uL 4.22   Hemoglobin      12.0 - 16.0 g/dL 12.2   Hematocrit      37.0 - 48.5 % 37.9   MCV      82 - 98 fL 90   MCH      27.0 - 31.0 pg 28.9   MCHC      32.0 - 36.0 g/dL 32.2   RDW      11.5 - 14.5 % 13.4   Platelets      150 - 450 K/uL 279   MPV      9.2 - 12.9 fL 10.0   Immature Granulocytes      0.0 - 0.5 % 0.2   Gran # (ANC)      1.8 - 7.7 K/uL 5.6   Immature Grans (Abs)      0.00 - 0.04 K/uL 0.02   Lymph #      1.0 - 4.8 K/uL 3.3   Mono #      0.3 - 1.0 K/uL 0.7   Eos #      0.0 - 0.5 K/uL 0.2   Baso #      0.00 - 0.20 K/uL 0.05   nRBC      0 /100 WBC 0   Gran %      38.0 - 73.0 % 56.3   Lymph %      18.0 - 48.0 % 33.6   Mono %      4.0 - 15.0 % 7.0   Eosinophil %      0.0 - 8.0 % 2.4   Basophil %      0.0 - 1.9 % 0.5   Sodium      136 - 145 mmol/L 138   Potassium      3.5 - 5.1 mmol/L 4.1   Chloride      95 - 110 mmol/L 103   CO2      23 - 29 mmol/L 27   Glucose      70 - 110 mg/dL 75   BUN      8 - 23 mg/dL 14   Creatinine      0.5 - 1.4 mg/dL 0.8   Calcium      8.7 - 10.5 mg/dL 10.1   PROTEIN TOTAL      6.0 - 8.4 g/dL 8.3   Albumin      3.5 - 5.2 g/dL 4.3   BILIRUBIN TOTAL      0.1 - 1.0 mg/dL 0.3   Alkaline Phosphatase      55 - 135 U/L 97    AST      10 - 40 U/L 15   ALT      10 - 44 U/L 12   Anion Gap      8 - 16 mmol/L 8   eGFR      >60 mL/min/1.73 m:2 >60.0   Specimen UA       Urine, Clean Catch   Color, UA      Yellow, Straw, [place] Yellow   Appearance, UA      Clear Clear   pH, UA      5.0 - 8.0 6.0   Specific Gravity, UA      1.005 - 1.030 <=1.005   Protein, UA      Negative Negative   Glucose, UA      Negative Negative   Ketones, UA      Negative Negative   Bilirubin (UA)      Negative Negative   Occult Blood UA      Negative Trace (A)   NITRITE UA      Negative Negative   Leukocytes, UA      Negative Negative   TSH      0.400 - 4.000 uIU/mL 0.048 (L)   Free T4      0.71 - 1.51 ng/dL 0.86   Magnesium      1.6 - 2.6 mg/dL 2.4   Lipase      4 - 60 U/L 17   PTH      9.0 - 77.0 pg/mL 95.4 (H)   PTH-Related Protein      < or = 4.2 pmol/L <0.4       Review of Systems   Constitutional: Negative.    Gastrointestinal:  Positive for constipation. Negative for abdominal distention, abdominal pain, diarrhea, nausea, rectal pain and vomiting.        Has tx constipation issues with Miralax, Linzess and Metamucil; doing better but not as regular as she would like.  Increased daily water intake reported.  Has appt with GI scheduled for 1/13/23.   Genitourinary:  Negative for dysuria, flank pain, hematuria, pelvic pain and urgency.   Musculoskeletal:  Positive for arthralgias and back pain. Negative for gait problem, joint swelling, neck pain and neck stiffness.        Reports lower back pain w/ dull ache.  Middle lower back down right buttock and leg.  Dr. Wadsworth did place referral to Back/Spine.  Zanaflex has not helped.   Neurological: Negative.          Review of patient's allergies indicates:  No Known Allergies      Patient Active Problem List   Diagnosis    Essential hypertension    Insulin resistance    Mixed hyperlipidemia    PVC (premature ventricular contraction)    Aortic atherosclerosis    Personal history of colonic polyps    Diverticulosis of  "sigmoid colon         Current Outpatient Medications:     amLODIPine (NORVASC) 5 MG tablet, TAKE 1 TABLET EVERY DAY, Disp: 90 tablet, Rfl: 3    ascorbic acid (VITAMIN C ORAL), , Disp: , Rfl:     aspirin (ECOTRIN) 81 MG EC tablet, Take 81 mg by mouth once daily., Disp: , Rfl:     biotin 5,000 mcg TbDL, Take by mouth., Disp: , Rfl:     estradioL (ESTRACE) 0.01 % (0.1 mg/gram) vaginal cream, Place 1 g vaginally once daily., Disp: 85 g, Rfl: 3    famotidine (PEPCID) 20 MG tablet, Take 1 tablet (20 mg total) by mouth 2 (two) times daily as needed for Heartburn., Disp: 60 tablet, Rfl: 2    Lactobacillus acidophilus (PROBIOTIC ORAL), , Disp: , Rfl:     metoprolol succinate (TOPROL-XL) 50 MG 24 hr tablet, Take 1 tablet (50 mg total) by mouth once daily., Disp: 90 tablet, Rfl: 3    tiZANidine (ZANAFLEX) 4 MG tablet, Take 1 tablet (4 mg total) by mouth 3 (three) times daily as needed (muscle spasms). Caution on sedation, Disp: 45 tablet, Rfl: 0      Past medical, surgical, family and social histories have been reviewed today.      Objective:     Vitals:    01/11/23 1326   BP: 118/72   Pulse: 75   Temp: 97.7 °F (36.5 °C)   SpO2: 99%   Weight: 77 kg (169 lb 13.8 oz)   Height: 5' 4" (1.626 m)       Physical Exam  Vitals reviewed.   Constitutional:       General: She is not in acute distress.  HENT:      Head: Normocephalic and atraumatic.   Eyes:      Pupils: Pupils are equal, round, and reactive to light.   Cardiovascular:      Rate and Rhythm: Normal rate and regular rhythm.      Pulses: Normal pulses.      Heart sounds: Normal heart sounds.   Pulmonary:      Effort: Pulmonary effort is normal.      Breath sounds: Normal breath sounds.   Abdominal:      Tenderness: There is no abdominal tenderness. There is no right CVA tenderness, left CVA tenderness, guarding or rebound.   Musculoskeletal:         General: Normal range of motion.      Cervical back: Normal range of motion and neck supple. No rigidity.      Lumbar back: " Spasms and tenderness present. No swelling, edema or deformity. Normal range of motion. Negative right straight leg raise test and negative left straight leg raise test.        Back:       Right lower leg: No edema.      Left lower leg: No edema.   Skin:     Capillary Refill: Capillary refill takes less than 2 seconds.   Neurological:      Mental Status: She is alert and oriented to person, place, and time.      Motor: No weakness.      Gait: Gait normal.   Psychiatric:         Mood and Affect: Mood normal.         Behavior: Behavior normal.         Thought Content: Thought content normal.         Judgment: Judgment normal.         Diagnosis/Assessment:     1. Back pain, unspecified back location, unspecified back pain laterality, unspecified chronicity  - US Retroperitoneal Complete; Future  - cyclobenzaprine (FLEXERIL) 5 MG tablet; Take 1-2 tablets (5-10 mg total) by mouth nightly as needed for Muscle spasms.  Dispense: 15 tablet; Refill: 0    2. Microscopic hematuria  - US Retroperitoneal Complete; Future    3. Constipation, unspecified constipation type --- water, fiber --- see GI on 1/13/23 as scheduled    4. Elevated parathyroid hormone --- chronic issue, trending down over time.  Calcium normal.  To Endo if needed.       Follow-Up:     Pending US.  RTC as directed and/or prn.      GABO Rome  [facility]       40 minutes of total time spent on the encounter, which includes face to face time and non-face to face time preparing to see the patient.  This included obtaining and/or reviewing separately obtained history, and documenting clinical information in the electronic or other health record.   Also includes independent interpretation of results (not separately reported) and communicating results to the patient/family/caregiver, with care coordination (not separately reported).

## 2023-01-10 NOTE — TELEPHONE ENCOUNTER
No new care gaps identified.  [facility] Embedded Care Gaps. Reference number: [id number]. 1/10/2023   7:18:17 AM CST

## 2023-01-11 ENCOUNTER — OFFICE VISIT (OUTPATIENT)
Dept: FAMILY MEDICINE | Facility: CLINIC | Age: 68
End: 2023-01-11
Payer: MEDICARE

## 2023-01-11 VITALS
OXYGEN SATURATION: 99 % | WEIGHT: 169.88 LBS | BODY MASS INDEX: 29 KG/M2 | SYSTOLIC BLOOD PRESSURE: 118 MMHG | HEART RATE: 75 BPM | DIASTOLIC BLOOD PRESSURE: 72 MMHG | TEMPERATURE: 98 F | HEIGHT: 64 IN

## 2023-01-11 DIAGNOSIS — M54.9 BACK PAIN, UNSPECIFIED BACK LOCATION, UNSPECIFIED BACK PAIN LATERALITY, UNSPECIFIED CHRONICITY: Primary | ICD-10-CM

## 2023-01-11 DIAGNOSIS — R31.29 MICROSCOPIC HEMATURIA: ICD-10-CM

## 2023-01-11 DIAGNOSIS — R79.89 ELEVATED PARATHYROID HORMONE: ICD-10-CM

## 2023-01-11 DIAGNOSIS — K59.00 CONSTIPATION, UNSPECIFIED CONSTIPATION TYPE: ICD-10-CM

## 2023-01-11 PROCEDURE — 3008F BODY MASS INDEX DOCD: CPT | Mod: HCNC,CPTII,S$GLB, | Performed by: REGISTERED NURSE

## 2023-01-11 PROCEDURE — 3288F FALL RISK ASSESSMENT DOCD: CPT | Mod: HCNC,CPTII,S$GLB, | Performed by: REGISTERED NURSE

## 2023-01-11 PROCEDURE — 3078F PR MOST RECENT DIASTOLIC BLOOD PRESSURE < 80 MM HG: ICD-10-PCS | Mod: HCNC,CPTII,S$GLB, | Performed by: REGISTERED NURSE

## 2023-01-11 PROCEDURE — 1101F PT FALLS ASSESS-DOCD LE1/YR: CPT | Mod: HCNC,CPTII,S$GLB, | Performed by: REGISTERED NURSE

## 2023-01-11 PROCEDURE — 3074F PR MOST RECENT SYSTOLIC BLOOD PRESSURE < 130 MM HG: ICD-10-PCS | Mod: HCNC,CPTII,S$GLB, | Performed by: REGISTERED NURSE

## 2023-01-11 PROCEDURE — 1125F PR PAIN SEVERITY QUANTIFIED, PAIN PRESENT: ICD-10-PCS | Mod: HCNC,CPTII,S$GLB, | Performed by: REGISTERED NURSE

## 2023-01-11 PROCEDURE — 1125F AMNT PAIN NOTED PAIN PRSNT: CPT | Mod: HCNC,CPTII,S$GLB, | Performed by: REGISTERED NURSE

## 2023-01-11 PROCEDURE — 3008F PR BODY MASS INDEX (BMI) DOCUMENTED: ICD-10-PCS | Mod: HCNC,CPTII,S$GLB, | Performed by: REGISTERED NURSE

## 2023-01-11 PROCEDURE — 1101F PR PT FALLS ASSESS DOC 0-1 FALLS W/OUT INJ PAST YR: ICD-10-PCS | Mod: HCNC,CPTII,S$GLB, | Performed by: REGISTERED NURSE

## 2023-01-11 PROCEDURE — 3288F PR FALLS RISK ASSESSMENT DOCUMENTED: ICD-10-PCS | Mod: HCNC,CPTII,S$GLB, | Performed by: REGISTERED NURSE

## 2023-01-11 PROCEDURE — 99215 PR OFFICE/OUTPT VISIT, EST, LEVL V, 40-54 MIN: ICD-10-PCS | Mod: HCNC,S$GLB,, | Performed by: REGISTERED NURSE

## 2023-01-11 PROCEDURE — 99999 PR PBB SHADOW E&M-EST. PATIENT-LVL III: CPT | Mod: PBBFAC,HCNC,, | Performed by: REGISTERED NURSE

## 2023-01-11 PROCEDURE — 99999 PR PBB SHADOW E&M-EST. PATIENT-LVL III: ICD-10-PCS | Mod: PBBFAC,HCNC,, | Performed by: REGISTERED NURSE

## 2023-01-11 PROCEDURE — 99215 OFFICE O/P EST HI 40 MIN: CPT | Mod: HCNC,S$GLB,, | Performed by: REGISTERED NURSE

## 2023-01-11 PROCEDURE — 3074F SYST BP LT 130 MM HG: CPT | Mod: HCNC,CPTII,S$GLB, | Performed by: REGISTERED NURSE

## 2023-01-11 PROCEDURE — 3078F DIAST BP <80 MM HG: CPT | Mod: HCNC,CPTII,S$GLB, | Performed by: REGISTERED NURSE

## 2023-01-11 RX ORDER — CYCLOBENZAPRINE HCL 5 MG
5-10 TABLET ORAL NIGHTLY PRN
Qty: 15 TABLET | Refills: 0 | Status: SHIPPED | OUTPATIENT
Start: 2023-01-11 | End: 2023-11-14

## 2023-01-13 ENCOUNTER — OFFICE VISIT (OUTPATIENT)
Dept: GASTROENTEROLOGY | Facility: CLINIC | Age: 68
End: 2023-01-13
Payer: MEDICARE

## 2023-01-13 VITALS
OXYGEN SATURATION: 97 % | HEIGHT: 63 IN | BODY MASS INDEX: 29.95 KG/M2 | DIASTOLIC BLOOD PRESSURE: 78 MMHG | SYSTOLIC BLOOD PRESSURE: 138 MMHG | WEIGHT: 169 LBS | HEART RATE: 70 BPM

## 2023-01-13 DIAGNOSIS — K64.9 HEMORRHOIDS, UNSPECIFIED HEMORRHOID TYPE: ICD-10-CM

## 2023-01-13 DIAGNOSIS — K58.1 IRRITABLE BOWEL SYNDROME WITH CONSTIPATION: Primary | ICD-10-CM

## 2023-01-13 PROCEDURE — 3078F PR MOST RECENT DIASTOLIC BLOOD PRESSURE < 80 MM HG: ICD-10-PCS | Mod: HCNC,CPTII,S$GLB, | Performed by: NURSE PRACTITIONER

## 2023-01-13 PROCEDURE — 99214 OFFICE O/P EST MOD 30 MIN: CPT | Mod: HCNC,S$GLB,, | Performed by: NURSE PRACTITIONER

## 2023-01-13 PROCEDURE — 1160F RVW MEDS BY RX/DR IN RCRD: CPT | Mod: HCNC,CPTII,S$GLB, | Performed by: NURSE PRACTITIONER

## 2023-01-13 PROCEDURE — 99999 PR PBB SHADOW E&M-EST. PATIENT-LVL IV: ICD-10-PCS | Mod: PBBFAC,HCNC,, | Performed by: NURSE PRACTITIONER

## 2023-01-13 PROCEDURE — 3008F PR BODY MASS INDEX (BMI) DOCUMENTED: ICD-10-PCS | Mod: HCNC,CPTII,S$GLB, | Performed by: NURSE PRACTITIONER

## 2023-01-13 PROCEDURE — 1159F PR MEDICATION LIST DOCUMENTED IN MEDICAL RECORD: ICD-10-PCS | Mod: HCNC,CPTII,S$GLB, | Performed by: NURSE PRACTITIONER

## 2023-01-13 PROCEDURE — 3075F SYST BP GE 130 - 139MM HG: CPT | Mod: HCNC,CPTII,S$GLB, | Performed by: NURSE PRACTITIONER

## 2023-01-13 PROCEDURE — 99999 PR PBB SHADOW E&M-EST. PATIENT-LVL IV: CPT | Mod: PBBFAC,HCNC,, | Performed by: NURSE PRACTITIONER

## 2023-01-13 PROCEDURE — 99214 PR OFFICE/OUTPT VISIT, EST, LEVL IV, 30-39 MIN: ICD-10-PCS | Mod: HCNC,S$GLB,, | Performed by: NURSE PRACTITIONER

## 2023-01-13 PROCEDURE — 1160F PR REVIEW ALL MEDS BY PRESCRIBER/CLIN PHARMACIST DOCUMENTED: ICD-10-PCS | Mod: HCNC,CPTII,S$GLB, | Performed by: NURSE PRACTITIONER

## 2023-01-13 PROCEDURE — 3008F BODY MASS INDEX DOCD: CPT | Mod: HCNC,CPTII,S$GLB, | Performed by: NURSE PRACTITIONER

## 2023-01-13 PROCEDURE — 3075F PR MOST RECENT SYSTOLIC BLOOD PRESS GE 130-139MM HG: ICD-10-PCS | Mod: HCNC,CPTII,S$GLB, | Performed by: NURSE PRACTITIONER

## 2023-01-13 PROCEDURE — 3078F DIAST BP <80 MM HG: CPT | Mod: HCNC,CPTII,S$GLB, | Performed by: NURSE PRACTITIONER

## 2023-01-13 PROCEDURE — 1159F MED LIST DOCD IN RCRD: CPT | Mod: HCNC,CPTII,S$GLB, | Performed by: NURSE PRACTITIONER

## 2023-01-13 RX ORDER — PLECANATIDE 3 MG/1
3 TABLET ORAL DAILY
Qty: 30 TABLET | Refills: 11 | Status: SHIPPED | OUTPATIENT
Start: 2023-01-13 | End: 2023-05-30 | Stop reason: SDUPTHER

## 2023-01-13 NOTE — PROGRESS NOTES
Clinic Follow Up:  [facility] Follow Up Note    Reason for Follow Up:  The primary encounter diagnosis was Irritable bowel syndrome with constipation. A diagnosis of Hemorrhoids, unspecified hemorrhoid type was also pertinent to this visit.    PCP: Rosemarie Meeks       HPI:  This is a 67 y.o. female here for follow up of constipation.   This is a chronic issue. She is having bowel movements but continues to show constipation on xrays. Most recent xray 1/6/23 with findings of constipation.   She also has hemorrhoids that bleeding from time to time. She does have an appt with surgery next week.     Review of Systems   Constitutional:  Negative for activity change and appetite change.        As per interval history above   Respiratory:  Negative for cough and shortness of breath.    Cardiovascular:  Negative for chest pain.   Gastrointestinal:  Positive for anal bleeding and constipation. Negative for blood in stool, diarrhea, nausea, rectal pain and vomiting.   Skin:  Negative for color change and rash.     Medical History:  Past Medical History:   Diagnosis Date    Arthritis     Digestive disorder     Hypertension     Insulin resistance        Surgical History:   Past Surgical History:   Procedure Laterality Date    ADENOIDECTOMY      CHOLECYSTECTOMY      COLONOSCOPY N/A 6/11/2021    Procedure: COLONOSCOPY;  Surgeon: Serenity Ramey MD;  Location: [facility];  Service: Endoscopy;  Laterality: N/A;    DILATION AND CURETTAGE OF UTERUS  10/2018    ESOPHAGOGASTRODUODENOSCOPY N/A 3/23/2022    Procedure: EGD (ESOPHAGOGASTRODUODENOSCOPY);  Surgeon: Abby Green MD;  Location: [facility];  Service: Endoscopy;  Laterality: N/A;       Family History:   Family History   Problem Relation Age of Onset    Hypertension Mother     Heart disease Sister     Hyperlipidemia Sister     Hypertension Sister     Diabetes Brother        Social History:   Social History     Tobacco Use    Smoking status: Never     "Smokeless tobacco: Never   Substance Use Topics    Alcohol use: Yes     Comment: Socially    Drug use: Never       Allergies: Review of patient's allergies indicates:  No Known Allergies    Home Medications:  Current Outpatient Medications on File Prior to Visit   Medication Sig Dispense Refill    amLODIPine (NORVASC) 5 MG tablet TAKE 1 TABLET EVERY DAY 90 tablet 3    ascorbic acid (VITAMIN C ORAL)       aspirin (ECOTRIN) 81 MG EC tablet Take 81 mg by mouth once daily.      estradioL (ESTRACE) 0.01 % (0.1 mg/gram) vaginal cream Place 1 g vaginally once daily. 85 g 3    Lactobacillus acidophilus (PROBIOTIC ORAL)       biotin 5,000 mcg TbDL Take by mouth.      cyclobenzaprine (FLEXERIL) 5 MG tablet Take 1-2 tablets (5-10 mg total) by mouth nightly as needed for Muscle spasms. (Patient not taking: Reported on 1/13/2023) 15 tablet 0    famotidine (PEPCID) 20 MG tablet Take 1 tablet (20 mg total) by mouth 2 (two) times daily as needed for Heartburn. (Patient not taking: Reported on 1/13/2023) 60 tablet 2    metoprolol succinate (TOPROL-XL) 50 MG 24 hr tablet Take 1 tablet (50 mg total) by mouth once daily. (Patient not taking: Reported on 1/13/2023) 90 tablet 3     No current facility-administered medications on file prior to visit.       /78 (BP Location: Left arm, Patient Position: Sitting, BP Method: Medium (Manual))   Pulse 70   Ht 5' 3" (1.6 m)   Wt 76.7 kg (169 lb)   SpO2 97%   BMI 29.94 kg/m²   Body mass index is 29.94 kg/m².  Physical Exam  Constitutional:       General: She is not in acute distress.  HENT:      Head: Normocephalic and atraumatic.   Eyes:      General: No scleral icterus.     Conjunctiva/sclera: Conjunctivae normal.   Cardiovascular:      Rate and Rhythm: Normal rate and regular rhythm.      Heart sounds: No murmur heard.  Pulmonary:      Effort: Pulmonary effort is normal. No respiratory distress.      Breath sounds: Normal breath sounds. No wheezing.   Abdominal:      General: " Abdomen is flat. Bowel sounds are normal.      Palpations: Abdomen is soft.      Tenderness: There is no abdominal tenderness.   Skin:     General: Skin is warm and dry.   Neurological:      General: No focal deficit present.      Mental Status: She is alert and oriented to person, place, and time.      Cranial Nerves: No cranial nerve deficit.   Psychiatric:         Mood and Affect: Mood normal.         Judgment: Judgment normal.       Labs: Pertinent labs reviewed.  CRC Screening:     Assessment:   1. Irritable bowel syndrome with constipation    2. Hemorrhoids, unspecified hemorrhoid type      Recommendations:   - discussed high fiber diet and water intake  - needs to have good control over constipation to help with hemorrhoids and to prevent return   - failed mrialax and linzess.   - start Trulance.     Irritable bowel syndrome with constipation  -     plecanatide (TRULANCE) 3 mg Tab; Take 1 tablet (3 mg) by mouth once daily.  Dispense: 30 tablet; Refill: 11    Hemorrhoids, unspecified hemorrhoid type      Return to Clinic:  Follow up in about 1 year (around 1/13/2024), or if symptoms worsen or fail to improve.    Thank you for the opportunity to participate in the care of this patient.  CHEO Burch

## 2023-01-13 NOTE — PATIENT INSTRUCTIONS
Miralax Prep for Bowel Purge/ Clean Out   You will purchase a 238 gram container of Miralax at your local drug store. It is over the counter in the laxative section.   You will mix this entire 238 gram container in a pitcher with 2 liters of non-carbonated clear liquid beverage.   You will sip this over the next 24-48 hours. The pitcher will need to remain in the refrigerator.      Fiber, along with fluid intake, can help your digestive tract function properly. A high-fiber diet can also help reduce the risk of obesity, heart disease, and diabetes.   Women should try to eat at least 21-25 grams of fiber a day. Men should try to eat at least 30-38 grams of fiber a day.   See the chart below for some of the more common foods and how much fiber they contain per serving. You should also drink at least 64 ounces of water per day when following a high fiber diet. When buying packaged foods, check the nutrition food label for fiber content.     Amount of fiber in different foods  Food Serving Grams of fiber   Fruits   Apple (with skin) 1 medium apple 4.4   Banana 1 medium banana 3.1   Oranges 1 orange 3.1          Prunes 1 cup, pitted 12.4   Juices   Apple, unsweetened, with added ascorbic acid 1 cup 0.5   Grapefruit, white, canned, sweetened 1 cup 0.2   Grape, unsweetened, with added ascorbic acid 1 cup 0.5          Orange 1 cup 0.7   Vegetables   Cooked    Green beans 1 cup 4.0    Carrots 1/2 cup sliced 2.3   Peas 1 cup 8.8   Potato (baked, with skin) 1 medium potato 3.8   Raw   Cucumber (with peel) 1 cucumber 1.5   Lettuce 1 cup shredded 0.5   Tomato 1 medium tomato 1.5   Spinach 1 cup 0.7   Legumes   Baked beans, canned, no salt added 1 cup 13.9   Kidney beans, canned 1 cup 13.6   Lima beans, canned 1 cup 11.6               Lentils, boiled 1 cup 15.6   Breads, pastas, flours   Bran muffins 1 medium muffin 5.2   Oatmeal, cooked 1 cup 4.0   White bread 1 slice 0.6   Whole-wheat bread 1 slice 1.9   Pasta and rice, cooked    Macaroni 1 cup 2.5   Rice, brown 1 cup 3.5   Rice, white 1 cup 0.6               Spaghetti (regular) 1 cup 2.5   Nuts   Almonds 1/2 cup 8.7   Peanuts 1/2 cup 7.9     To learn how much fiber and other nutrients are in different foods, visit the [place] Department of Agriculture (USDA) FoodData Central website.  Data from: USDA FoodData Central. Available at: https://fdc.nal.usda.gov/ (Accessed on October 11, 2019).  Graphic 45734 Version 6.0  © 2022 UpToDate, Inc. and/or its affiliates. All Rights Reserved.

## 2023-01-18 ENCOUNTER — TELEPHONE (OUTPATIENT)
Dept: PHARMACY | Facility: CLINIC | Age: 68
End: 2023-01-18
Payer: MEDICARE

## 2023-01-18 ENCOUNTER — HOSPITAL ENCOUNTER (OUTPATIENT)
Dept: RADIOLOGY | Facility: HOSPITAL | Age: 68
Discharge: HOME | End: 2023-01-18
Attending: REGISTERED NURSE
Payer: MEDICARE

## 2023-01-18 DIAGNOSIS — R31.29 MICROSCOPIC HEMATURIA: ICD-10-CM

## 2023-01-18 DIAGNOSIS — M54.9 BACK PAIN, UNSPECIFIED BACK LOCATION, UNSPECIFIED BACK PAIN LATERALITY, UNSPECIFIED CHRONICITY: ICD-10-CM

## 2023-01-18 PROCEDURE — 76770 US EXAM ABDO BACK WALL COMP: CPT | Mod: 26,HCNC,, | Performed by: RADIOLOGY

## 2023-01-18 PROCEDURE — 76770 US EXAM ABDO BACK WALL COMP: CPT | Mod: TC,HCNC

## 2023-01-18 PROCEDURE — 76770 US RETROPERITONEAL COMPLETE: ICD-10-PCS | Mod: 26,HCNC,, | Performed by: RADIOLOGY

## 2023-01-20 ENCOUNTER — PATIENT MESSAGE (OUTPATIENT)
Dept: GASTROENTEROLOGY | Facility: CLINIC | Age: 68
End: 2023-01-20
Payer: MEDICARE

## 2023-01-20 ENCOUNTER — OFFICE VISIT (OUTPATIENT)
Dept: FAMILY MEDICINE | Facility: CLINIC | Age: 68
End: 2023-01-20
Payer: MEDICARE

## 2023-01-20 VITALS
SYSTOLIC BLOOD PRESSURE: 101 MMHG | HEART RATE: 73 BPM | HEIGHT: 63 IN | OXYGEN SATURATION: 98 % | BODY MASS INDEX: 30 KG/M2 | TEMPERATURE: 98 F | WEIGHT: 169.31 LBS | DIASTOLIC BLOOD PRESSURE: 68 MMHG

## 2023-01-20 DIAGNOSIS — N20.0 LEFT RENAL STONE: Primary | ICD-10-CM

## 2023-01-20 DIAGNOSIS — K58.1 IRRITABLE BOWEL SYNDROME WITH CONSTIPATION: Primary | ICD-10-CM

## 2023-01-20 PROCEDURE — 1159F PR MEDICATION LIST DOCUMENTED IN MEDICAL RECORD: ICD-10-PCS | Mod: HCNC,CPTII,S$GLB, | Performed by: REGISTERED NURSE

## 2023-01-20 PROCEDURE — 3008F PR BODY MASS INDEX (BMI) DOCUMENTED: ICD-10-PCS | Mod: HCNC,CPTII,S$GLB, | Performed by: REGISTERED NURSE

## 2023-01-20 PROCEDURE — 99214 PR OFFICE/OUTPT VISIT, EST, LEVL IV, 30-39 MIN: ICD-10-PCS | Mod: HCNC,S$GLB,, | Performed by: REGISTERED NURSE

## 2023-01-20 PROCEDURE — 3074F SYST BP LT 130 MM HG: CPT | Mod: HCNC,CPTII,S$GLB, | Performed by: REGISTERED NURSE

## 2023-01-20 PROCEDURE — 99999 PR PBB SHADOW E&M-EST. PATIENT-LVL IV: CPT | Mod: PBBFAC,HCNC,, | Performed by: REGISTERED NURSE

## 2023-01-20 PROCEDURE — 99999 PR PBB SHADOW E&M-EST. PATIENT-LVL IV: ICD-10-PCS | Mod: PBBFAC,HCNC,, | Performed by: REGISTERED NURSE

## 2023-01-20 PROCEDURE — 3008F BODY MASS INDEX DOCD: CPT | Mod: HCNC,CPTII,S$GLB, | Performed by: REGISTERED NURSE

## 2023-01-20 PROCEDURE — 3078F DIAST BP <80 MM HG: CPT | Mod: HCNC,CPTII,S$GLB, | Performed by: REGISTERED NURSE

## 2023-01-20 PROCEDURE — 3288F FALL RISK ASSESSMENT DOCD: CPT | Mod: HCNC,CPTII,S$GLB, | Performed by: REGISTERED NURSE

## 2023-01-20 PROCEDURE — 1125F PR PAIN SEVERITY QUANTIFIED, PAIN PRESENT: ICD-10-PCS | Mod: HCNC,CPTII,S$GLB, | Performed by: REGISTERED NURSE

## 2023-01-20 PROCEDURE — 99214 OFFICE O/P EST MOD 30 MIN: CPT | Mod: HCNC,S$GLB,, | Performed by: REGISTERED NURSE

## 2023-01-20 PROCEDURE — 3078F PR MOST RECENT DIASTOLIC BLOOD PRESSURE < 80 MM HG: ICD-10-PCS | Mod: HCNC,CPTII,S$GLB, | Performed by: REGISTERED NURSE

## 2023-01-20 PROCEDURE — 1159F MED LIST DOCD IN RCRD: CPT | Mod: HCNC,CPTII,S$GLB, | Performed by: REGISTERED NURSE

## 2023-01-20 PROCEDURE — 1101F PT FALLS ASSESS-DOCD LE1/YR: CPT | Mod: HCNC,CPTII,S$GLB, | Performed by: REGISTERED NURSE

## 2023-01-20 PROCEDURE — 3288F PR FALLS RISK ASSESSMENT DOCUMENTED: ICD-10-PCS | Mod: HCNC,CPTII,S$GLB, | Performed by: REGISTERED NURSE

## 2023-01-20 PROCEDURE — 1125F AMNT PAIN NOTED PAIN PRSNT: CPT | Mod: HCNC,CPTII,S$GLB, | Performed by: REGISTERED NURSE

## 2023-01-20 PROCEDURE — 3074F PR MOST RECENT SYSTOLIC BLOOD PRESSURE < 130 MM HG: ICD-10-PCS | Mod: HCNC,CPTII,S$GLB, | Performed by: REGISTERED NURSE

## 2023-01-20 PROCEDURE — 1101F PR PT FALLS ASSESS DOC 0-1 FALLS W/OUT INJ PAST YR: ICD-10-PCS | Mod: HCNC,CPTII,S$GLB, | Performed by: REGISTERED NURSE

## 2023-01-20 RX ORDER — PRAVASTATIN SODIUM 20 MG/1
TABLET ORAL
Qty: 90 TABLET | Refills: 3 | OUTPATIENT
Start: 2023-01-20

## 2023-01-20 NOTE — PROGRESS NOTES
Subjective:      Cesario Sahu is a 67 y.o. female, here today with C/C of:  Nephrolithiasis      HPI    Mrs. Sahu is here to f/u on recent US done on kidneys.  Did show kidney stone, here to discuss further.  Reports back pain but not urinary issues or problems reported.      Results for orders placed during the hospital encounter of 01/18/23    US Retroperitoneal Complete    Narrative  EXAM: US RETROPERITONEAL COMPLETE    CLINICAL HISTORY:  [M54.9]-Dorsalgia, unspecified./[R31.29]-Other microscopic hematuria.    TECHNIQUE:  Grayscale, Doppler, and spectral imaging provided.    COMPARISON:  CT abdomen and pelvis 07/20/2021.    FINDINGS:  The right kidney measures 10.2 cm and has a normal sonographic appearance.  No hydronephrosis or nephrolithiasis.  Right resistive index is 0.7.    The left kidney measures 10.4 cm and has no hydronephrosis.  There is a nonobstructing 4 mm calculus at the superior pole left kidney.  The kidney otherwise has a normal appearance.  Resistive index is 0.66.    The bladder is not distended.    Impression  Nonobstructing 4 mm left renal calculus.    Finalized on: 1/18/2023 3:38 PM By:  Kojo Jones MD  [facility]# [id number]      2023-01-18 15:40:[id number]    [facility]        Lab Results   Component Value Date    CALCIUM 10.1 01/04/2023     No results found for: URICACID        Review of Systems   Constitutional:  Negative for chills and fever.   Respiratory: Negative.     Cardiovascular: Negative.    Gastrointestinal: Negative.    Genitourinary:  Positive for flank pain. Negative for decreased urine volume, difficulty urinating, dysuria, frequency, hematuria, pelvic pain and urgency.   Musculoskeletal:  Positive for back pain.   Neurological: Negative.        Review of patient's allergies indicates:  No Known Allergies      Patient Active Problem List   Diagnosis    Essential hypertension    Insulin resistance    Mixed hyperlipidemia    PVC (premature ventricular contraction)    Aortic  "atherosclerosis    Personal history of colonic polyps    Diverticulosis of sigmoid colon         Current Outpatient Medications:     amLODIPine (NORVASC) 5 MG tablet, TAKE 1 TABLET EVERY DAY, Disp: 90 tablet, Rfl: 3    ascorbic acid (VITAMIN C ORAL), , Disp: , Rfl:     aspirin (ECOTRIN) 81 MG EC tablet, Take 81 mg by mouth once daily., Disp: , Rfl:     biotin 5,000 mcg TbDL, Take by mouth., Disp: , Rfl:     cyclobenzaprine (FLEXERIL) 5 MG tablet, Take 1-2 tablets (5-10 mg total) by mouth nightly as needed for Muscle spasms. (Patient not taking: Reported on 1/13/2023), Disp: 15 tablet, Rfl: 0    estradioL (ESTRACE) 0.01 % (0.1 mg/gram) vaginal cream, Place 1 g vaginally once daily., Disp: 85 g, Rfl: 3    famotidine (PEPCID) 20 MG tablet, Take 1 tablet (20 mg total) by mouth 2 (two) times daily as needed for Heartburn. (Patient not taking: Reported on 1/13/2023), Disp: 60 tablet, Rfl: 2    Lactobacillus acidophilus (PROBIOTIC ORAL), , Disp: , Rfl:     metoprolol succinate (TOPROL-XL) 50 MG 24 hr tablet, Take 1 tablet (50 mg total) by mouth once daily. (Patient not taking: Reported on 1/13/2023), Disp: 90 tablet, Rfl: 3    plecanatide (TRULANCE) 3 mg Tab, Take 1 tablet (3 mg) by mouth once daily., Disp: 30 tablet, Rfl: 11      Past medical, surgical, family and social histories have been reviewed today.      Objective:     Vitals:    01/20/23 1127   BP: 101/68   Pulse: 73   Temp: 97.5 °F (36.4 °C)   SpO2: 98%   Weight: 76.8 kg (169 lb 5 oz)   Height: 5' 3" (1.6 m)   PainSc:   5   PainLoc: Abdomen       Physical Exam  Vitals reviewed.   Constitutional:       General: She is not in acute distress.  HENT:      Head: Normocephalic and atraumatic.   Abdominal:      Tenderness: There is no right CVA tenderness, left CVA tenderness, guarding or rebound.   Neurological:      Mental Status: She is alert and oriented to person, place, and time.       Diagnosis/Assessment:     1. Left renal stone  - Ambulatory referral/consult to " Urology; Future       Follow-up:     RTC as directed or on prn basis.        GABO [place]  [facility]       30 minutes of total time spent on the encounter, which includes face to face time and non-face to face time preparing to see the patient.  This included obtaining and/or reviewing separately obtained history, and documenting clinical information in the electronic or other health record.   Also includes independent interpretation of results (not separately reported) and communicating results to the patient/family/caregiver, with care coordination (not separately reported).

## 2023-01-23 RX ORDER — LACTULOSE 10 G/15ML
20 SOLUTION ORAL 2 TIMES DAILY
Qty: 1800 ML | Refills: 11 | Status: SHIPPED | OUTPATIENT
Start: 2023-01-23 | End: 2023-02-28

## 2023-02-06 ENCOUNTER — PATIENT MESSAGE (OUTPATIENT)
Dept: FAMILY MEDICINE | Facility: CLINIC | Age: 68
End: 2023-02-06
Payer: MEDICARE

## 2023-02-07 DIAGNOSIS — I10 HYPERTENSION, UNSPECIFIED TYPE: Primary | ICD-10-CM

## 2023-02-07 DIAGNOSIS — Z00.00 ENCOUNTER FOR MEDICARE ANNUAL WELLNESS EXAM: ICD-10-CM

## 2023-02-08 ENCOUNTER — LAB VISIT (OUTPATIENT)
Dept: LAB | Facility: HOSPITAL | Age: 68
End: 2023-02-08
Attending: FAMILY MEDICINE
Payer: MEDICARE

## 2023-02-08 DIAGNOSIS — I10 HYPERTENSION, UNSPECIFIED TYPE: ICD-10-CM

## 2023-02-08 LAB — HCV AB SERPL QL IA: NORMAL

## 2023-02-08 PROCEDURE — 86803 HEPATITIS C AB TEST: CPT | Mod: HCNC | Performed by: INTERNAL MEDICINE

## 2023-02-08 PROCEDURE — 36415 COLL VENOUS BLD VENIPUNCTURE: CPT | Mod: HCNC,PO | Performed by: INTERNAL MEDICINE

## 2023-02-09 DIAGNOSIS — Z00.00 ENCOUNTER FOR MEDICARE ANNUAL WELLNESS EXAM: ICD-10-CM

## 2023-02-13 ENCOUNTER — PATIENT MESSAGE (OUTPATIENT)
Dept: FAMILY MEDICINE | Facility: CLINIC | Age: 68
End: 2023-02-13
Payer: MEDICARE

## 2023-02-13 ENCOUNTER — PATIENT MESSAGE (OUTPATIENT)
Dept: GASTROENTEROLOGY | Facility: CLINIC | Age: 68
End: 2023-02-13
Payer: MEDICARE

## 2023-02-13 DIAGNOSIS — K64.9 HEMORRHOIDS, UNSPECIFIED HEMORRHOID TYPE: Primary | ICD-10-CM

## 2023-02-13 RX ORDER — HYDROCORTISONE 10 MG/G
1 CREAM TOPICAL 2 TIMES DAILY PRN
Qty: 28 G | Refills: 1 | Status: SHIPPED | OUTPATIENT
Start: 2023-02-13 | End: 2023-06-27 | Stop reason: SDUPTHER

## 2023-02-20 ENCOUNTER — PATIENT MESSAGE (OUTPATIENT)
Dept: FAMILY MEDICINE | Facility: CLINIC | Age: 68
End: 2023-02-20
Payer: MEDICARE

## 2023-02-20 DIAGNOSIS — N95.2 VAGINAL ATROPHY: ICD-10-CM

## 2023-02-20 DIAGNOSIS — Z76.0 MEDICATION REFILL: Primary | ICD-10-CM

## 2023-02-20 RX ORDER — ESTRADIOL 0.1 MG/G
1 CREAM VAGINAL DAILY
Qty: 85 G | Refills: 3 | Status: SHIPPED | OUTPATIENT
Start: 2023-02-20 | End: 2023-05-30 | Stop reason: SDUPTHER

## 2023-02-23 ENCOUNTER — PATIENT MESSAGE (OUTPATIENT)
Dept: FAMILY MEDICINE | Facility: CLINIC | Age: 68
End: 2023-02-23
Payer: MEDICARE

## 2023-02-27 ENCOUNTER — OFFICE VISIT (OUTPATIENT)
Dept: UROLOGY | Facility: CLINIC | Age: 68
End: 2023-02-27
Payer: MEDICARE

## 2023-02-27 VITALS
WEIGHT: 172.63 LBS | TEMPERATURE: 98 F | HEIGHT: 63 IN | DIASTOLIC BLOOD PRESSURE: 74 MMHG | SYSTOLIC BLOOD PRESSURE: 116 MMHG | HEART RATE: 57 BPM | BODY MASS INDEX: 30.59 KG/M2

## 2023-02-27 DIAGNOSIS — N20.0 LEFT RENAL STONE: Primary | ICD-10-CM

## 2023-02-27 DIAGNOSIS — E21.3 HYPERPARATHYROIDISM: ICD-10-CM

## 2023-02-27 PROCEDURE — 99999 PR PBB SHADOW E&M-EST. PATIENT-LVL III: ICD-10-PCS | Mod: PBBFAC,HCNC,, | Performed by: UROLOGY

## 2023-02-27 PROCEDURE — 3288F FALL RISK ASSESSMENT DOCD: CPT | Mod: HCNC,CPTII,S$GLB, | Performed by: UROLOGY

## 2023-02-27 PROCEDURE — 3008F PR BODY MASS INDEX (BMI) DOCUMENTED: ICD-10-PCS | Mod: HCNC,CPTII,S$GLB, | Performed by: UROLOGY

## 2023-02-27 PROCEDURE — 1126F PR PAIN SEVERITY QUANTIFIED, NO PAIN PRESENT: ICD-10-PCS | Mod: HCNC,CPTII,S$GLB, | Performed by: UROLOGY

## 2023-02-27 PROCEDURE — 81002 URINALYSIS NONAUTO W/O SCOPE: CPT | Mod: HCNC,S$GLB,, | Performed by: UROLOGY

## 2023-02-27 PROCEDURE — 3074F PR MOST RECENT SYSTOLIC BLOOD PRESSURE < 130 MM HG: ICD-10-PCS | Mod: HCNC,CPTII,S$GLB, | Performed by: UROLOGY

## 2023-02-27 PROCEDURE — 3078F PR MOST RECENT DIASTOLIC BLOOD PRESSURE < 80 MM HG: ICD-10-PCS | Mod: HCNC,CPTII,S$GLB, | Performed by: UROLOGY

## 2023-02-27 PROCEDURE — 99204 OFFICE O/P NEW MOD 45 MIN: CPT | Mod: HCNC,S$GLB,, | Performed by: UROLOGY

## 2023-02-27 PROCEDURE — 3078F DIAST BP <80 MM HG: CPT | Mod: HCNC,CPTII,S$GLB, | Performed by: UROLOGY

## 2023-02-27 PROCEDURE — 1126F AMNT PAIN NOTED NONE PRSNT: CPT | Mod: HCNC,CPTII,S$GLB, | Performed by: UROLOGY

## 2023-02-27 PROCEDURE — 81002 PR URINALYSIS NONAUTO W/O SCOPE: ICD-10-PCS | Mod: HCNC,S$GLB,, | Performed by: UROLOGY

## 2023-02-27 PROCEDURE — 99999 PR PBB SHADOW E&M-EST. PATIENT-LVL III: CPT | Mod: PBBFAC,HCNC,, | Performed by: UROLOGY

## 2023-02-27 PROCEDURE — 1101F PR PT FALLS ASSESS DOC 0-1 FALLS W/OUT INJ PAST YR: ICD-10-PCS | Mod: HCNC,CPTII,S$GLB, | Performed by: UROLOGY

## 2023-02-27 PROCEDURE — 1101F PT FALLS ASSESS-DOCD LE1/YR: CPT | Mod: HCNC,CPTII,S$GLB, | Performed by: UROLOGY

## 2023-02-27 PROCEDURE — 3008F BODY MASS INDEX DOCD: CPT | Mod: HCNC,CPTII,S$GLB, | Performed by: UROLOGY

## 2023-02-27 PROCEDURE — 99204 PR OFFICE/OUTPT VISIT, NEW, LEVL IV, 45-59 MIN: ICD-10-PCS | Mod: HCNC,S$GLB,, | Performed by: UROLOGY

## 2023-02-27 PROCEDURE — 3074F SYST BP LT 130 MM HG: CPT | Mod: HCNC,CPTII,S$GLB, | Performed by: UROLOGY

## 2023-02-27 PROCEDURE — 3288F PR FALLS RISK ASSESSMENT DOCUMENTED: ICD-10-PCS | Mod: HCNC,CPTII,S$GLB, | Performed by: UROLOGY

## 2023-02-27 NOTE — PROGRESS NOTES
Chief Complaint   Patient presents with    Nephrolithiasis     Checking for renal stone        Referring Provider: Jorge Burks      History of Present Illness:   Cesario Sahu is a 67 y.o. female here for evaluation of Nephrolithiasis (Checking for renal stone )  .   2/27/23- 66yo female, here for evaluation of a renal stone. She underwent renal US 1/11/23, which I have personally reviewed, showing a 4mm non-obstructing L upper pole renal stone.   Initially, she was having left sided abdominal and back pain. She reports that since starting the trulance for constipation, it has improved. No prior h/o urolithiasis. No gross hematuria. No incontinence. She did previously take oxybutynin for urgency, but got off of it due to constipation. Nocturia x 3. Bladder symptoms seem to be improving with constipation management.   She does have an elevated PTH, which has been trending down over the last year.     Review of Systems   Constitutional:  Negative for chills and fever.   Respiratory:  Negative for shortness of breath.    Cardiovascular:  Negative for chest pain.   Gastrointestinal:  Positive for abdominal pain.   Genitourinary:  Negative for hematuria.   All other systems reviewed and are negative.      Past Medical History:   Diagnosis Date    Arthritis     Digestive disorder     Hypertension     Insulin resistance        Past Surgical History:   Procedure Laterality Date    ADENOIDECTOMY      CHOLECYSTECTOMY      COLONOSCOPY N/A 6/11/2021    Procedure: COLONOSCOPY;  Surgeon: Serenity Ramey MD;  Location: [facility];  Service: Endoscopy;  Laterality: N/A;    DILATION AND CURETTAGE OF UTERUS  10/2018    ESOPHAGOGASTRODUODENOSCOPY N/A 3/23/2022    Procedure: EGD (ESOPHAGOGASTRODUODENOSCOPY);  Surgeon: Abby Green MD;  Location: [facility];  Service: Endoscopy;  Laterality: N/A;       Family History   Problem Relation Age of Onset    Hypertension Mother     Heart disease Sister     Hyperlipidemia  Sister     Hypertension Sister     Diabetes Brother        Social History     Tobacco Use    Smoking status: Never    Smokeless tobacco: Never   Substance Use Topics    Alcohol use: Yes     Comment: Socially    Drug use: Never       Current Outpatient Medications   Medication Sig Dispense Refill    amLODIPine (NORVASC) 5 MG tablet TAKE 1 TABLET EVERY DAY 90 tablet 3    ascorbic acid (VITAMIN C ORAL)       aspirin (ECOTRIN) 81 MG EC tablet Take 81 mg by mouth once daily.      biotin 5,000 mcg TbDL Take by mouth.      cyclobenzaprine (FLEXERIL) 5 MG tablet Take 1-2 tablets (5-10 mg total) by mouth nightly as needed for Muscle spasms. 15 tablet 0    estradioL (ESTRACE) 0.01 % (0.1 mg/gram) vaginal cream Place 1 g vaginally once daily. 85 g 3    famotidine (PEPCID) 20 MG tablet TAKE 1 TABLET TWICE DAILY AS NEEDED FOR HEARTBURN. 180 tablet 0    hydrocortisone (PROCTO-MAURA) 1 % crpe Place 1 applicator rectally 2 (two) times daily as needed. 28 g 1    Lactobacillus acidophilus (PROBIOTIC ORAL)       lactulose (CHRONULAC) 20 gram/30 mL Soln Take 30 mLs (20 g total) by mouth 2 (two) times daily. 1800 mL 11    metoprolol succinate (TOPROL-XL) 50 MG 24 hr tablet Take 1 tablet (50 mg total) by mouth once daily. 90 tablet 3    plecanatide (TRULANCE) 3 mg Tab Take 1 tablet (3 mg) by mouth once daily. 30 tablet 11     No current facility-administered medications for this visit.       Review of patient's allergies indicates:  No Known Allergies    Physical Exam  Vitals:    02/27/23 1113   BP: 116/74   Pulse: (!) 57   Temp: 98.1 °F (36.7 °C)     General: Well-developed, well-nourished, in no acute distress  HEENT: Normocephalic, atraumatic, extraocular movements intact  Neck: Supple, no supraclavicular or cervical lymphadenopathy, trachea midline  Respirations: even and unlabored  Back: midline spine, No CVA tenderness  Abdomen: soft, Non-tender, non-distended, no palpable masses, no rebound or guarding  Extremities: moves all  equally, no clubbing, cyanosis or edema  Skin: Warm and dry. No lesions  Psych: normal affect  Neuro: Alert and oriented x 3. Cranial nerves II-XII intact      Urinalysis  pH, UA   Date Value Ref Range Status   01/04/2023 6.0 5.0 - 8.0 Final     Protein, UA   Date Value Ref Range Status   01/04/2023 Negative Negative Final     Comment:     Recommend a 24 hour urine protein or a urine   protein/creatinine ratio if globulin induced proteinuria is  clinically suspected.       Glucose, UA   Date Value Ref Range Status   01/04/2023 Negative Negative Final     Occult Blood UA   Date Value Ref Range Status   01/04/2023 Trace (A) Negative Final     Nitrite, UA   Date Value Ref Range Status   01/04/2023 Negative Negative Final     Leukocytes, UA   Date Value Ref Range Status   01/04/2023 Negative Negative Final       Assessment:  1. Left renal stone  Ambulatory referral/consult to Urology    X-Ray Abdomen AP 1 View      2. Hyperparathyroidism  NM Parathyroid Scan with SPECT Routine            Plan:   Left renal stone  -     Ambulatory referral/consult to Urology  -     X-Ray Abdomen AP 1 View; Future; Expected date: 08/27/2023    Hyperparathyroidism  -     NM Parathyroid Scan with SPECT Routine; Future; Expected date: 02/27/2023          Follow up in about 6 months (around 8/27/2023) for KUB before visit.

## 2023-02-28 LAB
BILIRUB SERPL-MCNC: NORMAL MG/DL
BLOOD URINE, POC: NORMAL
CLARITY, POC UA: CLEAR
COLOR, POC UA: YELLOW
GLUCOSE UR QL STRIP: NORMAL
KETONES UR QL STRIP: NORMAL
LEUKOCYTE ESTERASE URINE, POC: NORMAL
NITRITE, POC UA: NORMAL
PH, POC UA: 6
PROTEIN, POC: NORMAL
SPECIFIC GRAVITY, POC UA: 1.01
UROBILINOGEN, POC UA: NORMAL

## 2023-03-01 ENCOUNTER — PATIENT MESSAGE (OUTPATIENT)
Dept: UROLOGY | Facility: CLINIC | Age: 68
End: 2023-03-01
Payer: MEDICARE

## 2023-03-02 ENCOUNTER — PATIENT MESSAGE (OUTPATIENT)
Dept: UROLOGY | Facility: CLINIC | Age: 68
End: 2023-03-02
Payer: MEDICARE

## 2023-03-21 ENCOUNTER — HOSPITAL ENCOUNTER (OUTPATIENT)
Dept: RADIOLOGY | Facility: HOSPITAL | Age: 68
Discharge: HOME | End: 2023-03-21
Attending: UROLOGY
Payer: MEDICARE

## 2023-03-21 DIAGNOSIS — E21.3 HYPERPARATHYROIDISM: ICD-10-CM

## 2023-03-21 PROCEDURE — 78071 PARATHYRD PLANAR W/WO SUBTRJ: CPT | Mod: TC,HCNC

## 2023-03-21 PROCEDURE — 78071 PARATHYRD PLANAR W/WO SUBTRJ: CPT | Mod: 26,HCNC,, | Performed by: RADIOLOGY

## 2023-03-21 PROCEDURE — 78071 NM PARATHYROID SCAN WITH SPECT ROUTINE: ICD-10-PCS | Mod: 26,HCNC,, | Performed by: RADIOLOGY

## 2023-03-21 PROCEDURE — A9500 TC99M SESTAMIBI: HCPCS | Mod: HCNC

## 2023-03-29 RX ORDER — AMLODIPINE BESYLATE 5 MG/1
5 TABLET ORAL DAILY
Qty: 90 TABLET | Refills: 1 | Status: SHIPPED | OUTPATIENT
Start: 2023-03-29

## 2023-03-29 NOTE — TELEPHONE ENCOUNTER
No new care gaps identified.  [facility] Embedded Care Gaps. Reference number: [id number]. 3/29/2023   2:00:59 AM CDT

## 2023-03-29 NOTE — TELEPHONE ENCOUNTER
Care Gaps Recommended:  Refill Decision Note   Cesario Sahu  is requesting a refill authorization.  Brief Assessment and Rationale for Refill:        Medication Therapy Plan:           Comments:     Note composed:11:25 AM 03/29/2023

## 2023-03-29 NOTE — TELEPHONE ENCOUNTER
Refill Decision Note   Cesario Sahu  is requesting a refill authorization.  Brief Assessment and Rationale for Refill:  Approve     Medication Therapy Plan:       Medication Reconciliation Completed: No    Comments:     No Care Gaps recommended.     Note composed:3:08 PM 03/29/2023

## 2023-05-02 ENCOUNTER — OFFICE VISIT (OUTPATIENT)
Dept: OPHTHALMOLOGY | Facility: CLINIC | Age: 68
End: 2023-05-02
Payer: MEDICARE

## 2023-05-02 DIAGNOSIS — M35.01 KERATOCONJUNCTIVITIS SICCA: ICD-10-CM

## 2023-05-02 DIAGNOSIS — I10 ESSENTIAL HYPERTENSION: ICD-10-CM

## 2023-05-02 DIAGNOSIS — H25.12 NUCLEAR SCLEROSIS OF LEFT EYE: ICD-10-CM

## 2023-05-02 DIAGNOSIS — H25.11 NUCLEAR SCLEROSIS OF RIGHT EYE: Primary | ICD-10-CM

## 2023-05-02 DIAGNOSIS — H52.7 REFRACTIVE DISORDER: ICD-10-CM

## 2023-05-02 PROCEDURE — 92015 PR REFRACTION: ICD-10-PCS | Mod: S$GLB,,, | Performed by: STUDENT IN AN ORGANIZED HEALTH CARE EDUCATION/TRAINING PROGRAM

## 2023-05-02 PROCEDURE — 1160F PR REVIEW ALL MEDS BY PRESCRIBER/CLIN PHARMACIST DOCUMENTED: ICD-10-PCS | Mod: CPTII,S$GLB,, | Performed by: STUDENT IN AN ORGANIZED HEALTH CARE EDUCATION/TRAINING PROGRAM

## 2023-05-02 PROCEDURE — 92014 COMPRE OPH EXAM EST PT 1/>: CPT | Mod: S$GLB,,, | Performed by: STUDENT IN AN ORGANIZED HEALTH CARE EDUCATION/TRAINING PROGRAM

## 2023-05-02 PROCEDURE — 92015 DETERMINE REFRACTIVE STATE: CPT | Mod: S$GLB,,, | Performed by: STUDENT IN AN ORGANIZED HEALTH CARE EDUCATION/TRAINING PROGRAM

## 2023-05-02 PROCEDURE — 1159F MED LIST DOCD IN RCRD: CPT | Mod: CPTII,S$GLB,, | Performed by: STUDENT IN AN ORGANIZED HEALTH CARE EDUCATION/TRAINING PROGRAM

## 2023-05-02 PROCEDURE — 99999 PR PBB SHADOW E&M-EST. PATIENT-LVL II: ICD-10-PCS | Mod: PBBFAC,,, | Performed by: STUDENT IN AN ORGANIZED HEALTH CARE EDUCATION/TRAINING PROGRAM

## 2023-05-02 PROCEDURE — 99999 PR PBB SHADOW E&M-EST. PATIENT-LVL II: CPT | Mod: PBBFAC,,, | Performed by: STUDENT IN AN ORGANIZED HEALTH CARE EDUCATION/TRAINING PROGRAM

## 2023-05-02 PROCEDURE — 1160F RVW MEDS BY RX/DR IN RCRD: CPT | Mod: CPTII,S$GLB,, | Performed by: STUDENT IN AN ORGANIZED HEALTH CARE EDUCATION/TRAINING PROGRAM

## 2023-05-02 PROCEDURE — 92014 PR EYE EXAM, EST PATIENT,COMPREHESV: ICD-10-PCS | Mod: S$GLB,,, | Performed by: STUDENT IN AN ORGANIZED HEALTH CARE EDUCATION/TRAINING PROGRAM

## 2023-05-02 PROCEDURE — 1159F PR MEDICATION LIST DOCUMENTED IN MEDICAL RECORD: ICD-10-PCS | Mod: CPTII,S$GLB,, | Performed by: STUDENT IN AN ORGANIZED HEALTH CARE EDUCATION/TRAINING PROGRAM

## 2023-05-02 NOTE — PROGRESS NOTES
HPI     Annual Exam     Additional comments: Patient in today for an annual eye exam. Patient   states her vision is very blurry even with her current glasses. Patient   states she does not drive at night due not being able to see that well.   Patient denies any pain or irritation.           Comments    1. HTN          Last edited by Mariel Kaur on 5/2/2023  9:50 AM.            Assessment /Plan     For exam results, see Encounter Report.    Nuclear sclerosis OU  - NVS, monitor    Keratoconjunctivitis sicca  - ATs QID and lid hygiene w/ baby shampoo  - [place] 3 Fish Oils    Essential hypertension  Strict BP control  Follow up with PCP    Refractive disorder  Mrx given    RTC 1 year with gonio before dilation

## 2023-05-30 ENCOUNTER — OFFICE VISIT (OUTPATIENT)
Dept: FAMILY MEDICINE | Facility: CLINIC | Age: 68
End: 2023-05-30
Payer: MEDICARE

## 2023-05-30 ENCOUNTER — LAB VISIT (OUTPATIENT)
Dept: LAB | Facility: HOSPITAL | Age: 68
End: 2023-05-30
Attending: FAMILY MEDICINE
Payer: MEDICARE

## 2023-05-30 VITALS
TEMPERATURE: 97 F | OXYGEN SATURATION: 98 % | SYSTOLIC BLOOD PRESSURE: 126 MMHG | DIASTOLIC BLOOD PRESSURE: 80 MMHG | HEART RATE: 60 BPM | WEIGHT: 176.13 LBS | HEIGHT: 63 IN | BODY MASS INDEX: 31.21 KG/M2

## 2023-05-30 DIAGNOSIS — R79.89 ABNORMAL THYROID BLOOD TEST: ICD-10-CM

## 2023-05-30 DIAGNOSIS — Z79.899 LONG TERM USE OF DRUG: ICD-10-CM

## 2023-05-30 DIAGNOSIS — E88.819 INSULIN RESISTANCE: ICD-10-CM

## 2023-05-30 DIAGNOSIS — R79.89 ABNORMAL THYROID BLOOD TEST: Primary | ICD-10-CM

## 2023-05-30 DIAGNOSIS — R63.5 WEIGHT GAIN: ICD-10-CM

## 2023-05-30 DIAGNOSIS — N95.2 VAGINAL ATROPHY: ICD-10-CM

## 2023-05-30 DIAGNOSIS — K58.1 IRRITABLE BOWEL SYNDROME WITH CONSTIPATION: ICD-10-CM

## 2023-05-30 DIAGNOSIS — Z76.0 MEDICATION REFILL: ICD-10-CM

## 2023-05-30 DIAGNOSIS — Z86.010 PERSONAL HISTORY OF COLONIC POLYPS: ICD-10-CM

## 2023-05-30 DIAGNOSIS — R53.83 FATIGUE, UNSPECIFIED TYPE: ICD-10-CM

## 2023-05-30 DIAGNOSIS — K64.9 HEMORRHOIDS, UNSPECIFIED HEMORRHOID TYPE: ICD-10-CM

## 2023-05-30 LAB
ALBUMIN SERPL BCP-MCNC: 4 G/DL (ref 3.5–5.2)
ALP SERPL-CCNC: 95 U/L (ref 55–135)
ALT SERPL W/O P-5'-P-CCNC: 15 U/L (ref 10–44)
ANION GAP SERPL CALC-SCNC: 11 MMOL/L (ref 8–16)
AST SERPL-CCNC: 17 U/L (ref 10–40)
BILIRUB SERPL-MCNC: 0.3 MG/DL (ref 0.1–1)
BUN SERPL-MCNC: 13 MG/DL (ref 8–23)
CALCIUM SERPL-MCNC: 9.8 MG/DL (ref 8.7–10.5)
CHLORIDE SERPL-SCNC: 105 MMOL/L (ref 95–110)
CO2 SERPL-SCNC: 24 MMOL/L (ref 23–29)
CREAT SERPL-MCNC: 0.7 MG/DL (ref 0.5–1.4)
ERYTHROCYTE [DISTWIDTH] IN BLOOD BY AUTOMATED COUNT: 13 % (ref 11.5–14.5)
EST. GFR  (NO RACE VARIABLE): >60 ML/MIN/1.73 M^2
ESTIMATED AVG GLUCOSE: 108 MG/DL (ref 68–131)
GLUCOSE SERPL-MCNC: 79 MG/DL (ref 70–110)
HBA1C MFR BLD: 5.4 % (ref 4–5.6)
HCT VFR BLD AUTO: 39.8 % (ref 37–48.5)
HGB BLD-MCNC: 12.2 G/DL (ref 12–16)
MCH RBC QN AUTO: 28.4 PG (ref 27–31)
MCHC RBC AUTO-ENTMCNC: 30.7 G/DL (ref 32–36)
MCV RBC AUTO: 93 FL (ref 82–98)
PLATELET # BLD AUTO: 309 K/UL (ref 150–450)
PMV BLD AUTO: 10.4 FL (ref 9.2–12.9)
POTASSIUM SERPL-SCNC: 3.7 MMOL/L (ref 3.5–5.1)
PROT SERPL-MCNC: 8.1 G/DL (ref 6–8.4)
RBC # BLD AUTO: 4.3 M/UL (ref 4–5.4)
SODIUM SERPL-SCNC: 140 MMOL/L (ref 136–145)
WBC # BLD AUTO: 8.28 K/UL (ref 3.9–12.7)

## 2023-05-30 PROCEDURE — 3288F FALL RISK ASSESSMENT DOCD: CPT | Mod: CPTII,S$GLB,, | Performed by: FAMILY MEDICINE

## 2023-05-30 PROCEDURE — 3008F BODY MASS INDEX DOCD: CPT | Mod: CPTII,S$GLB,, | Performed by: FAMILY MEDICINE

## 2023-05-30 PROCEDURE — 99999 PR PBB SHADOW E&M-EST. PATIENT-LVL IV: ICD-10-PCS | Mod: PBBFAC,,, | Performed by: FAMILY MEDICINE

## 2023-05-30 PROCEDURE — 1159F MED LIST DOCD IN RCRD: CPT | Mod: CPTII,S$GLB,, | Performed by: FAMILY MEDICINE

## 2023-05-30 PROCEDURE — 3008F PR BODY MASS INDEX (BMI) DOCUMENTED: ICD-10-PCS | Mod: CPTII,S$GLB,, | Performed by: FAMILY MEDICINE

## 2023-05-30 PROCEDURE — 1125F PR PAIN SEVERITY QUANTIFIED, PAIN PRESENT: ICD-10-PCS | Mod: CPTII,S$GLB,, | Performed by: FAMILY MEDICINE

## 2023-05-30 PROCEDURE — 3288F PR FALLS RISK ASSESSMENT DOCUMENTED: ICD-10-PCS | Mod: CPTII,S$GLB,, | Performed by: FAMILY MEDICINE

## 2023-05-30 PROCEDURE — 3044F PR MOST RECENT HEMOGLOBIN A1C LEVEL <7.0%: ICD-10-PCS | Mod: CPTII,S$GLB,, | Performed by: FAMILY MEDICINE

## 2023-05-30 PROCEDURE — 36415 COLL VENOUS BLD VENIPUNCTURE: CPT | Mod: PO | Performed by: FAMILY MEDICINE

## 2023-05-30 PROCEDURE — 1159F PR MEDICATION LIST DOCUMENTED IN MEDICAL RECORD: ICD-10-PCS | Mod: CPTII,S$GLB,, | Performed by: FAMILY MEDICINE

## 2023-05-30 PROCEDURE — 84439 ASSAY OF FREE THYROXINE: CPT | Performed by: FAMILY MEDICINE

## 2023-05-30 PROCEDURE — 3079F DIAST BP 80-89 MM HG: CPT | Mod: CPTII,S$GLB,, | Performed by: FAMILY MEDICINE

## 2023-05-30 PROCEDURE — 80053 COMPREHEN METABOLIC PANEL: CPT | Performed by: FAMILY MEDICINE

## 2023-05-30 PROCEDURE — 1101F PT FALLS ASSESS-DOCD LE1/YR: CPT | Mod: CPTII,S$GLB,, | Performed by: FAMILY MEDICINE

## 2023-05-30 PROCEDURE — 3044F HG A1C LEVEL LT 7.0%: CPT | Mod: CPTII,S$GLB,, | Performed by: FAMILY MEDICINE

## 2023-05-30 PROCEDURE — 1101F PR PT FALLS ASSESS DOC 0-1 FALLS W/OUT INJ PAST YR: ICD-10-PCS | Mod: CPTII,S$GLB,, | Performed by: FAMILY MEDICINE

## 2023-05-30 PROCEDURE — 3074F SYST BP LT 130 MM HG: CPT | Mod: CPTII,S$GLB,, | Performed by: FAMILY MEDICINE

## 2023-05-30 PROCEDURE — 85027 COMPLETE CBC AUTOMATED: CPT | Performed by: FAMILY MEDICINE

## 2023-05-30 PROCEDURE — 84481 FREE ASSAY (FT-3): CPT | Performed by: FAMILY MEDICINE

## 2023-05-30 PROCEDURE — 99214 PR OFFICE/OUTPT VISIT, EST, LEVL IV, 30-39 MIN: ICD-10-PCS | Mod: S$GLB,,, | Performed by: FAMILY MEDICINE

## 2023-05-30 PROCEDURE — 3079F PR MOST RECENT DIASTOLIC BLOOD PRESSURE 80-89 MM HG: ICD-10-PCS | Mod: CPTII,S$GLB,, | Performed by: FAMILY MEDICINE

## 2023-05-30 PROCEDURE — 99999 PR PBB SHADOW E&M-EST. PATIENT-LVL IV: CPT | Mod: PBBFAC,,, | Performed by: FAMILY MEDICINE

## 2023-05-30 PROCEDURE — 1125F AMNT PAIN NOTED PAIN PRSNT: CPT | Mod: CPTII,S$GLB,, | Performed by: FAMILY MEDICINE

## 2023-05-30 PROCEDURE — 82607 VITAMIN B-12: CPT | Performed by: FAMILY MEDICINE

## 2023-05-30 PROCEDURE — 82306 VITAMIN D 25 HYDROXY: CPT | Performed by: FAMILY MEDICINE

## 2023-05-30 PROCEDURE — 83036 HEMOGLOBIN GLYCOSYLATED A1C: CPT | Performed by: FAMILY MEDICINE

## 2023-05-30 PROCEDURE — 3074F PR MOST RECENT SYSTOLIC BLOOD PRESSURE < 130 MM HG: ICD-10-PCS | Mod: CPTII,S$GLB,, | Performed by: FAMILY MEDICINE

## 2023-05-30 PROCEDURE — 84443 ASSAY THYROID STIM HORMONE: CPT | Performed by: FAMILY MEDICINE

## 2023-05-30 PROCEDURE — 99214 OFFICE O/P EST MOD 30 MIN: CPT | Mod: S$GLB,,, | Performed by: FAMILY MEDICINE

## 2023-05-30 RX ORDER — TOBRAMYCIN AND DEXAMETHASONE 3; 1 MG/ML; MG/ML
SUSPENSION/ DROPS OPHTHALMIC
COMMUNITY
Start: 2023-03-20 | End: 2023-09-25 | Stop reason: ALTCHOICE

## 2023-05-30 RX ORDER — ESTRADIOL 0.1 MG/G
1 CREAM VAGINAL DAILY
Qty: 85 G | Refills: 3 | Status: SHIPPED | OUTPATIENT
Start: 2023-05-30 | End: 2023-07-20 | Stop reason: SDUPTHER

## 2023-05-30 RX ORDER — ZOSTER VACCINE RECOMBINANT, ADJUVANTED 50 MCG/0.5
KIT INTRAMUSCULAR
COMMUNITY
Start: 2023-01-12 | End: 2023-09-25 | Stop reason: ALTCHOICE

## 2023-05-30 RX ORDER — PLECANATIDE 3 MG/1
3 TABLET ORAL DAILY
Qty: 30 TABLET | Refills: 11 | Status: SHIPPED | OUTPATIENT
Start: 2023-05-30 | End: 2023-09-25

## 2023-05-30 NOTE — PROGRESS NOTES
Subjective:      Patient ID: Cesario Sahu is a 67 y.o. female.    Chief Complaint: Follow-up    HPI    Patient with pmhx of insulin resistance, urinary incont, chronic constipation, and HTN here today for follow up of abdominal issue/constipation and fatigue. Follow GI. Started on lactulose which is helping. Unable to afford trulance. Chronic IBS-c. Has had full workup recently including EGD, thyroid neck US. She also had kidney stone recently. Feeling just drained the last few weeks and has a new head discomfort/light headedness that comes and goes. Recent lab work showed abnormal thyroid lab tests. Needs repeat today. Would like referral to Hemorrhoids specialist as well.      Past Medical History:   Diagnosis Date    Arthritis     Digestive disorder     Hypertension     Insulin resistance        Past Surgical History:   Procedure Laterality Date    ADENOIDECTOMY      CHOLECYSTECTOMY      COLONOSCOPY N/A 6/11/2021    Procedure: COLONOSCOPY;  Surgeon: Serenity Ramey MD;  [facility];  Service: Endoscopy;  Laterality: N/A;    DILATION AND CURETTAGE OF UTERUS  10/2018    ESOPHAGOGASTRODUODENOSCOPY N/A 3/23/2022    Procedure: EGD (ESOPHAGOGASTRODUODENOSCOPY);  Surgeon: Abby Green MD;  Location: [facility];  Service: Endoscopy;  Laterality: N/A;       Family History   Problem Relation Age of Onset    Hypertension Mother     Heart disease Sister     Hyperlipidemia Sister     Hypertension Sister     Diabetes Brother        Social History     Socioeconomic History    Marital status:     Number of children: 3   Occupational History     Comment: financial     Tobacco Use    Smoking status: Never    Smokeless tobacco: Never   Substance and Sexual Activity    Alcohol use: Yes     Comment: Socially    Drug use: Never    Sexual activity: Yes     Partners: Male   Social History Narrative    The patient remarried in June 2018.  She has 3 adult children.  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       Health Maintenance Topics with due status: Not Due       Topic Last Completion Date    Colorectal Cancer Screening 06/11/2021    DEXA Scan 07/13/2022    Lipid Panel 09/23/2022    Hemoglobin A1c (Diabetic Prevention Screening) 05/30/2023       Medication List with Changes/Refills   Current Medications    AMLODIPINE (NORVASC) 5 MG TABLET    Take 1 tablet (5 mg total) by mouth once daily.    ASCORBIC ACID (VITAMIN C ORAL)        ASPIRIN (ECOTRIN) 81 MG EC TABLET    Take 81 mg by mouth once daily.    BIOTIN 5,000 MCG TBDL    Take by mouth.    CYCLOBENZAPRINE (FLEXERIL) 5 MG TABLET    Take 1-2 tablets (5-10 mg total) by mouth nightly as needed for Muscle spasms.    FAMOTIDINE (PEPCID) 20 MG TABLET    TAKE 1 TABLET TWICE DAILY AS NEEDED FOR HEARTBURN.    HYDROCORTISONE (PROCTO-MAURA) 1 % CRPE    Place 1 applicator rectally 2 (two) times daily as needed.    LACTOBACILLUS ACIDOPHILUS (PROBIOTIC ORAL)        LACTULOSE (CHRONULAC) 20 GRAM/30 ML SOLN    Take 30 mLs (20 g total) by mouth 2 (two) times daily.    METOPROLOL SUCCINATE (TOPROL-XL) 50 MG 24 HR TABLET    Take 1 tablet (50 mg total) by mouth once daily.    SHINGRIX, PF, 50 MCG/0.5 ML INJECTION        TOBRAMYCIN-DEXAMETHASONE 0.3-0.1% (TOBRADEX) 0.3-0.1 % DRPS    INSTILL 1 DROP INTO AFFECTED EYE EVERY 6 HOURS   Changed and/or Refilled Medications    Modified Medication Previous Medication    ESTRADIOL (ESTRACE) 0.01 % (0.1 MG/GRAM) VAGINAL CREAM estradioL (ESTRACE) 0.01 % (0.1 mg/gram) vaginal cream       Place 1 g vaginally once daily.    Place 1 g vaginally once daily.    PLECANATIDE (TRULANCE) 3 MG TAB plecanatide (TRULANCE) 3 mg Tab       Take 1 tablet (3 mg) by mouth once daily.    Take 1 tablet (3 mg) by mouth once daily.       Review of patient's allergies indicates:  No Known Allergies    Review of Systems   Constitutional:  Positive for malaise/fatigue.   Gastrointestinal:  Positive for abdominal pain and constipation.   Neurological:   Positive for headaches.     Objective:     Vitals:    05/30/23 1053   BP: 126/80   Pulse: 60   Temp: 96.5 °F (35.8 °C)     Body mass index is 31.2 kg/m².    Physical Exam  Vitals and nursing note reviewed.   Constitutional:       General: She is not in acute distress.     Appearance: She is well-developed.   HENT:      Head: Normocephalic and atraumatic.      Right Ear: External ear normal.      Left Ear: External ear normal.      Nose: Nose normal.   Eyes:      Conjunctiva/sclera: Conjunctivae normal.      Pupils: Pupils are equal, round, and reactive to light.   Neck:      Thyroid: No thyromegaly.   Cardiovascular:      Rate and Rhythm: Normal rate and regular rhythm.      Heart sounds: Normal heart sounds. No murmur heard.  Pulmonary:      Effort: Pulmonary effort is normal. No respiratory distress.      Breath sounds: Normal breath sounds. No wheezing or rales.   Chest:      Chest wall: No tenderness.   Abdominal:      General: Bowel sounds are normal. There is no distension.      Palpations: Abdomen is soft.      Tenderness: There is no abdominal tenderness.   Lymphadenopathy:      Cervical: No cervical adenopathy.   Skin:     General: Skin is warm and dry.   Neurological:      General: No focal deficit present.      Mental Status: She is alert and oriented to person, place, and time.       Assessment and Plan:     Abnormal thyroid blood test  -     TSH; Future; Expected date: 05/30/2023  -     T4, Free; Future; Expected date: 05/30/2023  -     T3, Free; Future; Expected date: 05/30/2023    Weight gain  -     TSH; Future; Expected date: 05/30/2023  -     T4, Free; Future; Expected date: 05/30/2023  -     T3, Free; Future; Expected date: 05/30/2023  -     CBC Without Differential; Future; Expected date: 05/30/2023  -     Comprehensive Metabolic Panel; Future; Expected date: 05/30/2023  -     Vitamin D; Future; Expected date: 05/30/2023  -     Vitamin B12; Future; Expected date: 05/30/2023  -     Hemoglobin A1C;  Future; Expected date: 05/30/2023    Fatigue, unspecified type  -     TSH; Future; Expected date: 05/30/2023  -     T4, Free; Future; Expected date: 05/30/2023  -     T3, Free; Future; Expected date: 05/30/2023  -     CBC Without Differential; Future; Expected date: 05/30/2023  -     Comprehensive Metabolic Panel; Future; Expected date: 05/30/2023  -     Vitamin D; Future; Expected date: 05/30/2023  -     Vitamin B12; Future; Expected date: 05/30/2023    Irritable bowel syndrome with constipation  -     plecanatide (TRULANCE) 3 mg Tab; Take 1 tablet (3 mg) by mouth once daily.  Dispense: 30 tablet; Refill: 11    Vaginal atrophy  -     estradioL (ESTRACE) 0.01 % (0.1 mg/gram) vaginal cream; Place 1 g vaginally once daily.  Dispense: 85 g; Refill: 3    Medication refill  -     estradioL (ESTRACE) 0.01 % (0.1 mg/gram) vaginal cream; Place 1 g vaginally once daily.  Dispense: 85 g; Refill: 3    Long term use of drug  -     TSH; Future; Expected date: 05/30/2023  -     T4, Free; Future; Expected date: 05/30/2023  -     T3, Free; Future; Expected date: 05/30/2023  -     CBC Without Differential; Future; Expected date: 05/30/2023  -     Comprehensive Metabolic Panel; Future; Expected date: 05/30/2023  -     Vitamin D; Future; Expected date: 05/30/2023  -     Vitamin B12; Future; Expected date: 05/30/2023  -     Hemoglobin A1C; Future; Expected date: 05/30/2023    Insulin resistance  -     Hemoglobin A1C; Future; Expected date: 05/30/2023    Personal history of colonic polyps    BMI 31.0-31.9,adult  -     Hemoglobin A1C; Future; Expected date: 05/30/2023    Hemorrhoids, unspecified hemorrhoid type  -     Ambulatory referral/consult to Colorectal Surgery; Future; Expected date: 06/06/2023    Will refill medications   Will get above labs for review of fatigue  Referral to colorectal surgery       Follow up in about 3 months (around 8/30/2023) for wellness .

## 2023-05-31 ENCOUNTER — PATIENT MESSAGE (OUTPATIENT)
Dept: FAMILY MEDICINE | Facility: CLINIC | Age: 68
End: 2023-05-31
Payer: MEDICARE

## 2023-05-31 DIAGNOSIS — M54.9 BACK PAIN, UNSPECIFIED BACK LOCATION, UNSPECIFIED BACK PAIN LATERALITY, UNSPECIFIED CHRONICITY: Primary | ICD-10-CM

## 2023-05-31 LAB
25(OH)D3+25(OH)D2 SERPL-MCNC: 31 NG/ML (ref 30–96)
T3FREE SERPL-MCNC: 2.4 PG/ML (ref 2.3–4.2)
T4 FREE SERPL-MCNC: 0.85 NG/DL (ref 0.71–1.51)
TSH SERPL DL<=0.005 MIU/L-ACNC: 0.71 UIU/ML (ref 0.4–4)
VIT B12 SERPL-MCNC: 700 PG/ML (ref 210–950)

## 2023-05-31 RX ORDER — ERGOCALCIFEROL 1.25 MG/1
50000 CAPSULE ORAL
Qty: 12 CAPSULE | Refills: 0 | Status: SHIPPED | OUTPATIENT
Start: 2023-05-31 | End: 2023-09-25

## 2023-06-01 ENCOUNTER — LAB VISIT (OUTPATIENT)
Dept: LAB | Facility: HOSPITAL | Age: 68
End: 2023-06-01
Attending: FAMILY MEDICINE
Payer: MEDICARE

## 2023-06-01 DIAGNOSIS — R53.83 FATIGUE, UNSPECIFIED TYPE: Primary | ICD-10-CM

## 2023-06-01 DIAGNOSIS — R06.83 SNORING: ICD-10-CM

## 2023-06-01 DIAGNOSIS — M54.9 BACK PAIN, UNSPECIFIED BACK LOCATION, UNSPECIFIED BACK PAIN LATERALITY, UNSPECIFIED CHRONICITY: ICD-10-CM

## 2023-06-01 LAB
BILIRUB UR QL STRIP: NEGATIVE
CLARITY UR REFRACT.AUTO: CLEAR
COLOR UR AUTO: YELLOW
GLUCOSE UR QL STRIP: NEGATIVE
HGB UR QL STRIP: NEGATIVE
KETONES UR QL STRIP: NEGATIVE
LEUKOCYTE ESTERASE UR QL STRIP: NEGATIVE
MICROSCOPIC COMMENT: NORMAL
NITRITE UR QL STRIP: NEGATIVE
PH UR STRIP: 6 [PH] (ref 5–8)
PROT UR QL STRIP: NEGATIVE
RBC #/AREA URNS AUTO: 1 /HPF (ref 0–4)
SP GR UR STRIP: 1.01 (ref 1–1.03)
SQUAMOUS #/AREA URNS AUTO: 4 /HPF
URN SPEC COLLECT METH UR: NORMAL
WBC #/AREA URNS AUTO: 0 /HPF (ref 0–5)

## 2023-06-01 PROCEDURE — 81001 URINALYSIS AUTO W/SCOPE: CPT | Performed by: FAMILY MEDICINE

## 2023-06-01 NOTE — TELEPHONE ENCOUNTER
Fax over referral to [place] [facility].   Spoke with patient

## 2023-06-07 ENCOUNTER — PES CALL (OUTPATIENT)
Dept: ADMINISTRATIVE | Facility: CLINIC | Age: 68
End: 2023-06-07
Payer: MEDICARE

## 2023-06-14 RX ORDER — METOPROLOL SUCCINATE 50 MG/1
TABLET, EXTENDED RELEASE ORAL
Qty: 90 TABLET | Refills: 3 | Status: SHIPPED | OUTPATIENT
Start: 2023-06-14 | End: 2023-07-25 | Stop reason: SDUPTHER

## 2023-06-27 ENCOUNTER — PATIENT MESSAGE (OUTPATIENT)
Dept: SURGERY | Facility: CLINIC | Age: 68
End: 2023-06-27
Payer: MEDICARE

## 2023-06-27 RX ORDER — HYDROCORTISONE 10 MG/G
1 CREAM TOPICAL 2 TIMES DAILY PRN
Qty: 28 G | Refills: 1 | Status: SHIPPED | OUTPATIENT
Start: 2023-06-27 | End: 2023-06-28 | Stop reason: ALTCHOICE

## 2023-06-28 ENCOUNTER — OFFICE VISIT (OUTPATIENT)
Dept: SURGERY | Facility: CLINIC | Age: 68
End: 2023-06-28
Payer: MEDICARE

## 2023-06-28 VITALS
HEART RATE: 59 BPM | WEIGHT: 173.81 LBS | BODY MASS INDEX: 30.79 KG/M2 | DIASTOLIC BLOOD PRESSURE: 69 MMHG | SYSTOLIC BLOOD PRESSURE: 110 MMHG

## 2023-06-28 DIAGNOSIS — K64.4 EXTERNAL HEMORRHOIDS: Primary | ICD-10-CM

## 2023-06-28 DIAGNOSIS — K64.8 INTERNAL HEMORRHOIDS: ICD-10-CM

## 2023-06-28 PROCEDURE — 3078F DIAST BP <80 MM HG: CPT | Mod: HCNC,CPTII,S$GLB,

## 2023-06-28 PROCEDURE — 3074F SYST BP LT 130 MM HG: CPT | Mod: HCNC,CPTII,S$GLB,

## 2023-06-28 PROCEDURE — 3074F PR MOST RECENT SYSTOLIC BLOOD PRESSURE < 130 MM HG: ICD-10-PCS | Mod: HCNC,CPTII,S$GLB,

## 2023-06-28 PROCEDURE — 1126F AMNT PAIN NOTED NONE PRSNT: CPT | Mod: HCNC,CPTII,S$GLB,

## 2023-06-28 PROCEDURE — 1126F PR PAIN SEVERITY QUANTIFIED, NO PAIN PRESENT: ICD-10-PCS | Mod: HCNC,CPTII,S$GLB,

## 2023-06-28 PROCEDURE — 99204 OFFICE O/P NEW MOD 45 MIN: CPT | Mod: HCNC,25,S$GLB,

## 2023-06-28 PROCEDURE — 3044F PR MOST RECENT HEMOGLOBIN A1C LEVEL <7.0%: ICD-10-PCS | Mod: HCNC,CPTII,S$GLB,

## 2023-06-28 PROCEDURE — 3044F HG A1C LEVEL LT 7.0%: CPT | Mod: HCNC,CPTII,S$GLB,

## 2023-06-28 PROCEDURE — 46600 DIAGNOSTIC ANOSCOPY SPX: CPT | Mod: HCNC,S$GLB,,

## 2023-06-28 PROCEDURE — 1160F RVW MEDS BY RX/DR IN RCRD: CPT | Mod: HCNC,CPTII,S$GLB,

## 2023-06-28 PROCEDURE — 3008F BODY MASS INDEX DOCD: CPT | Mod: HCNC,CPTII,S$GLB,

## 2023-06-28 PROCEDURE — 1159F PR MEDICATION LIST DOCUMENTED IN MEDICAL RECORD: ICD-10-PCS | Mod: HCNC,CPTII,S$GLB,

## 2023-06-28 PROCEDURE — 99999 PR PBB SHADOW E&M-EST. PATIENT-LVL IV: ICD-10-PCS | Mod: PBBFAC,HCNC,,

## 2023-06-28 PROCEDURE — 46600 PR DIAG2STIC A2SCOPY: ICD-10-PCS | Mod: HCNC,S$GLB,,

## 2023-06-28 PROCEDURE — 99204 PR OFFICE/OUTPT VISIT, NEW, LEVL IV, 45-59 MIN: ICD-10-PCS | Mod: HCNC,25,S$GLB,

## 2023-06-28 PROCEDURE — 3078F PR MOST RECENT DIASTOLIC BLOOD PRESSURE < 80 MM HG: ICD-10-PCS | Mod: HCNC,CPTII,S$GLB,

## 2023-06-28 PROCEDURE — 1160F PR REVIEW ALL MEDS BY PRESCRIBER/CLIN PHARMACIST DOCUMENTED: ICD-10-PCS | Mod: HCNC,CPTII,S$GLB,

## 2023-06-28 PROCEDURE — 1159F MED LIST DOCD IN RCRD: CPT | Mod: HCNC,CPTII,S$GLB,

## 2023-06-28 PROCEDURE — 3008F PR BODY MASS INDEX (BMI) DOCUMENTED: ICD-10-PCS | Mod: HCNC,CPTII,S$GLB,

## 2023-06-28 PROCEDURE — 99999 PR PBB SHADOW E&M-EST. PATIENT-LVL IV: CPT | Mod: PBBFAC,HCNC,,

## 2023-06-28 RX ORDER — HYDROCORTISONE 25 MG/G
CREAM TOPICAL 2 TIMES DAILY
Qty: 28 G | Refills: 0 | Status: SHIPPED | OUTPATIENT
Start: 2023-06-28 | End: 2023-07-08

## 2023-06-28 NOTE — PROGRESS NOTES
History & Physical    SUBJECTIVE:     CC: external hemorrhoids  Ref: Rosemarie Meeks MD    History of Present Illness:  Patient is a 67 y.o. female presents with rectal discomfort/pain/bleeding. Reports external tissue per rectum that swells and becomes painful at times. Has been worse/more frequent in past 2 months. Has had problems with hemorrhoids for years. Has blood with wiping occasionally. Discomfort worse than bleeding. Warm baths help. Reports long hx of constipation, has a BM every other day, does strain and has hard stools, alternates miralax and metamucil daily, uses stool softeners prn, sits on toilet for >5 mins at a time, drinks >64 oz water daily. Colonoscopy 06/2021 with polyps and diverticulosis. Denies famhx of CRC, IBD, polyps. Takes 81mg aspirin daily for prevention. Denies nausea, vomiting, fevers, chills, abdominal pain, hematochezia, melena.       Chief Complaint   Patient presents with    Consult     Hemorrhoid        Review of patient's allergies indicates:  No Known Allergies    Current Outpatient Medications   Medication Sig Dispense Refill    amLODIPine (NORVASC) 5 MG tablet Take 1 tablet (5 mg total) by mouth once daily. 90 tablet 1    ascorbic acid (VITAMIN C ORAL)       aspirin (ECOTRIN) 81 MG EC tablet Take 81 mg by mouth once daily.      biotin 5,000 mcg TbDL Take by mouth.      cyclobenzaprine (FLEXERIL) 5 MG tablet Take 1-2 tablets (5-10 mg total) by mouth nightly as needed for Muscle spasms. 15 tablet 0    ergocalciferol (ERGOCALCIFEROL) 50,000 unit Cap Take 1 capsule (50,000 Units total) by mouth every 7 days. 12 capsule 0    estradioL (ESTRACE) 0.01 % (0.1 mg/gram) vaginal cream Place 1 g vaginally once daily. 85 g 3    famotidine (PEPCID) 20 MG tablet TAKE 1 TABLET TWICE DAILY AS NEEDED FOR HEARTBURN. 180 tablet 0    Lactobacillus acidophilus (PROBIOTIC ORAL)       lactulose (CHRONULAC) 20 gram/30 mL Soln Take 30 mLs (20 g total) by mouth 2 (two) times daily. 1800 mL 3     metoprolol succinate (TOPROL-XL) 50 MG 24 hr tablet TAKE 1 TABLET ONE TIME DAILY 90 tablet 3    plecanatide (TRULANCE) 3 mg Tab Take 1 tablet (3 mg) by mouth once daily. 30 tablet 11    SHINGRIX, PF, 50 mcg/0.5 mL injection       tobramycin-dexAMETHasone 0.3-0.1% (TOBRADEX) 0.3-0.1 % DrpS INSTILL 1 DROP INTO AFFECTED EYE EVERY 6 HOURS      hydrocortisone (ANUSOL-HC) 2.5 % rectal cream Place rectally 2 (two) times daily. for 10 days 28 g 0     No current facility-administered medications for this visit.       Past Medical History:   Diagnosis Date    Arthritis     Digestive disorder     Hypertension     Insulin resistance      Past Surgical History:   Procedure Laterality Date    ADENOIDECTOMY      CHOLECYSTECTOMY      COLONOSCOPY N/A 6/11/2021    Procedure: COLONOSCOPY;  Surgeon: Serenity Ramey MD;  Location: [facility];  Service: Endoscopy;  Laterality: N/A;    DILATION AND CURETTAGE OF UTERUS  10/2018    ESOPHAGOGASTRODUODENOSCOPY N/A 3/23/2022    Procedure: EGD (ESOPHAGOGASTRODUODENOSCOPY);  Surgeon: Abby Green MD;  Location: [facility];  Service: Endoscopy;  Laterality: N/A;     Family History   Problem Relation Age of Onset    Hypertension Mother     Heart disease Sister     Hyperlipidemia Sister     Hypertension Sister     Diabetes Brother      Social History     Tobacco Use    Smoking status: Never    Smokeless tobacco: Never   Substance Use Topics    Alcohol use: Yes     Comment: Socially    Drug use: Never        Review of Systems:  Review of Systems   Constitutional:  Negative for activity change, appetite change, chills, diaphoresis, fatigue, fever and unexpected weight change.   Respiratory:  Negative for shortness of breath.    Cardiovascular:  Negative for chest pain.   Gastrointestinal:  Positive for anal bleeding and constipation. Negative for abdominal distention, abdominal pain, blood in stool, diarrhea, nausea, rectal pain and vomiting.     OBJECTIVE:     Vital Signs (Most Recent)  Pulse:  (!) 59 (06/28/23 1032)  BP: 110/69 (06/28/23 1032)     78.8 kg (173 lb 13.3 oz)     Physical Exam:  Physical Exam  Vitals reviewed. Exam conducted with a chaperone present.   Constitutional:       General: She is not in acute distress.     Appearance: Normal appearance. She is well-developed. She is not ill-appearing or toxic-appearing.   HENT:      Head: Normocephalic and atraumatic.      Right Ear: External ear normal.      Left Ear: External ear normal.      Nose: Nose normal.   Cardiovascular:      Rate and Rhythm: Normal rate.   Pulmonary:      Effort: Pulmonary effort is normal. No respiratory distress.   Abdominal:      General: Abdomen is flat. There is no distension.      Palpations: Abdomen is soft.      Tenderness: There is no abdominal tenderness.   Genitourinary:     Comments: Anorectal: +moderately enlarged left lateral & right anterior external hemorrhoids - soft/NT/NT. No other external masses or lesions. No TTP. KARISHMA with good tone, no blood, and no palpable masses or lesions.   Musculoskeletal:         General: Normal range of motion.      Cervical back: Normal range of motion and neck supple.   Skin:     General: Skin is warm and dry.   Neurological:      Mental Status: She is alert and oriented to person, place, and time.   Psychiatric:         Mood and Affect: Mood normal.         Behavior: Behavior normal.     Anoscopy Procedure Note    Pre-procedure diagnosis: Rectal bleeding    Post-procedure diagnosis: Internal hemorrhoids    Procedure: Anoscopy    Surgeon: Jennifer Jean-Baptiste PA-C    Assistant: Opal Serrano RN    Specimen: none    Findings: Anoscope inserted and all 4 quadrants examined. Mildly enlarged left lateral/right anterior/right posterior internal hemorrhoids without evidence of recent bleeding. No other internal masses or lesions visualized.     Patient tolerated procedure well.     Diagnostic Results:  Colonoscopy 06/2021 - polyps, diverticulosis, repeat recommended 7  years    ASSESSMENT/PLAN:     67 y.o. with internal and external hemorrhoids    -Anusol bid for 2 weeks. Sent to pharmacy.  -Recommended daily use of metamucil as opposed to every other day.   -Discussed that a minimum I would recommend behavioral, lifestyle and medication modifications to improve bowel habits. Usual bowel management handout given to patient. This includes a stool softener twice per day, fiber powder supplementation daily, drinking at least 64oz of water/day, avoiding straining with bowel movements, spending less than 5 min on toilet per bowel movement, eating a high fiber diet, using miralax as needed to achieve a bowel movement daily and using wet wipes to wipe after bowel movements when irritated.   -Discussed causes and treatment of hemorrhoidal disease including improvement in bowel habits, banding, and excisional hemorrhoidectomy. Patient elected to proceed with improvement of bowel habits.  -If patient decides that she wants surgery, will schedule appointment with MD. Would like to defer at this time.   -RTC prn     Jennifer Jean-Baptiste PA-C  Colon and Rectal Surgery  [facility]

## 2023-06-28 NOTE — PATIENT INSTRUCTIONS
May use prescribed cortisone cream to hemorrhoids twice a day not to exceed more than 2 weeks at a time. Take 2 week medication vacation holiday then resume as needed.  Warm sitz baths as needed

## 2023-07-06 DIAGNOSIS — I10 ESSENTIAL HYPERTENSION: Primary | ICD-10-CM

## 2023-07-06 DIAGNOSIS — I49.3 PVC (PREMATURE VENTRICULAR CONTRACTION): ICD-10-CM

## 2023-07-18 ENCOUNTER — OFFICE VISIT (OUTPATIENT)
Dept: FAMILY MEDICINE | Facility: CLINIC | Age: 68
End: 2023-07-18
Payer: MEDICARE

## 2023-07-18 VITALS
HEIGHT: 63 IN | WEIGHT: 173.94 LBS | DIASTOLIC BLOOD PRESSURE: 70 MMHG | BODY MASS INDEX: 30.82 KG/M2 | SYSTOLIC BLOOD PRESSURE: 132 MMHG | RESPIRATION RATE: 18 BRPM | TEMPERATURE: 97 F | HEART RATE: 57 BPM

## 2023-07-18 DIAGNOSIS — K58.1 IRRITABLE BOWEL SYNDROME WITH CONSTIPATION: ICD-10-CM

## 2023-07-18 DIAGNOSIS — I70.0 AORTIC ATHEROSCLEROSIS: ICD-10-CM

## 2023-07-18 DIAGNOSIS — Z00.00 ENCOUNTER FOR PREVENTIVE HEALTH EXAMINATION: Primary | ICD-10-CM

## 2023-07-18 DIAGNOSIS — I10 ESSENTIAL HYPERTENSION: ICD-10-CM

## 2023-07-18 DIAGNOSIS — N20.0 LEFT RENAL STONE: ICD-10-CM

## 2023-07-18 DIAGNOSIS — Z12.31 SCREENING MAMMOGRAM, ENCOUNTER FOR: ICD-10-CM

## 2023-07-18 DIAGNOSIS — E21.3 HYPERPARATHYROIDISM: ICD-10-CM

## 2023-07-18 DIAGNOSIS — Z86.010 PERSONAL HISTORY OF COLONIC POLYPS: ICD-10-CM

## 2023-07-18 DIAGNOSIS — E78.2 MIXED HYPERLIPIDEMIA: ICD-10-CM

## 2023-07-18 DIAGNOSIS — E66.09 CLASS 1 OBESITY DUE TO EXCESS CALORIES WITH SERIOUS COMORBIDITY AND BODY MASS INDEX (BMI) OF 30.0 TO 30.9 IN ADULT: ICD-10-CM

## 2023-07-18 PROCEDURE — 1101F PT FALLS ASSESS-DOCD LE1/YR: CPT | Mod: HCNC,CPTII,S$GLB, | Performed by: NURSE PRACTITIONER

## 2023-07-18 PROCEDURE — 1170F PR FUNCTIONAL STATUS ASSESSED: ICD-10-PCS | Mod: HCNC,CPTII,S$GLB, | Performed by: NURSE PRACTITIONER

## 2023-07-18 PROCEDURE — 1126F PR PAIN SEVERITY QUANTIFIED, NO PAIN PRESENT: ICD-10-PCS | Mod: HCNC,CPTII,S$GLB, | Performed by: NURSE PRACTITIONER

## 2023-07-18 PROCEDURE — 3078F DIAST BP <80 MM HG: CPT | Mod: HCNC,CPTII,S$GLB, | Performed by: NURSE PRACTITIONER

## 2023-07-18 PROCEDURE — 99999 PR PBB SHADOW E&M-EST. PATIENT-LVL V: CPT | Mod: PBBFAC,HCNC,, | Performed by: NURSE PRACTITIONER

## 2023-07-18 PROCEDURE — G0439 PPPS, SUBSEQ VISIT: HCPCS | Mod: HCNC,S$GLB,, | Performed by: NURSE PRACTITIONER

## 2023-07-18 PROCEDURE — 1126F AMNT PAIN NOTED NONE PRSNT: CPT | Mod: HCNC,CPTII,S$GLB, | Performed by: NURSE PRACTITIONER

## 2023-07-18 PROCEDURE — G0439 PR MEDICARE ANNUAL WELLNESS SUBSEQUENT VISIT: ICD-10-PCS | Mod: HCNC,S$GLB,, | Performed by: NURSE PRACTITIONER

## 2023-07-18 PROCEDURE — 1159F PR MEDICATION LIST DOCUMENTED IN MEDICAL RECORD: ICD-10-PCS | Mod: HCNC,CPTII,S$GLB, | Performed by: NURSE PRACTITIONER

## 2023-07-18 PROCEDURE — 3044F PR MOST RECENT HEMOGLOBIN A1C LEVEL <7.0%: ICD-10-PCS | Mod: HCNC,CPTII,S$GLB, | Performed by: NURSE PRACTITIONER

## 2023-07-18 PROCEDURE — 1160F RVW MEDS BY RX/DR IN RCRD: CPT | Mod: HCNC,CPTII,S$GLB, | Performed by: NURSE PRACTITIONER

## 2023-07-18 PROCEDURE — 1159F MED LIST DOCD IN RCRD: CPT | Mod: HCNC,CPTII,S$GLB, | Performed by: NURSE PRACTITIONER

## 2023-07-18 PROCEDURE — 1101F PR PT FALLS ASSESS DOC 0-1 FALLS W/OUT INJ PAST YR: ICD-10-PCS | Mod: HCNC,CPTII,S$GLB, | Performed by: NURSE PRACTITIONER

## 2023-07-18 PROCEDURE — 3075F SYST BP GE 130 - 139MM HG: CPT | Mod: HCNC,CPTII,S$GLB, | Performed by: NURSE PRACTITIONER

## 2023-07-18 PROCEDURE — 3008F BODY MASS INDEX DOCD: CPT | Mod: HCNC,CPTII,S$GLB, | Performed by: NURSE PRACTITIONER

## 2023-07-18 PROCEDURE — 3078F PR MOST RECENT DIASTOLIC BLOOD PRESSURE < 80 MM HG: ICD-10-PCS | Mod: HCNC,CPTII,S$GLB, | Performed by: NURSE PRACTITIONER

## 2023-07-18 PROCEDURE — 3075F PR MOST RECENT SYSTOLIC BLOOD PRESS GE 130-139MM HG: ICD-10-PCS | Mod: HCNC,CPTII,S$GLB, | Performed by: NURSE PRACTITIONER

## 2023-07-18 PROCEDURE — 1170F FXNL STATUS ASSESSED: CPT | Mod: HCNC,CPTII,S$GLB, | Performed by: NURSE PRACTITIONER

## 2023-07-18 PROCEDURE — 99999 PR PBB SHADOW E&M-EST. PATIENT-LVL V: ICD-10-PCS | Mod: PBBFAC,HCNC,, | Performed by: NURSE PRACTITIONER

## 2023-07-18 PROCEDURE — 3288F PR FALLS RISK ASSESSMENT DOCUMENTED: ICD-10-PCS | Mod: HCNC,CPTII,S$GLB, | Performed by: NURSE PRACTITIONER

## 2023-07-18 PROCEDURE — 3008F PR BODY MASS INDEX (BMI) DOCUMENTED: ICD-10-PCS | Mod: HCNC,CPTII,S$GLB, | Performed by: NURSE PRACTITIONER

## 2023-07-18 PROCEDURE — 3044F HG A1C LEVEL LT 7.0%: CPT | Mod: HCNC,CPTII,S$GLB, | Performed by: NURSE PRACTITIONER

## 2023-07-18 PROCEDURE — 3288F FALL RISK ASSESSMENT DOCD: CPT | Mod: HCNC,CPTII,S$GLB, | Performed by: NURSE PRACTITIONER

## 2023-07-18 PROCEDURE — 1160F PR REVIEW ALL MEDS BY PRESCRIBER/CLIN PHARMACIST DOCUMENTED: ICD-10-PCS | Mod: HCNC,CPTII,S$GLB, | Performed by: NURSE PRACTITIONER

## 2023-07-18 NOTE — PROGRESS NOTES
"  Cesario Sahu presented for a  Medicare AWV and comprehensive Health Risk Assessment today. The following components were reviewed and updated:    Medical history  Family History  Social history  Allergies and Current Medications  Health Risk Assessment  Health Maintenance  Care Team         ** See Completed Assessments for Annual Wellness Visit within the encounter summary.**         The following assessments were completed:  Living Situation  CAGE  Depression Screening  Timed Get Up and Go  Whisper Test  Cognitive Function Screening  Nutrition Screening  ADL Screening  PAQ Screening        Vitals:    07/18/23 0920   BP: 132/70   BP Location: Right arm   Patient Position: Sitting   Pulse: (!) 57   Resp: 18   Temp: 96.9 °F (36.1 °C)   Weight: 78.9 kg (173 lb 15.1 oz)   Height: 5' 3" (1.6 m)     Body mass index is 30.81 kg/m².  Physical Exam  Vitals and nursing note reviewed.   Constitutional:       Appearance: Normal appearance. She is well-developed.   HENT:      Head: Normocephalic and atraumatic.   Eyes:      Pupils: Pupils are equal, round, and reactive to light.   Neck:      Vascular: No carotid bruit.   Cardiovascular:      Rate and Rhythm: Normal rate and regular rhythm.      Pulses: Normal pulses.      Heart sounds: Normal heart sounds. No murmur heard.    No gallop.   Pulmonary:      Effort: Pulmonary effort is normal.      Breath sounds: Normal breath sounds.   Abdominal:      General: Bowel sounds are normal. There is no distension.      Palpations: Abdomen is soft.      Tenderness: There is no abdominal tenderness.   Musculoskeletal:         General: No tenderness. Normal range of motion.   Skin:     General: Skin is warm and dry.   Neurological:      Mental Status: She is alert.      Motor: No abnormal muscle tone.      Gait: Gait normal.   Psychiatric:         Speech: Speech normal.         Behavior: Behavior normal.         Thought Content: Thought content normal.         Judgment: Judgment " normal.           Current Outpatient Medications   Medication Instructions    amLODIPine (NORVASC) 5 mg, Oral, Daily    ascorbic acid (VITAMIN C ORAL) No dose, route, or frequency recorded.    aspirin (ECOTRIN) 81 mg, Oral, Daily    biotin 5,000 mcg TbDL Oral    cyclobenzaprine (FLEXERIL) 5-10 mg, Oral, Nightly PRN    ergocalciferol (ERGOCALCIFEROL) 50,000 Units, Oral, Every 7 days    estradioL (ESTRACE) 1 g, Vaginal, Daily    famotidine (PEPCID) 20 MG tablet TAKE 1 TABLET TWICE DAILY AS NEEDED FOR HEARTBURN.    Lactobacillus acidophilus (PROBIOTIC ORAL) No dose, route, or frequency recorded.    lactulose (CHRONULAC) 20 g, Oral, 2 times daily    metoprolol succinate (TOPROL-XL) 50 MG 24 hr tablet TAKE 1 TABLET ONE TIME DAILY    plecanatide (TRULANCE) 3 mg Tab Take 1 tablet (3 mg) by mouth once daily.    SHINGRIX, PF, 50 mcg/0.5 mL injection No dose, route, or frequency recorded.    tobramycin-dexAMETHasone 0.3-0.1% (TOBRADEX) 0.3-0.1 % DrpS INSTILL 1 DROP INTO AFFECTED EYE EVERY 6 HOURS         Diagnoses and health risks identified today and associated recommendations/orders:    1. Encounter for preventive health examination  Review for Opioid Screening: Patient does not have rx for Opioids.  Review for Substance Use Disorders: Patient does not use substance.    2. Essential hypertension  Chronic/ Monitored/Stable on Norvasc and Metoprolol  as directed.  Followed by PCP and card mdf    3. Hyperparathyroidism  Chronic/ Monitored/Stable on  as directed.  Followed by urologist    4. Aortic atherosclerosis  Chronic/ Monitored/Stable on  as directed.  Followed by PCP    5. Mixed hyperlipidemia  Chronic/ Monitored/Stable on  as directed.  Followed by PCP    6. Irritable bowel syndrome with constipation  Chronic/ Monitored/Stable diet and changes- no longer on meds  Followed by GI    7. Left renal stone  Chronic/ Monitored- scheduled for for xray 8/22/ 23- followed by OROLUDIN     8. Screening mammogram,  encounter for   Mammo Digital Screening Bilat  Scheduled 8/22/23    9. Personal history of colonic polyps  Colonoscopy    Due date: 6/11/2028   Last completion date: 6/11/2021    Frequency: Every 7 Years   Followed by PCP    Provided Shirline with a 5-10 year written screening schedule and personal prevention plan. Recommendations were developed using the USPSTF age appropriate recommendations. Education, counseling, and referrals were provided as needed. After Visit Summary printed and given to patient which includes a list of additional screenings\tests needed.    Follow up in about 1 year (around 7/18/2024) for Follow up with PCP.    Kimi Louie NP

## 2023-07-18 NOTE — PATIENT INSTRUCTIONS
Counseling and Referral of Other Preventative  (Italic type indicates deductible and co-insurance are waived)    Patient Name: Cesario Sahu  Today's Date: 7/18/2023    Health Maintenance       Date Due Completion Date    TETANUS VACCINE Never done ---    High Dose Statin Never done ---    COVID-19 Vaccine (6 - Mixed Product series) 12/24/2021 10/29/2021    Mammogram 07/13/2023 7/13/2022    Influenza Vaccine (1) 09/01/2023 9/23/2022    DEXA Scan 07/13/2025 7/13/2022    Hemoglobin A1c (Diabetic Prevention Screening) 05/30/2026 5/30/2023    Lipid Panel 09/23/2027 9/23/2022    Colorectal Cancer Screening 06/11/2028 6/11/2021        Orders Placed This Encounter   Procedures    Mammo Digital Screening Bilat     The following information is provided to all patients.  This information is to help you find resources for any of the problems found today that may be affecting your health:                Living healthy guide: www.[facility].[place].[facility]      Understanding Diabetes: www.diabetes.org      Eating healthy: www.cdc.gov/healthyweight      [facility] home safety checklist: www.cdc.gov/steadi/patient.html      Agency on Aging: www.goea.[place].gov      Alcoholics anonymous (AA): www.aa.org      Physical Activity: www.janet.nih.gov/os5sgot      Tobacco use: www.quitwithusla.org

## 2023-07-20 ENCOUNTER — OFFICE VISIT (OUTPATIENT)
Dept: OBSTETRICS AND GYNECOLOGY | Facility: CLINIC | Age: 68
End: 2023-07-20
Payer: MEDICARE

## 2023-07-20 VITALS
DIASTOLIC BLOOD PRESSURE: 76 MMHG | BODY MASS INDEX: 30.94 KG/M2 | HEIGHT: 63 IN | WEIGHT: 174.63 LBS | SYSTOLIC BLOOD PRESSURE: 130 MMHG

## 2023-07-20 DIAGNOSIS — Z01.419 ENCOUNTER FOR ANNUAL ROUTINE GYNECOLOGICAL EXAMINATION: Primary | ICD-10-CM

## 2023-07-20 DIAGNOSIS — N95.2 VAGINAL ATROPHY: ICD-10-CM

## 2023-07-20 DIAGNOSIS — N89.8 VAGINAL ODOR: ICD-10-CM

## 2023-07-20 PROCEDURE — 3044F PR MOST RECENT HEMOGLOBIN A1C LEVEL <7.0%: ICD-10-PCS | Mod: HCNC,CPTII,S$GLB,

## 2023-07-20 PROCEDURE — 3288F FALL RISK ASSESSMENT DOCD: CPT | Mod: HCNC,CPTII,S$GLB,

## 2023-07-20 PROCEDURE — 99999 PR PBB SHADOW E&M-EST. PATIENT-LVL III: ICD-10-PCS | Mod: PBBFAC,HCNC,,

## 2023-07-20 PROCEDURE — 81514 NFCT DS BV&VAGINITIS DNA ALG: CPT | Mod: HCNC

## 2023-07-20 PROCEDURE — 1126F AMNT PAIN NOTED NONE PRSNT: CPT | Mod: HCNC,CPTII,S$GLB,

## 2023-07-20 PROCEDURE — 1126F PR PAIN SEVERITY QUANTIFIED, NO PAIN PRESENT: ICD-10-PCS | Mod: HCNC,CPTII,S$GLB,

## 2023-07-20 PROCEDURE — 1101F PR PT FALLS ASSESS DOC 0-1 FALLS W/OUT INJ PAST YR: ICD-10-PCS | Mod: HCNC,CPTII,S$GLB,

## 2023-07-20 PROCEDURE — 1160F RVW MEDS BY RX/DR IN RCRD: CPT | Mod: HCNC,CPTII,S$GLB,

## 2023-07-20 PROCEDURE — 1159F PR MEDICATION LIST DOCUMENTED IN MEDICAL RECORD: ICD-10-PCS | Mod: HCNC,CPTII,S$GLB,

## 2023-07-20 PROCEDURE — 3078F DIAST BP <80 MM HG: CPT | Mod: HCNC,CPTII,S$GLB,

## 2023-07-20 PROCEDURE — 3075F PR MOST RECENT SYSTOLIC BLOOD PRESS GE 130-139MM HG: ICD-10-PCS | Mod: HCNC,CPTII,S$GLB,

## 2023-07-20 PROCEDURE — G0101 CA SCREEN;PELVIC/BREAST EXAM: HCPCS | Mod: HCNC,S$GLB,,

## 2023-07-20 PROCEDURE — 3078F PR MOST RECENT DIASTOLIC BLOOD PRESSURE < 80 MM HG: ICD-10-PCS | Mod: HCNC,CPTII,S$GLB,

## 2023-07-20 PROCEDURE — 3008F PR BODY MASS INDEX (BMI) DOCUMENTED: ICD-10-PCS | Mod: HCNC,CPTII,S$GLB,

## 2023-07-20 PROCEDURE — 1159F MED LIST DOCD IN RCRD: CPT | Mod: HCNC,CPTII,S$GLB,

## 2023-07-20 PROCEDURE — 3075F SYST BP GE 130 - 139MM HG: CPT | Mod: HCNC,CPTII,S$GLB,

## 2023-07-20 PROCEDURE — 3008F BODY MASS INDEX DOCD: CPT | Mod: HCNC,CPTII,S$GLB,

## 2023-07-20 PROCEDURE — 1101F PT FALLS ASSESS-DOCD LE1/YR: CPT | Mod: HCNC,CPTII,S$GLB,

## 2023-07-20 PROCEDURE — 3044F HG A1C LEVEL LT 7.0%: CPT | Mod: HCNC,CPTII,S$GLB,

## 2023-07-20 PROCEDURE — 99999 PR PBB SHADOW E&M-EST. PATIENT-LVL III: CPT | Mod: PBBFAC,HCNC,,

## 2023-07-20 PROCEDURE — 1160F PR REVIEW ALL MEDS BY PRESCRIBER/CLIN PHARMACIST DOCUMENTED: ICD-10-PCS | Mod: HCNC,CPTII,S$GLB,

## 2023-07-20 PROCEDURE — G0101 PR CA SCREEN;PELVIC/BREAST EXAM: ICD-10-PCS | Mod: HCNC,S$GLB,,

## 2023-07-20 PROCEDURE — 3288F PR FALLS RISK ASSESSMENT DOCUMENTED: ICD-10-PCS | Mod: HCNC,CPTII,S$GLB,

## 2023-07-20 RX ORDER — ESTRADIOL 0.1 MG/G
1 CREAM VAGINAL DAILY
Qty: 85 G | Refills: 4 | Status: SHIPPED | OUTPATIENT
Start: 2023-07-20 | End: 2023-11-14

## 2023-07-20 NOTE — PROGRESS NOTES
Subjective:       Patient ID: Cesario Sahu is a 68 y.o. female.    Chief Complaint:  Annual Exam      History of Present Illness  HPI  Annual Exam-Postmenopausal  Patient presents for annual exam. The patient has complaints today of occasional vaginal odor and vaginal dryness. The patient is sexually active, . GYN screening history: last pap: approximate date 2021 and was normal and last mammogram: approximate date 22 and was normal. Annual mammogram scheduled for August. The patient is taking hormone replacement therapy, vaginal estrace 3-4 times a week. Patient denies post-menopausal vaginal bleeding. The patient wears seatbelts: yes. The patient participates in regular exercise: yes. Has the patient ever been transfused or tattooed?: no. The patient reports that there is not domestic violence in her life.    Bone density in - Osteopenia   Colonoscopy - repeat in 7 years    GYN & OB History  No LMP recorded. Patient is postmenopausal.   Date of Last Pap: 2021    OB History    Para Term  AB Living   3 3 3     3   SAB IAB Ectopic Multiple Live Births           3      # Outcome Date GA Lbr Atilio/2nd Weight Sex Delivery Anes PTL Lv   3 Term      Vag-Spont   LEXI   2 Term      Vag-Spont   LEXI   1 Term      Vag-Spont   LEXI       Review of Systems  Review of Systems   Constitutional:  Negative for appetite change, fatigue, fever and unexpected weight change.   Eyes:  Negative for visual disturbance.   Cardiovascular:  Negative for chest pain.   Gastrointestinal:  Negative for abdominal pain, bloating, constipation, diarrhea, nausea and vomiting.   Genitourinary:  Positive for vaginal dryness and vaginal odor. Negative for bladder incontinence, decreased libido, dysmenorrhea, dyspareunia, dysuria, flank pain, frequency, genital sores, hot flashes, menorrhagia, menstrual problem, pelvic pain, urgency, vaginal bleeding, vaginal discharge, vaginal pain, postcoital bleeding and  postmenopausal bleeding.   Integumentary:  Negative for rash, acne, mole/lesion, breast mass, nipple discharge, breast skin changes and breast tenderness.   Neurological:  Negative for syncope and headaches.   Hematological:  Negative for adenopathy. Does not bruise/bleed easily.   Psychiatric/Behavioral:  Negative for sleep disturbance.    All other systems reviewed and are negative.  Breast: Positive for breast self exam.Negative for asymmetry, lump, mass, nipple discharge, skin changes and tenderness        Objective:      Physical Exam:   Constitutional: She is oriented to person, place, and time. She appears well-developed and well-nourished.    HENT:   Head: Normocephalic and atraumatic.   Nose: Nose normal.    Eyes: Pupils are equal, round, and reactive to light. Conjunctivae and EOM are normal.     Cardiovascular:  Normal rate and regular rhythm.             Pulmonary/Chest: Effort normal. She has no decreased breath sounds. She has no rhonchi. Right breast exhibits no inverted nipple, no mass, no nipple discharge, no tenderness and no bleeding. Left breast exhibits no inverted nipple, no mass, no nipple discharge, no tenderness and no bleeding. Breasts are symmetrical.        Abdominal: Soft. There is no abdominal tenderness.     Genitourinary:    Inguinal canal, uterus, right adnexa and left adnexa normal.      Pelvic exam was performed with patient supine.   The external female genitalia was normal.   No external genitalia lesions identified,Genitalia hair distrobution normal .   Labial bartholins normal.Cervix is normal. Right adnexum displays no mass and no tenderness. Left adnexum displays no mass and no tenderness. There is vaginal discharge in the vagina. No tenderness or bleeding in the vagina. Vagina was moist.Cervix exhibits no motion tenderness, no discharge and no tenderness. Uerus contour normal  Uterus is not tender. Uterus size: 10 cm.Normal urethral meatus.          Musculoskeletal: Normal  range of motion and moves all extremeties.       Neurological: She is alert and oriented to person, place, and time.    Skin: Skin is warm and dry.    Psychiatric: She has a normal mood and affect. Her speech is normal and behavior is normal. Mood, judgment and thought content normal.           Assessment:        1. Encounter for annual routine gynecological examination    2. Vaginal atrophy    3. Vaginal odor               Plan:   Continue annual well woman exam.  Pap current. Due 2024. Patient was counseled today on ASCCP screening guidelines (pap smear every 3 years in low risk patients) and recommendations for annual pelvic exams and clinical breast exams, yearly mammograms starting at age 40; and to see her PCP for other healthcare maintenance.   Continue vaginal estrace and alternate with vaginal moisturizers from [employer] and [employer]    Ensure adequate calcium and vitamin D intake and daily weight bearing exercises.    Diagnosis and orders this visit:  Encounter for annual routine gynecological examination    Vaginal atrophy  -     estradioL (ESTRACE) 0.01 % (0.1 mg/gram) vaginal cream; Place 1 g vaginally once daily.  Dispense: 85 g; Refill: 4    Vaginal odor  -     Vaginosis Screen by DNA Probe      Brenda Luu, KRISTINA

## 2023-07-21 LAB
BACTERIAL VAGINOSIS DNA: NEGATIVE
CANDIDA GLABRATA DNA: NEGATIVE
CANDIDA KRUSEI DNA: NEGATIVE
CANDIDA RRNA VAG QL PROBE: NEGATIVE
T VAGINALIS RRNA GENITAL QL PROBE: NEGATIVE

## 2023-07-24 ENCOUNTER — PATIENT MESSAGE (OUTPATIENT)
Dept: SURGERY | Facility: CLINIC | Age: 68
End: 2023-07-24
Payer: MEDICARE

## 2023-07-24 DIAGNOSIS — I10 ESSENTIAL HYPERTENSION: Primary | ICD-10-CM

## 2023-07-24 DIAGNOSIS — I49.3 PVC (PREMATURE VENTRICULAR CONTRACTION): ICD-10-CM

## 2023-07-25 ENCOUNTER — HOSPITAL ENCOUNTER (OUTPATIENT)
Dept: CARDIOLOGY | Facility: HOSPITAL | Age: 68
Discharge: HOME | End: 2023-07-25
Attending: INTERNAL MEDICINE
Payer: MEDICARE

## 2023-07-25 ENCOUNTER — OFFICE VISIT (OUTPATIENT)
Dept: CARDIOLOGY | Facility: CLINIC | Age: 68
End: 2023-07-25
Payer: MEDICARE

## 2023-07-25 VITALS
HEART RATE: 57 BPM | SYSTOLIC BLOOD PRESSURE: 122 MMHG | WEIGHT: 174.63 LBS | OXYGEN SATURATION: 98 % | DIASTOLIC BLOOD PRESSURE: 70 MMHG | WEIGHT: 174.63 LBS | BODY MASS INDEX: 30.94 KG/M2 | HEIGHT: 63 IN | BODY MASS INDEX: 30.93 KG/M2

## 2023-07-25 DIAGNOSIS — I49.3 PVC (PREMATURE VENTRICULAR CONTRACTION): ICD-10-CM

## 2023-07-25 DIAGNOSIS — I10 ESSENTIAL HYPERTENSION: Primary | ICD-10-CM

## 2023-07-25 DIAGNOSIS — I10 ESSENTIAL HYPERTENSION: ICD-10-CM

## 2023-07-25 DIAGNOSIS — E78.2 MIXED HYPERLIPIDEMIA: ICD-10-CM

## 2023-07-25 DIAGNOSIS — I70.0 AORTIC ATHEROSCLEROSIS: ICD-10-CM

## 2023-07-25 PROCEDURE — 3288F PR FALLS RISK ASSESSMENT DOCUMENTED: ICD-10-PCS | Mod: HCNC,CPTII,S$GLB, | Performed by: INTERNAL MEDICINE

## 2023-07-25 PROCEDURE — 3078F DIAST BP <80 MM HG: CPT | Mod: HCNC,CPTII,S$GLB, | Performed by: INTERNAL MEDICINE

## 2023-07-25 PROCEDURE — 93005 ELECTROCARDIOGRAM TRACING: CPT | Mod: HCNC

## 2023-07-25 PROCEDURE — 99214 PR OFFICE/OUTPT VISIT, EST, LEVL IV, 30-39 MIN: ICD-10-PCS | Mod: HCNC,S$GLB,, | Performed by: INTERNAL MEDICINE

## 2023-07-25 PROCEDURE — 99214 OFFICE O/P EST MOD 30 MIN: CPT | Mod: HCNC,S$GLB,, | Performed by: INTERNAL MEDICINE

## 2023-07-25 PROCEDURE — 93010 ELECTROCARDIOGRAM REPORT: CPT | Mod: HCNC,,, | Performed by: INTERNAL MEDICINE

## 2023-07-25 PROCEDURE — 3074F PR MOST RECENT SYSTOLIC BLOOD PRESSURE < 130 MM HG: ICD-10-PCS | Mod: HCNC,CPTII,S$GLB, | Performed by: INTERNAL MEDICINE

## 2023-07-25 PROCEDURE — 1126F PR PAIN SEVERITY QUANTIFIED, NO PAIN PRESENT: ICD-10-PCS | Mod: HCNC,CPTII,S$GLB, | Performed by: INTERNAL MEDICINE

## 2023-07-25 PROCEDURE — 99999 PR PBB SHADOW E&M-EST. PATIENT-LVL III: CPT | Mod: PBBFAC,HCNC,, | Performed by: INTERNAL MEDICINE

## 2023-07-25 PROCEDURE — 3288F FALL RISK ASSESSMENT DOCD: CPT | Mod: HCNC,CPTII,S$GLB, | Performed by: INTERNAL MEDICINE

## 2023-07-25 PROCEDURE — 1160F PR REVIEW ALL MEDS BY PRESCRIBER/CLIN PHARMACIST DOCUMENTED: ICD-10-PCS | Mod: HCNC,CPTII,S$GLB, | Performed by: INTERNAL MEDICINE

## 2023-07-25 PROCEDURE — 93010 EKG 12-LEAD: ICD-10-PCS | Mod: HCNC,,, | Performed by: INTERNAL MEDICINE

## 2023-07-25 PROCEDURE — 3008F PR BODY MASS INDEX (BMI) DOCUMENTED: ICD-10-PCS | Mod: HCNC,CPTII,S$GLB, | Performed by: INTERNAL MEDICINE

## 2023-07-25 PROCEDURE — 3044F HG A1C LEVEL LT 7.0%: CPT | Mod: HCNC,CPTII,S$GLB, | Performed by: INTERNAL MEDICINE

## 2023-07-25 PROCEDURE — 1101F PR PT FALLS ASSESS DOC 0-1 FALLS W/OUT INJ PAST YR: ICD-10-PCS | Mod: HCNC,CPTII,S$GLB, | Performed by: INTERNAL MEDICINE

## 2023-07-25 PROCEDURE — 1159F MED LIST DOCD IN RCRD: CPT | Mod: HCNC,CPTII,S$GLB, | Performed by: INTERNAL MEDICINE

## 2023-07-25 PROCEDURE — 3074F SYST BP LT 130 MM HG: CPT | Mod: HCNC,CPTII,S$GLB, | Performed by: INTERNAL MEDICINE

## 2023-07-25 PROCEDURE — 1159F PR MEDICATION LIST DOCUMENTED IN MEDICAL RECORD: ICD-10-PCS | Mod: HCNC,CPTII,S$GLB, | Performed by: INTERNAL MEDICINE

## 2023-07-25 PROCEDURE — 1126F AMNT PAIN NOTED NONE PRSNT: CPT | Mod: HCNC,CPTII,S$GLB, | Performed by: INTERNAL MEDICINE

## 2023-07-25 PROCEDURE — 1101F PT FALLS ASSESS-DOCD LE1/YR: CPT | Mod: HCNC,CPTII,S$GLB, | Performed by: INTERNAL MEDICINE

## 2023-07-25 PROCEDURE — 3078F PR MOST RECENT DIASTOLIC BLOOD PRESSURE < 80 MM HG: ICD-10-PCS | Mod: HCNC,CPTII,S$GLB, | Performed by: INTERNAL MEDICINE

## 2023-07-25 PROCEDURE — 1160F RVW MEDS BY RX/DR IN RCRD: CPT | Mod: HCNC,CPTII,S$GLB, | Performed by: INTERNAL MEDICINE

## 2023-07-25 PROCEDURE — 3008F BODY MASS INDEX DOCD: CPT | Mod: HCNC,CPTII,S$GLB, | Performed by: INTERNAL MEDICINE

## 2023-07-25 PROCEDURE — 3044F PR MOST RECENT HEMOGLOBIN A1C LEVEL <7.0%: ICD-10-PCS | Mod: HCNC,CPTII,S$GLB, | Performed by: INTERNAL MEDICINE

## 2023-07-25 PROCEDURE — 99999 PR PBB SHADOW E&M-EST. PATIENT-LVL III: ICD-10-PCS | Mod: PBBFAC,HCNC,, | Performed by: INTERNAL MEDICINE

## 2023-07-25 RX ORDER — METOPROLOL SUCCINATE 25 MG/1
25 TABLET, EXTENDED RELEASE ORAL DAILY
Qty: 90 TABLET | Refills: 2 | Status: SHIPPED | OUTPATIENT
Start: 2023-07-25

## 2023-07-25 NOTE — PROGRESS NOTES
Subjective:   Patient ID:  Cesario Sahu is a 68 y.o. female who presents for follow up of No chief complaint on file.      67 yo female, came in for palpitation. Retired   PMH PVCs, HTN, HLD. No smoking/drinking. H/o COVID 19 on 12/30/2022   EKG in  NSR, PVC, LVH and stt change  C/o palpitation, worse at night, fluttering sensation.  Some chest tightness.   No dyspnea, dizziness, faint and orthopnea.  Gym exercise 4 times a week, 30 mins on treadmill, but recently felt tired.  Mother healthy 92. Father not know too much.  H/o intolearnce of statin due to nausea. NOT start Pravastatin yet    01/22 visit  Last visit in 02/2020 and not started ToprolXL.. pt states that her palpitation improved and became worse after correction recently  ER visit showed K 2.9 and after K supplement felt better.  Per PCP recommendation, already took ToprolXL over a month.  Felt palpitation at nights. Likely fluttering sensation. Worse if ate too much  Occasional coffee.    Interval history  Good exercise. Concerning Weight even it is stable. BP controlled, ekg NSR LVH nonspecific STT change  No dizziness faint. Good energy  Off statin and               Past Medical History:   Diagnosis Date    Arthritis     Digestive disorder     Hypertension     Insulin resistance     Left renal stone 7/18/2023       Past Surgical History:   Procedure Laterality Date    ADENOIDECTOMY      CHOLECYSTECTOMY      COLONOSCOPY N/A 6/11/2021    Procedure: COLONOSCOPY;  Surgeon: Serenity Ramey MD;  Location: [facility];  Service: Endoscopy;  Laterality: N/A;    DILATION AND CURETTAGE OF UTERUS  10/2018    ESOPHAGOGASTRODUODENOSCOPY N/A 3/23/2022    Procedure: EGD (ESOPHAGOGASTRODUODENOSCOPY);  Surgeon: Abby Green MD;  Location: [facility];  Service: Endoscopy;  Laterality: N/A;       Social History     Tobacco Use    Smoking status: Never    Smokeless tobacco: Never   Substance Use Topics    Alcohol use: Yes     Comment: Socially     Drug use: Never       Family History   Problem Relation Age of Onset    Hypertension Mother     Heart disease Sister     Hyperlipidemia Sister     Hypertension Sister     Diabetes Brother          ROS    Objective:   Physical Exam  HENT:      Head: Normocephalic.   Eyes:      Pupils: Pupils are equal, round, and reactive to light.   Neck:      Thyroid: No thyromegaly.      Vascular: Normal carotid pulses. No carotid bruit or JVD.   Cardiovascular:      Rate and Rhythm: Normal rate and regular rhythm. No extrasystoles are present.     Chest Wall: PMI is not displaced.      Pulses: Normal pulses.      Heart sounds: Normal heart sounds. No murmur heard.    No gallop. No S3 sounds.   Pulmonary:      Effort: No respiratory distress.      Breath sounds: Normal breath sounds. No stridor.   Abdominal:      General: Bowel sounds are normal.      Palpations: Abdomen is soft.      Tenderness: There is no abdominal tenderness. There is no rebound.   Musculoskeletal:         General: Normal range of motion.   Skin:     Findings: No rash.   Neurological:      Mental Status: She is alert and oriented to person, place, and time.   Psychiatric:         Behavior: Behavior normal.       Lab Results   Component Value Date    CHOL 219 (H) 09/23/2022    CHOL 228 (H) 03/05/2021    CHOL 262 (H) 07/28/2020     Lab Results   Component Value Date    HDL 65 09/23/2022    HDL 71 03/05/2021    HDL 70 07/28/2020     Lab Results   Component Value Date    LDLCALC 134.0 09/23/2022    LDLCALC 140.8 03/05/2021    LDLCALC 169.0 (H) 07/28/2020     Lab Results   Component Value Date    TRIG 100 09/23/2022    TRIG 81 03/05/2021    TRIG 115 07/28/2020     Lab Results   Component Value Date    CHOLHDL 29.7 09/23/2022    CHOLHDL 31.1 03/05/2021    CHOLHDL 26.7 07/28/2020       Chemistry        Component Value Date/Time     05/30/2023 1230    K 3.7 05/30/2023 1230     05/30/2023 1230    CO2 24 05/30/2023 1230    BUN 13 05/30/2023 1230     CREATININE 0.7 05/30/2023 1230    GLU 79 05/30/2023 1230        Component Value Date/Time    CALCIUM 9.8 05/30/2023 1230    ALKPHOS 95 05/30/2023 1230    AST 17 05/30/2023 1230    ALT 15 05/30/2023 1230    BILITOT 0.3 05/30/2023 1230    ESTGFRAFRICA >60.0 02/04/2022 0933    EGFRNONAA >60.0 02/04/2022 0933          Lab Results   Component Value Date    HGBA1C 5.4 05/30/2023     Lab Results   Component Value Date    TSH 0.714 05/30/2023     No results found for: INR, PROTIME  Lab Results   Component Value Date    WBC 8.28 05/30/2023    HGB 12.2 05/30/2023    HCT 39.8 05/30/2023    MCV 93 05/30/2023     05/30/2023     BMP  Sodium   Date Value Ref Range Status   05/30/2023 140 136 - 145 mmol/L Final     Potassium   Date Value Ref Range Status   05/30/2023 3.7 3.5 - 5.1 mmol/L Final     Chloride   Date Value Ref Range Status   05/30/2023 105 95 - 110 mmol/L Final     CO2   Date Value Ref Range Status   05/30/2023 24 23 - 29 mmol/L Final     BUN   Date Value Ref Range Status   05/30/2023 13 8 - 23 mg/dL Final     Creatinine   Date Value Ref Range Status   05/30/2023 0.7 0.5 - 1.4 mg/dL Final     Calcium   Date Value Ref Range Status   05/30/2023 9.8 8.7 - 10.5 mg/dL Final     Anion Gap   Date Value Ref Range Status   05/30/2023 11 8 - 16 mmol/L Final     eGFR if    Date Value Ref Range Status   02/04/2022 >60.0 >60 mL/min/1.73 m^2 Final     eGFR if non    Date Value Ref Range Status   02/04/2022 >60.0 >60 mL/min/1.73 m^2 Final     Comment:     Calculation used to obtain the estimated glomerular filtration  rate (eGFR) is the CKD-EPI equation.        BNP  @LABRCNTIP(BNP,BNPTRIAGEBLO)@  @LABRCNTIP(troponini)@  CrCl cannot be calculated (Patient's most recent lab result is older than the maximum 7 days allowed.).  No results found in the last 24 hours.  No results found in the last 24 hours.  No results found in the last 24 hours.    Assessment:      1. Essential hypertension    2.  Mixed hyperlipidemia    3. PVC (premature ventricular contraction)    4. Aortic atherosclerosis        Plan:   Decrease torpolXL to 25 mg daily for HTN and PVCs  Continue Amlodipine and ASA     Counseled DASH  Check Lipid profile with PCP in 6 months  Recommend heart-healthy diet, weight control and regular exercise.  Tiffanie. Risk modification.   I have reviewed all pertinent labs and cardiac studies independently. Plans and recommendations have been formulated under my direct supervision. All questions answered and patient voiced understanding.   If symptoms persist go to the ED  RTC in 12 months

## 2023-08-04 ENCOUNTER — TELEPHONE (OUTPATIENT)
Dept: FAMILY MEDICINE | Facility: CLINIC | Age: 68
End: 2023-08-04
Payer: MEDICARE

## 2023-08-04 NOTE — TELEPHONE ENCOUNTER
----- Message from Sherlyn Gonzalez sent at 8/4/2023  3:25 PM CDT -----  Contact: [phone]    Patient is returning a phone call.- Cesario Sahu-            Who left a message for the patient: - Nurse  -     Does patient know what this is regarding: -Returning nurse call-     Would you like a call back, or a response through your [facility] portal?:- Call back-     Comments: Please call the patient back to advise.

## 2023-08-07 ENCOUNTER — PATIENT MESSAGE (OUTPATIENT)
Dept: SURGERY | Facility: CLINIC | Age: 68
End: 2023-08-07
Payer: MEDICARE

## 2023-08-14 RX ORDER — HYDROCORTISONE 25 MG/G
CREAM TOPICAL 2 TIMES DAILY
Qty: 28 G | Refills: 0 | Status: SHIPPED | OUTPATIENT
Start: 2023-08-14 | End: 2023-08-24

## 2023-08-22 ENCOUNTER — HOSPITAL ENCOUNTER (OUTPATIENT)
Dept: RADIOLOGY | Facility: HOSPITAL | Age: 68
Discharge: HOME | End: 2023-08-22
Attending: NURSE PRACTITIONER
Payer: MEDICARE

## 2023-08-22 ENCOUNTER — HOSPITAL ENCOUNTER (OUTPATIENT)
Dept: RADIOLOGY | Facility: HOSPITAL | Age: 68
Discharge: HOME | End: 2023-08-22
Attending: UROLOGY
Payer: MEDICARE

## 2023-08-22 VITALS — BODY MASS INDEX: 30.94 KG/M2 | HEIGHT: 63 IN | WEIGHT: 174.63 LBS

## 2023-08-22 DIAGNOSIS — N20.0 LEFT RENAL STONE: ICD-10-CM

## 2023-08-22 DIAGNOSIS — Z12.31 SCREENING MAMMOGRAM, ENCOUNTER FOR: ICD-10-CM

## 2023-08-22 PROCEDURE — 74018 RADEX ABDOMEN 1 VIEW: CPT | Mod: 26,HCNC,, | Performed by: RADIOLOGY

## 2023-08-22 PROCEDURE — 77067 SCR MAMMO BI INCL CAD: CPT | Mod: 26,HCNC,, | Performed by: RADIOLOGY

## 2023-08-22 PROCEDURE — 77067 MAMMO DIGITAL SCREENING BILAT WITH TOMO: ICD-10-PCS | Mod: 26,HCNC,, | Performed by: RADIOLOGY

## 2023-08-22 PROCEDURE — 74018 RADEX ABDOMEN 1 VIEW: CPT | Mod: TC,HCNC

## 2023-08-22 PROCEDURE — 77063 MAMMO DIGITAL SCREENING BILAT WITH TOMO: ICD-10-PCS | Mod: 26,HCNC,, | Performed by: RADIOLOGY

## 2023-08-22 PROCEDURE — 77067 SCR MAMMO BI INCL CAD: CPT | Mod: TC,HCNC

## 2023-08-22 PROCEDURE — 74018 XR ABDOMEN AP 1 VIEW: ICD-10-PCS | Mod: 26,HCNC,, | Performed by: RADIOLOGY

## 2023-08-22 PROCEDURE — 77063 BREAST TOMOSYNTHESIS BI: CPT | Mod: 26,HCNC,, | Performed by: RADIOLOGY

## 2023-09-23 NOTE — PROGRESS NOTES
Subjective     Patient ID: Cesario Sahu is a 68 y.o. female.    Chief Complaint: Annual physical exam    HPI  Patient presents for annual physical exam. She reports she has been experiencing low back pain that is worse with laying down. She states the pain radiates to her buttocks bilaterally. She states she has tingling pain in her arms and legs while laying down. She also reports acid reflux that has not been controlled by famotidine    Review of Systems   Constitutional:  Negative for chills and fatigue.   Respiratory:  Negative for chest tightness and shortness of breath.    Cardiovascular:  Negative for chest pain and palpitations.   Gastrointestinal:  Positive for reflux. Negative for abdominal pain, constipation, diarrhea, nausea and vomiting.   Genitourinary:  Negative for frequency and urgency.   Musculoskeletal:  Positive for back pain.   Neurological:  Negative for dizziness, numbness and headaches.          Objective     Physical Exam  Constitutional:       General: She is not in acute distress.     Appearance: Normal appearance. She is obese.   HENT:      Head: Normocephalic and atraumatic.   Cardiovascular:      Rate and Rhythm: Normal rate and regular rhythm.      Heart sounds: Normal heart sounds. No murmur heard.     No friction rub. No gallop.   Pulmonary:      Effort: Pulmonary effort is normal.      Breath sounds: Normal breath sounds. No wheezing, rhonchi or rales.   Abdominal:      General: Bowel sounds are normal. There is no distension.      Palpations: Abdomen is soft.      Tenderness: There is no abdominal tenderness. There is no rebound.   Skin:     General: Skin is warm and dry.      Coloration: Skin is not jaundiced.   Neurological:      General: No focal deficit present.      Mental Status: She is alert and oriented to person, place, and time. Mental status is at baseline.   Psychiatric:         Mood and Affect: Mood normal.         Behavior: Behavior normal.            Assessment  and Plan     1. Essential hypertension  Comments:  Will obtain CBC and CMP  Orders:  -     CBC Auto Differential; Future; Expected date: 09/25/2023  -     Comprehensive Metabolic Panel; Future; Expected date: 09/25/2023  -     Cancel: IN OFFICE EKG 12-LEAD (to Muse)    2. Mixed hyperlipidemia  Comments:  Will obtain lipid panel and TSH  Orders:  -     Lipid Panel; Future; Expected date: 09/25/2023  -     TSH; Future; Expected date: 09/25/2023    3. Annual physical exam  Comments:  Will obtain labs today. EKG was done in July    4. Acute bilateral low back pain with bilateral sciatica  Comments:  Will refer to physical therapy  Orders:  -     Ambulatory referral/consult to Physical/Occupational Therapy; Future; Expected date: 10/02/2023    5. Gastroesophageal reflux disease without esophagitis  Comments:  Will prescribe protonix for 1 month and reevaluate  Orders:  -     pantoprazole (PROTONIX) 40 MG tablet; Take 1 tablet (40 mg total) by mouth once daily.  Dispense: 30 tablet; Refill: 0               Follow up in about 6 months (around 3/25/2024).

## 2023-09-25 ENCOUNTER — LAB VISIT (OUTPATIENT)
Dept: LAB | Facility: HOSPITAL | Age: 68
End: 2023-09-25
Attending: INTERNAL MEDICINE
Payer: MEDICARE

## 2023-09-25 ENCOUNTER — OFFICE VISIT (OUTPATIENT)
Dept: FAMILY MEDICINE | Facility: CLINIC | Age: 68
End: 2023-09-25
Payer: MEDICARE

## 2023-09-25 VITALS
SYSTOLIC BLOOD PRESSURE: 124 MMHG | HEIGHT: 63 IN | BODY MASS INDEX: 30.88 KG/M2 | OXYGEN SATURATION: 98 % | WEIGHT: 174.25 LBS | HEART RATE: 85 BPM | DIASTOLIC BLOOD PRESSURE: 74 MMHG | TEMPERATURE: 98 F | RESPIRATION RATE: 18 BRPM

## 2023-09-25 DIAGNOSIS — K21.9 GASTROESOPHAGEAL REFLUX DISEASE WITHOUT ESOPHAGITIS: ICD-10-CM

## 2023-09-25 DIAGNOSIS — I10 ESSENTIAL HYPERTENSION: Primary | ICD-10-CM

## 2023-09-25 DIAGNOSIS — I10 ESSENTIAL HYPERTENSION: ICD-10-CM

## 2023-09-25 DIAGNOSIS — E78.2 MIXED HYPERLIPIDEMIA: ICD-10-CM

## 2023-09-25 DIAGNOSIS — M54.42 ACUTE BILATERAL LOW BACK PAIN WITH BILATERAL SCIATICA: ICD-10-CM

## 2023-09-25 DIAGNOSIS — Z00.00 ANNUAL PHYSICAL EXAM: ICD-10-CM

## 2023-09-25 DIAGNOSIS — M54.41 ACUTE BILATERAL LOW BACK PAIN WITH BILATERAL SCIATICA: ICD-10-CM

## 2023-09-25 LAB
ALBUMIN SERPL BCP-MCNC: 4.1 G/DL (ref 3.5–5.2)
ALP SERPL-CCNC: 102 U/L (ref 55–135)
ALT SERPL W/O P-5'-P-CCNC: 22 U/L (ref 10–44)
ANION GAP SERPL CALC-SCNC: 7 MMOL/L (ref 8–16)
AST SERPL-CCNC: 23 U/L (ref 10–40)
BASOPHILS # BLD AUTO: 0.05 K/UL (ref 0–0.2)
BASOPHILS NFR BLD: 0.6 % (ref 0–1.9)
BILIRUB SERPL-MCNC: 0.3 MG/DL (ref 0.1–1)
BUN SERPL-MCNC: 12 MG/DL (ref 8–23)
CALCIUM SERPL-MCNC: 9.8 MG/DL (ref 8.7–10.5)
CHLORIDE SERPL-SCNC: 106 MMOL/L (ref 95–110)
CHOLEST SERPL-MCNC: 219 MG/DL (ref 120–199)
CHOLEST/HDLC SERPL: 3.6 {RATIO} (ref 2–5)
CO2 SERPL-SCNC: 26 MMOL/L (ref 23–29)
CREAT SERPL-MCNC: 0.8 MG/DL (ref 0.5–1.4)
DIFFERENTIAL METHOD: ABNORMAL
EOSINOPHIL # BLD AUTO: 0.3 K/UL (ref 0–0.5)
EOSINOPHIL NFR BLD: 3.4 % (ref 0–8)
ERYTHROCYTE [DISTWIDTH] IN BLOOD BY AUTOMATED COUNT: 13.8 % (ref 11.5–14.5)
EST. GFR  (NO RACE VARIABLE): >60 ML/MIN/1.73 M^2
GLUCOSE SERPL-MCNC: 88 MG/DL (ref 70–110)
HCT VFR BLD AUTO: 39.6 % (ref 37–48.5)
HDLC SERPL-MCNC: 61 MG/DL (ref 40–75)
HDLC SERPL: 27.9 % (ref 20–50)
HGB BLD-MCNC: 12.1 G/DL (ref 12–16)
IMM GRANULOCYTES # BLD AUTO: 0.01 K/UL (ref 0–0.04)
IMM GRANULOCYTES NFR BLD AUTO: 0.1 % (ref 0–0.5)
LDLC SERPL CALC-MCNC: 141.8 MG/DL (ref 63–159)
LYMPHOCYTES # BLD AUTO: 2.8 K/UL (ref 1–4.8)
LYMPHOCYTES NFR BLD: 35.9 % (ref 18–48)
MCH RBC QN AUTO: 28.8 PG (ref 27–31)
MCHC RBC AUTO-ENTMCNC: 30.6 G/DL (ref 32–36)
MCV RBC AUTO: 94 FL (ref 82–98)
MONOCYTES # BLD AUTO: 0.5 K/UL (ref 0.3–1)
MONOCYTES NFR BLD: 6.3 % (ref 4–15)
NEUTROPHILS # BLD AUTO: 4.2 K/UL (ref 1.8–7.7)
NEUTROPHILS NFR BLD: 53.7 % (ref 38–73)
NONHDLC SERPL-MCNC: 158 MG/DL
NRBC BLD-RTO: 0 /100 WBC
PLATELET # BLD AUTO: 288 K/UL (ref 150–450)
PMV BLD AUTO: 9.9 FL (ref 9.2–12.9)
POTASSIUM SERPL-SCNC: 4 MMOL/L (ref 3.5–5.1)
PROT SERPL-MCNC: 8.2 G/DL (ref 6–8.4)
RBC # BLD AUTO: 4.2 M/UL (ref 4–5.4)
SODIUM SERPL-SCNC: 139 MMOL/L (ref 136–145)
TRIGL SERPL-MCNC: 81 MG/DL (ref 30–150)
TSH SERPL DL<=0.005 MIU/L-ACNC: 1.41 UIU/ML (ref 0.4–4)
WBC # BLD AUTO: 7.9 K/UL (ref 3.9–12.7)

## 2023-09-25 PROCEDURE — 99397 PR PREVENTIVE VISIT,EST,65 & OVER: ICD-10-PCS | Mod: HCNC,S$GLB,, | Performed by: INTERNAL MEDICINE

## 2023-09-25 PROCEDURE — 3008F BODY MASS INDEX DOCD: CPT | Mod: HCNC,CPTII,S$GLB, | Performed by: INTERNAL MEDICINE

## 2023-09-25 PROCEDURE — 1101F PT FALLS ASSESS-DOCD LE1/YR: CPT | Mod: HCNC,CPTII,S$GLB, | Performed by: INTERNAL MEDICINE

## 2023-09-25 PROCEDURE — 36415 COLL VENOUS BLD VENIPUNCTURE: CPT | Mod: HCNC,PO | Performed by: INTERNAL MEDICINE

## 2023-09-25 PROCEDURE — 1126F AMNT PAIN NOTED NONE PRSNT: CPT | Mod: HCNC,CPTII,S$GLB, | Performed by: INTERNAL MEDICINE

## 2023-09-25 PROCEDURE — 3078F PR MOST RECENT DIASTOLIC BLOOD PRESSURE < 80 MM HG: ICD-10-PCS | Mod: HCNC,CPTII,S$GLB, | Performed by: INTERNAL MEDICINE

## 2023-09-25 PROCEDURE — 3044F HG A1C LEVEL LT 7.0%: CPT | Mod: HCNC,CPTII,S$GLB, | Performed by: INTERNAL MEDICINE

## 2023-09-25 PROCEDURE — 99999 PR PBB SHADOW E&M-EST. PATIENT-LVL IV: CPT | Mod: PBBFAC,HCNC,, | Performed by: INTERNAL MEDICINE

## 2023-09-25 PROCEDURE — 1160F RVW MEDS BY RX/DR IN RCRD: CPT | Mod: HCNC,CPTII,S$GLB, | Performed by: INTERNAL MEDICINE

## 2023-09-25 PROCEDURE — 3288F FALL RISK ASSESSMENT DOCD: CPT | Mod: HCNC,CPTII,S$GLB, | Performed by: INTERNAL MEDICINE

## 2023-09-25 PROCEDURE — 85025 COMPLETE CBC W/AUTO DIFF WBC: CPT | Mod: HCNC | Performed by: INTERNAL MEDICINE

## 2023-09-25 PROCEDURE — 84443 ASSAY THYROID STIM HORMONE: CPT | Mod: HCNC | Performed by: INTERNAL MEDICINE

## 2023-09-25 PROCEDURE — 1160F PR REVIEW ALL MEDS BY PRESCRIBER/CLIN PHARMACIST DOCUMENTED: ICD-10-PCS | Mod: HCNC,CPTII,S$GLB, | Performed by: INTERNAL MEDICINE

## 2023-09-25 PROCEDURE — 80061 LIPID PANEL: CPT | Mod: HCNC | Performed by: INTERNAL MEDICINE

## 2023-09-25 PROCEDURE — 1101F PR PT FALLS ASSESS DOC 0-1 FALLS W/OUT INJ PAST YR: ICD-10-PCS | Mod: HCNC,CPTII,S$GLB, | Performed by: INTERNAL MEDICINE

## 2023-09-25 PROCEDURE — 1159F MED LIST DOCD IN RCRD: CPT | Mod: HCNC,CPTII,S$GLB, | Performed by: INTERNAL MEDICINE

## 2023-09-25 PROCEDURE — 80053 COMPREHEN METABOLIC PANEL: CPT | Mod: HCNC | Performed by: INTERNAL MEDICINE

## 2023-09-25 PROCEDURE — 3074F PR MOST RECENT SYSTOLIC BLOOD PRESSURE < 130 MM HG: ICD-10-PCS | Mod: HCNC,CPTII,S$GLB, | Performed by: INTERNAL MEDICINE

## 2023-09-25 PROCEDURE — 3008F PR BODY MASS INDEX (BMI) DOCUMENTED: ICD-10-PCS | Mod: HCNC,CPTII,S$GLB, | Performed by: INTERNAL MEDICINE

## 2023-09-25 PROCEDURE — 99999 PR PBB SHADOW E&M-EST. PATIENT-LVL IV: ICD-10-PCS | Mod: PBBFAC,HCNC,, | Performed by: INTERNAL MEDICINE

## 2023-09-25 PROCEDURE — 3074F SYST BP LT 130 MM HG: CPT | Mod: HCNC,CPTII,S$GLB, | Performed by: INTERNAL MEDICINE

## 2023-09-25 PROCEDURE — 1159F PR MEDICATION LIST DOCUMENTED IN MEDICAL RECORD: ICD-10-PCS | Mod: HCNC,CPTII,S$GLB, | Performed by: INTERNAL MEDICINE

## 2023-09-25 PROCEDURE — 3078F DIAST BP <80 MM HG: CPT | Mod: HCNC,CPTII,S$GLB, | Performed by: INTERNAL MEDICINE

## 2023-09-25 PROCEDURE — 3044F PR MOST RECENT HEMOGLOBIN A1C LEVEL <7.0%: ICD-10-PCS | Mod: HCNC,CPTII,S$GLB, | Performed by: INTERNAL MEDICINE

## 2023-09-25 PROCEDURE — 99397 PER PM REEVAL EST PAT 65+ YR: CPT | Mod: HCNC,S$GLB,, | Performed by: INTERNAL MEDICINE

## 2023-09-25 PROCEDURE — 3288F PR FALLS RISK ASSESSMENT DOCUMENTED: ICD-10-PCS | Mod: HCNC,CPTII,S$GLB, | Performed by: INTERNAL MEDICINE

## 2023-09-25 PROCEDURE — 1126F PR PAIN SEVERITY QUANTIFIED, NO PAIN PRESENT: ICD-10-PCS | Mod: HCNC,CPTII,S$GLB, | Performed by: INTERNAL MEDICINE

## 2023-09-25 RX ORDER — PANTOPRAZOLE SODIUM 40 MG/1
40 TABLET, DELAYED RELEASE ORAL DAILY
Qty: 30 TABLET | Refills: 0 | Status: SHIPPED | OUTPATIENT
Start: 2023-09-25 | End: 2024-02-29

## 2023-09-26 DIAGNOSIS — E78.00 HYPERCHOLESTEROLEMIA: Primary | ICD-10-CM

## 2023-09-26 RX ORDER — ATORVASTATIN CALCIUM 40 MG/1
40 TABLET, FILM COATED ORAL DAILY
Qty: 90 TABLET | Refills: 3 | Status: SHIPPED | OUTPATIENT
Start: 2023-09-26 | End: 2024-02-29

## 2023-10-02 ENCOUNTER — OFFICE VISIT (OUTPATIENT)
Dept: UROLOGY | Facility: CLINIC | Age: 68
End: 2023-10-02
Payer: MEDICARE

## 2023-10-02 VITALS
DIASTOLIC BLOOD PRESSURE: 77 MMHG | HEIGHT: 63 IN | SYSTOLIC BLOOD PRESSURE: 117 MMHG | HEART RATE: 73 BPM | RESPIRATION RATE: 16 BRPM | BODY MASS INDEX: 30.9 KG/M2 | WEIGHT: 174.38 LBS

## 2023-10-02 DIAGNOSIS — N20.0 LEFT RENAL STONE: Primary | ICD-10-CM

## 2023-10-02 DIAGNOSIS — R39.15 URINARY URGENCY: ICD-10-CM

## 2023-10-02 LAB
BILIRUB UR QL STRIP: NEGATIVE
GLUCOSE UR QL STRIP: NEGATIVE
KETONES UR QL STRIP: NEGATIVE
LEUKOCYTE ESTERASE UR QL STRIP: NEGATIVE
PH, POC UA: 6.5
POC BLOOD, URINE: NEGATIVE
POC NITRATES, URINE: NEGATIVE
POC RESIDUAL URINE VOLUME: 9 ML (ref 0–100)
PROT UR QL STRIP: NEGATIVE
SP GR UR STRIP: 1.01 (ref 1–1.03)
UROBILINOGEN UR STRIP-ACNC: 0.2 (ref 0.1–1.1)

## 2023-10-02 PROCEDURE — 51798 US URINE CAPACITY MEASURE: CPT | Mod: HCNC,S$GLB,, | Performed by: UROLOGY

## 2023-10-02 PROCEDURE — 3078F PR MOST RECENT DIASTOLIC BLOOD PRESSURE < 80 MM HG: ICD-10-PCS | Mod: HCNC,CPTII,S$GLB, | Performed by: UROLOGY

## 2023-10-02 PROCEDURE — 1159F MED LIST DOCD IN RCRD: CPT | Mod: HCNC,CPTII,S$GLB, | Performed by: UROLOGY

## 2023-10-02 PROCEDURE — 3074F PR MOST RECENT SYSTOLIC BLOOD PRESSURE < 130 MM HG: ICD-10-PCS | Mod: HCNC,CPTII,S$GLB, | Performed by: UROLOGY

## 2023-10-02 PROCEDURE — 99213 OFFICE O/P EST LOW 20 MIN: CPT | Mod: HCNC,S$GLB,, | Performed by: UROLOGY

## 2023-10-02 PROCEDURE — 3044F PR MOST RECENT HEMOGLOBIN A1C LEVEL <7.0%: ICD-10-PCS | Mod: HCNC,CPTII,S$GLB, | Performed by: UROLOGY

## 2023-10-02 PROCEDURE — 3044F HG A1C LEVEL LT 7.0%: CPT | Mod: HCNC,CPTII,S$GLB, | Performed by: UROLOGY

## 2023-10-02 PROCEDURE — 99999 PR PBB SHADOW E&M-EST. PATIENT-LVL IV: ICD-10-PCS | Mod: PBBFAC,HCNC,, | Performed by: UROLOGY

## 2023-10-02 PROCEDURE — 1101F PR PT FALLS ASSESS DOC 0-1 FALLS W/OUT INJ PAST YR: ICD-10-PCS | Mod: HCNC,CPTII,S$GLB, | Performed by: UROLOGY

## 2023-10-02 PROCEDURE — 3078F DIAST BP <80 MM HG: CPT | Mod: HCNC,CPTII,S$GLB, | Performed by: UROLOGY

## 2023-10-02 PROCEDURE — 1159F PR MEDICATION LIST DOCUMENTED IN MEDICAL RECORD: ICD-10-PCS | Mod: HCNC,CPTII,S$GLB, | Performed by: UROLOGY

## 2023-10-02 PROCEDURE — 1126F AMNT PAIN NOTED NONE PRSNT: CPT | Mod: HCNC,CPTII,S$GLB, | Performed by: UROLOGY

## 2023-10-02 PROCEDURE — 3008F BODY MASS INDEX DOCD: CPT | Mod: HCNC,CPTII,S$GLB, | Performed by: UROLOGY

## 2023-10-02 PROCEDURE — 3008F PR BODY MASS INDEX (BMI) DOCUMENTED: ICD-10-PCS | Mod: HCNC,CPTII,S$GLB, | Performed by: UROLOGY

## 2023-10-02 PROCEDURE — 99213 PR OFFICE/OUTPT VISIT, EST, LEVL III, 20-29 MIN: ICD-10-PCS | Mod: HCNC,S$GLB,, | Performed by: UROLOGY

## 2023-10-02 PROCEDURE — 3074F SYST BP LT 130 MM HG: CPT | Mod: HCNC,CPTII,S$GLB, | Performed by: UROLOGY

## 2023-10-02 PROCEDURE — 3288F PR FALLS RISK ASSESSMENT DOCUMENTED: ICD-10-PCS | Mod: HCNC,CPTII,S$GLB, | Performed by: UROLOGY

## 2023-10-02 PROCEDURE — 1101F PT FALLS ASSESS-DOCD LE1/YR: CPT | Mod: HCNC,CPTII,S$GLB, | Performed by: UROLOGY

## 2023-10-02 PROCEDURE — 3288F FALL RISK ASSESSMENT DOCD: CPT | Mod: HCNC,CPTII,S$GLB, | Performed by: UROLOGY

## 2023-10-02 PROCEDURE — 81003 POCT URINALYSIS, DIPSTICK, AUTOMATED, W/O SCOPE: ICD-10-PCS | Mod: QW,HCNC,S$GLB, | Performed by: UROLOGY

## 2023-10-02 PROCEDURE — 1126F PR PAIN SEVERITY QUANTIFIED, NO PAIN PRESENT: ICD-10-PCS | Mod: HCNC,CPTII,S$GLB, | Performed by: UROLOGY

## 2023-10-02 PROCEDURE — 51798 POCT BLADDER SCAN: ICD-10-PCS | Mod: HCNC,S$GLB,, | Performed by: UROLOGY

## 2023-10-02 PROCEDURE — 81003 URINALYSIS AUTO W/O SCOPE: CPT | Mod: QW,HCNC,S$GLB, | Performed by: UROLOGY

## 2023-10-02 PROCEDURE — 99999 PR PBB SHADOW E&M-EST. PATIENT-LVL IV: CPT | Mod: PBBFAC,HCNC,, | Performed by: UROLOGY

## 2023-10-02 NOTE — PROGRESS NOTES
Chief Complaint   Patient presents with    left renal stone         History of Present Illness:   Cesario Sahu is a 68 y.o. female here for evaluation of left renal stone    10/2/23-KUB completed 8/22/23 personally reviewed, noting no obvious stones on official read, per my review, question of faint opacity overlying L upper pole. Sestamibi scan negative for abnormal uptake. Occasional nausea. No flank pain. Sometimes she has urgency. Voids large volumes sometimes. Trying to drink more water.   2/27/23- 66yo female, here for evaluation of a renal stone. She underwent renal US 1/11/23, which I have personally reviewed, showing a 4mm non-obstructing L upper pole renal stone.   Initially, she was having left sided abdominal and back pain. She reports that since starting the trulance for constipation, it has improved. No prior h/o urolithiasis. No gross hematuria. No incontinence. She did previously take oxybutynin for urgency, but got off of it due to constipation. Nocturia x 3. Bladder symptoms seem to be improving with constipation management.   She does have an elevated PTH, which has been trending down over the last year.     Review of Systems   Constitutional:  Negative for chills and fever.   Respiratory:  Negative for shortness of breath.    Cardiovascular:  Negative for chest pain.   Gastrointestinal:  Positive for nausea. Negative for abdominal pain.   Genitourinary:  Negative for hematuria.   All other systems reviewed and are negative.        Past Medical History:   Diagnosis Date    Arthritis     Digestive disorder     Hypertension     Insulin resistance     Left renal stone 7/18/2023       Past Surgical History:   Procedure Laterality Date    ADENOIDECTOMY      CHOLECYSTECTOMY      COLONOSCOPY N/A 6/11/2021    Procedure: COLONOSCOPY;  Surgeon: Serenity Ramey MD;  Location: [facility];  Service: Endoscopy;  Laterality: N/A;    DILATION AND CURETTAGE OF UTERUS  10/2018    ESOPHAGOGASTRODUODENOSCOPY  N/A 3/23/2022    Procedure: EGD (ESOPHAGOGASTRODUODENOSCOPY);  Surgeon: Abby Green MD;  Location: [facility];  Service: Endoscopy;  Laterality: N/A;       Family History   Problem Relation Age of Onset    Hypertension Mother     Heart disease Sister     Hyperlipidemia Sister     Hypertension Sister     Diabetes Brother        Social History     Tobacco Use    Smoking status: Never    Smokeless tobacco: Never   Substance Use Topics    Alcohol use: Yes     Comment: Socially    Drug use: Never       Current Outpatient Medications   Medication Sig Dispense Refill    amLODIPine (NORVASC) 5 MG tablet Take 1 tablet (5 mg total) by mouth once daily. 90 tablet 1    ascorbic acid (VITAMIN C ORAL)       aspirin (ECOTRIN) 81 MG EC tablet Take 81 mg by mouth once daily.      atorvastatin (LIPITOR) 40 MG tablet Take 1 tablet (40 mg total) by mouth once daily. 90 tablet 3    biotin 5,000 mcg TbDL Take by mouth.      cyclobenzaprine (FLEXERIL) 5 MG tablet Take 1-2 tablets (5-10 mg total) by mouth nightly as needed for Muscle spasms. 15 tablet 0    estradioL (ESTRACE) 0.01 % (0.1 mg/gram) vaginal cream Place 1 g vaginally once daily. 85 g 4    Lactobacillus acidophilus (PROBIOTIC ORAL)       metoprolol succinate (TOPROL-XL) 25 MG 24 hr tablet Take 1 tablet (25 mg total) by mouth once daily. 90 tablet 2    pantoprazole (PROTONIX) 40 MG tablet Take 1 tablet (40 mg total) by mouth once daily. 30 tablet 0     No current facility-administered medications for this visit.       Review of patient's allergies indicates:  No Known Allergies    Physical Exam  Vitals:    10/02/23 1117   BP: 117/77   Pulse: 73   Resp: 16     General: Well-developed, well-nourished, in no acute distress  HEENT: Normocephalic, atraumatic, extraocular movements intact  Neck: Supple, no supraclavicular or cervical lymphadenopathy, trachea midline  Respirations: even and unlabored  Back: midline spine, No CVA tenderness  Abdomen: soft, Non-tender, non-distended,  no palpable masses, no rebound or guarding  Extremities: moves all equally, no clubbing, cyanosis or edema  Skin: Warm and dry. No lesions  Psych: normal affect  Neuro: Alert and oriented x 3. Cranial nerves II-XII intact    PVR: 9cc    Urinalysis  Negative for blood, LE, nit      Assessment:  1. Left renal stone  POCT Urinalysis, Dipstick, Automated, W/O Scope    POCT Bladder Scan    X-Ray Abdomen AP 1 View      2. Urinary urgency              Plan:   Left renal stone  -     POCT Urinalysis, Dipstick, Automated, W/O Scope  -     POCT Bladder Scan  -     X-Ray Abdomen AP 1 View; Future; Expected date: 10/09/2024    Urinary urgency      Fluid augmentation  Limit caffeine  Follow up in about 1 year (around 10/2/2024) for KUB before visit.

## 2023-10-25 ENCOUNTER — CLINICAL SUPPORT (OUTPATIENT)
Dept: REHABILITATION | Facility: HOSPITAL | Age: 68
End: 2023-10-25
Payer: MEDICARE

## 2023-10-25 DIAGNOSIS — R29.898 WEAKNESS OF BOTH LOWER EXTREMITIES: ICD-10-CM

## 2023-10-25 DIAGNOSIS — M54.42 ACUTE BILATERAL LOW BACK PAIN WITH BILATERAL SCIATICA: Primary | ICD-10-CM

## 2023-10-25 DIAGNOSIS — Z74.09 DECREASED MOBILITY AND ENDURANCE: ICD-10-CM

## 2023-10-25 DIAGNOSIS — M54.41 ACUTE BILATERAL LOW BACK PAIN WITH BILATERAL SCIATICA: Primary | ICD-10-CM

## 2023-10-25 PROCEDURE — 97112 NEUROMUSCULAR REEDUCATION: CPT | Mod: HCNC

## 2023-10-25 PROCEDURE — 97162 PT EVAL MOD COMPLEX 30 MIN: CPT | Mod: HCNC

## 2023-10-25 NOTE — PROGRESS NOTES
"[facility]   Physical Therapy Initial Evaluation      Date: 10/25/2023   Name: Cesario Sahu  Clinic Number: [id number]    Therapy Diagnosis:    Encounter Diagnoses   Name Primary?    Acute bilateral low back pain with bilateral sciatica Yes    Weakness of both lower extremities     Decreased mobility and endurance       Physician: Zahida Bass DO     Physician Orders: PT Eval and Treat  Medical Diagnosis from Referral: Acute bilateral low back pain with bilateral sciatica  Evaluation Date: 10/25/2023  Authorization Period Expiration: 09/24/2024  Plan of Care Expiration: 12/20/2023  Progress Note Due: 11/24/2023  Visit # / Visits authorized: 1/1   FOTO: 1/3 (last performed on 10/25/2023)    Precautions: Standard, hypertension    Time In: 1007  Time Out: 1040  Total Billable Time (timed & untimed codes): 33 minutes    Subjective     Date of onset: months    History of current condition - Cesario reports she has been having low back symptoms chronically. Patient notes low back pain is midline and she gets radiating symptoms bilaterally. Patient notes the symptoms in the legs are pain and "quivering" pain as well. Patient notes having spasms in the low back as well. Patient reports no clear mechanism of injury.     Falls: 0    Imaging: [x] Xray [] MRI [] CT: Performed on: 01/05/2023  "FINDINGS:  There are 5 conventional lumbar type vertebral bodies present.  Lumbar vertebral body heights are adequately preserved.  There is loss of lumbar disc height at L1-2, L2-3, L3-4, and L5-S1 disc levels.  There is moderate endplate sclerosis with marginal osteophyte formation present throughout the entire lumbar spine.  There is moderate facet arthrosis at L4-5 and L5-S1."    Pain:  Current 4/10, worst 9/10, best 0/10   Location: [] Right   [] Left:  Lumbar, bilateral lower extremity  Description: aching , dull, sharp, and spasm  Aggravating Factors: standing or sitting for extended periods " (15 minutes), twisting quickly, bending without being cautious of posture  Easing Factors: activity avoidance, rest, walking, stretching, heating pad, NSAID    Prior Therapy:   [] N/A    [x] Yes: cervical/shoulders  Social History: Pt lives with their spouse  Occupation: Pt is  3 days a few  Prior Level of Function: Independent and pain free with all ADL, IADL, community mobility and functional activities.   Current Level of Function: Independent with all ADL, IADL, community mobility and functional activities with reports of increased pain and need for increased time and frequent breaks.      Dominant Extremity:    [x] Right    [] Left    Pts goals: Pt reported goals are to decrease overall pain levels in order to return to prior functional level.     Medical History:   Past Medical History:   Diagnosis Date    Arthritis     Digestive disorder     Hypertension     Insulin resistance     Left renal stone 7/18/2023       Surgical History:   Cesario Sahu  has a past surgical history that includes Adenoidectomy; Cholecystectomy; Dilation and curettage of uterus (10/2018); Colonoscopy (N/A, 6/11/2021); and Esophagogastroduodenoscopy (N/A, 3/23/2022).    Medications:   Cesario has a current medication list which includes the following prescription(s): amlodipine, ascorbic acid, aspirin, atorvastatin, biotin, cyclobenzaprine, estradiol, lactobacillus acidophilus, metoprolol succinate, and pantoprazole.    Allergies:   Review of patient's allergies indicates:  No Known Allergies     Objective        RANGE OF MOTION:   Lumbar ROM Right  (spine) Left   Pain/Dysfunction with Movement Goal   Lumbar Flexion (60º) 75% ---  100%   Lumbar Extension (30º) 50% --- Pain     Lumbar Side Bending (25º) 75% 75% Pain     Lumbar Rotation 75% 75% pain          STRENGTH:   L/E MMT Right  (spine) Left Pain/Dysfunction with Movement Goal   Modified (90/90) Abdominal Strength  --- ---     Hip Flexion  4-/5 4-/5  4+/5 B   Hip  Extension  3+/5 3+/5  4+/5 B   Hip Abduction  4-/5 4-/5  4+/5 B   Knee Extension 4/5 4/5  5/5 B   Knee Flexion 4-/5 4-/5  5/5 B   Hip IR 4-/5 4-/5  4+/5 B   Hip ER 4-/5 4-/5  4+/5 B   Ankle DF 4+/5 4+/5  5/5 B          MUSCLE LENGTH:   Muscle Tested  Right Left  Limitation Goal   Hip Flexors [] Normal  [x] Limited [] Normal  [x] Limited  Normal B   Quadriceps [] Normal  [x] Limited [] Normal  [x] Limited  Normal B   Hamstrings  [] Normal  [x] Limited [] Normal  [x] Limited  Normal B   Piriformis  [] Normal  [x] Limited [] Normal  [x] Limited  Normal B          JOINT MOBILITY:   Joint Motion  Right Mobility  (spine) Left Mobility Goal   Thoracic PA  [] Hypo     [x] Normal     [] Hyper --- Normal   Costotransverse  [] Hypo     [x] Normal     [] Hyper [] Hypo     [x] Normal     [] Hyper Normal    Lumbar PA [x] Hypo     [] Normal     [] Hyper --- Normal   Lumbar Unilat. PA [x] Hypo     [] Normal     [] Hyper [x] Hypo     [] Normal     [] Hyper Normal   Hip:  [] Hypo     [x] Normal     [] Hyper [] Hypo     [x] Normal     [] Hyper Normal   SIJ:  [] Hypo     [x] Normal     [] Hyper [] Hypo     [x] Normal     [] Hyper Normal         SPECIAL TESTS:    Right  (spine) Left  Goal   Lumbar Distraction  [] Positive    [] Negative --- Negative B    Lumbar Compression [] Positive    [] Negative --- Negative B    SLUMP [x] Positive    [] Negative [x] Positive    [] Negative Negative B    Straight Leg Raise [] Positive    [x] Negative [] Positive    [x] Negative Negative B    PAULY [x] Positive    [] Negative [x] Positive    [] Negative Negative B           SENSATION  [x] Intact to Light Touch   [] Impaired:      PALPATION: Muscles: Increased tone and tenderness to palpation of: bilateral paraspinals, quadratus lumborum, glutes, piriformis, deep hip rotators. Structures: Increased tenderness to palpation of: bilateral LOWER STRUCTURES : PSIS, ischial tuberosity.      POSTURE:  Pt presents with postural abnormalities which include:     [x] Forward Head   [] Increased Lumbar Lordosis   [x] Rounded Shoulder   [] Genu Recurvatum   [] Increased Thoracic Kyphosis [] Genu Valgus   [] Trunk Deviated    [] Pes Planus   [] Scapular Winging    [] Other:       Function:     Intake Outcome Measure for FOTO Lumbar Survey    Therapist reviewed FOTO scores for Cesario on 10/25/2023.   FOTO report - see Media section or FOTO account for episode details    Intake Score: see next visit.         Treatment     Total Treatment time (time-based codes) separate from Evaluation: (10) minutes     Cesario received the treatments listed below:      MANUAL THERAPY TECHNIQUES were applied for (0) minutes, including:    Manual Intervention Performed Today    Soft Tissue Mobilization [] bilateral paraspinals, quadratus lumborum, glutes, piriformis, deep hip rotators   Joint Mobilizations []     []     []    Functional Dry Needling  []      Plan for Next Visit: Continue as needed       NEUROMUSCULAR RE-EDUCATION ACTIVITIES to improve Balance, Coordination, Kinesthetic, Sense, Proprioception, and Posture for (10) minutes.  The following were included:    Intervention Performed Today    Home exercise plan  x Demonstration, education, performance   LTR     SKTC     90/90 Nerve [place]                           Plan for Next Visit: FOTO, Bike, Posterior Pelvic Tilt, bridge, clamshells, hamstring stretch, quad stretch, openbooks     Patient Education and Home Exercises     Education provided: time included in treatment time.   PURPOSE: Patient educated on the impairments noted above and the effects of physical therapy intervention to improve overall condition and QOL.   EXERCISE: Patient was educated on all the above exercise prior/during/after for proper posture, positioning, and execution for safe performance with home exercise program.   STRENGTH: Patient educated on the importance of improved core and extremity strength in order to improve alignment of the spine and  extremities with static positions and dynamic movement.   POSTURE: Patient educated on postural awareness to reduce stress and maintain optimal alignment of the spine with static positions and dynamic movement   SLEEPING POSITIONS: Patient educated on the use of pillows to aid in neutral alignment of spine and extremities when sleeping in supine or side lying.  TRANSFERS & TRANSITIONS: Patient educated on proper technique for bed mobility, transitions and transfers to improve body mechanics and decrease risk of injury.     Written Home Exercises Provided: yes.  Exercises were reviewed and Cesario was able to demonstrate them prior to the end of the session.  Cesario demonstrated good  understanding of the education provided. See EMR under Patient Instructions for exercises provided during therapy sessions.    Assessment     Cesario is a 68 y.o. female referred to outpatient Physical Therapy with a medical diagnosis of Acute bilateral low back pain with bilateral sciatica. Pt presents with impairments in the following categories: IMPAIRMENTS: ROM, strength, joint mobility, muscle length, posture, and core strength and stability    Pt prognosis is Good  Pt will benefit from skilled outpatient Physical Therapy to address the deficits stated above and in the chart below, provide pt/family education, and to maximize pt's level of independence.     Plan of care discussed with patient: Yes  Pt's spiritual, cultural and educational needs considered and patient is agreeable to the plan of care and goals as stated below:     Anticipated Barriers for therapy: co-morbidities and sedentary lifestyle    Medical Necessity is demonstrated by the following  History  Co-morbidities and personal factors that may impact the plan of care [] LOW: no personal factors / co-morbidities  [x] MODERATE: 1-2 personal factors / co-morbidities  [] HIGH: 3+ personal factors / co-morbidities    Moderate / High Support Documentation: Lifestyle,  "chronicity  Past Medical History:   Diagnosis Date    Arthritis     Digestive disorder     Hypertension     Insulin resistance     Left renal stone 7/18/2023        Examination  Body Structures and Functions, activity limitations and participation restrictions that may impact the plan of care [] LOW: addressing 1-2 elements  [x] MODERATE: 3+ elements  [] HIGH: 4+ elements (please support below)    Moderate / High Support Documentation: See above in "Current Level of Function"      Clinical Presentation [] LOW: stable  [x] MODERATE: Evolving  [] HIGH: Unstable     Decision Making/ Complexity Score: moderate         Short Term Goals:  4 weeks Status  Date Met   PAIN: Pt will report worst pain of 5/10 in order to progress toward max functional ability and improve quality of life. [x] Progressing  [] Met  [] Not Met    FUNCTION: Patient will demonstrate improved function as indicated by a score of greater than or equal to 50% of goal score out of 100 on FOTO. [x] Progressing  [] Met  [] Not Met    MOBILITY: Patient will improve AROM to 50% of stated goals, listed in objective measures above, in order to progress towards independence with functional activities.  [x] Progressing  [] Met  [] Not Met    STRENGTH: Patient will improve strength to 50% of stated goals, listed in objective measures above, in order to progress towards independence with functional activities. [x] Progressing  [] Met  [] Not Met    POSTURE: Patient will correct postural deviations in sitting and standing, to decrease pain and promote long term stability.  [x] Progressing  [] Met  [] Not Met    GAIT: Patient will demonstrate improved gait mechanics including improved gait in order to improve functional mobility, improve quality of life, and decrease risk of further injury or fall.  [x] Progressing  [] Met  [] Not Met    HEP: Patient will demonstrate independence with HEP in order to progress toward functional independence. [x] Progressing  [] " Met  [] Not Met      Long Term Goals:  8 weeks Status Date Met   PAIN: Pt will report worst pain of 1/10 in order to progress toward max functional ability and improve quality of life [x] Progressing  [] Met  [] Not Met    FUNCTION: Patient will demonstrate improved function as indicated by a score of greater than or equal to goal score out of 100 on FOTO. [x] Progressing  [] Met  [] Not Met    MOBILITY: Patient will improve AROM to stated goals, listed in objective measures above, in order to return to maximal functional potential and improve quality of life.  [x] Progressing  [] Met  [] Not Met    STRENGTH: Patient will improve strength to stated goals, listed in objective measures above, in order to improve functional independence and quality of life.  [x] Progressing  [] Met  [] Not Met    GAIT: Patient will demonstrate normalized gait mechanics with minimal compensation in order to return to PLOF. [x] Progressing  [] Met  [] Not Met    Patient will return to normal ADL's, IADL's, community involvement, recreational activities, and work-related activities with less than or equal to 1/10 pain and maximal function.  [x] Progressing  [] Met  [] Not Met      Plan     Plan of care Certification: 10/25/2023 to 12/20/2023.    Outpatient Physical Therapy 2 times weekly for 8 weeks to include any combination of the following interventions: virtual visits, dry needling, modalities, electrical stimulation (IFC, Pre-Mod, Attended with Functional Dry Needling), Cervical/Lumbar Traction, Gait Training, Manual Therapy, Neuromuscular Re-ed, Patient Education, Self Care, Therapeutic Exercise, and Therapeutic Activites     Yoni Chambers, PT, DPT

## 2023-10-25 NOTE — PLAN OF CARE
"[facility]   Physical Therapy Initial Evaluation      Date: 10/25/2023   Name: Cesario Sahu  Clinic Number: [id number]    Therapy Diagnosis:    Encounter Diagnoses   Name Primary?    Acute bilateral low back pain with bilateral sciatica Yes    Weakness of both lower extremities     Decreased mobility and endurance       Physician: Zahida Bass DO     Physician Orders: PT Eval and Treat  Medical Diagnosis from Referral: Acute bilateral low back pain with bilateral sciatica  Evaluation Date: 10/25/2023  Authorization Period Expiration: 09/24/2024  Plan of Care Expiration: 12/20/2023  Progress Note Due: 11/24/2023  Visit # / Visits authorized: 1/1   FOTO: 1/3 (last performed on 10/25/2023)    Precautions: Standard, hypertension    Time In: 1007  Time Out: 1040  Total Billable Time (timed & untimed codes): 33 minutes    Subjective     Date of onset: months    History of current condition - Cesario reports she has been having low back symptoms chronically. Patient notes low back pain is midline and she gets radiating symptoms bilaterally. Patient notes the symptoms in the legs are pain and "quivering" pain as well. Patient notes having spasms in the low back as well. Patient reports no clear mechanism of injury.     Falls: 0    Imaging: [x] Xray [] MRI [] CT: Performed on: 01/05/2023  "FINDINGS:  There are 5 conventional lumbar type vertebral bodies present.  Lumbar vertebral body heights are adequately preserved.  There is loss of lumbar disc height at L1-2, L2-3, L3-4, and L5-S1 disc levels.  There is moderate endplate sclerosis with marginal osteophyte formation present throughout the entire lumbar spine.  There is moderate facet arthrosis at L4-5 and L5-S1."    Pain:  Current 4/10, worst 9/10, best 0/10   Location: [] Right   [] Left:  Lumbar, bilateral lower extremity  Description: aching , dull, sharp, and spasm  Aggravating Factors: standing or sitting for extended periods " (15 minutes), twisting quickly, bending without being cautious of posture  Easing Factors: activity avoidance, rest, walking, stretching, heating pad, NSAID    Prior Therapy:   [] N/A    [x] Yes: cervical/shoulders  Social History: Pt lives with their spouse  Occupation: Pt is  3 days a few  Prior Level of Function: Independent and pain free with all ADL, IADL, community mobility and functional activities.   Current Level of Function: Independent with all ADL, IADL, community mobility and functional activities with reports of increased pain and need for increased time and frequent breaks.      Dominant Extremity:    [x] Right    [] Left    Pts goals: Pt reported goals are to decrease overall pain levels in order to return to prior functional level.     Medical History:   Past Medical History:   Diagnosis Date    Arthritis     Digestive disorder     Hypertension     Insulin resistance     Left renal stone 7/18/2023       Surgical History:   Cesario Sahu  has a past surgical history that includes Adenoidectomy; Cholecystectomy; Dilation and curettage of uterus (10/2018); Colonoscopy (N/A, 6/11/2021); and Esophagogastroduodenoscopy (N/A, 3/23/2022).    Medications:   Cesario has a current medication list which includes the following prescription(s): amlodipine, ascorbic acid, aspirin, atorvastatin, biotin, cyclobenzaprine, estradiol, lactobacillus acidophilus, metoprolol succinate, and pantoprazole.    Allergies:   Review of patient's allergies indicates:  No Known Allergies     Objective        RANGE OF MOTION:   Lumbar ROM Right  (spine) Left   Pain/Dysfunction with Movement Goal   Lumbar Flexion (60º) 75% ---  100%   Lumbar Extension (30º) 50% --- Pain     Lumbar Side Bending (25º) 75% 75% Pain     Lumbar Rotation 75% 75% pain          STRENGTH:   L/E MMT Right  (spine) Left Pain/Dysfunction with Movement Goal   Modified (90/90) Abdominal Strength  --- ---     Hip Flexion  4-/5 4-/5  4+/5 B   Hip  Extension  3+/5 3+/5  4+/5 B   Hip Abduction  4-/5 4-/5  4+/5 B   Knee Extension 4/5 4/5  5/5 B   Knee Flexion 4-/5 4-/5  5/5 B   Hip IR 4-/5 4-/5  4+/5 B   Hip ER 4-/5 4-/5  4+/5 B   Ankle DF 4+/5 4+/5  5/5 B          MUSCLE LENGTH:   Muscle Tested  Right Left  Limitation Goal   Hip Flexors [] Normal  [x] Limited [] Normal  [x] Limited  Normal B   Quadriceps [] Normal  [x] Limited [] Normal  [x] Limited  Normal B   Hamstrings  [] Normal  [x] Limited [] Normal  [x] Limited  Normal B   Piriformis  [] Normal  [x] Limited [] Normal  [x] Limited  Normal B          JOINT MOBILITY:   Joint Motion  Right Mobility  (spine) Left Mobility Goal   Thoracic PA  [] Hypo     [x] Normal     [] Hyper --- Normal   Costotransverse  [] Hypo     [x] Normal     [] Hyper [] Hypo     [x] Normal     [] Hyper Normal    Lumbar PA [x] Hypo     [] Normal     [] Hyper --- Normal   Lumbar Unilat. PA [x] Hypo     [] Normal     [] Hyper [x] Hypo     [] Normal     [] Hyper Normal   Hip:  [] Hypo     [x] Normal     [] Hyper [] Hypo     [x] Normal     [] Hyper Normal   SIJ:  [] Hypo     [x] Normal     [] Hyper [] Hypo     [x] Normal     [] Hyper Normal         SPECIAL TESTS:    Right  (spine) Left  Goal   Lumbar Distraction  [] Positive    [] Negative --- Negative B    Lumbar Compression [] Positive    [] Negative --- Negative B    SLUMP [x] Positive    [] Negative [x] Positive    [] Negative Negative B    Straight Leg Raise [] Positive    [x] Negative [] Positive    [x] Negative Negative B    PAULY [x] Positive    [] Negative [x] Positive    [] Negative Negative B           SENSATION  [x] Intact to Light Touch   [] Impaired:      PALPATION: Muscles: Increased tone and tenderness to palpation of: bilateral paraspinals, quadratus lumborum, glutes, piriformis, deep hip rotators. Structures: Increased tenderness to palpation of: bilateral LOWER STRUCTURES : PSIS, ischial tuberosity.      POSTURE:  Pt presents with postural abnormalities which include:     [x] Forward Head   [] Increased Lumbar Lordosis   [x] Rounded Shoulder   [] Genu Recurvatum   [] Increased Thoracic Kyphosis [] Genu Valgus   [] Trunk Deviated    [] Pes Planus   [] Scapular Winging    [] Other:       Function:     Intake Outcome Measure for FOTO Lumbar Survey    Therapist reviewed FOTO scores for Cesario on 10/25/2023.   FOTO report - see Media section or FOTO account for episode details    Intake Score: see next visit.         Treatment     Total Treatment time (time-based codes) separate from Evaluation: (10) minutes     Cesario received the treatments listed below:      MANUAL THERAPY TECHNIQUES were applied for (0) minutes, including:    Manual Intervention Performed Today    Soft Tissue Mobilization [] bilateral paraspinals, quadratus lumborum, glutes, piriformis, deep hip rotators   Joint Mobilizations []     []     []    Functional Dry Needling  []      Plan for Next Visit: Continue as needed       NEUROMUSCULAR RE-EDUCATION ACTIVITIES to improve Balance, Coordination, Kinesthetic, Sense, Proprioception, and Posture for (10) minutes.  The following were included:    Intervention Performed Today    Home exercise plan  x Demonstration, education, performance   LTR     SKTC     90/90 Nerve [place]                           Plan for Next Visit: FOTO, Bike, Posterior Pelvic Tilt, bridge, clamshells, hamstring stretch, quad stretch, openbooks     Patient Education and Home Exercises     Education provided: time included in treatment time.   PURPOSE: Patient educated on the impairments noted above and the effects of physical therapy intervention to improve overall condition and QOL.   EXERCISE: Patient was educated on all the above exercise prior/during/after for proper posture, positioning, and execution for safe performance with home exercise program.   STRENGTH: Patient educated on the importance of improved core and extremity strength in order to improve alignment of the spine and  extremities with static positions and dynamic movement.   POSTURE: Patient educated on postural awareness to reduce stress and maintain optimal alignment of the spine with static positions and dynamic movement   SLEEPING POSITIONS: Patient educated on the use of pillows to aid in neutral alignment of spine and extremities when sleeping in supine or side lying.  TRANSFERS & TRANSITIONS: Patient educated on proper technique for bed mobility, transitions and transfers to improve body mechanics and decrease risk of injury.     Written Home Exercises Provided: yes.  Exercises were reviewed and Cesario was able to demonstrate them prior to the end of the session.  Cesario demonstrated good  understanding of the education provided. See EMR under Patient Instructions for exercises provided during therapy sessions.    Assessment     Cesario is a 68 y.o. female referred to outpatient Physical Therapy with a medical diagnosis of Acute bilateral low back pain with bilateral sciatica. Pt presents with impairments in the following categories: IMPAIRMENTS: ROM, strength, joint mobility, muscle length, posture, and core strength and stability    Pt prognosis is Good  Pt will benefit from skilled outpatient Physical Therapy to address the deficits stated above and in the chart below, provide pt/family education, and to maximize pt's level of independence.     Plan of care discussed with patient: Yes  Pt's spiritual, cultural and educational needs considered and patient is agreeable to the plan of care and goals as stated below:     Anticipated Barriers for therapy: co-morbidities and sedentary lifestyle    Medical Necessity is demonstrated by the following  History  Co-morbidities and personal factors that may impact the plan of care [] LOW: no personal factors / co-morbidities  [x] MODERATE: 1-2 personal factors / co-morbidities  [] HIGH: 3+ personal factors / co-morbidities    Moderate / High Support Documentation: Lifestyle,  "chronicity  Past Medical History:   Diagnosis Date    Arthritis     Digestive disorder     Hypertension     Insulin resistance     Left renal stone 7/18/2023        Examination  Body Structures and Functions, activity limitations and participation restrictions that may impact the plan of care [] LOW: addressing 1-2 elements  [x] MODERATE: 3+ elements  [] HIGH: 4+ elements (please support below)    Moderate / High Support Documentation: See above in "Current Level of Function"      Clinical Presentation [] LOW: stable  [x] MODERATE: Evolving  [] HIGH: Unstable     Decision Making/ Complexity Score: moderate         Short Term Goals:  4 weeks Status  Date Met   PAIN: Pt will report worst pain of 5/10 in order to progress toward max functional ability and improve quality of life. [x] Progressing  [] Met  [] Not Met    FUNCTION: Patient will demonstrate improved function as indicated by a score of greater than or equal to 50% of goal score out of 100 on FOTO. [x] Progressing  [] Met  [] Not Met    MOBILITY: Patient will improve AROM to 50% of stated goals, listed in objective measures above, in order to progress towards independence with functional activities.  [x] Progressing  [] Met  [] Not Met    STRENGTH: Patient will improve strength to 50% of stated goals, listed in objective measures above, in order to progress towards independence with functional activities. [x] Progressing  [] Met  [] Not Met    POSTURE: Patient will correct postural deviations in sitting and standing, to decrease pain and promote long term stability.  [x] Progressing  [] Met  [] Not Met    GAIT: Patient will demonstrate improved gait mechanics including improved gait in order to improve functional mobility, improve quality of life, and decrease risk of further injury or fall.  [x] Progressing  [] Met  [] Not Met    HEP: Patient will demonstrate independence with HEP in order to progress toward functional independence. [x] Progressing  [] " Met  [] Not Met      Long Term Goals:  8 weeks Status Date Met   PAIN: Pt will report worst pain of 1/10 in order to progress toward max functional ability and improve quality of life [x] Progressing  [] Met  [] Not Met    FUNCTION: Patient will demonstrate improved function as indicated by a score of greater than or equal to goal score out of 100 on FOTO. [x] Progressing  [] Met  [] Not Met    MOBILITY: Patient will improve AROM to stated goals, listed in objective measures above, in order to return to maximal functional potential and improve quality of life.  [x] Progressing  [] Met  [] Not Met    STRENGTH: Patient will improve strength to stated goals, listed in objective measures above, in order to improve functional independence and quality of life.  [x] Progressing  [] Met  [] Not Met    GAIT: Patient will demonstrate normalized gait mechanics with minimal compensation in order to return to PLOF. [x] Progressing  [] Met  [] Not Met    Patient will return to normal ADL's, IADL's, community involvement, recreational activities, and work-related activities with less than or equal to 1/10 pain and maximal function.  [x] Progressing  [] Met  [] Not Met      Plan     Plan of care Certification: 10/25/2023 to 12/20/2023.    Outpatient Physical Therapy 2 times weekly for 8 weeks to include any combination of the following interventions: virtual visits, dry needling, modalities, electrical stimulation (IFC, Pre-Mod, Attended with Functional Dry Needling), Cervical/Lumbar Traction, Gait Training, Manual Therapy, Neuromuscular Re-ed, Patient Education, Self Care, Therapeutic Exercise, and Therapeutic Activites     Yoni Chambers, PT, DPT

## 2023-11-12 DIAGNOSIS — N95.2 VAGINAL ATROPHY: ICD-10-CM

## 2023-11-13 NOTE — TELEPHONE ENCOUNTER
Refill Routing Note   Medication(s) are not appropriate for processing by [facility] for the following reason(s):      Patient not seen by provider within 15 months  No active prescription written by provider    [facility] action(s):  Defer Care Due:  None identified              Appointments  past 12m or future 3m with PCP    Date Provider   Last Visit   7/13/2022 Frida Ochoa MD   Next Visit   Visit date not found Frida Ochoa MD   ED visits in past 90 days: 0        Note composed:11:35 PM 11/12/2023

## 2023-11-14 ENCOUNTER — OFFICE VISIT (OUTPATIENT)
Dept: FAMILY MEDICINE | Facility: CLINIC | Age: 68
End: 2023-11-14
Payer: MEDICARE

## 2023-11-14 ENCOUNTER — LAB VISIT (OUTPATIENT)
Dept: LAB | Facility: HOSPITAL | Age: 68
End: 2023-11-14
Attending: REGISTERED NURSE
Payer: MEDICARE

## 2023-11-14 VITALS
HEART RATE: 68 BPM | BODY MASS INDEX: 30.64 KG/M2 | RESPIRATION RATE: 18 BRPM | TEMPERATURE: 99 F | HEIGHT: 63 IN | SYSTOLIC BLOOD PRESSURE: 108 MMHG | WEIGHT: 172.94 LBS | OXYGEN SATURATION: 98 % | DIASTOLIC BLOOD PRESSURE: 64 MMHG

## 2023-11-14 DIAGNOSIS — I70.0 AORTIC ATHEROSCLEROSIS: ICD-10-CM

## 2023-11-14 DIAGNOSIS — Z91.89 FRAMINGHAM CARDIAC RISK <10% IN NEXT 10 YEARS: ICD-10-CM

## 2023-11-14 DIAGNOSIS — I10 ESSENTIAL HYPERTENSION: Primary | ICD-10-CM

## 2023-11-14 DIAGNOSIS — Z79.899 ON STATIN THERAPY: ICD-10-CM

## 2023-11-14 DIAGNOSIS — M79.609 PAIN IN EXTREMITY, UNSPECIFIED EXTREMITY: ICD-10-CM

## 2023-11-14 DIAGNOSIS — M62.838 MUSCLE SPASM: ICD-10-CM

## 2023-11-14 LAB
ANION GAP SERPL CALC-SCNC: 10 MMOL/L (ref 8–16)
BUN SERPL-MCNC: 11 MG/DL (ref 8–23)
CALCIUM SERPL-MCNC: 10.1 MG/DL (ref 8.7–10.5)
CHLORIDE SERPL-SCNC: 104 MMOL/L (ref 95–110)
CK SERPL-CCNC: 147 U/L (ref 20–180)
CO2 SERPL-SCNC: 25 MMOL/L (ref 23–29)
CREAT SERPL-MCNC: 0.8 MG/DL (ref 0.5–1.4)
EST. GFR  (NO RACE VARIABLE): >60 ML/MIN/1.73 M^2
GLUCOSE SERPL-MCNC: 60 MG/DL (ref 70–110)
MAGNESIUM SERPL-MCNC: 2.7 MG/DL (ref 1.6–2.6)
POTASSIUM SERPL-SCNC: 3.7 MMOL/L (ref 3.5–5.1)
SODIUM SERPL-SCNC: 139 MMOL/L (ref 136–145)

## 2023-11-14 PROCEDURE — 1101F PT FALLS ASSESS-DOCD LE1/YR: CPT | Mod: HCNC,CPTII,S$GLB, | Performed by: REGISTERED NURSE

## 2023-11-14 PROCEDURE — 3044F HG A1C LEVEL LT 7.0%: CPT | Mod: HCNC,CPTII,S$GLB, | Performed by: REGISTERED NURSE

## 2023-11-14 PROCEDURE — 99214 OFFICE O/P EST MOD 30 MIN: CPT | Mod: HCNC,S$GLB,, | Performed by: REGISTERED NURSE

## 2023-11-14 PROCEDURE — 99214 PR OFFICE/OUTPT VISIT, EST, LEVL IV, 30-39 MIN: ICD-10-PCS | Mod: HCNC,S$GLB,, | Performed by: REGISTERED NURSE

## 2023-11-14 PROCEDURE — 1125F AMNT PAIN NOTED PAIN PRSNT: CPT | Mod: HCNC,CPTII,S$GLB, | Performed by: REGISTERED NURSE

## 2023-11-14 PROCEDURE — 3078F PR MOST RECENT DIASTOLIC BLOOD PRESSURE < 80 MM HG: ICD-10-PCS | Mod: HCNC,CPTII,S$GLB, | Performed by: REGISTERED NURSE

## 2023-11-14 PROCEDURE — 3074F PR MOST RECENT SYSTOLIC BLOOD PRESSURE < 130 MM HG: ICD-10-PCS | Mod: HCNC,CPTII,S$GLB, | Performed by: REGISTERED NURSE

## 2023-11-14 PROCEDURE — 83735 ASSAY OF MAGNESIUM: CPT | Mod: HCNC | Performed by: REGISTERED NURSE

## 2023-11-14 PROCEDURE — 3288F PR FALLS RISK ASSESSMENT DOCUMENTED: ICD-10-PCS | Mod: HCNC,CPTII,S$GLB, | Performed by: REGISTERED NURSE

## 2023-11-14 PROCEDURE — 3044F PR MOST RECENT HEMOGLOBIN A1C LEVEL <7.0%: ICD-10-PCS | Mod: HCNC,CPTII,S$GLB, | Performed by: REGISTERED NURSE

## 2023-11-14 PROCEDURE — 3008F PR BODY MASS INDEX (BMI) DOCUMENTED: ICD-10-PCS | Mod: HCNC,CPTII,S$GLB, | Performed by: REGISTERED NURSE

## 2023-11-14 PROCEDURE — 36415 COLL VENOUS BLD VENIPUNCTURE: CPT | Mod: HCNC,PO | Performed by: REGISTERED NURSE

## 2023-11-14 PROCEDURE — 1159F PR MEDICATION LIST DOCUMENTED IN MEDICAL RECORD: ICD-10-PCS | Mod: HCNC,CPTII,S$GLB, | Performed by: REGISTERED NURSE

## 2023-11-14 PROCEDURE — 1101F PR PT FALLS ASSESS DOC 0-1 FALLS W/OUT INJ PAST YR: ICD-10-PCS | Mod: HCNC,CPTII,S$GLB, | Performed by: REGISTERED NURSE

## 2023-11-14 PROCEDURE — 3008F BODY MASS INDEX DOCD: CPT | Mod: HCNC,CPTII,S$GLB, | Performed by: REGISTERED NURSE

## 2023-11-14 PROCEDURE — 3074F SYST BP LT 130 MM HG: CPT | Mod: HCNC,CPTII,S$GLB, | Performed by: REGISTERED NURSE

## 2023-11-14 PROCEDURE — 80048 BASIC METABOLIC PNL TOTAL CA: CPT | Mod: HCNC | Performed by: REGISTERED NURSE

## 2023-11-14 PROCEDURE — 3078F DIAST BP <80 MM HG: CPT | Mod: HCNC,CPTII,S$GLB, | Performed by: REGISTERED NURSE

## 2023-11-14 PROCEDURE — 3288F FALL RISK ASSESSMENT DOCD: CPT | Mod: HCNC,CPTII,S$GLB, | Performed by: REGISTERED NURSE

## 2023-11-14 PROCEDURE — 1125F PR PAIN SEVERITY QUANTIFIED, PAIN PRESENT: ICD-10-PCS | Mod: HCNC,CPTII,S$GLB, | Performed by: REGISTERED NURSE

## 2023-11-14 PROCEDURE — 99999 PR PBB SHADOW E&M-EST. PATIENT-LVL IV: ICD-10-PCS | Mod: PBBFAC,HCNC,, | Performed by: REGISTERED NURSE

## 2023-11-14 PROCEDURE — 82550 ASSAY OF CK (CPK): CPT | Mod: HCNC | Performed by: REGISTERED NURSE

## 2023-11-14 PROCEDURE — 1159F MED LIST DOCD IN RCRD: CPT | Mod: HCNC,CPTII,S$GLB, | Performed by: REGISTERED NURSE

## 2023-11-14 PROCEDURE — 99999 PR PBB SHADOW E&M-EST. PATIENT-LVL IV: CPT | Mod: PBBFAC,HCNC,, | Performed by: REGISTERED NURSE

## 2023-11-14 RX ORDER — ESTRADIOL 0.1 MG/G
CREAM VAGINAL
Qty: 42.5 G | Refills: 3 | Status: SHIPPED | OUTPATIENT
Start: 2023-11-14

## 2023-11-14 RX ORDER — TIZANIDINE 4 MG/1
2-4 TABLET ORAL
Qty: 30 TABLET | Refills: 0 | Status: SHIPPED | OUTPATIENT
Start: 2023-11-14 | End: 2024-02-29

## 2023-11-14 NOTE — PROGRESS NOTES
"SUBJECTIVE:     Cesario Sahu is a 68 y.o. female is here today for:  Arm Pain    HPI:    Mrs. Sahu is here with reports of arm pain.  Current pain 9/10, mod to severe.  Pain comes and goes, similar to a muscle spasm.  Onset last night with "quivering" in right upper arm.  Did have a problem with lifting on Sunday 11/13/23 but nothing significant.  She is right-handed dominant.  No injury or trauma reported although she has been lifting dumbbells for about the past 6 months for strengthening, no more than 5 lbs.  Denies NT in hands/fingers, ROM decreased due to pain.  Also with c/o muscle/joint pain all over with charley horses.  Trying to drink more water, unclear if adequate for her.  Of note, has been taking Mag-Oxide supplement twice a day for at least the past month with Metamucil twice a day.  Started LIPITOR a few weeks ago; h/o statin intolerance (nausea).  Has had a low-K in the past.      REVIEW OF SYSTEMS:  Review of Systems   Constitutional:  Negative for chills, fever and malaise/fatigue.   Respiratory: Negative.     Cardiovascular: Negative.    Musculoskeletal:  Positive for joint pain and myalgias. Negative for falls.   Skin:  Negative for itching and rash.   Neurological:  Negative for tingling, tremors, seizures, loss of consciousness and weakness.         CURRENT ALLERGIES:  Review of patient's allergies indicates:  No Known Allergies    CURRENT PROBLEM LIST:  Patient Active Problem List   Diagnosis    Essential hypertension    Insulin resistance    Mixed hyperlipidemia    PVC (premature ventricular contraction)    Aortic atherosclerosis    Personal history of colonic polyps    Diverticulosis of sigmoid colon    Left renal stone    Hyperparathyroidism    Irritable bowel syndrome with constipation    Obesity due to excess calories    Gastroesophageal reflux disease without esophagitis    Acute bilateral low back pain with bilateral sciatica    Weakness of both lower extremities    Decreased " "mobility and endurance       CURRENT MEDICATION LIST:  Current Outpatient Medications   Medication Instructions    amLODIPine (NORVASC) 5 mg, Oral, Daily    ascorbic acid (VITAMIN C ORAL) No dose, route, or frequency recorded.    aspirin (ECOTRIN) 81 mg, Oral, Daily    atorvastatin (LIPITOR) 40 mg, Oral, Daily    biotin 5,000 mcg TbDL Oral    cyclobenzaprine (FLEXERIL) 5-10 mg, Oral, Nightly PRN    estradioL (ESTRACE) 0.01 % (0.1 mg/gram) vaginal cream APPLY 1 GRAM VAGINALLY DAILY    Lactobacillus acidophilus (PROBIOTIC ORAL) No dose, route, or frequency recorded.    metoprolol succinate (TOPROL-XL) 25 mg, Oral, Daily    pantoprazole (PROTONIX) 40 mg, Oral, Daily         HISTORY:  Past medical, surgical, family and social histories have been reviewed today.      OBJECTIVE:     Vitals:    11/14/23 1432   BP: 108/64   Pulse: 68   Resp: 18   Temp: 98.7 °F (37.1 °C)   SpO2: 98%   Weight: 78.4 kg (172 lb 15.2 oz)   Height: 5' 3" (1.6 m)   PainSc:   9   PainLoc: Arm       Physical Exam  Vitals reviewed.   Constitutional:       General: She is not in acute distress.  Cardiovascular:      Rate and Rhythm: Normal rate and regular rhythm.      Pulses: Normal pulses.      Heart sounds: Normal heart sounds.   Pulmonary:      Effort: Pulmonary effort is normal.      Breath sounds: Normal breath sounds.   Musculoskeletal:         General: Normal range of motion.      Right upper arm: Tenderness (RT biceps muscle) present. No swelling, edema or bony tenderness.        Arms:       Cervical back: Normal range of motion and neck supple. No rigidity.      Right lower leg: No edema.      Left lower leg: No edema.   Skin:     Capillary Refill: Capillary refill takes less than 2 seconds.   Neurological:      Mental Status: She is alert and oriented to person, place, and time.      Motor: No weakness.      Gait: Gait normal.   Psychiatric:         Mood and Affect: Mood normal.         Behavior: Behavior normal.         Thought Content: " Thought content normal.         Judgment: Judgment normal.         ASSESSMENT:     1. Essential hypertension --- chronic issue, stable on current med --- followed by Cardiology.    2. Aortic atherosclerosis --- see # 3 and 6    3. On statin therapy --- atorvastatin --- new med ordered 9/2023.  Check lab --- see # 4 and 5  -     CK; Future; Expected date: 11/14/2023    4. Pain in extremity, unspecified extremity --- lyte disturbance vs statin ???  -     tiZANidine (ZANAFLEX) 4 MG tablet; Take 0.5-1 tablets (2-4 mg total) by mouth every 6 to 8 hours as needed (muscle pain/spasms ---- possible sedation).  Dispense: 30 tablet; Refill: 0  -     CK; Future; Expected date: 11/14/2023  -     Basic Metabolic Panel; Future; Expected date: 11/14/2023  -     Magnesium; Future; Expected date: 11/14/2023    5. Muscle spasm  -     tiZANidine (ZANAFLEX) 4 MG tablet; Take 0.5-1 tablets (2-4 mg total) by mouth every 6 to 8 hours as needed (muscle pain/spasms ---- possible sedation).  Dispense: 30 tablet; Refill: 0  -     CK; Future; Expected date: 11/14/2023  -     Basic Metabolic Panel; Future; Expected date: 11/14/2023  -     Magnesium; Future; Expected date: 11/14/2023    6. [place] cardiac risk <10% in next 10 years ---- INTERMEDIATE    The 10-year ASCVD risk score (Petros DK, et al., 2019) is: 8.2%    Values used to calculate the score:      Age: 68 years      Sex: Female      Is Non- : Yes      Diabetic: No      Tobacco smoker: No      Systolic Blood Pressure: 108 mmHg      Is BP treated: Yes      HDL Cholesterol: 61 mg/dL      Total Cholesterol: 219 mg/dL      PLAN:     Stop statin and Mag supplement for now.  Lab pending.  RTC as directed and/or prn.        GABO [place]  [facility]       30 minutes of total time spent on the encounter, which includes face to face time and non-face to face time preparing to see the patient.  This includes obtaining and/or  reviewing separately obtained history, performing a medically appropriate examination and/or evaluation, and counseling and educating the patient/family/caregiver.  Includes documenting clinical information in the electronic or other health record, independently interpreting results (not separately reported) and communicating results to the patient/family/caregiver, with care coordination (not separately reported).  Medications, tests and/or procedures ordered as necessary along with referring and communicating with other health professionals (when not separately reported).

## 2023-11-15 ENCOUNTER — PATIENT MESSAGE (OUTPATIENT)
Dept: FAMILY MEDICINE | Facility: CLINIC | Age: 68
End: 2023-11-15
Payer: MEDICARE

## 2023-11-15 DIAGNOSIS — E83.41 HYPERMAGNESEMIA: Primary | ICD-10-CM

## 2023-11-21 ENCOUNTER — TELEPHONE (OUTPATIENT)
Dept: GASTROENTEROLOGY | Facility: CLINIC | Age: 68
End: 2023-11-21
Payer: MEDICARE

## 2023-11-21 NOTE — TELEPHONE ENCOUNTER
----- Message from Rachael Zeng sent at 11/20/2023 10:16 AM CST -----  Regarding: appt  Name of who is calling:   Cesario      What is the request in detail: Pt is requesting a call back in ref to scheduling a sooner appt than 12/27/2023 due to stomach issue and pain       Can the clinic reply by [facility]:no      What number to call back if not [facility]: [phone]

## 2023-12-01 ENCOUNTER — LAB VISIT (OUTPATIENT)
Dept: LAB | Facility: HOSPITAL | Age: 68
End: 2023-12-01
Attending: REGISTERED NURSE
Payer: MEDICARE

## 2023-12-01 DIAGNOSIS — E83.41 HYPERMAGNESEMIA: ICD-10-CM

## 2023-12-01 PROCEDURE — 36415 COLL VENOUS BLD VENIPUNCTURE: CPT | Mod: HCNC,PO | Performed by: REGISTERED NURSE

## 2023-12-01 PROCEDURE — 83735 ASSAY OF MAGNESIUM: CPT | Mod: HCNC | Performed by: REGISTERED NURSE

## 2023-12-02 LAB — MAGNESIUM SERPL-MCNC: 2.2 MG/DL (ref 1.6–2.6)

## 2023-12-05 ENCOUNTER — OFFICE VISIT (OUTPATIENT)
Dept: GASTROENTEROLOGY | Facility: CLINIC | Age: 68
End: 2023-12-05
Payer: MEDICARE

## 2023-12-05 VITALS
HEIGHT: 63 IN | WEIGHT: 168.19 LBS | SYSTOLIC BLOOD PRESSURE: 156 MMHG | BODY MASS INDEX: 29.8 KG/M2 | DIASTOLIC BLOOD PRESSURE: 84 MMHG | HEART RATE: 61 BPM

## 2023-12-05 DIAGNOSIS — K58.1 IRRITABLE BOWEL SYNDROME WITH CONSTIPATION: Primary | ICD-10-CM

## 2023-12-05 PROCEDURE — 99213 PR OFFICE/OUTPT VISIT, EST, LEVL III, 20-29 MIN: ICD-10-PCS | Mod: HCNC,S$GLB,, | Performed by: NURSE PRACTITIONER

## 2023-12-05 PROCEDURE — 3044F PR MOST RECENT HEMOGLOBIN A1C LEVEL <7.0%: ICD-10-PCS | Mod: HCNC,CPTII,S$GLB, | Performed by: NURSE PRACTITIONER

## 2023-12-05 PROCEDURE — 3008F BODY MASS INDEX DOCD: CPT | Mod: HCNC,CPTII,S$GLB, | Performed by: NURSE PRACTITIONER

## 2023-12-05 PROCEDURE — 99999 PR PBB SHADOW E&M-EST. PATIENT-LVL III: ICD-10-PCS | Mod: PBBFAC,HCNC,, | Performed by: NURSE PRACTITIONER

## 2023-12-05 PROCEDURE — 1125F PR PAIN SEVERITY QUANTIFIED, PAIN PRESENT: ICD-10-PCS | Mod: HCNC,CPTII,S$GLB, | Performed by: NURSE PRACTITIONER

## 2023-12-05 PROCEDURE — 1101F PT FALLS ASSESS-DOCD LE1/YR: CPT | Mod: HCNC,CPTII,S$GLB, | Performed by: NURSE PRACTITIONER

## 2023-12-05 PROCEDURE — 1125F AMNT PAIN NOTED PAIN PRSNT: CPT | Mod: HCNC,CPTII,S$GLB, | Performed by: NURSE PRACTITIONER

## 2023-12-05 PROCEDURE — 3077F PR MOST RECENT SYSTOLIC BLOOD PRESSURE >= 140 MM HG: ICD-10-PCS | Mod: HCNC,CPTII,S$GLB, | Performed by: NURSE PRACTITIONER

## 2023-12-05 PROCEDURE — 3079F DIAST BP 80-89 MM HG: CPT | Mod: HCNC,CPTII,S$GLB, | Performed by: NURSE PRACTITIONER

## 2023-12-05 PROCEDURE — 3288F PR FALLS RISK ASSESSMENT DOCUMENTED: ICD-10-PCS | Mod: HCNC,CPTII,S$GLB, | Performed by: NURSE PRACTITIONER

## 2023-12-05 PROCEDURE — 1160F PR REVIEW ALL MEDS BY PRESCRIBER/CLIN PHARMACIST DOCUMENTED: ICD-10-PCS | Mod: HCNC,CPTII,S$GLB, | Performed by: NURSE PRACTITIONER

## 2023-12-05 PROCEDURE — 99999 PR PBB SHADOW E&M-EST. PATIENT-LVL III: CPT | Mod: PBBFAC,HCNC,, | Performed by: NURSE PRACTITIONER

## 2023-12-05 PROCEDURE — 1101F PR PT FALLS ASSESS DOC 0-1 FALLS W/OUT INJ PAST YR: ICD-10-PCS | Mod: HCNC,CPTII,S$GLB, | Performed by: NURSE PRACTITIONER

## 2023-12-05 PROCEDURE — 3288F FALL RISK ASSESSMENT DOCD: CPT | Mod: HCNC,CPTII,S$GLB, | Performed by: NURSE PRACTITIONER

## 2023-12-05 PROCEDURE — 1159F MED LIST DOCD IN RCRD: CPT | Mod: HCNC,CPTII,S$GLB, | Performed by: NURSE PRACTITIONER

## 2023-12-05 PROCEDURE — 1159F PR MEDICATION LIST DOCUMENTED IN MEDICAL RECORD: ICD-10-PCS | Mod: HCNC,CPTII,S$GLB, | Performed by: NURSE PRACTITIONER

## 2023-12-05 PROCEDURE — 1160F RVW MEDS BY RX/DR IN RCRD: CPT | Mod: HCNC,CPTII,S$GLB, | Performed by: NURSE PRACTITIONER

## 2023-12-05 PROCEDURE — 3077F SYST BP >= 140 MM HG: CPT | Mod: HCNC,CPTII,S$GLB, | Performed by: NURSE PRACTITIONER

## 2023-12-05 PROCEDURE — 3044F HG A1C LEVEL LT 7.0%: CPT | Mod: HCNC,CPTII,S$GLB, | Performed by: NURSE PRACTITIONER

## 2023-12-05 PROCEDURE — 99213 OFFICE O/P EST LOW 20 MIN: CPT | Mod: HCNC,S$GLB,, | Performed by: NURSE PRACTITIONER

## 2023-12-05 PROCEDURE — 3079F PR MOST RECENT DIASTOLIC BLOOD PRESSURE 80-89 MM HG: ICD-10-PCS | Mod: HCNC,CPTII,S$GLB, | Performed by: NURSE PRACTITIONER

## 2023-12-05 PROCEDURE — 3008F PR BODY MASS INDEX (BMI) DOCUMENTED: ICD-10-PCS | Mod: HCNC,CPTII,S$GLB, | Performed by: NURSE PRACTITIONER

## 2023-12-05 RX ORDER — TENAPANOR HYDROCHLORIDE 53.2 MG/1
1 TABLET ORAL 2 TIMES DAILY
Qty: 60 TABLET | Refills: 11 | Status: SHIPPED | OUTPATIENT
Start: 2023-12-05 | End: 2024-02-29

## 2023-12-05 RX ORDER — TENAPANOR HYDROCHLORIDE 53.2 MG/1
1 TABLET ORAL 2 TIMES DAILY
Qty: 60 TABLET | Refills: 11 | Status: SHIPPED | OUTPATIENT
Start: 2023-12-05 | End: 2023-12-05

## 2023-12-05 NOTE — PROGRESS NOTES
Clinic Follow Up:  [facility] Follow Up Note    Reason for Follow Up:  The encounter diagnosis was Irritable bowel syndrome with constipation.    PCP: Zahida Bass       HPI:  This is a 68 y.o. female here for follow up of constipation.   She continues constipation despite trying many different medications in the past. She is having infrequent bowel movements.      Linzess- ineffective  Lactulsoe-- not able to tolerate  Colace-- ineffective   Tulance-- insurance denied.     Review of Systems   Constitutional:  Negative for activity change and appetite change.        As per interval history above   Respiratory:  Negative for cough and shortness of breath.    Cardiovascular:  Negative for chest pain.   Gastrointestinal:  Positive for abdominal pain and constipation. Negative for diarrhea, nausea and vomiting.   Skin:  Negative for color change and rash.       Medical History:  Past Medical History:   Diagnosis Date    Arthritis     Digestive disorder     Hypertension     Insulin resistance     Left renal stone 7/18/2023       Surgical History:   Past Surgical History:   Procedure Laterality Date    ADENOIDECTOMY      CHOLECYSTECTOMY      COLONOSCOPY N/A 6/11/2021    Procedure: COLONOSCOPY;  Surgeon: Serenity Ramey MD;  Location: [facility];  Service: Endoscopy;  Laterality: N/A;    DILATION AND CURETTAGE OF UTERUS  10/2018    ESOPHAGOGASTRODUODENOSCOPY N/A 3/23/2022    Procedure: EGD (ESOPHAGOGASTRODUODENOSCOPY);  Surgeon: Abby Green MD;  Location: [facility];  Service: Endoscopy;  Laterality: N/A;       Family History:   Family History   Problem Relation Age of Onset    Hypertension Mother     Heart disease Sister     Hyperlipidemia Sister     Hypertension Sister     Diabetes Brother        Social History:   Social History     Tobacco Use    Smoking status: Never    Smokeless tobacco: Never   Substance Use Topics    Alcohol use: Yes     Comment: Socially    Drug use: Never  "      Allergies: Review of patient's allergies indicates:  No Known Allergies    Home Medications:  Current Outpatient Medications on File Prior to Visit   Medication Sig Dispense Refill    amLODIPine (NORVASC) 5 MG tablet Take 1 tablet (5 mg total) by mouth once daily. 90 tablet 1    ascorbic acid (VITAMIN C ORAL)       aspirin (ECOTRIN) 81 MG EC tablet Take 81 mg by mouth once daily.      biotin 5,000 mcg TbDL Take by mouth.      estradioL (ESTRACE) 0.01 % (0.1 mg/gram) vaginal cream APPLY 1 GRAM VAGINALLY DAILY 42.5 g 3    Lactobacillus acidophilus (PROBIOTIC ORAL)       metoprolol succinate (TOPROL-XL) 25 MG 24 hr tablet Take 1 tablet (25 mg total) by mouth once daily. 90 tablet 2    pantoprazole (PROTONIX) 40 MG tablet Take 1 tablet (40 mg total) by mouth once daily. 30 tablet 0    tiZANidine (ZANAFLEX) 4 MG tablet Take 0.5-1 tablets (2-4 mg total) by mouth every 6 to 8 hours as needed (muscle pain/spasms ---- possible sedation). 30 tablet 0    atorvastatin (LIPITOR) 40 MG tablet Take 1 tablet (40 mg total) by mouth once daily. (Patient not taking: Reported on 12/5/2023) 90 tablet 3     No current facility-administered medications on file prior to visit.       BP (!) 156/84 (BP Location: Left arm, Patient Position: Sitting, BP Method: Medium (Manual))   Pulse 61   Ht 5' 3" (1.6 m)   Wt 76.3 kg (168 lb 3.4 oz)   BMI 29.80 kg/m²   Body mass index is 29.8 kg/m².  Physical Exam  Constitutional:       General: She is not in acute distress.  HENT:      Head: Normocephalic.   Neurological:      General: No focal deficit present.      Mental Status: She is alert.   Psychiatric:         Mood and Affect: Mood normal.         Judgment: Judgment normal.         Labs: Pertinent labs reviewed.    Assessment:   1. Irritable bowel syndrome with constipation        Recommendations:   - trail of IBSrella     Irritable bowel syndrome with constipation  -     Discontinue: tenapanor (IBSRELA) 50 mg Tab; Take 1 tablet by mouth 2 " (two) times a day.  Dispense: 60 tablet; Refill: 11  -     tenapanor (IBSRELA) 50 mg Tab; Take 1 tablet by mouth 2 (two) times a day.  Dispense: 60 tablet; Refill: 11    Return to Clinic:  Follow up in about 3 months (around 3/5/2024).    Thank you for the opportunity to participate in the care of this patient.  CHEO Burch

## 2023-12-19 ENCOUNTER — TELEPHONE (OUTPATIENT)
Dept: GASTROENTEROLOGY | Facility: CLINIC | Age: 68
End: 2023-12-19
Payer: MEDICARE

## 2024-01-21 ENCOUNTER — PATIENT MESSAGE (OUTPATIENT)
Dept: FAMILY MEDICINE | Facility: CLINIC | Age: 69
End: 2024-01-21
Payer: MEDICARE

## 2024-02-29 ENCOUNTER — OFFICE VISIT (OUTPATIENT)
Dept: INTERNAL MEDICINE | Facility: CLINIC | Age: 69
End: 2024-02-29
Payer: MEDICARE

## 2024-02-29 ENCOUNTER — HOSPITAL ENCOUNTER (OUTPATIENT)
Dept: RADIOLOGY | Facility: HOSPITAL | Age: 69
Discharge: HOME | End: 2024-02-29
Attending: INTERNAL MEDICINE
Payer: MEDICARE

## 2024-02-29 VITALS
WEIGHT: 172.81 LBS | OXYGEN SATURATION: 99 % | BODY MASS INDEX: 30.62 KG/M2 | HEIGHT: 63 IN | SYSTOLIC BLOOD PRESSURE: 130 MMHG | TEMPERATURE: 98 F | HEART RATE: 66 BPM | DIASTOLIC BLOOD PRESSURE: 70 MMHG

## 2024-02-29 DIAGNOSIS — M79.609 PAIN IN EXTREMITY, UNSPECIFIED EXTREMITY: ICD-10-CM

## 2024-02-29 DIAGNOSIS — M62.838 MUSCLE SPASM: ICD-10-CM

## 2024-02-29 DIAGNOSIS — M54.2 NECK PAIN: Primary | ICD-10-CM

## 2024-02-29 DIAGNOSIS — M54.2 NECK PAIN: ICD-10-CM

## 2024-02-29 PROCEDURE — 3078F DIAST BP <80 MM HG: CPT | Mod: HCNC,CPTII,S$GLB, | Performed by: INTERNAL MEDICINE

## 2024-02-29 PROCEDURE — 1125F AMNT PAIN NOTED PAIN PRSNT: CPT | Mod: HCNC,CPTII,S$GLB, | Performed by: INTERNAL MEDICINE

## 2024-02-29 PROCEDURE — 1159F MED LIST DOCD IN RCRD: CPT | Mod: HCNC,CPTII,S$GLB, | Performed by: INTERNAL MEDICINE

## 2024-02-29 PROCEDURE — 72040 X-RAY EXAM NECK SPINE 2-3 VW: CPT | Mod: TC,HCNC

## 2024-02-29 PROCEDURE — 99999 PR PBB SHADOW E&M-EST. PATIENT-LVL III: CPT | Mod: PBBFAC,HCNC,, | Performed by: INTERNAL MEDICINE

## 2024-02-29 PROCEDURE — 3288F FALL RISK ASSESSMENT DOCD: CPT | Mod: HCNC,CPTII,S$GLB, | Performed by: INTERNAL MEDICINE

## 2024-02-29 PROCEDURE — 99214 OFFICE O/P EST MOD 30 MIN: CPT | Mod: HCNC,S$GLB,, | Performed by: INTERNAL MEDICINE

## 2024-02-29 PROCEDURE — 3008F BODY MASS INDEX DOCD: CPT | Mod: HCNC,CPTII,S$GLB, | Performed by: INTERNAL MEDICINE

## 2024-02-29 PROCEDURE — 72040 X-RAY EXAM NECK SPINE 2-3 VW: CPT | Mod: 26,HCNC,, | Performed by: RADIOLOGY

## 2024-02-29 PROCEDURE — 3075F SYST BP GE 130 - 139MM HG: CPT | Mod: HCNC,CPTII,S$GLB, | Performed by: INTERNAL MEDICINE

## 2024-02-29 PROCEDURE — 1101F PT FALLS ASSESS-DOCD LE1/YR: CPT | Mod: HCNC,CPTII,S$GLB, | Performed by: INTERNAL MEDICINE

## 2024-02-29 RX ORDER — TIZANIDINE 4 MG/1
TABLET ORAL
Qty: 30 TABLET | Refills: 0 | Status: SHIPPED | OUTPATIENT
Start: 2024-02-29 | End: 2024-02-29 | Stop reason: SDUPTHER

## 2024-02-29 RX ORDER — METHYLPREDNISOLONE 4 MG/1
TABLET ORAL
Qty: 1 EACH | Refills: 0 | Status: SHIPPED | OUTPATIENT
Start: 2024-02-29 | End: 2024-02-29 | Stop reason: SDUPTHER

## 2024-02-29 RX ORDER — TIZANIDINE 4 MG/1
TABLET ORAL
Qty: 30 TABLET | Refills: 0 | Status: SHIPPED | OUTPATIENT
Start: 2024-02-29

## 2024-02-29 RX ORDER — GABAPENTIN 300 MG/1
300 CAPSULE ORAL NIGHTLY
Qty: 90 CAPSULE | Refills: 0 | Status: SHIPPED | OUTPATIENT
Start: 2024-02-29 | End: 2025-02-28

## 2024-02-29 RX ORDER — METHYLPREDNISOLONE 4 MG/1
TABLET ORAL
Qty: 1 EACH | Refills: 0 | Status: SHIPPED | OUTPATIENT
Start: 2024-02-29 | End: 2024-05-22

## 2024-02-29 RX ORDER — GABAPENTIN 300 MG/1
300 CAPSULE ORAL NIGHTLY
Qty: 90 CAPSULE | Refills: 0 | Status: SHIPPED | OUTPATIENT
Start: 2024-02-29 | End: 2024-02-29 | Stop reason: SDUPTHER

## 2024-02-29 NOTE — PROGRESS NOTES
"Subjective:      Patient ID: Cesario Sahu is a 68 y.o. female.    Chief Complaint: Arm Pain    HPI    67 yo with   Patient Active Problem List   Diagnosis    Essential hypertension    Insulin resistance    Mixed hyperlipidemia    PVC (premature ventricular contraction)    Aortic atherosclerosis    Personal history of colonic polyps    Diverticulosis of sigmoid colon    Left renal stone    Hyperparathyroidism    Irritable bowel syndrome with constipation    Obesity due to excess calories    Gastroesophageal reflux disease without esophagitis    Acute bilateral low back pain with bilateral sciatica    Weakness of both lower extremities    Decreased mobility and endurance     Past Medical History:   Diagnosis Date    Arthritis     Digestive disorder     Hypertension     Insulin resistance     Left renal stone 7/18/2023         Pt c/o justin arm pain. Justin shoulder radiating to wrists. Can radiate to neck. Right worse than left  Spasms during the day.   Pain Worse at night.   Numbness to arms and hands worse at night.   No injuries.   Symptoms for at least 3 months.   Worse with exercise positions with hands above head.   Worse with coming hair.   No lower ext symptoms.   No wt loss.   Walks on treadmill 3 days per week without lower ext symptoms.     Tizanidine prescribed by previous provider was of mild help.      Review of Systems   Constitutional:  Negative for chills, fever and unexpected weight change.   Respiratory:  Negative for cough, shortness of breath and wheezing.    Cardiovascular:  Negative for chest pain.   Gastrointestinal:  Negative for abdominal pain.   Skin:  Negative for rash and wound.     Objective:   /70 (BP Location: Right arm, Patient Position: Sitting, BP Method: Large (Manual))   Pulse 66   Temp 97.5 °F (36.4 °C) (Tympanic)   Ht 5' 3" (1.6 m)   Wt 78.4 kg (172 lb 13.5 oz)   SpO2 99%   BMI 30.62 kg/m²     Physical Exam  Constitutional:       General: She is awake.      " Appearance: Normal appearance.   HENT:      Head: Normocephalic and atraumatic.   Eyes:      Conjunctiva/sclera: Conjunctivae normal.   Neck:        Comments: Area of movement pain and tenderness.  Pulmonary:      Effort: Pulmonary effort is normal.   Musculoskeletal:      Cervical back: Normal range of motion. No rigidity. Pain with movement present. Normal range of motion.   Neurological:      Mental Status: She is alert. Mental status is at baseline.   Psychiatric:         Mood and Affect: Mood normal.         Behavior: Behavior normal. Behavior is cooperative.         Thought Content: Thought content normal.         Judgment: Judgment normal.     Good range of motion bilateral shoulders elbows wrists without significant pain.  There was mild pain reproduced with raising arm above head.    Lab Results   Component Value Date    WBC 7.90 09/25/2023    HGB 12.1 09/25/2023    HGB 12.2 05/30/2023    HGB 12.2 01/04/2023    HCT 39.6 09/25/2023    MCV 94 09/25/2023    MCV 93 05/30/2023    MCV 90 01/04/2023     09/25/2023    CHOL 219 (H) 09/25/2023    TRIG 81 09/25/2023    HDL 61 09/25/2023    LDLCALC 141.8 09/25/2023    LDLCALC 134.0 09/23/2022    LDLCALC 140.8 03/05/2021    ALT 22 09/25/2023    AST 23 09/25/2023     11/14/2023    K 3.7 11/14/2023    CALCIUM 10.1 11/14/2023     11/14/2023    CO2 25 11/14/2023    BUN 11 11/14/2023    CREATININE 0.8 11/14/2023    CREATININE 0.8 09/25/2023    CREATININE 0.7 05/30/2023    EGFRNORACEVR >60.0 11/14/2023    EGFRNORACEVR >60.0 09/25/2023    EGFRNORACEVR >60.0 05/30/2023    TSH 1.413 09/25/2023    TSH 0.714 05/30/2023    TSH 0.048 (L) 01/04/2023    GLU 60 (L) 11/14/2023    HGBA1C 5.4 05/30/2023    HGBA1C 5.4 09/23/2022    HGBA1C 5.5 06/01/2021    JZEBSXCZ35UY 31 05/30/2023    VAHESKAJ40JL 27 (L) 09/23/2022    PEFWJFLE92VR 26 (L) 06/01/2021    BNP 27 12/01/2021          The 10-year ASCVD risk score (Petros BLACK, et al., 2019) is: 12%    Values used to calculate  the score:      Age: 68 years      Sex: Female      Is Non- : Yes      Diabetic: No      Tobacco smoker: No      Systolic Blood Pressure: 130 mmHg      Is BP treated: Yes      HDL Cholesterol: 61 mg/dL      Total Cholesterol: 219 mg/dL     Assessment:     1. Neck pain    2. Muscle spasm    3. Pain in extremity, unspecified extremity      Plan:   1. Neck pain  -     X-Ray Cervical Spine 2 or 3 Views; Future; Expected date: 02/29/2024  -     Discontinue: methylPREDNISolone (MEDROL, MAURA,) 4 mg tablet; use as directed  Dispense: 1 each; Refill: 0  -     gabapentin (NEURONTIN) 300 MG capsule; Take 1 capsule (300 mg total) by mouth every evening.  Dispense: 90 capsule; Refill: 0  -     methylPREDNISolone (MEDROL, MAURA,) 4 mg tablet; use as directed  Dispense: 1 each; Refill: 0    2. Muscle spasm  -     tiZANidine (ZANAFLEX) 4 MG tablet; 1 tablet qhs prn muscle spasm.  Dispense: 30 tablet; Refill: 0    3. Pain in extremity, unspecified extremity  -     C-Reactive Protein; Future; Expected date: 02/29/2024  -     Sedimentation rate; Future; Expected date: 02/29/2024  -     tiZANidine (ZANAFLEX) 4 MG tablet; 1 tablet qhs prn muscle spasm.  Dispense: 30 tablet; Refill: 0  -     gabapentin (NEURONTIN) 300 MG capsule; Take 1 capsule (300 mg total) by mouth every evening.  Dispense: 90 capsule; Refill: 0        There are no Patient Instructions on file for this visit.    Future Appointments   Date Time Provider Department Center   3/14/2024  1:00 PM Kimi Louie NP [facility] [address]   4/8/2024 11:00 AM Madison Patino MD [facility] [address]   7/30/2024 10:40 AM Rene Vanessa MD [facility] CARDIO [facility]   10/7/2024 10:15 AM [facility] XR1 [facility] XRAY [facility]   10/7/2024 11:30 AM Carli Stone MD [facility] UROLOGY [facility]       Lab Frequency Next Occurrence   DXA Bone Density Spine And Hip Once 11/23/2021   Ambulatory referral/consult to Colorectal Surgery Once 02/28/2023   Ambulatory  referral/consult to Colorectal Surgery Once 06/06/2023   Ambulatory referral/consult to Sleep Disorders Once 06/08/2023   X-Ray Abdomen AP 1 View Once 10/09/2024       No follow-ups on file.

## 2024-03-19 ENCOUNTER — OFFICE VISIT (OUTPATIENT)
Dept: FAMILY MEDICINE | Facility: CLINIC | Age: 69
End: 2024-03-19
Payer: MEDICARE

## 2024-03-19 ENCOUNTER — TELEPHONE (OUTPATIENT)
Dept: INTERNAL MEDICINE | Facility: CLINIC | Age: 69
End: 2024-03-19
Payer: MEDICARE

## 2024-03-19 VITALS
SYSTOLIC BLOOD PRESSURE: 134 MMHG | HEIGHT: 63 IN | BODY MASS INDEX: 30.23 KG/M2 | OXYGEN SATURATION: 96 % | HEART RATE: 89 BPM | DIASTOLIC BLOOD PRESSURE: 70 MMHG | WEIGHT: 170.63 LBS | TEMPERATURE: 102 F

## 2024-03-19 DIAGNOSIS — R09.81 SINUS CONGESTION: ICD-10-CM

## 2024-03-19 DIAGNOSIS — I70.0 AORTIC ATHEROSCLEROSIS: ICD-10-CM

## 2024-03-19 DIAGNOSIS — U07.1 COVID-19: ICD-10-CM

## 2024-03-19 DIAGNOSIS — R05.9 COUGH, UNSPECIFIED TYPE: ICD-10-CM

## 2024-03-19 DIAGNOSIS — E21.3 HYPERPARATHYROIDISM: ICD-10-CM

## 2024-03-19 DIAGNOSIS — R52 BODY ACHES: ICD-10-CM

## 2024-03-19 DIAGNOSIS — R50.9 FEVER, UNSPECIFIED FEVER CAUSE: Primary | ICD-10-CM

## 2024-03-19 DIAGNOSIS — M35.01 KERATOCONJUNCTIVITIS SICCA: ICD-10-CM

## 2024-03-19 PROBLEM — H16.229 KERATOCONJUNCTIVITIS SICCA: Status: ACTIVE | Noted: 2024-03-19

## 2024-03-19 LAB
CTP QC/QA: YES
CTP QC/QA: YES
POC MOLECULAR INFLUENZA A AGN: NEGATIVE
POC MOLECULAR INFLUENZA B AGN: NEGATIVE
SARS-COV-2 RDRP RESP QL NAA+PROBE: POSITIVE

## 2024-03-19 PROCEDURE — 87635 SARS-COV-2 COVID-19 AMP PRB: CPT | Mod: QW,HCNC,S$GLB, | Performed by: NURSE PRACTITIONER

## 2024-03-19 PROCEDURE — 99999 PR PBB SHADOW E&M-EST. PATIENT-LVL IV: CPT | Mod: PBBFAC,HCNC,, | Performed by: NURSE PRACTITIONER

## 2024-03-19 PROCEDURE — 1160F RVW MEDS BY RX/DR IN RCRD: CPT | Mod: HCNC,CPTII,S$GLB, | Performed by: NURSE PRACTITIONER

## 2024-03-19 PROCEDURE — 87502 INFLUENZA DNA AMP PROBE: CPT | Mod: QW,HCNC,S$GLB, | Performed by: NURSE PRACTITIONER

## 2024-03-19 PROCEDURE — 3008F BODY MASS INDEX DOCD: CPT | Mod: HCNC,CPTII,S$GLB, | Performed by: NURSE PRACTITIONER

## 2024-03-19 PROCEDURE — 99214 OFFICE O/P EST MOD 30 MIN: CPT | Mod: HCNC,S$GLB,, | Performed by: NURSE PRACTITIONER

## 2024-03-19 PROCEDURE — 3075F SYST BP GE 130 - 139MM HG: CPT | Mod: HCNC,CPTII,S$GLB, | Performed by: NURSE PRACTITIONER

## 2024-03-19 PROCEDURE — 1125F AMNT PAIN NOTED PAIN PRSNT: CPT | Mod: HCNC,CPTII,S$GLB, | Performed by: NURSE PRACTITIONER

## 2024-03-19 PROCEDURE — 1159F MED LIST DOCD IN RCRD: CPT | Mod: HCNC,CPTII,S$GLB, | Performed by: NURSE PRACTITIONER

## 2024-03-19 PROCEDURE — 3078F DIAST BP <80 MM HG: CPT | Mod: HCNC,CPTII,S$GLB, | Performed by: NURSE PRACTITIONER

## 2024-03-19 RX ORDER — NIRMATRELVIR AND RITONAVIR 300-100 MG
KIT ORAL
Qty: 30 TABLET | Refills: 0 | Status: SHIPPED | OUTPATIENT
Start: 2024-03-19

## 2024-03-19 RX ORDER — LORATADINE 10 MG/1
10 TABLET ORAL DAILY
Qty: 30 TABLET | Refills: 0 | Status: SHIPPED | OUTPATIENT
Start: 2024-03-19 | End: 2025-03-19

## 2024-03-19 RX ORDER — PROMETHAZINE HYDROCHLORIDE AND DEXTROMETHORPHAN HYDROBROMIDE 6.25; 15 MG/5ML; MG/5ML
10 SYRUP ORAL EVERY 6 HOURS PRN
Qty: 240 ML | Refills: 0 | Status: SHIPPED | OUTPATIENT
Start: 2024-03-19 | End: 2024-03-29

## 2024-03-19 RX ORDER — IBUPROFEN 800 MG/1
800 TABLET ORAL EVERY 6 HOURS PRN
Qty: 30 TABLET | Refills: 2 | Status: SHIPPED | OUTPATIENT
Start: 2024-03-19

## 2024-03-19 NOTE — LETTER
March 19, 2024      [facility]  [address] [place] [address]  Phone: [phone]  Fax: [phone]       Patient: Cesario Sahu   YOB: 1955  Date of Visit: 03/19/2024    To Whom It May Concern:    Suyapa Sahu  was at [facility] on 03/19/2024. The patient may return to work/school on 3/25/24 with no restrictions. If you have any questions or concerns, or if I can be of further assistance, please do not hesitate to contact me.    Sincerely,    Elaina Mesa, NP

## 2024-03-19 NOTE — TELEPHONE ENCOUNTER
----- Message from Ivy Josafat sent at 3/19/2024  7:56 AM CDT -----  Contact: Cesario  Type:  Same Day Appointment Request    Caller is requesting a same day appointment.  Caller declined first available appointment listed below.    Name of Caller: Cesario   When is the first available appointment? 03/22/2024   Symptoms:-head, throat, chest hurts  Best Call Back Number: [phone] (home)    Additional Information:  Cesario want to change primary care providers and start seeing SALAZAR Gonzalez. She want to be seen today please. MRN: [id number] SALAZAR Gonzalez. Only

## 2024-03-19 NOTE — PROGRESS NOTES
Cesario Sahu  03/19/2024  [id number]    Zahida Bass DO  Patient Care Team:  Zahida Bass DO as PCP - General (Internal Medicine)  Carter Lea MD (Obstetrics and Gynecology)  Mickey Souza LPN as Care Coordinator (Internal Medicine)          Visit Type:an urgent visit for a new problem    Chief Complaint:  Chief Complaint   Patient presents with    Cough    Headache    Sore Throat    Nasal Congestion       History of Present Illness:    69 yo female presents with co fever, cough, nausea, ear pain, throat pain, BA, HA,  chills, sweats, sinus congestion that started Sunday. Pt denies CP, SOB, NVD, abdominal pain, fatigue, loss of taste or smell.   OTC zyrtec, tylenol and cough medication with no symptom relief   Pt requesting to be tested for flu and covid     History:  Past Medical History:   Diagnosis Date    Arthritis     Digestive disorder     Hypertension     Insulin resistance     Left renal stone 7/18/2023     Past Surgical History:   Procedure Laterality Date    ADENOIDECTOMY      CHOLECYSTECTOMY      COLONOSCOPY N/A 6/11/2021    Procedure: COLONOSCOPY;  Surgeon: Serenity Ramey MD;  Location: [facility];  Service: Endoscopy;  Laterality: N/A;    DILATION AND CURETTAGE OF UTERUS  10/2018    ESOPHAGOGASTRODUODENOSCOPY N/A 3/23/2022    Procedure: EGD (ESOPHAGOGASTRODUODENOSCOPY);  Surgeon: Abby Green MD;  Location: [facility];  Service: Endoscopy;  Laterality: N/A;     Family History   Problem Relation Age of Onset    Hypertension Mother     Heart disease Sister     Hyperlipidemia Sister     Hypertension Sister     Diabetes Brother      Social History     Socioeconomic History    Marital status:     Number of children: 3   Occupational History     Comment: financial     Tobacco Use    Smoking status: Never    Smokeless tobacco: Never   Substance and Sexual Activity    Alcohol use: Yes     Comment: Socially    Drug use: Never    Sexual activity: Yes      Partners: Male   Social History Narrative    The patient remarried in June 2018.  She has 3 adult children.  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     Social Determinants of Health     Financial Resource Strain: Low Risk  (7/18/2023)    Overall Financial Resource Strain (CARDIA)     Difficulty of Paying Living Expenses: Not very hard   Transportation Needs: No Transportation Needs (7/18/2023)    PRAPARE - Transportation     Lack of Transportation (Medical): No     Lack of Transportation (Non-Medical): No   Physical Activity: Sufficiently Active (7/18/2023)    Exercise Vital Sign     Days of Exercise per Week: 5 days     Minutes of Exercise per Session: 30 min   Stress: No Stress Concern Present (7/18/2023)    [employer] [place] [employer] - Occupational Stress Questionnaire     Feeling of Stress : Only a little   Social Connections: Socially Integrated (7/18/2023)    Social Connection and Isolation Panel [NHANES]     Frequency of Communication with Friends and Family: More than three times a week     Frequency of Social Gatherings with Friends and Family: More than three times a week     Attends [employer]ism Services: More than 4 times per year     Active Member of Clubs or Organizations: Yes     Attends Club or Organization Meetings: More than 4 times per year     Marital Status:    Housing Stability: Unknown (7/18/2023)    Housing Stability Vital Sign     Unable to Pay for Housing in the Last Year: No     Unstable Housing in the Last Year: No     Patient Active Problem List   Diagnosis    Essential hypertension    Insulin resistance    Mixed hyperlipidemia    PVC (premature ventricular contraction)    Aortic atherosclerosis    Personal history of colonic polyps    Diverticulosis of sigmoid colon    Left renal stone    Hyperparathyroidism    Irritable bowel syndrome with constipation    Obesity due to excess calories    Gastroesophageal reflux disease without esophagitis    Acute bilateral  low back pain with bilateral sciatica    Weakness of both lower extremities    Decreased mobility and endurance     Review of patient's allergies indicates:  No Known Allergies    The following were reviewed at this visit: active problem list, medication list, allergies, family history, social history, and health maintenance.    Medications:  Current Outpatient Medications on File Prior to Visit   Medication Sig Dispense Refill    amLODIPine (NORVASC) 5 MG tablet Take 1 tablet (5 mg total) by mouth once daily. 90 tablet 1    ascorbic acid (VITAMIN C ORAL)       aspirin (ECOTRIN) 81 MG EC tablet Take 81 mg by mouth once daily.      estradioL (ESTRACE) 0.01 % (0.1 mg/gram) vaginal cream APPLY 1 GRAM VAGINALLY DAILY 42.5 g 3    gabapentin (NEURONTIN) 300 MG capsule Take 1 capsule (300 mg total) by mouth every evening. 90 capsule 0    Lactobacillus acidophilus (PROBIOTIC ORAL)       metoprolol succinate (TOPROL-XL) 25 MG 24 hr tablet Take 1 tablet (25 mg total) by mouth once daily. 90 tablet 2    tiZANidine (ZANAFLEX) 4 MG tablet 1 tablet qhs prn muscle spasm. 30 tablet 0    methylPREDNISolone (MEDROL, MAURA,) 4 mg tablet use as directed (Patient not taking: Reported on 3/19/2024) 1 each 0     No current facility-administered medications on file prior to visit.       Medications have been reviewed and reconciled with patient at this visit.  Barriers to medications reviewed with patient.    Adverse reactions to current medications reviewed with patient..    Over the counter medications reviewed and reconciled with patient.    Exam:  Wt Readings from Last 3 Encounters:   03/19/24 77.4 kg (170 lb 10.2 oz)   02/29/24 78.4 kg (172 lb 13.5 oz)   12/05/23 76.3 kg (168 lb 3.4 oz)     Temp Readings from Last 3 Encounters:   03/19/24 (!) 101.5 °F (38.6 °C) (Temporal)   02/29/24 97.5 °F (36.4 °C) (Tympanic)   11/14/23 98.7 °F (37.1 °C)     BP Readings from Last 3 Encounters:   03/19/24 134/70   02/29/24 130/70   12/05/23 (!) 156/84      Pulse Readings from Last 3 Encounters:   03/19/24 89   02/29/24 66   12/05/23 61     Body mass index is 30.23 kg/m².      Review of Systems   Constitutional:  Positive for chills. Negative for fever.   HENT:  Positive for congestion, ear pain, sinus pain and sore throat.    Respiratory:  Positive for cough. Negative for sputum production, shortness of breath and wheezing.    Musculoskeletal:  Positive for myalgias.   Neurological:  Positive for headaches.     Physical Exam  Vitals and nursing note reviewed.   Constitutional:       General: She is awake.      Appearance: Normal appearance. She is obese. She is ill-appearing.   HENT:      Head: Normocephalic and atraumatic.      Right Ear: Tympanic membrane, ear canal and external ear normal.      Left Ear: Tympanic membrane, ear canal and external ear normal.      Nose: Congestion and rhinorrhea present.      Mouth/Throat:      Mouth: Mucous membranes are moist.      Pharynx: Oropharynx is clear.   Eyes:      Extraocular Movements: Extraocular movements intact.      Conjunctiva/sclera: Conjunctivae normal.      Pupils: Pupils are equal, round, and reactive to light.   Cardiovascular:      Rate and Rhythm: Normal rate and regular rhythm.      Pulses: Normal pulses.      Heart sounds: Normal heart sounds.   Pulmonary:      Effort: Pulmonary effort is normal.      Breath sounds: Normal breath sounds.      Comments: Dry cough throughout exam   Abdominal:      General: Bowel sounds are normal.      Palpations: Abdomen is soft.   Musculoskeletal:         General: Normal range of motion.      Cervical back: Normal range of motion and neck supple.   Skin:     General: Skin is warm and dry.      Capillary Refill: Capillary refill takes less than 2 seconds.   Neurological:      General: No focal deficit present.      Mental Status: She is alert and oriented to person, place, and time.   Psychiatric:         Mood and Affect: Mood normal.         Behavior: Behavior normal.          Thought Content: Thought content normal.         Judgment: Judgment normal.         Laboratory Reviewed ({Yes)  Lab Results   Component Value Date    WBC 7.90 09/25/2023    HGB 12.1 09/25/2023    HCT 39.6 09/25/2023     09/25/2023    CHOL 219 (H) 09/25/2023    TRIG 81 09/25/2023    HDL 61 09/25/2023    ALT 22 09/25/2023    AST 23 09/25/2023     11/14/2023    K 3.7 11/14/2023     11/14/2023    CREATININE 0.8 11/14/2023    BUN 11 11/14/2023    CO2 25 11/14/2023    TSH 1.413 09/25/2023    HGBA1C 5.4 05/30/2023       Cesario was seen today for cough, headache, sore throat and nasal congestion.    Diagnoses and all orders for this visit:    Fever, unspecified fever cause  -     POCT COVID-19 Rapid Screening  -     POCT Influenza A/B Molecular    COVID-19  -     nirmatrelvir-ritonavir (PAXLOVID) 300 mg (150 mg x 2)-100 mg copackaged tablets (EUA); Take 3 tablets by mouth 2 (two) times daily. Each dose contains 2 nirmatrelvir (pink tablets) and 1 ritonavir (white tablet). Take all 3 tablets together    Body aches  -     ibuprofen (ADVIL,MOTRIN) 800 MG tablet; Take 1 tablet (800 mg total) by mouth every 6 (six) hours as needed for Pain.    Sinus congestion  -     loratadine (CLARITIN) 10 mg tablet; Take 1 tablet (10 mg total) by mouth once daily.    Cough, unspecified type  -     promethazine-dextromethorphan (PROMETHAZINE-DM) 6.25-15 mg/5 mL Syrp; Take 10 mLs by mouth every 6 (six) hours as needed.        Plan     Start medications prescribed today   COVID +        Care Plan/Goals: Reviewed    Goals    None     Cesario was seen today for cough, headache, sore throat and nasal congestion.    Diagnoses and all orders for this visit:    Fever, unspecified fever cause  -     POCT COVID-19 Rapid Screening  -     POCT Influenza A/B Molecular    COVID-19  -     nirmatrelvir-ritonavir (PAXLOVID) 300 mg (150 mg x 2)-100 mg copackaged tablets (EUA); Take 3 tablets by mouth 2 (two) times daily. Each dose  contains 2 nirmatrelvir (pink tablets) and 1 ritonavir (white tablet). Take all 3 tablets together    Body aches  -     ibuprofen (ADVIL,MOTRIN) 800 MG tablet; Take 1 tablet (800 mg total) by mouth every 6 (six) hours as needed for Pain.    Sinus congestion  -     loratadine (CLARITIN) 10 mg tablet; Take 1 tablet (10 mg total) by mouth once daily.    Cough, unspecified type  -     promethazine-dextromethorphan (PROMETHAZINE-DM) 6.25-15 mg/5 mL Syrp; Take 10 mLs by mouth every 6 (six) hours as needed.         Follow up: Follow up if symptoms worsen or fail to improve.    After visit summary was printed and given to patient upon discharge today.  Patient goals and care plan are included in After Visit Summary.

## 2024-04-04 ENCOUNTER — TELEPHONE (OUTPATIENT)
Dept: OBSTETRICS AND GYNECOLOGY | Facility: CLINIC | Age: 69
End: 2024-04-04
Payer: MEDICARE

## 2024-04-04 DIAGNOSIS — N95.2 VAGINAL ATROPHY: ICD-10-CM

## 2024-04-04 RX ORDER — ESTRADIOL 0.1 MG/G
1 CREAM VAGINAL
Qty: 42.5 G | Refills: 6 | Status: SHIPPED | OUTPATIENT
Start: 2024-04-04 | End: 2024-04-09 | Stop reason: SDUPTHER

## 2024-04-04 NOTE — TELEPHONE ENCOUNTER
----- Message from Jill León sent at 4/4/2024 10:33 AM CDT -----  Contact: Elaina/ Rosamaria Delaney is calling to request a refill on the vaginal cream, please give her a call at     Thanks  LJ

## 2024-04-05 ENCOUNTER — TELEPHONE (OUTPATIENT)
Dept: OBSTETRICS AND GYNECOLOGY | Facility: CLINIC | Age: 69
End: 2024-04-05
Payer: MEDICARE

## 2024-04-05 DIAGNOSIS — N95.2 VAGINAL ATROPHY: ICD-10-CM

## 2024-04-05 NOTE — TELEPHONE ENCOUNTER
Spoke to patient and she states her estradiol vaginal cream was sent to the wrong pharmacy. [facility] [place]- [address] [place] [address]. Informed patient I will route this message to KRISTINA Gutierrez. Patient verbalized understanding.     Please advise.

## 2024-04-05 NOTE — TELEPHONE ENCOUNTER
----- Message from Abby Chua sent at 4/5/2024 12:24 PM CDT -----  Contact: ARGELIA SMITH [[id number]]  ..Type:  Patient Requesting Call    Who Called:   Does the patient know what this is regarding?: pt reports estradioL (ESTRACE) 0.01 % (0.1 mg/gram) vaginal cream was sent to wrong pharmacy  Would the patient rather a call back or a response via [facility]?  call  Best Call Back Number: .[phone] (home)   Additional Information:     [id number] - [place] - [address]  [place] [address]  Phone: [phone] Fax: [phone]

## 2024-04-09 RX ORDER — ESTRADIOL 0.1 MG/G
1 CREAM VAGINAL
Qty: 42.5 G | Refills: 6 | Status: SHIPPED | OUTPATIENT
Start: 2024-04-11

## 2024-04-25 RX ORDER — METOPROLOL SUCCINATE 25 MG/1
25 TABLET, EXTENDED RELEASE ORAL
Qty: 90 TABLET | Refills: 3 | Status: SHIPPED | OUTPATIENT
Start: 2024-04-25

## 2024-05-21 NOTE — PROGRESS NOTES
Subjective     Patient ID: Cesario Sahu is a 68 y.o. female.    Chief Complaint: Fatigue (Patient c/o pain in her right arm and her lower back. Patient states that this has been going on for the last couple of months. Worse at night when she sleeps. )      HPI  Patient presents with the complaints of low back pain, bilateral shoulder pain (R>L) and fatigue. She has had these symptoms for several months. She does report the pain is worse overnight. She was diagnosed with osteoarthritis in the past.    Review of Systems   Constitutional:  Positive for fatigue. Negative for chills.   Respiratory:  Negative for chest tightness and shortness of breath.    Cardiovascular:  Negative for chest pain and palpitations.   Gastrointestinal:  Negative for abdominal pain, constipation, diarrhea, nausea and vomiting.   Genitourinary:  Negative for frequency and urgency.   Musculoskeletal:  Positive for arthralgias and back pain.   Neurological:  Negative for dizziness, numbness and headaches.          Objective       Current Outpatient Medications:     amLODIPine (NORVASC) 5 MG tablet, Take 1 tablet (5 mg total) by mouth once daily., Disp: 90 tablet, Rfl: 1    ascorbic acid (VITAMIN C ORAL), , Disp: , Rfl:     aspirin (ECOTRIN) 81 MG EC tablet, Take 81 mg by mouth once daily., Disp: , Rfl:     estradioL (ESTRACE) 0.01 % (0.1 mg/gram) vaginal cream, Place 1 g vaginally twice a week., Disp: 42.5 g, Rfl: 6    gabapentin (NEURONTIN) 300 MG capsule, Take 1 capsule (300 mg total) by mouth every evening., Disp: 90 capsule, Rfl: 0    ibuprofen (ADVIL,MOTRIN) 800 MG tablet, Take 1 tablet (800 mg total) by mouth every 6 (six) hours as needed for Pain., Disp: 30 tablet, Rfl: 2    Lactobacillus acidophilus (PROBIOTIC ORAL), , Disp: , Rfl:     loratadine (CLARITIN) 10 mg tablet, Take 1 tablet (10 mg total) by mouth once daily., Disp: 30 tablet, Rfl: 0    metoprolol succinate (TOPROL-XL) 25 MG 24 hr tablet, Take 1 tablet by mouth once  daily, Disp: 90 tablet, Rfl: 3    nirmatrelvir-ritonavir (PAXLOVID) 300 mg (150 mg x 2)-100 mg copackaged tablets (EUA), Take 3 tablets by mouth 2 (two) times daily. Each dose contains 2 nirmatrelvir (pink tablets) and 1 ritonavir (white tablet). Take all 3 tablets together, Disp: 30 tablet, Rfl: 0    tiZANidine (ZANAFLEX) 4 MG tablet, 1 tablet qhs prn muscle spasm., Disp: 30 tablet, Rfl: 0    meloxicam (MOBIC) 7.5 MG tablet, Take 1 tablet (7.5 mg total) by mouth once daily., Disp: 30 tablet, Rfl: 0     Physical Exam  Constitutional:       General: She is not in acute distress.     Appearance: Normal appearance. She is obese.   HENT:      Head: Normocephalic and atraumatic.   Cardiovascular:      Rate and Rhythm: Normal rate and regular rhythm.      Heart sounds: Normal heart sounds. No murmur heard.     No friction rub. No gallop.   Pulmonary:      Effort: Pulmonary effort is normal.      Breath sounds: Normal breath sounds. No wheezing, rhonchi or rales.   Abdominal:      General: Bowel sounds are normal. There is no distension.      Palpations: Abdomen is soft.      Tenderness: There is no abdominal tenderness. There is no rebound.   Skin:     General: Skin is warm and dry.      Coloration: Skin is not jaundiced.   Neurological:      General: No focal deficit present.      Mental Status: She is alert and oriented to person, place, and time. Mental status is at baseline.   Psychiatric:         Mood and Affect: Mood normal.         Behavior: Behavior normal.            Assessment and Plan     1. Essential hypertension  Comments:  well controlled with Amlodipine, metoprol. Will obtain CBC and CMP for medication monitoring  Orders:  -     CBC Auto Differential; Future; Expected date: 05/22/2024  -     Comprehensive Metabolic Panel; Future; Expected date: 05/22/2024    2. Mixed hyperlipidemia  Comments:  Chornic. Not on medication. Will obtain lipid panel today. Will obtain TSH to determine if contributing  factor  Orders:  -     Lipid Panel; Future; Expected date: 05/22/2024  -     TSH; Future; Expected date: 05/22/2024    3. Insulin resistance  Comments:  Chronic. was on metformin in the past. Will obtain A1c today  Orders:  -     Hemoglobin A1C; Future; Expected date: 05/22/2024  -     TSH; Future; Expected date: 05/22/2024    4. Fatigue, unspecified type  Comments:  Will obtain TSH to determine if cause    5. Acute bilateral low back pain with bilateral sciatica  Comments:  Will start patient on Mobic  Overview:  Will refer to physical therapy    Orders:  -     meloxicam (MOBIC) 7.5 MG tablet; Take 1 tablet (7.5 mg total) by mouth once daily.  Dispense: 30 tablet; Refill: 0    6. Chronic right shoulder pain  Comments:  will refer to ortho. Will prescribe Mobic  Orders:  -     meloxicam (MOBIC) 7.5 MG tablet; Take 1 tablet (7.5 mg total) by mouth once daily.  Dispense: 30 tablet; Refill: 0  -     Ambulatory referral/consult to Orthopedics; Future; Expected date: 05/29/2024    7. Long term use of drug  Comments:  Will obtain CBC, CMP, TSH, A1c and lipid panel for regular health monitoring  Orders:  -     Hemoglobin A1C; Future; Expected date: 05/22/2024               No follow-ups on file.

## 2024-05-22 ENCOUNTER — LAB VISIT (OUTPATIENT)
Dept: LAB | Facility: HOSPITAL | Age: 69
End: 2024-05-22
Attending: INTERNAL MEDICINE
Payer: MEDICARE

## 2024-05-22 ENCOUNTER — OFFICE VISIT (OUTPATIENT)
Dept: FAMILY MEDICINE | Facility: CLINIC | Age: 69
End: 2024-05-22
Payer: MEDICARE

## 2024-05-22 VITALS
OXYGEN SATURATION: 97 % | HEART RATE: 65 BPM | BODY MASS INDEX: 31 KG/M2 | TEMPERATURE: 97 F | WEIGHT: 174.94 LBS | SYSTOLIC BLOOD PRESSURE: 120 MMHG | HEIGHT: 63 IN | DIASTOLIC BLOOD PRESSURE: 80 MMHG

## 2024-05-22 DIAGNOSIS — M54.41 ACUTE BILATERAL LOW BACK PAIN WITH BILATERAL SCIATICA: ICD-10-CM

## 2024-05-22 DIAGNOSIS — I10 ESSENTIAL HYPERTENSION: ICD-10-CM

## 2024-05-22 DIAGNOSIS — E78.2 MIXED HYPERLIPIDEMIA: ICD-10-CM

## 2024-05-22 DIAGNOSIS — Z79.899 LONG TERM USE OF DRUG: ICD-10-CM

## 2024-05-22 DIAGNOSIS — M54.42 ACUTE BILATERAL LOW BACK PAIN WITH BILATERAL SCIATICA: ICD-10-CM

## 2024-05-22 DIAGNOSIS — E88.819 INSULIN RESISTANCE: ICD-10-CM

## 2024-05-22 DIAGNOSIS — G89.29 CHRONIC RIGHT SHOULDER PAIN: ICD-10-CM

## 2024-05-22 DIAGNOSIS — M25.511 CHRONIC RIGHT SHOULDER PAIN: ICD-10-CM

## 2024-05-22 DIAGNOSIS — I10 ESSENTIAL HYPERTENSION: Primary | ICD-10-CM

## 2024-05-22 DIAGNOSIS — R53.83 FATIGUE, UNSPECIFIED TYPE: ICD-10-CM

## 2024-05-22 LAB
ALBUMIN SERPL BCP-MCNC: 3.8 G/DL (ref 3.5–5.2)
ALP SERPL-CCNC: 79 U/L (ref 55–135)
ALT SERPL W/O P-5'-P-CCNC: 16 U/L (ref 10–44)
ANION GAP SERPL CALC-SCNC: 10 MMOL/L (ref 8–16)
AST SERPL-CCNC: 18 U/L (ref 10–40)
BASOPHILS # BLD AUTO: 0.03 K/UL (ref 0–0.2)
BASOPHILS NFR BLD: 0.4 % (ref 0–1.9)
BILIRUB SERPL-MCNC: 0.2 MG/DL (ref 0.1–1)
BUN SERPL-MCNC: 13 MG/DL (ref 8–23)
CALCIUM SERPL-MCNC: 9.4 MG/DL (ref 8.7–10.5)
CHLORIDE SERPL-SCNC: 108 MMOL/L (ref 95–110)
CHOLEST SERPL-MCNC: 210 MG/DL (ref 120–199)
CHOLEST/HDLC SERPL: 3.4 {RATIO} (ref 2–5)
CO2 SERPL-SCNC: 22 MMOL/L (ref 23–29)
CREAT SERPL-MCNC: 0.8 MG/DL (ref 0.5–1.4)
DIFFERENTIAL METHOD BLD: ABNORMAL
EOSINOPHIL # BLD AUTO: 0.3 K/UL (ref 0–0.5)
EOSINOPHIL NFR BLD: 3.7 % (ref 0–8)
ERYTHROCYTE [DISTWIDTH] IN BLOOD BY AUTOMATED COUNT: 14.1 % (ref 11.5–14.5)
EST. GFR  (NO RACE VARIABLE): >60 ML/MIN/1.73 M^2
ESTIMATED AVG GLUCOSE: 114 MG/DL (ref 68–131)
GLUCOSE SERPL-MCNC: 92 MG/DL (ref 70–110)
HBA1C MFR BLD: 5.6 % (ref 4–5.6)
HCT VFR BLD AUTO: 35.5 % (ref 37–48.5)
HDLC SERPL-MCNC: 61 MG/DL (ref 40–75)
HDLC SERPL: 29 % (ref 20–50)
HGB BLD-MCNC: 11.6 G/DL (ref 12–16)
IMM GRANULOCYTES # BLD AUTO: 0.01 K/UL (ref 0–0.04)
IMM GRANULOCYTES NFR BLD AUTO: 0.1 % (ref 0–0.5)
LDLC SERPL CALC-MCNC: 133.4 MG/DL (ref 63–159)
LYMPHOCYTES # BLD AUTO: 2.7 K/UL (ref 1–4.8)
LYMPHOCYTES NFR BLD: 33.3 % (ref 18–48)
MCH RBC QN AUTO: 29.8 PG (ref 27–31)
MCHC RBC AUTO-ENTMCNC: 32.7 G/DL (ref 32–36)
MCV RBC AUTO: 91 FL (ref 82–98)
MONOCYTES # BLD AUTO: 0.5 K/UL (ref 0.3–1)
MONOCYTES NFR BLD: 5.8 % (ref 4–15)
NEUTROPHILS # BLD AUTO: 4.6 K/UL (ref 1.8–7.7)
NEUTROPHILS NFR BLD: 56.7 % (ref 38–73)
NONHDLC SERPL-MCNC: 149 MG/DL
NRBC BLD-RTO: 0 /100 WBC
PLATELET # BLD AUTO: 277 K/UL (ref 150–450)
PMV BLD AUTO: 9.9 FL (ref 9.2–12.9)
POTASSIUM SERPL-SCNC: 4.2 MMOL/L (ref 3.5–5.1)
PROT SERPL-MCNC: 7.6 G/DL (ref 6–8.4)
RBC # BLD AUTO: 3.89 M/UL (ref 4–5.4)
SODIUM SERPL-SCNC: 140 MMOL/L (ref 136–145)
TRIGL SERPL-MCNC: 78 MG/DL (ref 30–150)
TSH SERPL DL<=0.005 MIU/L-ACNC: 1.18 UIU/ML (ref 0.4–4)
WBC # BLD AUTO: 8.1 K/UL (ref 3.9–12.7)

## 2024-05-22 PROCEDURE — 80061 LIPID PANEL: CPT | Mod: HCNC | Performed by: INTERNAL MEDICINE

## 2024-05-22 PROCEDURE — 1159F MED LIST DOCD IN RCRD: CPT | Mod: HCNC,CPTII,S$GLB, | Performed by: INTERNAL MEDICINE

## 2024-05-22 PROCEDURE — 99999 PR PBB SHADOW E&M-EST. PATIENT-LVL IV: CPT | Mod: PBBFAC,HCNC,, | Performed by: INTERNAL MEDICINE

## 2024-05-22 PROCEDURE — 84443 ASSAY THYROID STIM HORMONE: CPT | Mod: HCNC | Performed by: INTERNAL MEDICINE

## 2024-05-22 PROCEDURE — 99214 OFFICE O/P EST MOD 30 MIN: CPT | Mod: HCNC,S$GLB,, | Performed by: INTERNAL MEDICINE

## 2024-05-22 PROCEDURE — 3288F FALL RISK ASSESSMENT DOCD: CPT | Mod: HCNC,CPTII,S$GLB, | Performed by: INTERNAL MEDICINE

## 2024-05-22 PROCEDURE — 3074F SYST BP LT 130 MM HG: CPT | Mod: HCNC,CPTII,S$GLB, | Performed by: INTERNAL MEDICINE

## 2024-05-22 PROCEDURE — 1101F PT FALLS ASSESS-DOCD LE1/YR: CPT | Mod: HCNC,CPTII,S$GLB, | Performed by: INTERNAL MEDICINE

## 2024-05-22 PROCEDURE — 80053 COMPREHEN METABOLIC PANEL: CPT | Mod: HCNC | Performed by: INTERNAL MEDICINE

## 2024-05-22 PROCEDURE — 3008F BODY MASS INDEX DOCD: CPT | Mod: HCNC,CPTII,S$GLB, | Performed by: INTERNAL MEDICINE

## 2024-05-22 PROCEDURE — 83036 HEMOGLOBIN GLYCOSYLATED A1C: CPT | Mod: HCNC | Performed by: INTERNAL MEDICINE

## 2024-05-22 PROCEDURE — 85025 COMPLETE CBC W/AUTO DIFF WBC: CPT | Mod: HCNC | Performed by: INTERNAL MEDICINE

## 2024-05-22 PROCEDURE — 36415 COLL VENOUS BLD VENIPUNCTURE: CPT | Mod: HCNC,PO | Performed by: INTERNAL MEDICINE

## 2024-05-22 PROCEDURE — 1125F AMNT PAIN NOTED PAIN PRSNT: CPT | Mod: HCNC,CPTII,S$GLB, | Performed by: INTERNAL MEDICINE

## 2024-05-22 PROCEDURE — 3079F DIAST BP 80-89 MM HG: CPT | Mod: HCNC,CPTII,S$GLB, | Performed by: INTERNAL MEDICINE

## 2024-05-22 RX ORDER — MELOXICAM 7.5 MG/1
7.5 TABLET ORAL DAILY
Qty: 30 TABLET | Refills: 0 | Status: SHIPPED | OUTPATIENT
Start: 2024-05-22

## 2024-05-24 ENCOUNTER — PATIENT MESSAGE (OUTPATIENT)
Dept: FAMILY MEDICINE | Facility: CLINIC | Age: 69
End: 2024-05-24
Payer: MEDICARE

## 2024-06-07 DIAGNOSIS — M25.511 RIGHT SHOULDER PAIN, UNSPECIFIED CHRONICITY: Primary | ICD-10-CM

## 2024-06-14 ENCOUNTER — PATIENT MESSAGE (OUTPATIENT)
Dept: FAMILY MEDICINE | Facility: CLINIC | Age: 69
End: 2024-06-14
Payer: MEDICARE

## 2024-06-19 DIAGNOSIS — I49.3 PVC (PREMATURE VENTRICULAR CONTRACTION): ICD-10-CM

## 2024-06-19 DIAGNOSIS — I10 ESSENTIAL HYPERTENSION: Primary | ICD-10-CM

## 2024-06-21 ENCOUNTER — HOSPITAL ENCOUNTER (OUTPATIENT)
Dept: CARDIOLOGY | Facility: HOSPITAL | Age: 69
Discharge: HOME | End: 2024-06-21
Attending: INTERNAL MEDICINE
Payer: MEDICARE

## 2024-06-21 ENCOUNTER — OFFICE VISIT (OUTPATIENT)
Dept: CARDIOLOGY | Facility: CLINIC | Age: 69
End: 2024-06-21
Payer: MEDICARE

## 2024-06-21 VITALS
SYSTOLIC BLOOD PRESSURE: 134 MMHG | OXYGEN SATURATION: 99 % | DIASTOLIC BLOOD PRESSURE: 78 MMHG | HEART RATE: 61 BPM | WEIGHT: 173.75 LBS | BODY MASS INDEX: 30.77 KG/M2

## 2024-06-21 DIAGNOSIS — R94.31 EKG ABNORMALITIES: ICD-10-CM

## 2024-06-21 DIAGNOSIS — I10 ESSENTIAL HYPERTENSION: ICD-10-CM

## 2024-06-21 DIAGNOSIS — I49.3 PVC (PREMATURE VENTRICULAR CONTRACTION): ICD-10-CM

## 2024-06-21 DIAGNOSIS — E78.2 MIXED HYPERLIPIDEMIA: ICD-10-CM

## 2024-06-21 DIAGNOSIS — I70.0 AORTIC ATHEROSCLEROSIS: Primary | ICD-10-CM

## 2024-06-21 PROCEDURE — 93005 ELECTROCARDIOGRAM TRACING: CPT

## 2024-06-21 PROCEDURE — 93010 ELECTROCARDIOGRAM REPORT: CPT | Mod: HCNC,,, | Performed by: INTERNAL MEDICINE

## 2024-06-21 PROCEDURE — 99999 PR PBB SHADOW E&M-EST. PATIENT-LVL III: CPT | Mod: PBBFAC,,, | Performed by: INTERNAL MEDICINE

## 2024-06-21 RX ORDER — ATORVASTATIN CALCIUM 40 MG/1
40 TABLET, FILM COATED ORAL DAILY
COMMUNITY
End: 2024-06-21 | Stop reason: SDUPTHER

## 2024-06-21 RX ORDER — ATORVASTATIN CALCIUM 10 MG/1
10 TABLET, FILM COATED ORAL NIGHTLY
Qty: 90 TABLET | Refills: 2 | Status: SHIPPED | OUTPATIENT
Start: 2024-06-21

## 2024-06-21 NOTE — PROGRESS NOTES
Subjective:   Patient ID:  Cesario Sahu is a 68 y.o. female who presents for follow up of Fatigue and Palpitations      67 yo female, came in for 1 yr. Retired   PMH PVCs, HTN, HLD. No smoking/drinking. H/o COVID 19 on 12/30/2022   EKG in  NSR, PVC, LVH and stt change  C/o palpitation, worse at night, fluttering sensation.  Some chest tightness.   No dyspnea, dizziness, faint and orthopnea.  Gym exercise 4 times a week, 30 mins on treadmill, but recently felt tired.  Mother healthy 92. Father not know too much.  H/o intolearnce of statin due to nausea. NOT start Pravastatin yet    01/22 visit  Last visit in 02/2020 and not started ToprolXL.. pt states that her palpitation improved and became worse after FDC recently  ER visit showed K 2.9 and after K supplement felt better.  Per PCP recommendation, already took ToprolXL over a month.  Felt palpitation at nights. Likely fluttering sensation. Worse if ate too much  Occasional coffee.    I07/23 visit  Good exercise. Concerning Weight even it is stable. BP controlled, ekg NSR LVH nonspecific STT change  No dizziness faint. Good energy  Off statin and     Interval history  Fatigue for 3 weeks. Walks well. No chest pain dyspnea dizziness. Resume lipitor 1 week ago  Occasional palpitation,   Snores   EKG reviewed by myself today reveals NSR nonspecific STT change LVH                    Past Medical History:   Diagnosis Date    Arthritis     Digestive disorder     Hypertension     Insulin resistance     Left renal stone 7/18/2023       Past Surgical History:   Procedure Laterality Date    ADENOIDECTOMY      CHOLECYSTECTOMY      COLONOSCOPY N/A 6/11/2021    Procedure: COLONOSCOPY;  Surgeon: Serenity Ramey MD;  Location: [facility];  Service: Endoscopy;  Laterality: N/A;    DILATION AND CURETTAGE OF UTERUS  10/2018    ESOPHAGOGASTRODUODENOSCOPY N/A 3/23/2022    Procedure: EGD (ESOPHAGOGASTRODUODENOSCOPY);  Surgeon: Abby Green MD;   Location: [facility];  Service: Endoscopy;  Laterality: N/A;       Social History     Tobacco Use    Smoking status: Never    Smokeless tobacco: Never   Substance Use Topics    Alcohol use: Yes     Comment: Socially    Drug use: Never       Family History   Problem Relation Name Age of Onset    Hypertension Mother      Heart disease Sister      Hyperlipidemia Sister      Hypertension Sister      Diabetes Brother           ROS    Objective:   Physical Exam  HENT:      Head: Normocephalic.   Eyes:      Pupils: Pupils are equal, round, and reactive to light.   Neck:      Thyroid: No thyromegaly.      Vascular: Normal carotid pulses. No carotid bruit or JVD.   Cardiovascular:      Rate and Rhythm: Normal rate and regular rhythm. No extrasystoles are present.     Chest Wall: PMI is not displaced.      Pulses: Normal pulses.      Heart sounds: Normal heart sounds. No murmur heard.     No gallop. No S3 sounds.   Pulmonary:      Effort: No respiratory distress.      Breath sounds: Normal breath sounds. No stridor.   Abdominal:      General: Bowel sounds are normal.      Palpations: Abdomen is soft.      Tenderness: There is no abdominal tenderness. There is no rebound.   Skin:     Findings: No rash.   Neurological:      Mental Status: She is alert and oriented to person, place, and time.   Psychiatric:         Behavior: Behavior normal.         Lab Results   Component Value Date    CHOL 210 (H) 05/22/2024    CHOL 219 (H) 09/25/2023    CHOL 219 (H) 09/23/2022     Lab Results   Component Value Date    HDL 61 05/22/2024    HDL 61 09/25/2023    HDL 65 09/23/2022     Lab Results   Component Value Date    LDLCALC 133.4 05/22/2024    LDLCALC 141.8 09/25/2023    LDLCALC 134.0 09/23/2022     Lab Results   Component Value Date    TRIG 78 05/22/2024    TRIG 81 09/25/2023    TRIG 100 09/23/2022     Lab Results   Component Value Date    CHOLHDL 29.0 05/22/2024    CHOLHDL 27.9 09/25/2023    CHOLHDL 29.7 09/23/2022       Chemistry       "  Component Value Date/Time     05/22/2024 1117    K 4.2 05/22/2024 1117     05/22/2024 1117    CO2 22 (L) 05/22/2024 1117    BUN 13 05/22/2024 1117    CREATININE 0.8 05/22/2024 1117    GLU 92 05/22/2024 1117        Component Value Date/Time    CALCIUM 9.4 05/22/2024 1117    ALKPHOS 79 05/22/2024 1117    AST 18 05/22/2024 1117    ALT 16 05/22/2024 1117    BILITOT 0.2 05/22/2024 1117    ESTGFRAFRICA >60.0 02/04/2022 0933    EGFRNONAA >60.0 02/04/2022 0933          Lab Results   Component Value Date    HGBA1C 5.6 05/22/2024     Lab Results   Component Value Date    TSH 1.180 05/22/2024     No results found for: "INR", "PROTIME"  Lab Results   Component Value Date    WBC 8.10 05/22/2024    HGB 11.6 (L) 05/22/2024    HCT 35.5 (L) 05/22/2024    MCV 91 05/22/2024     05/22/2024     BMP  Sodium   Date Value Ref Range Status   05/22/2024 140 136 - 145 mmol/L Final     Potassium   Date Value Ref Range Status   05/22/2024 4.2 3.5 - 5.1 mmol/L Final     Chloride   Date Value Ref Range Status   05/22/2024 108 95 - 110 mmol/L Final     CO2   Date Value Ref Range Status   05/22/2024 22 (L) 23 - 29 mmol/L Final     BUN   Date Value Ref Range Status   05/22/2024 13 8 - 23 mg/dL Final     Creatinine   Date Value Ref Range Status   05/22/2024 0.8 0.5 - 1.4 mg/dL Final     Calcium   Date Value Ref Range Status   05/22/2024 9.4 8.7 - 10.5 mg/dL Final     Anion Gap   Date Value Ref Range Status   05/22/2024 10 8 - 16 mmol/L Final     eGFR if    Date Value Ref Range Status   02/04/2022 >60.0 >60 mL/min/1.73 m^2 Final     eGFR if non    Date Value Ref Range Status   02/04/2022 >60.0 >60 mL/min/1.73 m^2 Final     Comment:     Calculation used to obtain the estimated glomerular filtration  rate (eGFR) is the CKD-EPI equation.        BNP  @LABRCNTIP(BNP,BNPTRIAGEBLO)@  @LABRCNTIP(troponini)@  CrCl cannot be calculated (Patient's most recent lab result is older than the maximum 7 days " allowed.).  No results found in the last 24 hours.  No results found in the last 24 hours.  No results found in the last 24 hours.    Assessment:      1. Aortic atherosclerosis    2. Essential hypertension    3. Mixed hyperlipidemia    4. PVC (premature ventricular contraction)    5. EKG abnormalities        Plan:   ETT for abnl EKG and fatigue'  Decrease lipitor to 10 mg   Continue asa amlodipine and Metoprolol    Counseled DASH  Check Lipid profile with PCP in 6 months  Recommend heart-healthy diet, weight control and regular exercise.  Tiffanie. Risk modification.   I have reviewed all pertinent labs and cardiac studies independently. Plans and recommendations have been formulated under my direct supervision. All questions answered and patient voiced understanding.   If symptoms persist go to the ED  RTC in 12 months

## 2024-06-22 LAB
OHS QRS DURATION: 88 MS
OHS QTC CALCULATION: 417 MS

## 2024-06-23 LAB
OHS QRS DURATION: 88 MS
OHS QTC CALCULATION: 417 MS

## 2024-06-28 ENCOUNTER — HOSPITAL ENCOUNTER (OUTPATIENT)
Dept: CARDIOLOGY | Facility: HOSPITAL | Age: 69
Discharge: HOME | End: 2024-06-28
Attending: INTERNAL MEDICINE
Payer: MEDICARE

## 2024-06-28 DIAGNOSIS — R94.31 EKG ABNORMALITIES: ICD-10-CM

## 2024-06-28 LAB
CV STRESS BASE HR: 60 BPM
DIASTOLIC BLOOD PRESSURE: 78 MMHG
OHS CV CPX 85 PERCENT MAX PREDICTED HEART RATE MALE: 129
OHS CV CPX ESTIMATED METS: 7
OHS CV CPX MAX PREDICTED HEART RATE: 152
OHS CV CPX PATIENT IS FEMALE: 1
OHS CV CPX PATIENT IS MALE: 0
OHS CV CPX PEAK DIASTOLIC BLOOD PRESSURE: 88 MMHG
OHS CV CPX PEAK HEAR RATE: 134 BPM
OHS CV CPX PEAK RATE PRESSURE PRODUCT: NORMAL
OHS CV CPX PEAK SYSTOLIC BLOOD PRESSURE: 175 MMHG
OHS CV CPX PERCENT MAX PREDICTED HEART RATE ACHIEVED: 92
OHS CV CPX RATE PRESSURE PRODUCT PRESENTING: 8220
STRESS ECHO POST EXERCISE DUR MIN: 6 MINUTES
STRESS ECHO POST EXERCISE DUR SEC: 17 SECONDS
STRESS ST DEPRESSION: 1.5 MM
SYSTOLIC BLOOD PRESSURE: 137 MMHG

## 2024-06-28 PROCEDURE — 93018 CV STRESS TEST I&R ONLY: CPT | Mod: HCNC,,, | Performed by: INTERNAL MEDICINE

## 2024-06-28 PROCEDURE — 93016 CV STRESS TEST SUPVJ ONLY: CPT | Mod: HCNC,,, | Performed by: INTERNAL MEDICINE

## 2024-06-28 PROCEDURE — 93017 CV STRESS TEST TRACING ONLY: CPT | Mod: HCNC

## 2024-07-01 ENCOUNTER — TELEPHONE (OUTPATIENT)
Dept: CARDIOLOGY | Facility: CLINIC | Age: 69
End: 2024-07-01
Payer: MEDICARE

## 2024-07-01 ENCOUNTER — PATIENT MESSAGE (OUTPATIENT)
Dept: CARDIOLOGY | Facility: CLINIC | Age: 69
End: 2024-07-01
Payer: MEDICARE

## 2024-07-01 DIAGNOSIS — R07.89 OTHER CHEST PAIN: Primary | ICD-10-CM

## 2024-07-01 DIAGNOSIS — R94.31 ABNORMAL ELECTROCARDIOGRAM (ECG) (EKG): ICD-10-CM

## 2024-07-01 DIAGNOSIS — R94.39 ABNORMAL STRESS ECG WITH TREADMILL: Primary | ICD-10-CM

## 2024-07-01 NOTE — TELEPHONE ENCOUNTER
----- Message from Rene Vanessa MD sent at 7/1/2024 12:10 PM CDT -----  Treadmill stress EKG suggests ischemia  Exercise MPI ordered for further eval

## 2024-07-01 NOTE — TELEPHONE ENCOUNTER
The patient has been               Treadmill stress EKG suggests ischemia   Exercise MPI ordered for further eval

## 2024-07-01 NOTE — TELEPHONE ENCOUNTER
The patient has been notified of the results, verbalized understanding.          Treadmill stress EKG suggests ischemia   Exercise MPI ordered for further eval

## 2024-07-02 ENCOUNTER — TELEPHONE (OUTPATIENT)
Dept: SURGERY | Facility: CLINIC | Age: 69
End: 2024-07-02
Payer: MEDICARE

## 2024-07-02 NOTE — TELEPHONE ENCOUNTER
RN called pt back in regards to message left. No answer, left voicemail with callback number.     ----- Message from Abby Chua sent at 7/2/2024  4:14 PM CDT -----  Contact: ARGELIA SMITH [[id number]]  ..Type:  Patient Requesting Call    Who Called: ARGELIA SMITH [[id number]]  Does the patient know what this is regarding?: pt would like to move appt to Friday   Would the patient rather a call back or a response via [facility]?  call  Best Call Back Number: .[phone] (home)   Additional Information:

## 2024-07-03 ENCOUNTER — TELEPHONE (OUTPATIENT)
Dept: SPORTS MEDICINE | Facility: CLINIC | Age: 69
End: 2024-07-03
Payer: MEDICARE

## 2024-07-03 ENCOUNTER — HOSPITAL ENCOUNTER (EMERGENCY)
Facility: HOSPITAL | Age: 69
Discharge: HOME | End: 2024-07-03
Attending: EMERGENCY MEDICINE
Payer: MEDICARE

## 2024-07-03 VITALS
HEIGHT: 63 IN | TEMPERATURE: 99 F | RESPIRATION RATE: 18 BRPM | BODY MASS INDEX: 30.86 KG/M2 | DIASTOLIC BLOOD PRESSURE: 72 MMHG | WEIGHT: 174.19 LBS | HEART RATE: 57 BPM | OXYGEN SATURATION: 99 % | SYSTOLIC BLOOD PRESSURE: 163 MMHG

## 2024-07-03 DIAGNOSIS — R07.9 CHEST PAIN: Primary | ICD-10-CM

## 2024-07-03 DIAGNOSIS — R07.89 CHEST WALL PAIN: ICD-10-CM

## 2024-07-03 LAB
ALBUMIN SERPL BCP-MCNC: 4.4 G/DL (ref 3.5–5.2)
ALP SERPL-CCNC: 97 U/L (ref 55–135)
ALT SERPL W/O P-5'-P-CCNC: 22 U/L (ref 10–44)
ANION GAP SERPL CALC-SCNC: 10 MMOL/L (ref 8–16)
AST SERPL-CCNC: 20 U/L (ref 10–40)
BASOPHILS # BLD AUTO: 0.04 K/UL (ref 0–0.2)
BASOPHILS NFR BLD: 0.4 % (ref 0–1.9)
BILIRUB SERPL-MCNC: 0.4 MG/DL (ref 0.1–1)
BILIRUB UR QL STRIP: NEGATIVE
BNP SERPL-MCNC: 29 PG/ML (ref 0–99)
BUN SERPL-MCNC: 12 MG/DL (ref 8–23)
CALCIUM SERPL-MCNC: 10.2 MG/DL (ref 8.7–10.5)
CHLORIDE SERPL-SCNC: 106 MMOL/L (ref 95–110)
CLARITY UR: CLEAR
CO2 SERPL-SCNC: 24 MMOL/L (ref 23–29)
COLOR UR: COLORLESS
CREAT SERPL-MCNC: 0.8 MG/DL (ref 0.5–1.4)
DIFFERENTIAL METHOD BLD: ABNORMAL
EOSINOPHIL # BLD AUTO: 0.2 K/UL (ref 0–0.5)
EOSINOPHIL NFR BLD: 1.8 % (ref 0–8)
ERYTHROCYTE [DISTWIDTH] IN BLOOD BY AUTOMATED COUNT: 13 % (ref 11.5–14.5)
EST. GFR  (NO RACE VARIABLE): >60 ML/MIN/1.73 M^2
GLUCOSE SERPL-MCNC: 92 MG/DL (ref 70–110)
GLUCOSE UR QL STRIP: NEGATIVE
HCT VFR BLD AUTO: 38.5 % (ref 37–48.5)
HCV AB SERPL QL IA: NEGATIVE
HEP C VIRUS HOLD SPECIMEN: NORMAL
HGB BLD-MCNC: 12.6 G/DL (ref 12–16)
HGB UR QL STRIP: NEGATIVE
HIV 1+2 AB+HIV1 P24 AG SERPL QL IA: NEGATIVE
IMM GRANULOCYTES # BLD AUTO: 0.03 K/UL (ref 0–0.04)
IMM GRANULOCYTES NFR BLD AUTO: 0.3 % (ref 0–0.5)
KETONES UR QL STRIP: NEGATIVE
LEUKOCYTE ESTERASE UR QL STRIP: NEGATIVE
LIPASE SERPL-CCNC: 14 U/L (ref 4–60)
LYMPHOCYTES # BLD AUTO: 3.7 K/UL (ref 1–4.8)
LYMPHOCYTES NFR BLD: 35.9 % (ref 18–48)
MCH RBC QN AUTO: 29 PG (ref 27–31)
MCHC RBC AUTO-ENTMCNC: 32.7 G/DL (ref 32–36)
MCV RBC AUTO: 89 FL (ref 82–98)
MONOCYTES # BLD AUTO: 0.6 K/UL (ref 0.3–1)
MONOCYTES NFR BLD: 6.1 % (ref 4–15)
NEUTROPHILS # BLD AUTO: 5.7 K/UL (ref 1.8–7.7)
NEUTROPHILS NFR BLD: 55.5 % (ref 38–73)
NITRITE UR QL STRIP: NEGATIVE
NRBC BLD-RTO: 0 /100 WBC
PH UR STRIP: 6 [PH] (ref 5–8)
PLATELET # BLD AUTO: 292 K/UL (ref 150–450)
PMV BLD AUTO: 8.9 FL (ref 9.2–12.9)
POTASSIUM SERPL-SCNC: 3.8 MMOL/L (ref 3.5–5.1)
PROT SERPL-MCNC: 8.8 G/DL (ref 6–8.4)
PROT UR QL STRIP: NEGATIVE
RBC # BLD AUTO: 4.34 M/UL (ref 4–5.4)
SODIUM SERPL-SCNC: 140 MMOL/L (ref 136–145)
SP GR UR STRIP: <1.005 (ref 1–1.03)
TROPONIN I SERPL DL<=0.01 NG/ML-MCNC: 0.01 NG/ML (ref 0–0.03)
URN SPEC COLLECT METH UR: ABNORMAL
UROBILINOGEN UR STRIP-ACNC: NEGATIVE EU/DL
WBC # BLD AUTO: 10.31 K/UL (ref 3.9–12.7)

## 2024-07-03 PROCEDURE — 83690 ASSAY OF LIPASE: CPT | Mod: HCNC | Performed by: PHYSICIAN ASSISTANT

## 2024-07-03 PROCEDURE — 80053 COMPREHEN METABOLIC PANEL: CPT | Mod: HCNC | Performed by: PHYSICIAN ASSISTANT

## 2024-07-03 PROCEDURE — 99285 EMERGENCY DEPT VISIT HI MDM: CPT | Mod: 25,HCNC

## 2024-07-03 PROCEDURE — 85025 COMPLETE CBC W/AUTO DIFF WBC: CPT | Mod: HCNC | Performed by: PHYSICIAN ASSISTANT

## 2024-07-03 PROCEDURE — 93005 ELECTROCARDIOGRAM TRACING: CPT | Mod: HCNC

## 2024-07-03 PROCEDURE — 83880 ASSAY OF NATRIURETIC PEPTIDE: CPT | Mod: HCNC | Performed by: PHYSICIAN ASSISTANT

## 2024-07-03 PROCEDURE — 81003 URINALYSIS AUTO W/O SCOPE: CPT | Mod: HCNC | Performed by: EMERGENCY MEDICINE

## 2024-07-03 PROCEDURE — 93010 ELECTROCARDIOGRAM REPORT: CPT | Mod: HCNC,,, | Performed by: INTERNAL MEDICINE

## 2024-07-03 PROCEDURE — 87389 HIV-1 AG W/HIV-1&-2 AB AG IA: CPT | Mod: HCNC | Performed by: EMERGENCY MEDICINE

## 2024-07-03 PROCEDURE — 84484 ASSAY OF TROPONIN QUANT: CPT | Mod: HCNC | Performed by: PHYSICIAN ASSISTANT

## 2024-07-03 PROCEDURE — 86803 HEPATITIS C AB TEST: CPT | Mod: HCNC | Performed by: EMERGENCY MEDICINE

## 2024-07-03 NOTE — TELEPHONE ENCOUNTER
Spoke to pt and advised that she is okay to come in at 7:40am for her appt with Dr Tan on 7/23. Informed pt that she is to have xrays prior at 7:30am. Pt voiced verbal understanding.

## 2024-07-03 NOTE — TELEPHONE ENCOUNTER
----- Message from Lara Mojica sent at 7/3/2024 10:07 AM CDT -----  Contact: Cesario Nassar is needing call back to reschedule her upcoming appointments on 7/9 and she wants to change both.. Please nestor back at [phone].

## 2024-07-04 LAB
OHS QRS DURATION: 84 MS
OHS QTC CALCULATION: 404 MS

## 2024-07-04 NOTE — FIRST PROVIDER EVALUATION
"Medical screening examination initiated.  I have conducted a focused provider triage encounter, findings are as follows:    Brief history of present illness:  CP for 2 days    Vitals:    07/03/24 1911 07/03/24 1913   BP:  (!) 186/84   BP Location:  Right arm   Patient Position:  Sitting   Pulse:  60   Resp:  18   Temp:  97.4 °F (36.3 °C)   TempSrc:  Oral   SpO2:  98%   Weight: 79 kg (174 lb 2.6 oz)    Height: 5' 3" (1.6 m)        Pertinent physical exam:  nad, alert    Brief workup plan:  cardiac    Preliminary workup initiated; this workup will be continued and followed by the physician or advanced practice provider that is assigned to the patient when roomed.  "

## 2024-07-04 NOTE — ED PROVIDER NOTES
"SCRIBE #1 NOTE: I, [place], am scribing for, and in the presence of, Jaron Mcgraw MD. I have scribed the entire note.       History     Chief Complaint   Patient presents with    Chest Pain     Pt c/o intermittent midsternal sharp CP radiating to epigastric region & mid/upper back x2-3days, "slight pinch feeling". CARDs- Dr. Vanessa.      Review of patient's allergies indicates:  No Known Allergies      History of Present Illness     HPI    7/3/2024, 9:16 PM  History obtained from the patient      History of Present Illness: Cesario Sahu is a 68 y.o. female patient with a PMHx of HTN, cholecyestectomy who presents to the Emergency Department for evaluation of midsternal CP which onset gradually 2-3 days ago. Symptoms are constant and moderate in severity. No mitigating or exacerbating factors reported. Associated sxs include back pain. Patient denies any abdominal pain, N/V, diaphoresis, fever, chills, weakness, cough, and all other sxs at this time. No further complaints or concerns at this time.       Arrival mode: Personal vehicle    PCP: Zahida Bass DO        Past Medical History:  Past Medical History:   Diagnosis Date    Arthritis     Digestive disorder     Hypertension     Insulin resistance     Left renal stone 7/18/2023       Past Surgical History:  Past Surgical History:   Procedure Laterality Date    ADENOIDECTOMY      CHOLECYSTECTOMY      COLONOSCOPY N/A 6/11/2021    Procedure: COLONOSCOPY;  Surgeon: Serenity Ramey MD;  Location: [facility];  Service: Endoscopy;  Laterality: N/A;    DILATION AND CURETTAGE OF UTERUS  10/2018    ESOPHAGOGASTRODUODENOSCOPY N/A 3/23/2022    Procedure: EGD (ESOPHAGOGASTRODUODENOSCOPY);  Surgeon: Abby Green MD;  Location: [facility];  Service: Endoscopy;  Laterality: N/A;         Family History:  Family History   Problem Relation Name Age of Onset    Hypertension Mother      Heart disease Sister      Hyperlipidemia Sister      Hypertension Sister   "    Diabetes Brother         Social History:  Social History     Tobacco Use    Smoking status: Never    Smokeless tobacco: Never   Substance and Sexual Activity    Alcohol use: Yes     Comment: Socially    Drug use: Never    Sexual activity: Yes     Partners: Male        Review of Systems     Review of Systems   Constitutional:  Negative for chills, diaphoresis and fever.   HENT:  Negative for sore throat.    Respiratory:  Negative for shortness of breath.    Cardiovascular:  Positive for chest pain.   Gastrointestinal:  Negative for abdominal pain, nausea and vomiting.   Genitourinary:  Negative for dysuria.   Musculoskeletal:  Positive for back pain.   Skin:  Negative for rash.   Neurological:  Negative for weakness.   Hematological:  Does not bruise/bleed easily.   All other systems reviewed and are negative.       Physical Exam     Initial Vitals [07/03/24 1913]   BP Pulse Resp Temp SpO2   (!) 186/84 60 18 97.4 °F (36.3 °C) 98 %      MAP       --          Physical Exam   Nursing Notes and Vital Signs Reviewed.  Constitutional: Patient is in no acute distress. Well-developed and well-nourished.  Head: Atraumatic. Normocephalic.  Eyes: PERRL. EOM intact. Conjunctivae are not pale. No scleral icterus.  ENT: Mucous membranes are moist. Oropharynx is clear and symmetric.    Neck: Supple. Full ROM. No lymphadenopathy.  Cardiovascular: Regular rate. Regular rhythm. No murmurs, rubs, or gallops. Distal pulses are 2+ and symmetric.  Pulmonary/Chest: No respiratory distress. Clear to auscultation bilaterally. No wheezing or rales.  Abdominal: Soft and non-distended.  There is no tenderness.  No rebound, guarding, or rigidity.   Musculoskeletal: Moves all extremities. No obvious deformities. No edema. No calf tenderness. Anterior chest wall tenderness with palpation that reproduces chief complaint  Skin: Warm and dry.  Neurological:  Alert, awake, and appropriate.  Normal speech.  No acute focal neurological deficits are  "appreciated.  Psychiatric: Normal affect. Good eye contact. Appropriate in content.     ED Course   Procedures  ED Vital Signs:  Vitals:    07/03/24 1911 07/03/24 1913 07/03/24 2120 07/03/24 2123   BP:  (!) 186/84 (!) 163/72    Pulse:  60 62 (!) 57   Resp:  18     Temp:  97.4 °F (36.3 °C) 98.8 °F (37.1 °C)    TempSrc:  Oral Oral    SpO2:  98% 99%    Weight: 79 kg (174 lb 2.6 oz)      Height: 5' 3" (1.6 m)          Abnormal Lab Results:  Labs Reviewed   CBC W/ AUTO DIFFERENTIAL - Abnormal; Notable for the following components:       Result Value    MPV 8.9 (*)     All other components within normal limits    Narrative:     Release to patient->Immediate   COMPREHENSIVE METABOLIC PANEL - Abnormal; Notable for the following components:    Total Protein 8.8 (*)     All other components within normal limits    Narrative:     Release to patient->Immediate   URINALYSIS, REFLEX TO URINE CULTURE - Abnormal; Notable for the following components:    Color, UA Colorless (*)     Specific Gravity, UA <1.005 (*)     All other components within normal limits    Narrative:     Specimen Source->Urine   HIV 1 / 2 ANTIBODY    Narrative:     Release to patient->Immediate   HEPATITIS C ANTIBODY    Narrative:     Release to patient->Immediate   HEP C VIRUS HOLD SPECIMEN    Narrative:     Release to patient->Immediate   TROPONIN I    Narrative:     Release to patient->Immediate   B-TYPE NATRIURETIC PEPTIDE    Narrative:     Release to patient->Immediate   LIPASE    Narrative:     Release to patient->Immediate        All Lab Results:  Results for orders placed or performed during the hospital encounter of 07/03/24   HIV 1/2 Ag/Ab (4th Gen)   Result Value Ref Range    HIV 1/2 Ag/Ab Negative Negative   Hepatitis C Antibody   Result Value Ref Range    Hepatitis C Ab Negative Negative   HCV Virus Hold Specimen   Result Value Ref Range    HEP C Virus Hold Specimen Hold for HCV sendout    CBC auto differential   Result Value Ref Range    WBC 10.31 " 3.90 - 12.70 K/uL    RBC 4.34 4.00 - 5.40 M/uL    Hemoglobin 12.6 12.0 - 16.0 g/dL    Hematocrit 38.5 37.0 - 48.5 %    MCV 89 82 - 98 fL    MCH 29.0 27.0 - 31.0 pg    MCHC 32.7 32.0 - 36.0 g/dL    RDW 13.0 11.5 - 14.5 %    Platelets 292 150 - 450 K/uL    MPV 8.9 (L) 9.2 - 12.9 fL    Immature Granulocytes 0.3 0.0 - 0.5 %    Gran # (ANC) 5.7 1.8 - 7.7 K/uL    Immature Grans (Abs) 0.03 0.00 - 0.04 K/uL    Lymph # 3.7 1.0 - 4.8 K/uL    Mono # 0.6 0.3 - 1.0 K/uL    Eos # 0.2 0.0 - 0.5 K/uL    Baso # 0.04 0.00 - 0.20 K/uL    nRBC 0 0 /100 WBC    Gran % 55.5 38.0 - 73.0 %    Lymph % 35.9 18.0 - 48.0 %    Mono % 6.1 4.0 - 15.0 %    Eosinophil % 1.8 0.0 - 8.0 %    Basophil % 0.4 0.0 - 1.9 %    Differential Method Automated    Comprehensive metabolic panel   Result Value Ref Range    Sodium 140 136 - 145 mmol/L    Potassium 3.8 3.5 - 5.1 mmol/L    Chloride 106 95 - 110 mmol/L    CO2 24 23 - 29 mmol/L    Glucose 92 70 - 110 mg/dL    BUN 12 8 - 23 mg/dL    Creatinine 0.8 0.5 - 1.4 mg/dL    Calcium 10.2 8.7 - 10.5 mg/dL    Total Protein 8.8 (H) 6.0 - 8.4 g/dL    Albumin 4.4 3.5 - 5.2 g/dL    Total Bilirubin 0.4 0.1 - 1.0 mg/dL    Alkaline Phosphatase 97 55 - 135 U/L    AST 20 10 - 40 U/L    ALT 22 10 - 44 U/L    eGFR >60 >60 mL/min/1.73 m^2    Anion Gap 10 8 - 16 mmol/L   Troponin I #1   Result Value Ref Range    Troponin I 0.013 0.000 - 0.026 ng/mL   BNP   Result Value Ref Range    BNP 29 0 - 99 pg/mL   Lipase   Result Value Ref Range    Lipase 14 4 - 60 U/L   Urinalysis, Reflex to Urine Culture Urine, Clean Catch    Specimen: Urine   Result Value Ref Range    Specimen UA Urine, Clean Catch     Color, UA Colorless (A) Yellow, Straw, [place]    Appearance, UA Clear Clear    pH, UA 6.0 5.0 - 8.0    Specific Gravity, UA <1.005 (A) 1.005 - 1.030    Protein, UA Negative Negative    Glucose, UA Negative Negative    Ketones, UA Negative Negative    Bilirubin (UA) Negative Negative    Occult Blood UA Negative Negative    Nitrite, UA  Negative Negative    Urobilinogen, UA Negative <2.0 EU/dL    Leukocytes, UA Negative Negative         Imaging Results:  Imaging Results              X-Ray Chest AP Portable (Final result)  Result time 07/03/24 19:48:18      Final result by Biju Jacobson MD (07/03/24 19:48:18)                   Impression:      No acute abnormality.      Electronically signed by: Biju Jacobson  Date:    07/03/2024  Time:    19:48               Narrative:    EXAMINATION:  XR CHEST AP PORTABLE    CLINICAL HISTORY:  Chest Pain;    TECHNIQUE:  Single frontal view of the chest was performed.    COMPARISON:  None    FINDINGS:  The lungs are clear, with normal appearance of pulmonary vasculature and no pleural effusion or pneumothorax.    The cardiac silhouette is normal in size. The hilar and mediastinal contours are unremarkable.    Bones are intact.                                       The EKG was ordered, reviewed, and independently interpreted by the ED provider.  Interpretation time: 19:17  Rate: 58 BPM  Rhythm: Sinus bradycardia  Interpretation: No acute ST changes. No STEMI.    The Emergency Provider reviewed the vital signs and test results, which are outlined above.     ED Discussion       9:16 PM: Reassessed pt at this time. Discussed with pt all pertinent ED information and results. Discussed pt dx and plan of tx. Gave pt all f/u and return to the ED instructions. All questions and concerns were addressed at this time. Pt expresses understanding of information and instructions, and is comfortable with plan to discharge. Pt is stable for discharge.    I discussed with patient and/or family/caretaker that evaluation in the ED does not suggest any emergent or life threatening medical conditions requiring immediate intervention beyond what was provided in the ED, and I believe patient is safe for discharge.  Regardless, an unremarkable evaluation in the ED does not preclude the development or presence of a serious of life  threatening condition. As such, patient was instructed to return immediately for any worsening or change in current symptoms.         Medical Decision Making  DDx: Chest pain, NSTEMI    Amount and/or Complexity of Data Reviewed  Labs: ordered. Decision-making details documented in ED Course.     Details: Troponin - negative  Radiology: ordered. Decision-making details documented in ED Course.  ECG/medicine tests: ordered and independent interpretation performed. Decision-making details documented in ED Course.    Risk  Decision regarding hospitalization.                 ED Medication(s):  Medications - No data to display    Discharge Medication List as of 7/3/2024  9:12 PM           Follow-up Information       Zahida Bass DO .    Specialty: Internal Medicine  Contact information:  [address] [facility] [address] [phone]                                 Scribe Attestation:   Scribe #1: I performed the above scribed service and the documentation accurately describes the services I performed. I attest to the accuracy of the note.     Attending:   Physician Attestation Statement for Scribe #1: I, Jaron Mcgraw MD, personally performed the services described in this documentation, as scribed by [place], in my presence, and it is both accurate and complete.           Clinical Impression       ICD-10-CM ICD-9-CM   1. Chest pain  R07.9 786.50   2. Chest wall pain  R07.89 786.52       Disposition:   Disposition: Discharged  Condition: Stable         Jaron Mcgraw MD  07/03/24 6909

## 2024-07-05 ENCOUNTER — TELEPHONE (OUTPATIENT)
Dept: FAMILY MEDICINE | Facility: CLINIC | Age: 69
End: 2024-07-05
Payer: MEDICARE

## 2024-07-05 ENCOUNTER — PATIENT MESSAGE (OUTPATIENT)
Dept: CARDIOLOGY | Facility: CLINIC | Age: 69
End: 2024-07-05
Payer: MEDICARE

## 2024-07-10 ENCOUNTER — OFFICE VISIT (OUTPATIENT)
Dept: SURGERY | Facility: CLINIC | Age: 69
End: 2024-07-10
Payer: MEDICARE

## 2024-07-10 VITALS
HEIGHT: 63 IN | WEIGHT: 174.19 LBS | HEART RATE: 57 BPM | BODY MASS INDEX: 30.86 KG/M2 | DIASTOLIC BLOOD PRESSURE: 71 MMHG | SYSTOLIC BLOOD PRESSURE: 154 MMHG

## 2024-07-10 DIAGNOSIS — K64.4 EXTERNAL HEMORRHOIDS: ICD-10-CM

## 2024-07-10 DIAGNOSIS — K60.2 ANAL FISSURE: Primary | ICD-10-CM

## 2024-07-10 PROCEDURE — 3078F DIAST BP <80 MM HG: CPT | Mod: HCNC,CPTII,S$GLB,

## 2024-07-10 PROCEDURE — 3044F HG A1C LEVEL LT 7.0%: CPT | Mod: HCNC,CPTII,S$GLB,

## 2024-07-10 PROCEDURE — 1126F AMNT PAIN NOTED NONE PRSNT: CPT | Mod: HCNC,CPTII,S$GLB,

## 2024-07-10 PROCEDURE — 99999 PR PBB SHADOW E&M-EST. PATIENT-LVL III: CPT | Mod: PBBFAC,HCNC,,

## 2024-07-10 PROCEDURE — 1159F MED LIST DOCD IN RCRD: CPT | Mod: HCNC,CPTII,S$GLB,

## 2024-07-10 PROCEDURE — 99214 OFFICE O/P EST MOD 30 MIN: CPT | Mod: HCNC,S$GLB,,

## 2024-07-10 PROCEDURE — 3077F SYST BP >= 140 MM HG: CPT | Mod: HCNC,CPTII,S$GLB,

## 2024-07-10 PROCEDURE — 3008F BODY MASS INDEX DOCD: CPT | Mod: HCNC,CPTII,S$GLB,

## 2024-07-10 NOTE — PROGRESS NOTES
History & Physical    SUBJECTIVE:     CC: external hemorrhoids  Ref: Rosemarie Meeks MD    History of Present Illness:  Patient is a 68 y.o. female presents with rectal discomfort/pain/bleeding. Reports external tissue per rectum that swells and becomes painful at times. Has been worse/more frequent in past 2 months. Has had problems with hemorrhoids for years. Has blood with wiping occasionally. Discomfort worse than bleeding. Warm baths help. Reports long hx of constipation, has a BM every other day, does strain and has hard stools, alternates miralax and metamucil daily, uses stool softeners prn, sits on toilet for >5 mins at a time, drinks >64 oz water daily. Colonoscopy 06/2021 with polyps and diverticulosis. Denies famhx of CRC, IBD, polyps. Takes 81mg aspirin daily for prevention. Denies nausea, vomiting, fevers, chills, abdominal pain, hematochezia, melena.       Interval History:   Since the last clinic visit, the patient had an improvement of symptoms from an external hemorrhoid standpoint with conservative management.  However, she developed some rectal bleeding and pain over the past month.  States that she was constipated prior.  Pain worse with bowel movements and lasts several minutes after.  Rectal bleeding only happened twice with wiping after bowel movement.  Patient states that she takes MiraLax and stool softeners daily.  Takes fiber gummies p.r.n..  Of note, she did see Gastroenterology in December where she was prescribed IBSrella but she does not think that she ever started.  She has a follow-up appointment next month with GI.  Continues to have hard stools and straining.      Chief Complaint   Patient presents with    Hemorrhoids       Review of patient's allergies indicates:  No Known Allergies    Current Outpatient Medications   Medication Sig Dispense Refill    amLODIPine (NORVASC) 5 MG tablet Take 1 tablet (5 mg total) by mouth once daily. 90 tablet 1    ascorbic acid (VITAMIN C ORAL)        aspirin (ECOTRIN) 81 MG EC tablet Take 81 mg by mouth once daily.      atorvastatin (LIPITOR) 10 MG tablet Take 1 tablet (10 mg total) by mouth every evening. 90 tablet 2    estradioL (ESTRACE) 0.01 % (0.1 mg/gram) vaginal cream Place 1 g vaginally twice a week. 42.5 g 6    gabapentin (NEURONTIN) 300 MG capsule Take 1 capsule (300 mg total) by mouth every evening. 90 capsule 0    ibuprofen (ADVIL,MOTRIN) 800 MG tablet Take 1 tablet (800 mg total) by mouth every 6 (six) hours as needed for Pain. 30 tablet 2    Lactobacillus acidophilus (PROBIOTIC ORAL)       loratadine (CLARITIN) 10 mg tablet Take 1 tablet (10 mg total) by mouth once daily. 30 tablet 0    meloxicam (MOBIC) 7.5 MG tablet Take 1 tablet (7.5 mg total) by mouth once daily. 30 tablet 0    metoprolol succinate (TOPROL-XL) 25 MG 24 hr tablet Take 1 tablet by mouth once daily 90 tablet 3    tiZANidine (ZANAFLEX) 4 MG tablet 1 tablet qhs prn muscle spasm. 30 tablet 0    diltiazem HCl (DILTIAZEM 2% - LIDOCAINE 5% CREAM) Apply 1 application  topically 3 (three) times daily. 30 g 1     No current facility-administered medications for this visit.       Past Medical History:   Diagnosis Date    Arthritis     Digestive disorder     Hypertension     Insulin resistance     Left renal stone 7/18/2023     Past Surgical History:   Procedure Laterality Date    ADENOIDECTOMY      CHOLECYSTECTOMY      COLONOSCOPY N/A 6/11/2021    Procedure: COLONOSCOPY;  Surgeon: Serenity Ramey MD;  Location: [facility];  Service: Endoscopy;  Laterality: N/A;    DILATION AND CURETTAGE OF UTERUS  10/2018    ESOPHAGOGASTRODUODENOSCOPY N/A 3/23/2022    Procedure: EGD (ESOPHAGOGASTRODUODENOSCOPY);  Surgeon: Abby Green MD;  Location: [facility];  Service: Endoscopy;  Laterality: N/A;     Family History   Problem Relation Name Age of Onset    Hypertension Mother      Heart disease Sister      Hyperlipidemia Sister      Hypertension Sister      Diabetes Brother       Social History  "    Tobacco Use    Smoking status: Never    Smokeless tobacco: Never   Substance Use Topics    Alcohol use: Yes     Comment: Socially    Drug use: Never        Review of Systems:  Review of Systems   Constitutional:  Negative for activity change, appetite change, chills, diaphoresis, fatigue, fever and unexpected weight change.   HENT:  Negative for congestion, ear pain, sore throat and trouble swallowing.    Eyes:  Negative for pain, redness and itching.   Respiratory:  Negative for cough, shortness of breath and wheezing.    Cardiovascular:  Negative for chest pain, palpitations and leg swelling.   Gastrointestinal:  Positive for anal bleeding, constipation and rectal pain. Negative for abdominal distention, abdominal pain, blood in stool, diarrhea, nausea and vomiting.   Endocrine: Negative for cold intolerance, heat intolerance and polyuria.   Genitourinary:  Negative for dysuria, flank pain, frequency and hematuria.   Musculoskeletal:  Negative for gait problem, joint swelling and neck pain.   Skin:  Negative for color change, rash and wound.   Allergic/Immunologic: Negative for environmental allergies and immunocompromised state.   Neurological:  Negative for dizziness, speech difficulty, weakness and numbness.   Psychiatric/Behavioral:  Negative for agitation, confusion and hallucinations.        OBJECTIVE:     Vital Signs (Most Recent)  Pulse: (!) 57 (07/10/24 1119)  BP: (!) 154/71 (07/10/24 1119)  5' 3" (1.6 m)  79 kg (174 lb 2.6 oz)     Physical Exam:  Physical Exam  Vitals reviewed. Exam conducted with a chaperone present.   Constitutional:       General: She is not in acute distress.     Appearance: Normal appearance. She is well-developed. She is not ill-appearing or toxic-appearing.   HENT:      Head: Normocephalic and atraumatic.      Right Ear: External ear normal.      Left Ear: External ear normal.      Nose: Nose normal.   Cardiovascular:      Rate and Rhythm: Normal rate.   Pulmonary:      " Effort: Pulmonary effort is normal. No respiratory distress.   Abdominal:      General: Abdomen is flat. There is no distension.      Palpations: Abdomen is soft.      Tenderness: There is no abdominal tenderness.   Genitourinary:     Comments: Anorectal: +anterior midline anal fissure with sentinel pile, TTP. +mildly enlarged left posterior & left anterior external hemorrhoids - soft/NT/NT. No other external masses or lesions. KARISHMA deferred.   Musculoskeletal:         General: Normal range of motion.      Cervical back: Normal range of motion and neck supple.   Skin:     General: Skin is warm and dry.   Neurological:      Mental Status: She is alert and oriented to person, place, and time.   Psychiatric:         Mood and Affect: Mood normal.         Behavior: Behavior normal.         Diagnostic Results:  Colonoscopy 06/2021 - polyps, diverticulosis, repeat recommended 7 years    ASSESSMENT/PLAN:     68 y.o. with rectal pain and bleeding consistent with anal fissure as well as hemorrhoids    - believe her new symptoms are from anal fissure. She has pain with bowel movements as well as some rectal bleeding. No further irritation like she was having when she presented to clinic last year. Believe that patient has some pelvic floor dysfunction and would likely benefit from PFPT. Would like to allow fissure to heal then possibly get referred to PFPT at next appointment.   - keep f/u with GI for medication management of constipation   - Discussed causes and treatment of anal fissures including soaks/sitz baths, topical diltiazem, botox injection, and LIS. Recommended treatment with high-fiber diet and topical diltiazem and patient agreeable to this approach.   - Discussed that a minimum I would recommend behavioral, lifestyle and medication modifications to improve bowel habits. Usual bowel management handout given to patient. This includes a stool softener twice per day, fiber powder supplementation daily, drinking at  least 64oz of water/day, avoiding straining with bowel movements, spending less than 5 min on toilet per bowel movement, eating a high fiber diet, using miralax as needed to achieve a bowel movement daily and using wet wipes to wipe after bowel movements when irritated.   - Usual bowel management sheet and compound pharmacy list distributed.  - RTC 6 weeks      Jennifer Jean-Baptiste PA-C  Colon and Rectal Surgery  [facility]

## 2024-07-11 ENCOUNTER — TELEPHONE (OUTPATIENT)
Dept: CARDIOLOGY | Facility: HOSPITAL | Age: 69
End: 2024-07-11
Payer: MEDICARE

## 2024-07-12 ENCOUNTER — PATIENT MESSAGE (OUTPATIENT)
Dept: FAMILY MEDICINE | Facility: CLINIC | Age: 69
End: 2024-07-12
Payer: MEDICARE

## 2024-07-12 ENCOUNTER — PATIENT MESSAGE (OUTPATIENT)
Dept: ADMINISTRATIVE | Facility: HOSPITAL | Age: 69
End: 2024-07-12
Payer: MEDICARE

## 2024-07-15 ENCOUNTER — PATIENT OUTREACH (OUTPATIENT)
Dept: ADMINISTRATIVE | Facility: HOSPITAL | Age: 69
End: 2024-07-15
Payer: MEDICARE

## 2024-07-15 ENCOUNTER — OFFICE VISIT (OUTPATIENT)
Dept: FAMILY MEDICINE | Facility: CLINIC | Age: 69
End: 2024-07-15
Payer: MEDICARE

## 2024-07-15 ENCOUNTER — PATIENT MESSAGE (OUTPATIENT)
Dept: CARDIOLOGY | Facility: HOSPITAL | Age: 69
End: 2024-07-15
Payer: MEDICARE

## 2024-07-15 VITALS
DIASTOLIC BLOOD PRESSURE: 70 MMHG | WEIGHT: 174.19 LBS | BODY MASS INDEX: 30.86 KG/M2 | HEART RATE: 60 BPM | TEMPERATURE: 97 F | HEIGHT: 63 IN | RESPIRATION RATE: 18 BRPM | SYSTOLIC BLOOD PRESSURE: 130 MMHG

## 2024-07-15 DIAGNOSIS — E88.819 INSULIN RESISTANCE: ICD-10-CM

## 2024-07-15 DIAGNOSIS — Z86.010 PERSONAL HISTORY OF COLONIC POLYPS: ICD-10-CM

## 2024-07-15 DIAGNOSIS — E66.09 CLASS 1 OBESITY DUE TO EXCESS CALORIES WITH SERIOUS COMORBIDITY AND BODY MASS INDEX (BMI) OF 30.0 TO 30.9 IN ADULT: ICD-10-CM

## 2024-07-15 DIAGNOSIS — I10 ESSENTIAL HYPERTENSION: ICD-10-CM

## 2024-07-15 DIAGNOSIS — K58.1 IRRITABLE BOWEL SYNDROME WITH CONSTIPATION: ICD-10-CM

## 2024-07-15 DIAGNOSIS — Z00.00 ENCOUNTER FOR PREVENTIVE HEALTH EXAMINATION: ICD-10-CM

## 2024-07-15 DIAGNOSIS — I70.0 AORTIC ATHEROSCLEROSIS: ICD-10-CM

## 2024-07-15 DIAGNOSIS — E21.3 HYPERPARATHYROIDISM: ICD-10-CM

## 2024-07-15 DIAGNOSIS — E78.2 MIXED HYPERLIPIDEMIA: ICD-10-CM

## 2024-07-15 DIAGNOSIS — F41.9 ANXIETY: ICD-10-CM

## 2024-07-15 DIAGNOSIS — I49.3 PVC (PREMATURE VENTRICULAR CONTRACTION): ICD-10-CM

## 2024-07-15 DIAGNOSIS — K21.9 GASTROESOPHAGEAL REFLUX DISEASE WITHOUT ESOPHAGITIS: ICD-10-CM

## 2024-07-15 PROCEDURE — 1101F PT FALLS ASSESS-DOCD LE1/YR: CPT | Mod: HCNC,CPTII,S$GLB, | Performed by: NURSE PRACTITIONER

## 2024-07-15 PROCEDURE — 3288F FALL RISK ASSESSMENT DOCD: CPT | Mod: HCNC,CPTII,S$GLB, | Performed by: NURSE PRACTITIONER

## 2024-07-15 PROCEDURE — 99999 PR PBB SHADOW E&M-EST. PATIENT-LVL V: CPT | Mod: PBBFAC,HCNC,, | Performed by: NURSE PRACTITIONER

## 2024-07-15 PROCEDURE — 1159F MED LIST DOCD IN RCRD: CPT | Mod: HCNC,CPTII,S$GLB, | Performed by: NURSE PRACTITIONER

## 2024-07-15 PROCEDURE — 3044F HG A1C LEVEL LT 7.0%: CPT | Mod: HCNC,CPTII,S$GLB, | Performed by: NURSE PRACTITIONER

## 2024-07-15 PROCEDURE — 3075F SYST BP GE 130 - 139MM HG: CPT | Mod: HCNC,CPTII,S$GLB, | Performed by: NURSE PRACTITIONER

## 2024-07-15 PROCEDURE — 1160F RVW MEDS BY RX/DR IN RCRD: CPT | Mod: HCNC,CPTII,S$GLB, | Performed by: NURSE PRACTITIONER

## 2024-07-15 PROCEDURE — 3078F DIAST BP <80 MM HG: CPT | Mod: HCNC,CPTII,S$GLB, | Performed by: NURSE PRACTITIONER

## 2024-07-15 PROCEDURE — G0439 PPPS, SUBSEQ VISIT: HCPCS | Mod: HCNC,S$GLB,, | Performed by: NURSE PRACTITIONER

## 2024-07-15 PROCEDURE — 1126F AMNT PAIN NOTED NONE PRSNT: CPT | Mod: HCNC,CPTII,S$GLB, | Performed by: NURSE PRACTITIONER

## 2024-07-15 PROCEDURE — 1170F FXNL STATUS ASSESSED: CPT | Mod: HCNC,CPTII,S$GLB, | Performed by: NURSE PRACTITIONER

## 2024-07-15 NOTE — PROGRESS NOTES
"  Cesario Sahu presented for a follow-up Medicare AWV today. The following components were reviewed and updated:    Medical history  Family History  Social history  Allergies and Current Medications  Health Risk Assessment  Health Maintenance  Care Team    **See Completed Assessments for Annual Wellness visit with in the encounter summary    The following assessments were completed:  Depression Screening  Cognitive function Screening  Timed Get Up Test  Whisper Test      Opioid documentation:      Patient does not have a current opioid prescription.          Vitals:    07/15/24 1118   BP: 130/70   BP Location: Right arm   Patient Position: Sitting   Pulse: 60   Resp: 18   Temp: 97.4 °F (36.3 °C)   Weight: 79 kg (174 lb 2.6 oz)   Height: 5' 3" (1.6 m)     Body mass index is 30.85 kg/m².       Physical Exam  Vitals and nursing note reviewed.   Constitutional:       Appearance: Normal appearance.   HENT:      Head: Normocephalic.   Eyes:      Pupils: Pupils are equal, round, and reactive to light.   Cardiovascular:      Rate and Rhythm: Normal rate and regular rhythm.      Pulses: Normal pulses.      Heart sounds: Normal heart sounds.   Pulmonary:      Effort: Pulmonary effort is normal.      Breath sounds: Normal breath sounds.   Musculoskeletal:         General: Normal range of motion.   Skin:     General: Skin is warm.   Neurological:      Mental Status: She is alert and oriented to person, place, and time.   Psychiatric:         Mood and Affect: Mood is anxious.         Behavior: Behavior is agitated.         Thought Content: Thought content normal.         Judgment: Judgment normal.       Current Outpatient Medications   Medication Instructions    amLODIPine (NORVASC) 5 mg, Oral, Daily    ascorbic acid (VITAMIN C ORAL) No dose, route, or frequency recorded.    aspirin (ECOTRIN) 81 mg, Oral, Daily    atorvastatin (LIPITOR) 10 mg, Oral, Nightly    diltiazem HCl (DILTIAZEM 2% - LIDOCAINE 5% CREAM) 1 application , " Topical (Top), 3 times daily    estradioL (ESTRACE) 1 g, Vaginal, Twice weekly    gabapentin (NEURONTIN) 300 mg, Oral, Nightly    ibuprofen (ADVIL,MOTRIN) 800 mg, Oral, Every 6 hours PRN    Lactobacillus acidophilus (PROBIOTIC ORAL) No dose, route, or frequency recorded.    loratadine (CLARITIN) 10 mg, Oral, Daily    meloxicam (MOBIC) 7.5 mg, Oral, Daily    metoprolol succinate (TOPROL-XL) 25 mg, Oral    tiZANidine (ZANAFLEX) 4 MG tablet 1 tablet qhs prn muscle spasm.         Diagnoses and health risks identified today and associated recommendations/orders:  1. Encounter for preventive health examination  Rsv  Covid   Flu in fall      2. Aortic atherosclerosis  Chronic and Stable - on meds. Denies cp/sob or cp  Discussed and recommend  low fat/carb/chol diet. Cardio exercise as tolerated. Life style modifications.  [. Continue current treatment plan as previously prescribed with your PCP/card    3. Essential hypertension  Chronic and Stable on meds. Continue current treatment plan as previously prescribed with your PCP    4. Anxiety  New-states feeling stress- not eating - overwhelm- mother and self with ongoing health problems and still works  Having difficulty coping  Request to see PCP for anxiety  Pt has chance to Dr. GREEN for new PCP- schedule to see NP 7/19/24    5. Mixed hyperlipidemia  Chronic and Stable - on meds. Denies cp/sob or palpations  Discussed and recommend  low fat/carb/chol diet. Cardio exercise as tolerated. Life style modifications.   Continue current treatment plan as previously prescribed with your PCP/card    6. Class 1 obesity due to excess calories with serious comorbidity and body mass index (BMI) of 30.0 to 30.9 in adult  Chronic and ongoing- BM1 31 GK  Discussed and recommend  low fat/carb/chol diet. Cardio exercise as tolerated. Life style modifications.   Continue current treatment plan as previously prescribed with your PCP/card    7. PVC (premature ventricular contraction)  Chronic  and Stable - on med's. Denies cp/sob or palpation  Discussed and recommend  low fat/carb/chol diet. Cardio exercise as tolerated. Life style modifications.  . Continue current treatment plan as previously prescribed with your PCP/card    8. Hyperparathyroidism  Chronic and Stable BP. Continue current treatment plan as previously prescribed with your PCP    9. Insulin resistance  Chronic and Stable - on med's. Denies cp/sob or palpation  Discussed and recommend  low fat/carb/chol diet. Cardio exercise as tolerated. Life style modifications.  [. Continue current treatment plan as previously prescribed with your PCP/card    10. Irritable bowel syndrome with constipation  Chronic and Ongoing. States oct no working or diet modifications- scheduled to see GI    11. Gastroesophageal reflux disease without esophagitis  Chronic and Ongoing. States oct no working or diet modifications- scheduled to see GI    12. Personal history of colonic polyps  Colonoscopy    Due date: 6/11/2028   Last completion date: 6/11/2021    Frequency: Every 7 Years      Followed by PCP    Provided Shirline with a 5-10 year written screening schedule and personal prevention plan. Recommendations were developed using the USPSTF age appropriate recommendations. Education, counseling, and referrals were provided as needed.  After Visit Summary printed and given to patient which includes a list of additional screenings\tests needed.    Follow up in about 1 year (around 7/15/2025).    Kimi Louie NP

## 2024-07-15 NOTE — PATIENT INSTRUCTIONS
Counseling and Referral of Other Preventative  (Italic type indicates deductible and co-insurance are waived)    Patient Name: Cesario Sahu  Today's Date: 7/15/2024    Health Maintenance       Date Due Completion Date    TETANUS VACCINE Never done ---    RSV Vaccine (Age 60+ and Pregnant patients) (1 - 1-dose 60+ series) Never done ---    COVID-19 Vaccine (7 - 2023-24 season) 09/01/2023 10/29/2021    Mammogram 08/22/2024 8/22/2023    Influenza Vaccine (1) 09/01/2024 9/23/2022    DEXA Scan 07/13/2025 7/13/2022    High Dose Statin 07/15/2025 7/15/2024    Hemoglobin A1c (Diabetic Prevention Screening) 05/22/2027 5/22/2024    Colorectal Cancer Screening 06/11/2028 6/11/2021    Lipid Panel 05/22/2029 5/22/2024        No orders of the defined types were placed in this encounter.    The following information is provided to all patients.  This information is to help you find resources for any of the problems found today that may be affecting your health:                  Living healthy guide: www.[facility].[place].gov      Understanding Diabetes: www.diabetes.org      Eating healthy: www.cdc.gov/healthyweight      CDC home safety checklist: www.cdc.gov/steadi/patient.html      Agency on Aging: www.goea.[place].[facility]      Alcoholics anonymous (AA): www.aa.org      Physical Activity: www.janet.nih.gov/cu0ezhu      Tobacco use: www.quitwithusla.org

## 2024-07-17 ENCOUNTER — OFFICE VISIT (OUTPATIENT)
Dept: OTOLARYNGOLOGY | Facility: CLINIC | Age: 69
End: 2024-07-17
Payer: MEDICARE

## 2024-07-17 ENCOUNTER — LAB VISIT (OUTPATIENT)
Dept: LAB | Facility: HOSPITAL | Age: 69
End: 2024-07-17
Attending: OTOLARYNGOLOGY
Payer: MEDICARE

## 2024-07-17 VITALS — HEIGHT: 63 IN | BODY MASS INDEX: 30.86 KG/M2 | WEIGHT: 174.19 LBS

## 2024-07-17 DIAGNOSIS — K12.0 APHTHOUS ULCER OF MOUTH: Primary | ICD-10-CM

## 2024-07-17 DIAGNOSIS — K12.1 MOUTH ULCERS: ICD-10-CM

## 2024-07-17 DIAGNOSIS — R09.A2 GLOBUS SENSATION: ICD-10-CM

## 2024-07-17 DIAGNOSIS — K21.9 LARYNGOPHARYNGEAL REFLUX (LPR): ICD-10-CM

## 2024-07-17 PROCEDURE — 86235 NUCLEAR ANTIGEN ANTIBODY: CPT | Mod: HCNC | Performed by: OTOLARYNGOLOGY

## 2024-07-17 PROCEDURE — 99204 OFFICE O/P NEW MOD 45 MIN: CPT | Mod: 25,HCNC,S$GLB, | Performed by: OTOLARYNGOLOGY

## 2024-07-17 PROCEDURE — 31575 DIAGNOSTIC LARYNGOSCOPY: CPT | Mod: HCNC,S$GLB,, | Performed by: OTOLARYNGOLOGY

## 2024-07-17 PROCEDURE — 86038 ANTINUCLEAR ANTIBODIES: CPT | Mod: HCNC | Performed by: OTOLARYNGOLOGY

## 2024-07-17 PROCEDURE — 1159F MED LIST DOCD IN RCRD: CPT | Mod: HCNC,CPTII,S$GLB, | Performed by: OTOLARYNGOLOGY

## 2024-07-17 PROCEDURE — 3008F BODY MASS INDEX DOCD: CPT | Mod: HCNC,CPTII,S$GLB, | Performed by: OTOLARYNGOLOGY

## 2024-07-17 PROCEDURE — 3044F HG A1C LEVEL LT 7.0%: CPT | Mod: HCNC,CPTII,S$GLB, | Performed by: OTOLARYNGOLOGY

## 2024-07-17 PROCEDURE — 1125F AMNT PAIN NOTED PAIN PRSNT: CPT | Mod: HCNC,CPTII,S$GLB, | Performed by: OTOLARYNGOLOGY

## 2024-07-17 PROCEDURE — 36415 COLL VENOUS BLD VENIPUNCTURE: CPT | Mod: HCNC | Performed by: OTOLARYNGOLOGY

## 2024-07-17 PROCEDURE — 86235 NUCLEAR ANTIGEN ANTIBODY: CPT | Mod: 59,HCNC | Performed by: OTOLARYNGOLOGY

## 2024-07-17 PROCEDURE — 99999 PR PBB SHADOW E&M-EST. PATIENT-LVL III: CPT | Mod: PBBFAC,HCNC,, | Performed by: OTOLARYNGOLOGY

## 2024-07-17 PROCEDURE — 1101F PT FALLS ASSESS-DOCD LE1/YR: CPT | Mod: HCNC,CPTII,S$GLB, | Performed by: OTOLARYNGOLOGY

## 2024-07-17 PROCEDURE — 3288F FALL RISK ASSESSMENT DOCD: CPT | Mod: HCNC,CPTII,S$GLB, | Performed by: OTOLARYNGOLOGY

## 2024-07-17 RX ORDER — PANTOPRAZOLE SODIUM 40 MG/1
40 TABLET, DELAYED RELEASE ORAL DAILY
Qty: 90 TABLET | Refills: 3 | Status: SHIPPED | OUTPATIENT
Start: 2024-07-17 | End: 2025-07-17

## 2024-07-17 RX ORDER — TRIAMCINOLONE ACETONIDE 1 MG/G
PASTE DENTAL 2 TIMES DAILY
Qty: 5 G | Refills: 3 | Status: SHIPPED | OUTPATIENT
Start: 2024-07-17 | End: 2024-08-16

## 2024-07-17 NOTE — PROGRESS NOTES
"Referring Provider:    Self, Aaareferral  No address on file  Subjective:   Patient: Cesario Sahu [id number], :1955   Visit date:2024 11:10 AM    Chief Complaint:  Other (Patient c/o ulcers in mouth, throat pain throughout the night. Patient states she wakes up with throat pain. Patient also c/o L ear pain.)    HPI:    Prior notes reviewed by myself.  Clinical documentation obtained by nursing staff reviewed.     68 y/o female here for evaluation of recurrent oral cavity ulcers.  She frequently has ulcers that are mostly along the lateral edges and ventral surface of her tongue.  She has not been able to identify any exacerbating or causative circumstances.  She denies any trauma or new medications.  She does have a history of clenching/grinding her teeth but does not wear a .  She has a history of chronic dry eye but no prior diagnosis of sjogrens or other AI disease.  She reports frequent sore throats and globus sensation, with chronic throat clearing and occasional episodes of heartburn/acid regurgitation.        Objective:     Physical Exam:  Vitals:  Ht 5' 3" (1.6 m)   Wt 79 kg (174 lb 2.6 oz)   BMI 30.85 kg/m²   General appearance:  Well developed, well nourished    Ears:  Otoscopy of external auditory canals and tympanic membranes was normal, clinical speech reception thresholds grossly intact, no mass/lesion of auricle.    Nose:  No masses/lesions of external nose, nasal mucosa, septum, and turbinates were within normal limits.    Mouth:  No mass/lesion of lips, teeth, gums, hard/soft palate, tongue with shallow white ulcerations along lateral surface extending onto ventral surface bilaterally, tonsils, or oropharynx.    Neck & Lymphatics:  No cervical lymphadenopathy, no neck mass/crepitus/ asymmetry, trachea is midline, no thyroid enlargement/tenderness/mass.    Due to indication in patient's history, presentation or risk factors,  a fiber optic exam was performed.    SEPARATE " PROCEDURE NOTE:    ANESTHESIA:  Topical xylocaine with wesley-synephrine  FINDINGS:  Moderate to severe inaterarytenoid erythema and edema    PROCEDURE:  After verbal consent was obtained, the flexible scope was passed through the patient's nasal cavity without difficulty.  The nasopharynx (adenoid pad) and eustachian tube orifices were first visualized and were found to be normal, without masses or irregularity.  The posterior pharyngeal wall and base of tongue were then examined and no mass or irregular tissue was seen.  The scope was then advanced to the larynx, and the epiglottis, valleculae, and piriform sinuses were normal, without masses or mucosal irregularity.  The false vocal folds and true vocal folds were then examined and were found to have normal mobility (full abduction and adduction) and no masses or mucosal irregularity was seen.  The interartyenoid area had moderate to severe edema and erythema consistent with reflux.      [x]  Data Reviewed:    Lab Results   Component Value Date    WBC 10.31 07/03/2024    HGB 12.6 07/03/2024    HCT 38.5 07/03/2024    MCV 89 07/03/2024    EOSINOPHIL 1.8 07/03/2024             Assessment & Plan:   Aphthous ulcer of mouth  -     triamcinolone acetonide 0.1% (KENALOG) 0.1 % paste; Place onto teeth 2 (two) times daily.  Dispense: 5 g; Refill: 3    Mouth ulcers  -     FERCHO; Future; Expected date: 07/17/2024  -     ANTI -SSA ANTIBODY; Future; Expected date: 07/17/2024  -     ANTI-SSB ANTIBODY; Future; Expected date: 07/17/2024    Laryngopharyngeal reflux (LPR)  -     pantoprazole (PROTONIX) 40 MG tablet; Take 1 tablet (40 mg total) by mouth once daily.  Dispense: 90 tablet; Refill: 3        Evidence of uncontrolled LPR on her scope exam, so we agreed on a 6 week trial of PPI.  She has symmetric ulcerations along her lateral and ventral tongue.  I encouraged her to discuss the possible of bruxism with her dentist.  I am concerned that this could be a contact issue from her  grinding.  We also discussed the possibility of autoimmune etiologies for recurrent mouth ulcers and she has a history of chronic dry eye, so I recommended a screening for autoimmune and Sjogren's.  I recommended a trial of Orabase with Kenalog for symptomatic relief    Allan Martinez M.D.  Department of Otolaryngology - Head & Neck Surgery  [address].  [place] [address]  P: [phone]  F: [phone]        DISCLAIMER: This note was prepared with [employer] voice recognition transcription software. Garbled syntax, mangled pronouns, and other bizarre constructions may be attributed to that software system. While efforts were made to correct any mistakes made by this voice recognition program, some errors and/or omissions may remain in the note that were missed when the note was originally created.

## 2024-07-18 ENCOUNTER — TELEPHONE (OUTPATIENT)
Dept: OTOLARYNGOLOGY | Facility: CLINIC | Age: 69
End: 2024-07-18
Payer: MEDICARE

## 2024-07-18 LAB
ANA SER QL IF: NORMAL
ANTI-SSA ANTIBODY: 0.06 RATIO (ref 0–0.99)
ANTI-SSA INTERPRETATION: NEGATIVE
ANTI-SSB ANTIBODY: 0.05 RATIO (ref 0–0.99)
ANTI-SSB INTERPRETATION: NEGATIVE

## 2024-07-18 NOTE — TELEPHONE ENCOUNTER
----- Message from Allan Martinez MD sent at 7/18/2024  3:26 PM CDT -----  Your results are normal.

## 2024-07-19 ENCOUNTER — OFFICE VISIT (OUTPATIENT)
Dept: FAMILY MEDICINE | Facility: CLINIC | Age: 69
End: 2024-07-19
Payer: MEDICARE

## 2024-07-19 VITALS
HEART RATE: 66 BPM | BODY MASS INDEX: 30.82 KG/M2 | OXYGEN SATURATION: 99 % | RESPIRATION RATE: 14 BRPM | HEIGHT: 63 IN | WEIGHT: 173.94 LBS | TEMPERATURE: 98 F | SYSTOLIC BLOOD PRESSURE: 124 MMHG | DIASTOLIC BLOOD PRESSURE: 82 MMHG

## 2024-07-19 DIAGNOSIS — F41.9 ANXIETY: Primary | ICD-10-CM

## 2024-07-19 PROCEDURE — 3079F DIAST BP 80-89 MM HG: CPT | Mod: CPTII,S$GLB,, | Performed by: NURSE PRACTITIONER

## 2024-07-19 PROCEDURE — 99999 PR PBB SHADOW E&M-EST. PATIENT-LVL IV: CPT | Mod: PBBFAC,,, | Performed by: NURSE PRACTITIONER

## 2024-07-19 PROCEDURE — 1101F PT FALLS ASSESS-DOCD LE1/YR: CPT | Mod: CPTII,S$GLB,, | Performed by: NURSE PRACTITIONER

## 2024-07-19 PROCEDURE — 3044F HG A1C LEVEL LT 7.0%: CPT | Mod: CPTII,S$GLB,, | Performed by: NURSE PRACTITIONER

## 2024-07-19 PROCEDURE — 3074F SYST BP LT 130 MM HG: CPT | Mod: CPTII,S$GLB,, | Performed by: NURSE PRACTITIONER

## 2024-07-19 PROCEDURE — 99214 OFFICE O/P EST MOD 30 MIN: CPT | Mod: S$GLB,,, | Performed by: NURSE PRACTITIONER

## 2024-07-19 PROCEDURE — 3008F BODY MASS INDEX DOCD: CPT | Mod: CPTII,S$GLB,, | Performed by: NURSE PRACTITIONER

## 2024-07-19 PROCEDURE — 3288F FALL RISK ASSESSMENT DOCD: CPT | Mod: CPTII,S$GLB,, | Performed by: NURSE PRACTITIONER

## 2024-07-19 RX ORDER — BUSPIRONE HYDROCHLORIDE 5 MG/1
5 TABLET ORAL 2 TIMES DAILY
Qty: 60 TABLET | Refills: 11 | Status: SHIPPED | OUTPATIENT
Start: 2024-07-19 | End: 2025-07-19

## 2024-07-19 RX ORDER — SERTRALINE HYDROCHLORIDE 25 MG/1
25 TABLET, FILM COATED ORAL DAILY
Qty: 30 TABLET | Refills: 11 | Status: SHIPPED | OUTPATIENT
Start: 2024-07-19 | End: 2025-07-19

## 2024-07-24 ENCOUNTER — OFFICE VISIT (OUTPATIENT)
Dept: OBSTETRICS AND GYNECOLOGY | Facility: CLINIC | Age: 69
End: 2024-07-24
Payer: MEDICARE

## 2024-07-24 VITALS
SYSTOLIC BLOOD PRESSURE: 122 MMHG | HEIGHT: 63 IN | WEIGHT: 175.06 LBS | DIASTOLIC BLOOD PRESSURE: 80 MMHG | BODY MASS INDEX: 31.02 KG/M2

## 2024-07-24 DIAGNOSIS — N95.2 VAGINAL ATROPHY: ICD-10-CM

## 2024-07-24 DIAGNOSIS — Z01.419 ENCOUNTER FOR ANNUAL ROUTINE GYNECOLOGICAL EXAMINATION: Primary | ICD-10-CM

## 2024-07-24 DIAGNOSIS — N89.8 VAGINAL ODOR: ICD-10-CM

## 2024-07-24 DIAGNOSIS — R39.9 UTI SYMPTOMS: ICD-10-CM

## 2024-07-24 DIAGNOSIS — N76.0 BACTERIAL VAGINITIS: ICD-10-CM

## 2024-07-24 DIAGNOSIS — B96.89 BACTERIAL VAGINITIS: ICD-10-CM

## 2024-07-24 LAB
BILIRUBIN, UA POC OHS: NEGATIVE
BLOOD, UA POC OHS: NEGATIVE
CLARITY, UA POC OHS: CLEAR
COLOR, UA POC OHS: YELLOW
GLUCOSE, UA POC OHS: NEGATIVE
KETONES, UA POC OHS: NEGATIVE
LEUKOCYTES, UA POC OHS: NEGATIVE
NITRITE, UA POC OHS: NEGATIVE
PH, UA POC OHS: 7
PROTEIN, UA POC OHS: NEGATIVE
SPECIFIC GRAVITY, UA POC OHS: 1.01
UROBILINOGEN, UA POC OHS: 0.2

## 2024-07-24 PROCEDURE — 99999 PR PBB SHADOW E&M-EST. PATIENT-LVL IV: CPT | Mod: PBBFAC,HCNC,,

## 2024-07-24 RX ORDER — ESTRADIOL 0.1 MG/G
1 CREAM VAGINAL
Qty: 42.5 G | Refills: 6 | Status: SHIPPED | OUTPATIENT
Start: 2024-07-25

## 2024-07-24 RX ORDER — FLUCONAZOLE 150 MG/1
150 TABLET ORAL
Qty: 2 TABLET | Refills: 0 | Status: SHIPPED | OUTPATIENT
Start: 2024-07-24 | End: 2024-07-28

## 2024-07-24 RX ORDER — METRONIDAZOLE 500 MG/1
500 TABLET ORAL 2 TIMES DAILY
Qty: 14 TABLET | Refills: 0 | Status: SHIPPED | OUTPATIENT
Start: 2024-07-24 | End: 2024-07-31

## 2024-07-24 NOTE — PATIENT INSTRUCTIONS
May use over the counter vaginal suppositories for day to day relief from vaginal dryness and irritation. Brands include:    Replens  RepHresh  [place] moisture beads  [employer] brand vaginal suppositories with hyaluronic acid     These can be used 2-3 times per week, inserted into the vagina. Wear a pad or panty liner to catch any leaking.

## 2024-07-24 NOTE — PROGRESS NOTES
Subjective:       Patient ID: Cesario Sahu is a 69 y.o. female.    Chief Complaint:  Well Woman      History of Present Illness  HPI  Annual Exam-Postmenopausal  Patient presents for annual exam. The patient has complaints today of vaginal odor, vaginal irritation and urinary frequency. The patient is sexually active, . GYN screening history: last pap: approximate date 21 and was normal and last mammogram: approximate date 23 and was normal. Annual mammogram scheduled. The patient is taking hormone replacement therapy. Patient using vaginal estrace 2-3x a week and doing well.  Patient denies post-menopausal vaginal bleeding. The patient wears seatbelts: yes. The patient participates in regular exercise: yes. Has the patient ever been transfused or tattooed?: no. The patient reports that there is not domestic violence in her life.      GYN & OB History  No LMP recorded. Patient is postmenopausal.   Date of Last Pap: 2021    OB History    Para Term  AB Living   3 3 3     3   SAB IAB Ectopic Multiple Live Births           3      # Outcome Date GA Lbr Atilio/2nd Weight Sex Type Anes PTL Lv   3 Term      Vag-Spont   LEXI   2 Term      Vag-Spont   LEXI   1 Term      Vag-Spont   LEXI       Review of Systems  Review of Systems   Constitutional:  Negative for appetite change, fatigue, fever and unexpected weight change.   Eyes:  Negative for visual disturbance.   Cardiovascular:  Negative for chest pain.   Gastrointestinal:  Negative for abdominal pain, bloating, constipation, diarrhea, nausea and vomiting.   Genitourinary:  Positive for vaginal pain, vaginal dryness and vaginal odor. Negative for bladder incontinence, decreased libido, dysmenorrhea, dyspareunia, dysuria, flank pain, frequency, genital sores, hot flashes, menorrhagia, menstrual problem, pelvic pain, urgency, vaginal bleeding, vaginal discharge, postcoital bleeding and postmenopausal bleeding.   Integumentary:  Negative  for rash, acne, mole/lesion, breast mass, nipple discharge, breast skin changes and breast tenderness.   Neurological:  Negative for syncope and headaches.   Hematological:  Negative for adenopathy. Does not bruise/bleed easily.   Psychiatric/Behavioral:  Negative for sleep disturbance.    All other systems reviewed and are negative.  Breast: Positive for breast self exam.Negative for asymmetry, lump, mass, nipple discharge, skin changes and tenderness          Objective:      Physical Exam:   Constitutional: She is oriented to person, place, and time. She appears well-developed and well-nourished.    HENT:   Head: Normocephalic and atraumatic.   Nose: Nose normal.    Eyes: Pupils are equal, round, and reactive to light. Conjunctivae and EOM are normal.     Cardiovascular:  Normal rate and regular rhythm.             Pulmonary/Chest: Effort normal. She has no decreased breath sounds. She has no rhonchi. Right breast exhibits no inverted nipple, no mass, no nipple discharge, no tenderness and no bleeding. Left breast exhibits no inverted nipple, no mass, no nipple discharge, no tenderness and no bleeding. Breasts are symmetrical.        Abdominal: Soft. There is no abdominal tenderness.     Genitourinary:    Inguinal canal, uterus, right adnexa and left adnexa normal.      Pelvic exam was performed with patient supine.   The external female genitalia was normal.   No external genitalia lesions identified,Genitalia hair distrobution normal .     Labial bartholins normal.Cervix is normal. Right adnexum displays no mass and no tenderness. Left adnexum displays no mass and no tenderness. There is vaginal discharge and tenderness in the vagina. No bleeding in the vagina. Vaginal atrophy noted. Cervix exhibits no motion tenderness, no discharge and no tenderness.    pap smear not completedUerus contour normal  Uterus is not tender. Uterus size: 8 cm.Normal urethral meatus.          Musculoskeletal: Normal range of motion  and moves all extremeties.       Neurological: She is alert and oriented to person, place, and time.    Skin: Skin is warm and dry.    Psychiatric: She has a normal mood and affect. Her speech is normal and behavior is normal. Mood, judgment and thought content normal.             Assessment:        1. Encounter for annual routine gynecological examination    2. UTI symptoms    3. Vaginal odor    4. Vaginal atrophy    5. Bacterial vaginitis          Microscopic wet-mount exam shows clue cells.         Plan:   Continue annual well woman exam.  Pap no longer indicated. Patient was counseled today on ASCCP screening guidelines (no longer indicated in pt over 65 with normal pap history) and recommendations for annual pelvic exams and clinical breast exams, yearly mammograms; and to see her PCP for other healthcare maintenance.   Urine dip negative  Ensure adequate calcium and vitamin D intake and daily weight bearing exercises.      Diagnosis and orders this visit:  Encounter for annual routine gynecological examination    UTI symptoms  -     POCT Urinalysis(Instrument)  -     Cancel: Urine culture    Vaginal odor  -     POCT Wet Prep    Vaginal atrophy  -     estradioL (ESTRACE) 0.01 % (0.1 mg/gram) vaginal cream; Place 1 g vaginally twice a week.  Dispense: 42.5 g; Refill: 6    Bacterial vaginitis  -     metroNIDAZOLE (FLAGYL) 500 MG tablet; Take 1 tablet (500 mg total) by mouth 2 (two) times daily. for 7 days  Dispense: 14 tablet; Refill: 0  -     fluconazole (DIFLUCAN) 150 MG Tab; Take 1 tablet (150 mg total) by mouth every 72 hours. for 2 doses  Dispense: 2 tablet; Refill: 0      Brenda Luu NP

## 2024-08-01 ENCOUNTER — TELEPHONE (OUTPATIENT)
Dept: CARDIOLOGY | Facility: HOSPITAL | Age: 69
End: 2024-08-01
Payer: MEDICARE

## 2024-08-20 ENCOUNTER — TELEPHONE (OUTPATIENT)
Dept: CARDIOLOGY | Facility: HOSPITAL | Age: 69
End: 2024-08-20
Payer: MEDICARE

## 2024-08-22 ENCOUNTER — TELEPHONE (OUTPATIENT)
Dept: CARDIOLOGY | Facility: HOSPITAL | Age: 69
End: 2024-08-22
Payer: MEDICARE

## 2024-08-22 NOTE — TELEPHONE ENCOUNTER
Returned call. Patient vasu from 08/27/24 to 09/17/2024 at 0830, The [place] location., due to will be out of town.

## 2024-08-22 NOTE — TELEPHONE ENCOUNTER
----- Message from Chelsie Chua sent at 8/21/2024  4:29 PM CDT -----  Contact: Cesario Nassar needs a call back at  [phone], Regards to getting her stress test reschedule.    Thanks  Td

## 2024-08-23 ENCOUNTER — HOSPITAL ENCOUNTER (OUTPATIENT)
Dept: RADIOLOGY | Facility: HOSPITAL | Age: 69
Discharge: HOME | End: 2024-08-23
Attending: INTERNAL MEDICINE
Payer: MEDICARE

## 2024-08-23 DIAGNOSIS — Z12.31 ENCOUNTER FOR SCREENING MAMMOGRAM FOR BREAST CANCER: ICD-10-CM

## 2024-08-23 PROCEDURE — 77067 SCR MAMMO BI INCL CAD: CPT | Mod: TC,HCNC

## 2024-08-23 PROCEDURE — 77063 BREAST TOMOSYNTHESIS BI: CPT | Mod: 26,HCNC,, | Performed by: RADIOLOGY

## 2024-08-23 PROCEDURE — 77063 BREAST TOMOSYNTHESIS BI: CPT | Mod: TC,HCNC

## 2024-08-23 PROCEDURE — 77067 SCR MAMMO BI INCL CAD: CPT | Mod: 26,HCNC,, | Performed by: RADIOLOGY

## 2024-08-26 ENCOUNTER — TELEPHONE (OUTPATIENT)
Dept: FAMILY MEDICINE | Facility: CLINIC | Age: 69
End: 2024-08-26
Payer: MEDICARE

## 2024-08-26 NOTE — TELEPHONE ENCOUNTER
----- Message from Zahida Bass DO sent at 8/26/2024  8:05 AM CDT -----  Mammogram normal. Repeat in 1 year

## 2024-09-17 ENCOUNTER — HOSPITAL ENCOUNTER (OUTPATIENT)
Dept: CARDIOLOGY | Facility: HOSPITAL | Age: 69
Discharge: HOME | End: 2024-09-17
Attending: INTERNAL MEDICINE
Payer: MEDICARE

## 2024-10-02 ENCOUNTER — OFFICE VISIT (OUTPATIENT)
Dept: FAMILY MEDICINE | Facility: CLINIC | Age: 69
End: 2024-10-02
Attending: FAMILY MEDICINE
Payer: MEDICARE

## 2024-10-02 VITALS
HEIGHT: 63 IN | WEIGHT: 168 LBS | SYSTOLIC BLOOD PRESSURE: 122 MMHG | HEART RATE: 63 BPM | OXYGEN SATURATION: 98 % | BODY MASS INDEX: 29.77 KG/M2 | RESPIRATION RATE: 18 BRPM | DIASTOLIC BLOOD PRESSURE: 78 MMHG | TEMPERATURE: 97 F

## 2024-10-02 DIAGNOSIS — I70.0 AORTIC ATHEROSCLEROSIS: ICD-10-CM

## 2024-10-02 DIAGNOSIS — Z78.0 POSTMENOPAUSAL: ICD-10-CM

## 2024-10-02 DIAGNOSIS — F41.9 ANXIETY: ICD-10-CM

## 2024-10-02 DIAGNOSIS — E78.2 MIXED HYPERLIPIDEMIA: ICD-10-CM

## 2024-10-02 DIAGNOSIS — I10 ESSENTIAL HYPERTENSION: Primary | ICD-10-CM

## 2024-10-02 DIAGNOSIS — Z23 NEED FOR VACCINATION: ICD-10-CM

## 2024-10-02 DIAGNOSIS — E66.3 OVERWEIGHT (BMI 25.0-29.9): ICD-10-CM

## 2024-10-02 PROCEDURE — 99214 OFFICE O/P EST MOD 30 MIN: CPT | Mod: 25,HCNC,S$GLB, | Performed by: FAMILY MEDICINE

## 2024-10-02 PROCEDURE — 3074F SYST BP LT 130 MM HG: CPT | Mod: HCNC,CPTII,S$GLB, | Performed by: FAMILY MEDICINE

## 2024-10-02 PROCEDURE — G0008 ADMIN INFLUENZA VIRUS VAC: HCPCS | Mod: HCNC,S$GLB,, | Performed by: FAMILY MEDICINE

## 2024-10-02 PROCEDURE — 3078F DIAST BP <80 MM HG: CPT | Mod: HCNC,CPTII,S$GLB, | Performed by: FAMILY MEDICINE

## 2024-10-02 PROCEDURE — 3044F HG A1C LEVEL LT 7.0%: CPT | Mod: HCNC,CPTII,S$GLB, | Performed by: FAMILY MEDICINE

## 2024-10-02 PROCEDURE — 1160F RVW MEDS BY RX/DR IN RCRD: CPT | Mod: HCNC,CPTII,S$GLB, | Performed by: FAMILY MEDICINE

## 2024-10-02 PROCEDURE — 1101F PT FALLS ASSESS-DOCD LE1/YR: CPT | Mod: HCNC,CPTII,S$GLB, | Performed by: FAMILY MEDICINE

## 2024-10-02 PROCEDURE — 1126F AMNT PAIN NOTED NONE PRSNT: CPT | Mod: HCNC,CPTII,S$GLB, | Performed by: FAMILY MEDICINE

## 2024-10-02 PROCEDURE — 90653 IIV ADJUVANT VACCINE IM: CPT | Mod: HCNC,S$GLB,, | Performed by: FAMILY MEDICINE

## 2024-10-02 PROCEDURE — 1159F MED LIST DOCD IN RCRD: CPT | Mod: HCNC,CPTII,S$GLB, | Performed by: FAMILY MEDICINE

## 2024-10-02 PROCEDURE — 99999 PR PBB SHADOW E&M-EST. PATIENT-LVL IV: CPT | Mod: PBBFAC,HCNC,, | Performed by: FAMILY MEDICINE

## 2024-10-02 PROCEDURE — 3008F BODY MASS INDEX DOCD: CPT | Mod: HCNC,CPTII,S$GLB, | Performed by: FAMILY MEDICINE

## 2024-10-02 PROCEDURE — 3288F FALL RISK ASSESSMENT DOCD: CPT | Mod: HCNC,CPTII,S$GLB, | Performed by: FAMILY MEDICINE

## 2024-10-02 NOTE — PROGRESS NOTES
"Cesario Sahu    Chief Complaint   Patient presents with    Establish Care       History of Present Illness:   Ms. Sahu comes in today to establish care with me.  She has been previously followed by PCP Dr. Bass.  She states she is fasting and has not taken medication today.      She states she takes Toprol-XL 25 mg daily, amlodipine 5 mg daily for blood pressure control; Lipitor 10 mg daily p.r.n. for hyperlipidemia; BuSpar 5 mg twice daily and Zoloft 25 mg daily or anxiety; aspirin EC 81 mg daily.    She states she does not perform home blood pressure checks.  She states she walks 5 times per week, 30-40 minutes each time on treadmill. She states she monitors her diet.    She states she takes stool softener for constipation with help.    She states she has chronic, occasional knee pain but "keeps going. "    Otherwise, she denies having fever, chills, fatigue, appetite changes; shortness of breath, cough, wheezing; chest pain, palpitations, leg swelling; abdominal pain, nausea, vomiting, diarrhea; unusual urinary symptoms; polydipsia, polyphagia, polyuria, hot or cold intolerance; back pain; numbness, headache; anxiety, depression, homicidal or suicidal thoughts.      She saw Dr. Vanessa, cardiologist, on June 21, 2024 for aortic atherosclerosis, essential hypertension, mixed hyperlipidemia, PVC (premature ventricular contraction), EKG abnormalities.        Labs:                  WBC                      10.31               07/03/2024                 HGB                      12.6                07/03/2024                 HCT                      38.5                07/03/2024                 PLT                      292                 07/03/2024                 CHOL                     210 (H)             05/22/2024                 TRIG                     78                  05/22/2024                 HDL                      61                  05/22/2024                 ALT                      22     "              07/03/2024                 AST                      20                  07/03/2024                 NA                       140                 07/03/2024                 K                        3.8                 07/03/2024                 CL                       106                 07/03/2024                 CREATININE               0.8                 07/03/2024                 BUN                      12                  07/03/2024                 CO2                      24                  07/03/2024                 TSH                      1.180               05/22/2024                 HGBA1C                   5.6                 05/22/2024                   LDLCALC                  133.4               05/22/2024                  Current Outpatient Medications   Medication Sig    amLODIPine (NORVASC) 5 MG tablet Take 1 tablet (5 mg total) by mouth once daily.    ascorbic acid (VITAMIN C ORAL)     aspirin (ECOTRIN) 81 MG EC tablet Take 81 mg by mouth once daily.    atorvastatin (LIPITOR) 10 MG tablet Take 1 tablet (10 mg total) by mouth every evening.    busPIRone (BUSPAR) 5 MG Tab Take 1 tablet (5 mg total) by mouth 2 (two) times daily.    diltiazem HCl (DILTIAZEM 2% - LIDOCAINE 5% CREAM) Apply 1 application  topically 3 (three) times daily.    estradioL (ESTRACE) 0.01 % (0.1 mg/gram) vaginal cream Place 1 g vaginally twice a week.    ibuprofen (ADVIL,MOTRIN) 800 MG tablet Take 1 tablet (800 mg total) by mouth every 6 (six) hours as needed for Pain.    Lactobacillus acidophilus (PROBIOTIC ORAL)     loratadine (CLARITIN) 10 mg tablet Take 1 tablet (10 mg total) by mouth once daily.    metoprolol succinate (TOPROL-XL) 25 MG 24 hr tablet Take 1 tablet by mouth once daily    pantoprazole (PROTONIX) 40 MG tablet Take 1 tablet (40 mg total) by mouth once daily.    sertraline (ZOLOFT) 25 MG tablet Take 1 tablet (25 mg total) by mouth once daily.       Review of Systems   Constitutional:  Negative for  activity change, appetite change, chills, fatigue and fever.   Respiratory:  Negative for cough, shortness of breath and wheezing.    Cardiovascular:  Negative for chest pain, palpitations and leg swelling.        See history of present illness.   Gastrointestinal:  Positive for constipation. Negative for abdominal pain, diarrhea, nausea and vomiting.   Endocrine: Negative for cold intolerance, heat intolerance, polydipsia, polyphagia and polyuria.   Genitourinary:  Negative for difficulty urinating.   Musculoskeletal:  Positive for myalgias. Negative for back pain.   Neurological:  Negative for numbness and headaches.   Psychiatric/Behavioral:  Negative for dysphoric mood and suicidal ideas. The patient is not nervous/anxious.         Negative for homicidal ideas.       Objective:  Physical Exam  Vitals reviewed.   Constitutional:       General: She is not in acute distress.     Appearance: Normal appearance. She is not ill-appearing, toxic-appearing or diaphoretic.      Comments: Pleasant.   Cardiovascular:      Rate and Rhythm: Normal rate and regular rhythm.      Pulses: Normal pulses.      Heart sounds: No murmur heard.  Pulmonary:      Effort: Pulmonary effort is normal. No respiratory distress.      Breath sounds: Normal breath sounds. No wheezing.   Abdominal:      General: Bowel sounds are normal. There is no distension.      Palpations: Abdomen is soft. There is no mass.      Tenderness: There is no abdominal tenderness. There is no guarding or rebound.   Musculoskeletal:         General: No swelling or tenderness. Normal range of motion.      Cervical back: Normal range of motion and neck supple. No tenderness.      Comments: She is ambulatory without problems.   Lymphadenopathy:      Cervical: No cervical adenopathy.   Skin:     General: Skin is warm.   Neurological:      General: No focal deficit present.      Mental Status: She is alert and oriented to person, place, and time.   Psychiatric:          Mood and Affect: Mood normal.         Behavior: Behavior normal.         Thought Content: Thought content normal.         Judgment: Judgment normal.         ASSESSMENT:  1. Essential hypertension    2. Mixed hyperlipidemia    3. Aortic atherosclerosis    4. Anxiety    5. Overweight (BMI 25.0-29.9)    6. Postmenopausal    7. Need for vaccination        PLAN:  Cesario was seen today for establish care.    Diagnoses and all orders for this visit:    Essential hypertension    Mixed hyperlipidemia    Aortic atherosclerosis    Anxiety    Overweight (BMI 25.0-29.9)    Postmenopausal    Need for vaccination  -     Influenza - Trivalent (Adjuvanted)      No labs today.  Continue current medications, follow low sodium, low cholesterol, low carb diet, daily walks.  Keep follow up with specialists.  Follow up in about 8 months (around 5/22/2025) for physical.

## 2024-10-18 ENCOUNTER — TELEPHONE (OUTPATIENT)
Dept: FAMILY MEDICINE | Facility: CLINIC | Age: 69
End: 2024-10-18

## 2024-10-18 NOTE — TELEPHONE ENCOUNTER
----- Message from Radha sent at 10/18/2024  9:15 AM CDT -----  Contact: Shirline  Type:  Same Day Appointment Request    Caller is requesting a same day appointment.  Caller declined first available appointment listed below.    Name of Caller:Cesario  When is the first available appointment? 05/02/25  Symptoms:Earache, sore throat  Best Call Back Number:[phone]  Additional Information:     Thanks  Patricia

## 2024-11-06 RX ORDER — AMLODIPINE BESYLATE 5 MG/1
5 TABLET ORAL DAILY
Qty: 90 TABLET | Refills: 1 | Status: SHIPPED | OUTPATIENT
Start: 2024-11-06

## 2024-11-06 NOTE — TELEPHONE ENCOUNTER
No care due was identified.  [facility] Embedded Care Due Messages. Reference number: [id number].   11/06/2024 11:06:51 AM CST

## 2024-11-06 NOTE — TELEPHONE ENCOUNTER
Refill Routing Note   Medication(s) are not appropriate for processing by [facility] for the following reason(s):        No active prescription written by provider    [facility] action(s):  Defer               Appointments  past 12m or future 3m with PCP    Date Provider   Last Visit   10/2/2024 Madison Patino MD   Next Visit   Visit date not found Madison Patino MD   ED visits in past 90 days: 0        Note composed:2:46 PM 11/06/2024

## 2024-11-06 NOTE — TELEPHONE ENCOUNTER
----- Message from Neaveh sent at 11/6/2024 10:53 AM CST -----  Contact: Cesario  .Type:  RX Refill Request    Who Called: Cesario  Refill or New Rx:refill  RX Name and Strength:amLODIPine (NORVASC) 5 MG tablet  How is the patient currently taking it? (ex. 1XDay): once a day  Is this a 30 day or 90 day RX:30 but mention if she can change to 90 days instead  Preferred Pharmacy with phone number:.  [id number] - [place] - [address]  [address] [address]  Phone: [phone] Fax: [phone]  Local or Mail Order:local  Ordering Provider:  Would the patient rather a call back or a response via [facility]? Back back  Best Call Back Number:[phone]  Additional Information: She stated that she's currently out of medicine & if they have any questions or concerns to please give her a call

## 2024-12-26 ENCOUNTER — OFFICE VISIT (OUTPATIENT)
Dept: INTERNAL MEDICINE | Facility: CLINIC | Age: 69
End: 2024-12-26
Payer: MEDICARE

## 2024-12-26 VITALS
BODY MASS INDEX: 30.43 KG/M2 | HEART RATE: 90 BPM | OXYGEN SATURATION: 98 % | HEIGHT: 63 IN | DIASTOLIC BLOOD PRESSURE: 70 MMHG | SYSTOLIC BLOOD PRESSURE: 120 MMHG | WEIGHT: 171.75 LBS | TEMPERATURE: 98 F

## 2024-12-26 DIAGNOSIS — H60.392 OTHER INFECTIVE ACUTE OTITIS EXTERNA OF LEFT EAR: ICD-10-CM

## 2024-12-26 DIAGNOSIS — Z20.822 CONTACT WITH AND (SUSPECTED) EXPOSURE TO COVID-19: ICD-10-CM

## 2024-12-26 DIAGNOSIS — R05.9 COUGH, UNSPECIFIED TYPE: ICD-10-CM

## 2024-12-26 DIAGNOSIS — R61 DIAPHORESIS: ICD-10-CM

## 2024-12-26 DIAGNOSIS — R09.81 NASAL CONGESTION: ICD-10-CM

## 2024-12-26 DIAGNOSIS — U07.1 COVID-19 VIRUS INFECTION: Primary | ICD-10-CM

## 2024-12-26 DIAGNOSIS — U07.1 COVID-19 VIRUS DETECTED: ICD-10-CM

## 2024-12-26 PROCEDURE — 99999 PR PBB SHADOW E&M-EST. PATIENT-LVL V: CPT | Mod: PBBFAC,HCNC,, | Performed by: NURSE PRACTITIONER

## 2024-12-26 RX ORDER — OFLOXACIN 3 MG/ML
10 SOLUTION AURICULAR (OTIC) DAILY
Qty: 5 ML | Refills: 0 | Status: SHIPPED | OUTPATIENT
Start: 2024-12-26

## 2024-12-26 RX ORDER — PROMETHAZINE HYDROCHLORIDE AND DEXTROMETHORPHAN HYDROBROMIDE 6.25; 15 MG/5ML; MG/5ML
5 SYRUP ORAL EVERY 4 HOURS PRN
Qty: 118 ML | Refills: 0 | Status: SHIPPED | OUTPATIENT
Start: 2024-12-26

## 2024-12-26 RX ORDER — ALBUTEROL SULFATE 90 UG/1
2 INHALANT RESPIRATORY (INHALATION) EVERY 6 HOURS PRN
Qty: 6.7 G | Refills: 0 | Status: SHIPPED | OUTPATIENT
Start: 2024-12-26

## 2024-12-26 NOTE — PATIENT INSTRUCTIONS
OOB as much as possible  Take over the counter tylenol 650 mg by mouth and may alternate 3 hours later with motrin 400 mg by mouth as needed for pain   Increase hydration with water/electrolytes  Obtain a pulse oximeter to monitor your oxygenation.  If your oxygenations falls at or below 90%, call 911  Dial 911 for any shortness of breath, difficulty breathing, intractable fever, or chest pain.       You have tested positive for COVID-19 today.      Please note that patients who test positive for COVID-19 are required by the CDC to undergo isolation for 5 days after their symptoms first began.   - If you tested positive and do not have symptoms, you must isolate for 5 days starting on the day of the positive test.   - If you tested positive and have symptoms, you must isolate for 5 days starting on the day of the first symptoms,  not the day of the positive test.    This is the most important part, both the CDC and the [facility] emphasize that you do not test out of isolation.  In fact, we do not retest if you were positive in the last 90 days.    After 5 days, if your symptoms have improved and you have not had fever on day 5, you can return to the community on day 6- NO TESTING REQUIRED!     After your 5 days of isolation are completed, the CDC recommends strict mask use for the first 5 days that you come out of isolation.     During quarantine:   Separate yourself from other people and animals in your home.  Call ahead before visiting your doctor.  Wear a facemask.  Cover your coughs and sneezes.  Wash your hands often with soap and water; hand  can be used, too.  Avoid sharing personal household items.  Wipe down surfaces used daily.  Monitor your symptoms. Seek prompt medical attention if your illness is worsening (e.g., difficulty breathing).   Before seeking care, call your healthcare provider.  If you have a medical emergency and need to call 911, notify the dispatch personnel that you have, or are  being evaluated for COVID-19. If possible, put on a facemask before emergency medical services arrive.         CDC Testing and Quarantine Guidelines for household members, intimate partners, and caregivers in a home setting of a known-positive COVID-19 person:    ·     A 'close exposure' is defined as anyone who has had an exposure (masked or unmasked) to a known COVID -19 positive person within 6 feet of someone for a cumulative total of 15 minutes or more over a 24-hour period.    ·     Vaccinated: patients who have been boosted or completed the primary series of Pfizer or Moderna vaccine within the last 6 months or completed the primary series of J&J vaccine within the last 2 months and/or had a positive test within 90 days   - do NOT need to quarantine after contact with someone who had COVID-19 unless they have symptoms.   - should get tested 3-5 days after their exposure (if they have not had a positive test within the last 90 days), even if they don't have symptoms and wear a mask indoors in public for 10 days following exposure or until their test result is negative on day 5.  - If you develop symptoms test and quarantine.    ·     Unvaccinated, or are more than six months out from their second mRNA dose (or more than 2 months after the J&J vaccine) and not yet boosted,  and/or NOT had a positive test within 90 days and meet 'close exposure'  - you are required by CDC guidelines to quarantine for at least 5 days from time of exposure followed by 5 days of strict masking. It is recommended, but not required to test after 5 days, unless you develop symptoms, in which case you should test at that time.  - If you do decide to test at 5 days and are asymptomatic, the risk is that if you test without symptoms (on Day 5 for example) and you are positive, your 5 day isolation begins on that day, and you turned your 5 day quarantine into 10 days.  - If your exposure does not meet the above definition, you can return  to your normal daily activities to include social distancing, wearing a mask and frequent handwashing.       Close contacts should also follow these recommendations:  Make sure that you understand and can help the patient follow their provider's instructions for medication(s) and care. You should help the patient with basic needs in the home and provide support for getting groceries, prescriptions, and other personal needs.  Monitor the patient's symptoms. If the patient is getting sicker, call his or her healthcare provider and tell them that the patient has laboratory-confirmed COVID-19. If the patient has a medical emergency and you need to call 911, notify the dispatch personnel that the patient has, or is being evaluated for COVID-19.  Household members should stay in another room or be  from the patient. Household members should use a separate bedroom and bathroom, if available.  Prohibit visitors.  Household members should care for any pets in the home.  Make sure that shared spaces in the home have good air flow, such as by an air conditioner or an opened window, weather permitting.  Perform hand hygiene frequently. Wash your hands often with soap and water for at least 20 seconds or use an alcohol-based hand  (that contains > 60% alcohol) covering all surfaces of your hands and rubbing them together until they feel dry. Soap and water should be used preferentially.  Avoid touching your eyes, nose, and mouth.  The patient should wear a facemask. If the patient is not able to wear a facemask (for example, because it causes trouble breathing), caregivers should wear a mask when they are in the same room as the patient.  Wear a disposable facemask and gloves when you touch or have contact with the patient's blood, stool, or body fluids, such as saliva, sputum, nasal mucus, vomit, urine.  Throw out disposable facemasks and gloves after using them. Do not reuse.  When removing personal  protective equipment, first remove and dispose of gloves. Then, immediately clean your hands with soap and water or alcohol-based hand . Next, remove and dispose of facemask, and immediately clean your hands again with soap and water or alcohol-based hand .  You should not share dishes, drinking glasses, cups, eating utensils, towels, bedding, or other items with the patient. After the patient uses these items, you should wash them thoroughly (see below Wash laundry thoroughly).  Clean all high-touch surfaces, such as counters, tabletops, doorknobs, bathroom fixtures, toilets, phones, keyboards, tablets, and bedside tables, every day. Also, clean any surfaces that may have blood, stool, or body fluids on them.  Use a household cleaning spray or wipe, according to the label instructions. Labels contain instructions for safe and effective use of the cleaning product including precautions you should take when applying the product, such as wearing gloves and making sure you have good ventilation during use of the product.  Wash laundry thoroughly.  Immediately remove and wash clothes or bedding that have blood, stool, or body fluids on them.  Wear disposable gloves while handling soiled items and keep soiled items away from your body. Clean your hands (with soap and water or an alcohol-based hand ) immediately after removing your gloves.  Read and follow directions on labels of laundry or clothing items and detergent. In general, using a normal laundry detergent according to washing machine instructions and dry thoroughly using the warmest temperatures recommended on the clothing label.  Place all used disposable gloves, facemasks, and other contaminated items in a lined container before disposing of them with other household waste. Clean your hands (with soap and water or an alcohol-based hand ) immediately after handling these items. Soap and water should be used preferentially  if hands are visibly dirty.  Discuss any additional questions with your state or local health department or healthcare provider. Check available hours when contacting your local health department.     You must understand that you've received an Urgent Care treatment only and that you may be released before all your medical problems are known or treated. You, the patient, will arrange for follow up care as instructed. Follow up with your PCP or specialty clinic as directed within 2-5 days if not improved or as needed.  You can call [phone] to schedule an appointment with the appropriate provider.  If your condition worsens we recommend that you receive another evaluation at the emergency room immediately or contact your primary medical clinics after hours call service to discuss your concerns.  Please return here or go to the Emergency Department for any concerns or worsening of condition.       If we discussed the COVID surveillance/home monitoring program, you will also get a call from [facility] at the [place] or [facility] location to get a pulse oximeter to monitor your blood oxygen level.  This will be followed by a COVID surveillance team daily through [facility] (available on computer or through mobile paulette).

## 2024-12-26 NOTE — PROGRESS NOTES
Subjective:      Patient ID: Cesario Sahu is a 69 y.o. female.    Chief Complaint: Cough and Sore Throat    History of Present Illness    CHIEF COMPLAINT:  Cesario presents today with symptoms of COVID-19.    COVID-19 HISTORY:  She reports this is her second COVID-19 infection this year, with the first occurring in March. She has received both COVID-19 shots and one booster, and expresses concern about frequent infections despite vaccination. 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 She had contact with her great grandbaby and daughter before knowing her diagnosis.    CURRENT SYMPTOMS:  Symptoms began Saturday, including sore throat (5/10 pain), cough, congestion, night sweats, ear pain, dry eyes, sneezing, and body aches that have worsened since yesterday. She experienced severe coughing last night with associated chest pain.    EYE SYMPTOMS:  She reports dry eyes since Saturday, with left eye suspected to be pink eye. She is using eye drops for management.    ENT:  She reports ear pain due to self-induced trauma from digging in ear.      ROS:  General: -fever, -chills, -fatigue, -weight gain, -weight loss, +night sweats  Eyes: -vision changes, -redness, -discharge, -eye pain  ENT: +ear pain, +nasal congestion, +sore throat  Cardiovascular: +chest pain, -palpitations, -lower extremity edema  Respiratory: +cough, -shortness of breath  Gastrointestinal: -abdominal pain, -nausea, -vomiting, -diarrhea, -constipation, -blood in stool  Genitourinary: -dysuria, -hematuria, -frequency  Musculoskeletal: -joint pain, -muscle pain  Skin: -rash, -lesion  Neurological: -headache, -dizziness, -numbness, -tingling  Psychiatric: -anxiety, -depression, -sleep difficulty          Patient Active Problem List   Diagnosis    Essential hypertension    Insulin resistance    Mixed hyperlipidemia    PVC (premature ventricular contraction)    Aortic atherosclerosis     Personal history of colonic polyps    Diverticulosis of sigmoid colon    Left renal stone    Hyperparathyroidism    Irritable bowel syndrome with constipation    Obesity due to excess calories    Gastroesophageal reflux disease without esophagitis    Acute bilateral low back pain with bilateral sciatica    Weakness of both lower extremities    Decreased mobility and endurance    Keratoconjunctivitis sicca    EKG abnormalities    Anxiety    Overweight (BMI 25.0-29.9)    Postmenopausal         Current Outpatient Medications:     amLODIPine (NORVASC) 5 MG tablet, Take 1 tablet (5 mg total) by mouth once daily., Disp: 90 tablet, Rfl: 1    ascorbic acid (VITAMIN C ORAL), , Disp: , Rfl:     aspirin (ECOTRIN) 81 MG EC tablet, Take 81 mg by mouth once daily., Disp: , Rfl:     atorvastatin (LIPITOR) 10 MG tablet, Take 1 tablet (10 mg total) by mouth every evening., Disp: 90 tablet, Rfl: 2    busPIRone (BUSPAR) 5 MG Tab, Take 1 tablet (5 mg total) by mouth 2 (two) times daily., Disp: 60 tablet, Rfl: 11    diltiazem HCl (DILTIAZEM 2% - LIDOCAINE 5% CREAM), Apply 1 application  topically 3 (three) times daily., Disp: 30 g, Rfl: 1    estradioL (ESTRACE) 0.01 % (0.1 mg/gram) vaginal cream, Place 1 g vaginally twice a week., Disp: 42.5 g, Rfl: 6    ibuprofen (ADVIL,MOTRIN) 800 MG tablet, Take 1 tablet (800 mg total) by mouth every 6 (six) hours as needed for Pain., Disp: 30 tablet, Rfl: 2    Lactobacillus acidophilus (PROBIOTIC ORAL), , Disp: , Rfl:     loratadine (CLARITIN) 10 mg tablet, Take 1 tablet (10 mg total) by mouth once daily., Disp: 30 tablet, Rfl: 0    metoprolol succinate (TOPROL-XL) 25 MG 24 hr tablet, Take 1 tablet by mouth once daily, Disp: 90 tablet, Rfl: 3    pantoprazole (PROTONIX) 40 MG tablet, Take 1 tablet (40 mg total) by mouth once daily., Disp: 90 tablet, Rfl: 3    sertraline (ZOLOFT) 25 MG tablet, Take 1 tablet (25 mg total) by mouth once daily., Disp: 30 tablet, Rfl: 11    albuterol (VENTOLIN HFA) 90  "mcg/actuation inhaler, Inhale 2 puffs into the lungs every 6 (six) hours as needed for Wheezing (cough). Rescue, Disp: 6.7 g, Rfl: 0    ofloxacin (FLOXIN) 0.3 % otic solution, Place 10 drops into the right ear once daily., Disp: 5 mL, Rfl: 0    promethazine-dextromethorphan (PROMETHAZINE-DM) 6.25-15 mg/5 mL Syrp, Take 5 mLs by mouth every 4 (four) hours as needed (cough)., Disp: 118 mL, Rfl: 0      Objective:   /70 (BP Location: Right arm, Patient Position: Sitting)   Pulse 90   Temp 98.1 °F (36.7 °C) (Tympanic)   Ht 5' 3" (1.6 m)   Wt 77.9 kg (171 lb 11.8 oz)   SpO2 98%   BMI 30.42 kg/m²     Physical Exam             Physical Exam  Vitals and nursing note reviewed.   Constitutional:       General: She is awake. She is not in acute distress.     Appearance: Normal appearance. She is well-developed and well-groomed. She is not ill-appearing, toxic-appearing or diaphoretic.   HENT:      Head: Normocephalic and atraumatic.      Right Ear: Tympanic membrane, ear canal and external ear normal.      Left Ear: Tympanic membrane and external ear normal. Tenderness present. No drainage or swelling.  No middle ear effusion. There is no impacted cerumen. Tympanic membrane is not injected, scarred, perforated, erythematous, retracted or bulging.      Ears:      Comments: Left ear canal noted with marked erythema     Nose: Congestion and rhinorrhea present.      Mouth/Throat:      Pharynx: Posterior oropharyngeal erythema present. No oropharyngeal exudate.   Eyes:      Conjunctiva/sclera:      Right eye: Right conjunctiva is not injected. No chemosis, exudate or hemorrhage.     Left eye: Left conjunctiva is injected. No chemosis, exudate or hemorrhage.     Pupils: Pupils are equal, round, and reactive to light.   Cardiovascular:      Rate and Rhythm: Normal rate and regular rhythm.      Heart sounds: Normal heart sounds. No murmur heard.  Pulmonary:      Effort: Pulmonary effort is normal. No tachypnea, bradypnea, " accessory muscle usage, prolonged expiration, respiratory distress or retractions.      Breath sounds: Normal breath sounds. No stridor, decreased air movement or transmitted upper airway sounds. No decreased breath sounds, wheezing, rhonchi or rales.   Abdominal:      General: Abdomen is flat. Bowel sounds are normal.      Palpations: Abdomen is soft.   Musculoskeletal:         General: No swelling, tenderness, deformity or signs of injury.      Cervical back: Normal range of motion and neck supple.      Right lower leg: No edema.      Left lower leg: No edema.   Skin:     General: Skin is warm and dry.      Capillary Refill: Capillary refill takes less than 2 seconds.   Neurological:      General: No focal deficit present.      Mental Status: She is alert and oriented to person, place, and time.      Gait: Gait normal.   Psychiatric:         Attention and Perception: Attention and perception normal.         Mood and Affect: Mood and affect normal.         Speech: Speech normal.         Behavior: Behavior normal. Behavior is cooperative.         Thought Content: Thought content normal.         Cognition and Memory: Cognition normal.          Assessment/Plan :   1. COVID-19 virus infection    2. Contact with and (suspected) exposure to covid-19    3. Cough, unspecified type  -     POCT COVID-19 Rapid Screening  -     POCT Influenza A/B Molecular  -     promethazine-dextromethorphan (PROMETHAZINE-DM) 6.25-15 mg/5 mL Syrp; Take 5 mLs by mouth every 4 (four) hours as needed (cough).  Dispense: 118 mL; Refill: 0  -     albuterol (VENTOLIN HFA) 90 mcg/actuation inhaler; Inhale 2 puffs into the lungs every 6 (six) hours as needed for Wheezing (cough). Rescue  Dispense: 6.7 g; Refill: 0    4. Nasal congestion    5. Diaphoresis    6. Other infective acute otitis externa of left ear  -     ofloxacin (FLOXIN) 0.3 % otic solution; Place 10 drops into the right ear once daily.  Dispense: 5 mL; Refill: 0         Assessment &  Plan    Diagnosed patient with COVID-19 based on symptoms and recent exposure  Determined patient has conjunctivitis as a complication of COVID-19  Assessed ear pain as likely due to patient digging in ear, not directly related to COVID-19  Considered patient's history of multiple COVID-19 infections this year  Decided against prescribing antiviral medication due to patient's mild symptoms and risk of rebound COVID    COVID-19:  - Explained COVID-19 contagion period: most contagious for first 5 days, contagious for 10 days total.  - Discussed the possibility of catching COVID-19 every 30 days.  - Educated on the importance of wearing masks, especially in healthcare settings and large crowds.  - Explained the use of pulse oximeter for monitoring oxygen levels during COVID-19 infection.  - Shirline to stay out of bed as much as possible.  - Shirline to elevate head of bed when sleeping to facilitate breathing.  - Recommend hydrating with plenty of fluids (Gatorade, Powerade, water).  - Shirline to wear mask until no longer contagious (10 days from symptom onset).  - Shirline to monitor oxygen levels with pulse oximeter; go to ER if levels drop below 90.  - Started Robitussin CF for daytime cough and congestion relief.  - Started Promethazine at night for cough suppression.  - Started albuterol inhaler: Use 4 times daily (8am, 12pm, 4pm, 8pm) for 1 week.  - Contact the office if oxygen levels drop below 90.    CONJUNCTIVITIS:  - Shirline to wash hands before and after touching eyes.  - Continued eye drops: 1 drop every 4 hours in affected eye(s).    OTITIS MEDIA:  - Shirline to stop using Q-tips in ears.  - Started ear drops: 10 drops in left ear daily for 7 days, lay on right side for 3-5 minutes after application.    MYALGIA AND FEVER:  - Started alternating Tylenol and Motrin for fever, pain, or body aches as needed.    FOLLOW-UP:  - Follow up on Monday to return to work, wearing a mask until Thursday (10 days from  symptom onset).          Follow up if symptoms worsen or fail to improve.    This note was generated with the assistance of ambient listening technology. Verbal consent was obtained by the patient and accompanying visitor(s) for the recording of patient appointment to facilitate this note. I attest to having reviewed and edited the generated note for accuracy, though some syntax or spelling errors may persist. Please contact the author of this note for any clarification.

## 2024-12-31 ENCOUNTER — OFFICE VISIT (OUTPATIENT)
Dept: INTERNAL MEDICINE | Facility: CLINIC | Age: 69
End: 2024-12-31
Payer: MEDICARE

## 2024-12-31 ENCOUNTER — NURSE TRIAGE (OUTPATIENT)
Dept: ADMINISTRATIVE | Facility: CLINIC | Age: 69
End: 2024-12-31
Payer: MEDICARE

## 2024-12-31 DIAGNOSIS — B30.9 ACUTE VIRAL CONJUNCTIVITIS, UNSPECIFIED LATERALITY: Primary | ICD-10-CM

## 2024-12-31 PROCEDURE — 3044F HG A1C LEVEL LT 7.0%: CPT | Mod: HCNC,CPTII,95,

## 2024-12-31 PROCEDURE — 99213 OFFICE O/P EST LOW 20 MIN: CPT | Mod: HCNC,95,,

## 2024-12-31 RX ORDER — POLYETHYLENE GLYCOL 400 AND PROPYLENE GLYCOL 4; 3 MG/ML; MG/ML
1 SOLUTION/ DROPS OPHTHALMIC EVERY 6 HOURS PRN
Qty: 10 ML | Refills: 0 | Status: SHIPPED | OUTPATIENT
Start: 2024-12-31

## 2024-12-31 RX ORDER — OLOPATADINE HYDROCHLORIDE 1 MG/ML
1 SOLUTION/ DROPS OPHTHALMIC 2 TIMES DAILY PRN
Qty: 5 ML | Refills: 0 | Status: SHIPPED | OUTPATIENT
Start: 2024-12-31 | End: 2025-12-31

## 2024-12-31 NOTE — PROGRESS NOTES
"CLINIC NOTE    Chief Complaint: "Pink eye"    Subjective: Cesario Sahu is a 69 y.o. year old Black or  female with PMH as below. Pt is seen via tele visit for left pink eye.  Patient states that she was diagnosed with COVID about 1 week back and has been having redness, watering, matting of left eye since then.  She has tried antibiotic eyedrops for it with no improvement.  No swelling on fever.  No problem with vision.        7/15/2024    11:19 AM 2/29/2024     2:18 PM 7/18/2023     9:22 AM 1/4/2023     3:38 PM 2/15/2022     2:10 PM 7/19/2021     8:28 AM   Depression Patient Health Questionnaire   Over the last two weeks how often have you been bothered by little interest or pleasure in doing things Not at all Not at all Not at all Not at all Not at all Not at all   Over the last two weeks how often have you been bothered by feeling down, depressed or hopeless Not at all Not at all Not at all Not at all Not at all Not at all   PHQ-2 Total Score 0 0 0 0 0 0       Active Problem List with Overview Notes    Diagnosis Date Noted    Anxiety 10/02/2024    Overweight (BMI 25.0-29.9) 10/02/2024    Postmenopausal 10/02/2024    EKG abnormalities 06/21/2024    Keratoconjunctivitis sicca 03/19/2024    Acute bilateral low back pain with bilateral sciatica 10/25/2023     Will refer to physical therapy      Weakness of both lower extremities 10/25/2023    Decreased mobility and endurance 10/25/2023    Gastroesophageal reflux disease without esophagitis 09/25/2023    Left renal stone 07/18/2023    Hyperparathyroidism 07/18/2023    Irritable bowel syndrome with constipation 07/18/2023    Obesity due to excess calories 07/18/2023    Diverticulosis of sigmoid colon 06/14/2021    Personal history of colonic polyps 06/11/2021    Mixed hyperlipidemia 02/20/2020    PVC (premature ventricular contraction) 02/20/2020    Essential hypertension 04/23/2019    Insulin resistance 04/23/2019    Aortic atherosclerosis " 10/16/2016       Past Medical History:   Diagnosis Date    Arthritis     Digestive disorder     Hypertension     Insulin resistance     Left renal stone 7/18/2023       Past Surgical History:   Procedure Laterality Date    ADENOIDECTOMY      CHOLECYSTECTOMY      COLONOSCOPY N/A 6/11/2021    Procedure: COLONOSCOPY;  Surgeon: Serenity Ramey MD;  Location: [facility] ENDO;  Service: Endoscopy;  Laterality: N/A;    DILATION AND CURETTAGE OF UTERUS  10/2018    ESOPHAGOGASTRODUODENOSCOPY N/A 3/23/2022    Procedure: EGD (ESOPHAGOGASTRODUODENOSCOPY);  Surgeon: Abby Green MD;  Location: [facility] ENDO;  Service: Endoscopy;  Laterality: N/A;       Family History   Problem Relation Name Age of Onset    Hypertension Mother      Heart failure Mother      Heart disease Sister      Hyperlipidemia Sister      Hypertension Sister      Diabetes Brother         Social History     Socioeconomic History    Marital status:     Number of children: 3   Occupational History     Comment: financial     Tobacco Use    Smoking status: Never    Smokeless tobacco: Never   Substance and Sexual Activity    Alcohol use: Yes     Comment: Socially    Drug use: Never    Sexual activity: Yes     Partners: Male   Social History Narrative    The patient remarried in June 2018.  She has 3 adult children.  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     Social Drivers of Health     Financial Resource Strain: Medium Risk (12/31/2024)    Overall Financial Resource Strain (CARDIA)     Difficulty of Paying Living Expenses: Somewhat hard   Food Insecurity: No Food Insecurity (12/31/2024)    Hunger Vital Sign     Worried About Running Out of Food in the Last Year: Never true     Ran Out of Food in the Last Year: Never true   Transportation Needs: No Transportation Needs (7/15/2024)    PRAPARE - Transportation     Lack of Transportation (Medical): No     Lack of Transportation (Non-Medical): No   Physical Activity: Sufficiently Active  (12/31/2024)    Exercise Vital Sign     Days of Exercise per Week: 5 days     Minutes of Exercise per Session: 30 min   Stress: Stress Concern Present (12/31/2024)    [place] [employer] [place] [employer] - Occupational Stress Questionnaire     Feeling of Stress : To some extent   Housing Stability: Unknown (7/18/2023)    Housing Stability Vital Sign     Unable to Pay for Housing in the Last Year: No     Unstable Housing in the Last Year: No       Current Outpatient Medications on File Prior to Visit   Medication Sig Dispense Refill    albuterol (VENTOLIN HFA) 90 mcg/actuation inhaler Inhale 2 puffs into the lungs every 6 (six) hours as needed for Wheezing (cough). Rescue 6.7 g 0    amLODIPine (NORVASC) 5 MG tablet Take 1 tablet (5 mg total) by mouth once daily. 90 tablet 1    ascorbic acid (VITAMIN C ORAL)       aspirin (ECOTRIN) 81 MG EC tablet Take 81 mg by mouth once daily.      atorvastatin (LIPITOR) 10 MG tablet Take 1 tablet (10 mg total) by mouth every evening. 90 tablet 2    busPIRone (BUSPAR) 5 MG Tab Take 1 tablet (5 mg total) by mouth 2 (two) times daily. 60 tablet 11    diltiazem HCl (DILTIAZEM 2% - LIDOCAINE 5% CREAM) Apply 1 application  topically 3 (three) times daily. 30 g 1    estradioL (ESTRACE) 0.01 % (0.1 mg/gram) vaginal cream Place 1 g vaginally twice a week. 42.5 g 6    ibuprofen (ADVIL,MOTRIN) 800 MG tablet Take 1 tablet (800 mg total) by mouth every 6 (six) hours as needed for Pain. 30 tablet 2    Lactobacillus acidophilus (PROBIOTIC ORAL)       loratadine (CLARITIN) 10 mg tablet Take 1 tablet (10 mg total) by mouth once daily. 30 tablet 0    metoprolol succinate (TOPROL-XL) 25 MG 24 hr tablet Take 1 tablet by mouth once daily 90 tablet 3    ofloxacin (FLOXIN) 0.3 % otic solution Place 10 drops into the right ear once daily. 5 mL 0    pantoprazole (PROTONIX) 40 MG tablet Take 1 tablet (40 mg total) by mouth once daily. 90 tablet 3    promethazine-dextromethorphan (PROMETHAZINE-DM)  6.25-15 mg/5 mL Syrp Take 5 mLs by mouth every 4 (four) hours as needed (cough). 118 mL 0    sertraline (ZOLOFT) 25 MG tablet Take 1 tablet (25 mg total) by mouth once daily. 30 tablet 11     No current facility-administered medications on file prior to visit.       Review of patient's allergies indicates:  No Known Allergies    ROS:  See HPI for pertinent items    Objective:    Physical Exam:  There were no vitals filed for this visit.  There is no height or weight on file to calculate BMI.   Wt Readings from Last 5 Encounters:   12/26/24 77.9 kg (171 lb 11.8 oz)   10/02/24 76.2 kg (167 lb 15.9 oz)   07/24/24 79.4 kg (175 lb 0.7 oz)   07/19/24 78.9 kg (173 lb 15.1 oz)   07/17/24 79 kg (174 lb 2.6 oz)       General - Well developed, alert and oriented in NAD  Left eye redness appreciated, no swelling or discharge seen    Pertinent Labs:    Chemistry        Component Value Date/Time     07/03/2024 1958    K 3.8 07/03/2024 1958     07/03/2024 1958    CO2 24 07/03/2024 1958    BUN 12 07/03/2024 1958    CREATININE 0.8 07/03/2024 1958    GLU 92 07/03/2024 1958        Component Value Date/Time    CALCIUM 10.2 07/03/2024 1958    ALKPHOS 97 07/03/2024 1958    AST 20 07/03/2024 1958    ALT 22 07/03/2024 1958    BILITOT 0.4 07/03/2024 1958    ESTGFRAFRICA >60.0 02/04/2022 0933    EGFRNONAA >60.0 02/04/2022 0933          Lab Results   Component Value Date    WBC 10.31 07/03/2024    HGB 12.6 07/03/2024    HCT 38.5 07/03/2024    MCV 89 07/03/2024    MCH 29.0 07/03/2024    MCHC 32.7 07/03/2024    RDW 13.0 07/03/2024     07/03/2024    MPV 8.9 (L) 07/03/2024       Lab Results   Component Value Date    HGBA1C 5.6 05/22/2024    HGBA1C 5.4 05/30/2023    HGBA1C 5.4 09/23/2022    GLU 92 07/03/2024     Lab Results   Component Value Date    LDLCALC 133.4 05/22/2024     Lab Results   Component Value Date    TSH 1.180 05/22/2024     Lab Results   Component Value Date    CHOL 210 (H) 05/22/2024    CHOL 219 (H) 09/25/2023  "   CHOL 219 (H) 09/23/2022     Lab Results   Component Value Date    TRIG 78 05/22/2024    TRIG 81 09/25/2023    TRIG 100 09/23/2022     Lab Results   Component Value Date    HDL 61 05/22/2024    HDL 61 09/25/2023    HDL 65 09/23/2022     Lab Results   Component Value Date    LDLCALC 133.4 05/22/2024    LDLCALC 141.8 09/25/2023    LDLCALC 134.0 09/23/2022     No results found for: "NONHDLC"  Lab Results   Component Value Date    CHOLHDL 29.0 05/22/2024    CHOLHDL 27.9 09/25/2023    CHOLHDL 29.7 09/23/2022         Imaging:  No images are attached to the encounter.    Pertinent labs and imaging reviewed     ASSESSMENT / PLAN:    All medications reviewed and reconciled.    Impression:    ICD-10-CM ICD-9-CM    1. Acute viral conjunctivitis, unspecified laterality  B30.9 077.99 olopatadine (PATANOL) 0.1 % ophthalmic solution      peg 400-propylene glycol (SYSTANE, PROPYLENE GLYCOL,) 0.4-0.3 % Drop           1. Acute viral conjunctivitis, unspecified laterality  - olopatadine (PATANOL) 0.1 % ophthalmic solution; Place 1 drop into both eyes 2 (two) times daily as needed for Allergies.  Dispense: 5 mL; Refill: 0  - peg 400-propylene glycol (SYSTANE, PROPYLENE GLYCOL,) 0.4-0.3 % Drop; Apply 1 drop to eye every 6 (six) hours as needed.  Dispense: 10 mL; Refill: 0      Immunization History   Administered Date(s) Administered    COVID-19 Vaccine 02/28/2021, 03/28/2021, 10/29/2021    COVID-19, MRNA, LN-S, PF (MODERNA FULL 0.5 ML DOSE) 02/28/2021, 03/28/2021, 10/29/2021    Influenza (FLUAD) - Quadrivalent - Adjuvanted - PF *Preferred* (65+) 10/22/2020, 10/22/2020, 09/23/2022    Influenza - Trivalent - Fluad - Adjuvanted - PF (65 years and older 10/02/2024    Pneumococcal Conjugate - 20 Valent 09/23/2022    Zoster Recombinant 10/22/2020, 10/22/2020, 01/12/2023       Future Appointments   Date Time Provider Department Center   5/22/2025  9:40 AM Madison Patino MD [facility]     The attending portion of this " evaluation, treatment, and documentation was performed per Kelli Justice MD via Telemedicine AudioVisual using the secure [employer] software platform with two-way audio/video. The provider was located off-site and the patient is located in the hospital. The aforementioned video software was utilized to document the relevant history and physical exam.       Total 25 minutes spent on this encounter  This includes face to face time and non-face to face time preparing to see the patient (eg, review of tests), obtaining and/or reviewing separately obtained history, documenting clinical information in the electronic or other health record, independently interpreting results and communicating results to the patient/family/caregiver, or care coordinator.    This note is generated with speech recognition software and is subject to transcription error and sound alike phrases that may be missed by proofreading    Kelli Justice MD

## 2024-12-31 NOTE — TELEPHONE ENCOUNTER
Patient tested positive for covid on 12/26. She was feeling better but this morning her symptoms have returned and worsened. DIspo provided- be seen in office or video visit today. Virtual visit scheduled. Instructed to call back with additional questions or worsening of symptoms. Patient verbalized understanding.     Reason for Disposition   Persisting symptoms of COVID-19 and symptoms WORSE    Additional Information   Negative: SEVERE difficulty breathing (e.g., struggling for each breath, speaks in single words)   Negative: SEVERE weakness (e.g., can't stand or can barely walk) and new-onset or WORSE   Negative: Difficult to awaken or acting confused (e.g., disoriented, slurred speech)   Negative: Bluish (or gray) lips or face now   Negative: Sounds like a life-threatening emergency to the triager   Negative: MODERATE difficulty breathing (e.g., speaks in phrases, SOB even at rest, pulse 100-120) and new-onset or WORSE   Negative: MODERATE difficulty breathing and oxygen level (e.g., pulse oximetry) 91 to 94%   Negative: Oxygen level (e.g., pulse oximetry) 90% or lower   Negative: MODERATE difficulty breathing (e.g., speaks in phrases, SOB even at rest, pulse 100-120)   Negative: Drinking very little and dehydration suspected (e.g., no urine > 12 hours, very dry mouth, very lightheaded)   Negative: Patient sounds very sick or weak to the triager   Negative: MILD difficulty breathing (e.g., minimal/no SOB at rest, SOB with walking, pulse <100) and new-onset   Negative: Oxygen level (e.g., pulse oximetry) 91 to 94%   Negative: Persisting symptoms of COVID-19 and NEW symptom and could be serious   Negative: Caller has URGENT question and triager unable to answer question    Protocols used: COVID-19 - Persisting Symptoms Follow-up Call-[facility]-[place]

## 2025-01-03 ENCOUNTER — OFFICE VISIT (OUTPATIENT)
Dept: OPHTHALMOLOGY | Facility: CLINIC | Age: 70
End: 2025-01-03
Payer: MEDICARE

## 2025-01-03 DIAGNOSIS — B00.52 HERPES SIMPLEX VIRUS (HSV) STROMAL KERATITIS OF LEFT EYE: Primary | ICD-10-CM

## 2025-01-03 PROCEDURE — 99999 PR PBB SHADOW E&M-EST. PATIENT-LVL III: CPT | Mod: PBBFAC,,, | Performed by: STUDENT IN AN ORGANIZED HEALTH CARE EDUCATION/TRAINING PROGRAM

## 2025-01-03 RX ORDER — VALACYCLOVIR HYDROCHLORIDE 1 G/1
1000 TABLET, FILM COATED ORAL 3 TIMES DAILY
Qty: 30 TABLET | Refills: 0 | Status: SHIPPED | OUTPATIENT
Start: 2025-01-03 | End: 2025-01-13

## 2025-01-03 RX ORDER — TOBRAMYCIN AND DEXAMETHASONE 3; 1 MG/ML; MG/ML
1 SUSPENSION/ DROPS OPHTHALMIC EVERY 6 HOURS
COMMUNITY
Start: 2024-12-21

## 2025-01-03 NOTE — PROGRESS NOTES
HPI     Eye Problem     Additional comments: Possible eye infection    Patient states for the last 2 weeks she has been extremely red, mucous   discharge, matted in the mornings, and sensitive to light, has tried   multiple drops called in by her PCP but nothing has improved the symptoms.           Comments    1. HTN  2. Cataracts OU  3. KCS  4. Refractive error             Last edited by Ivy Fernández, Patient Care Assistant on 1/3/2025  3:02   PM.            Assessment /Plan     For exam results, see Encounter Report.    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

## 2025-01-08 ENCOUNTER — OFFICE VISIT (OUTPATIENT)
Dept: OPHTHALMOLOGY | Facility: CLINIC | Age: 70
End: 2025-01-08
Payer: MEDICARE

## 2025-01-08 DIAGNOSIS — B00.52 HERPES SIMPLEX VIRUS (HSV) STROMAL KERATITIS OF LEFT EYE: Primary | ICD-10-CM

## 2025-01-08 PROCEDURE — 1159F MED LIST DOCD IN RCRD: CPT | Mod: CPTII,S$GLB,, | Performed by: STUDENT IN AN ORGANIZED HEALTH CARE EDUCATION/TRAINING PROGRAM

## 2025-01-08 PROCEDURE — 99999 PR PBB SHADOW E&M-EST. PATIENT-LVL III: CPT | Mod: PBBFAC,,, | Performed by: STUDENT IN AN ORGANIZED HEALTH CARE EDUCATION/TRAINING PROGRAM

## 2025-01-08 PROCEDURE — 99214 OFFICE O/P EST MOD 30 MIN: CPT | Mod: S$GLB,,, | Performed by: STUDENT IN AN ORGANIZED HEALTH CARE EDUCATION/TRAINING PROGRAM

## 2025-01-08 PROCEDURE — 1160F RVW MEDS BY RX/DR IN RCRD: CPT | Mod: CPTII,S$GLB,, | Performed by: STUDENT IN AN ORGANIZED HEALTH CARE EDUCATION/TRAINING PROGRAM

## 2025-01-08 RX ORDER — VALACYCLOVIR HYDROCHLORIDE 1 G/1
1000 TABLET, FILM COATED ORAL 3 TIMES DAILY
Qty: 21 TABLET | Refills: 0 | Status: SHIPPED | OUTPATIENT
Start: 2025-01-08 | End: 2025-01-15

## 2025-01-08 NOTE — PROGRESS NOTES
HPI     Follow-up     Additional comments: Pt reports for follow up for HSV OS. Complains that   it still feels as if something is in the eye & itching. Compliant with   Valtrex 1000mg TID.            Comments    1. HTN  2. Cataracts OU  3. KCS  4. Refractive error    *Will need prophylaxis valtrex          Last edited by Byron Louie on 1/8/2025  8:47 AM.            Assessment /Plan     For exam results, see Encounter Report.    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

## 2025-02-06 DIAGNOSIS — G89.29 CHRONIC PAIN OF RIGHT KNEE: Primary | ICD-10-CM

## 2025-02-06 DIAGNOSIS — M25.561 CHRONIC PAIN OF RIGHT KNEE: Primary | ICD-10-CM

## 2025-02-07 ENCOUNTER — OFFICE VISIT (OUTPATIENT)
Dept: ORTHOPEDICS | Facility: CLINIC | Age: 70
End: 2025-02-07
Payer: MEDICARE

## 2025-02-07 ENCOUNTER — HOSPITAL ENCOUNTER (OUTPATIENT)
Dept: RADIOLOGY | Facility: HOSPITAL | Age: 70
Discharge: HOME | End: 2025-02-07
Payer: MEDICARE

## 2025-02-07 DIAGNOSIS — G89.29 CHRONIC PAIN OF RIGHT KNEE: ICD-10-CM

## 2025-02-07 DIAGNOSIS — M25.561 CHRONIC PAIN OF RIGHT KNEE: ICD-10-CM

## 2025-02-07 DIAGNOSIS — M17.12 PRIMARY OSTEOARTHRITIS OF LEFT KNEE: ICD-10-CM

## 2025-02-07 DIAGNOSIS — G89.29 CHRONIC PAIN OF LEFT KNEE: ICD-10-CM

## 2025-02-07 DIAGNOSIS — M25.562 CHRONIC PAIN OF LEFT KNEE: ICD-10-CM

## 2025-02-07 DIAGNOSIS — M17.11 PRIMARY OSTEOARTHRITIS OF RIGHT KNEE: Primary | ICD-10-CM

## 2025-02-07 PROCEDURE — 73564 X-RAY EXAM KNEE 4 OR MORE: CPT | Mod: TC,RT

## 2025-02-07 PROCEDURE — 73564 X-RAY EXAM KNEE 4 OR MORE: CPT | Mod: 26,RT,, | Performed by: RADIOLOGY

## 2025-02-07 PROCEDURE — 73562 X-RAY EXAM OF KNEE 3: CPT | Mod: 26,59,LT, | Performed by: RADIOLOGY

## 2025-02-07 PROCEDURE — 99999 PR PBB SHADOW E&M-EST. PATIENT-LVL II: CPT | Mod: PBBFAC,,,

## 2025-02-07 RX ORDER — CELECOXIB 100 MG/1
100 CAPSULE ORAL 2 TIMES DAILY PRN
Qty: 60 CAPSULE | Refills: 1 | Status: SHIPPED | OUTPATIENT
Start: 2025-02-07

## 2025-02-07 NOTE — PROGRESS NOTES
Dion Martínez PA-C  Orthopedic Surgery   [address] [facility], [place] [address]       Chief Complaint  Chief Complaint   Patient presents with    Right Knee - Pain     10/10 duration 3-6 month Patient denies any steroids / gel injections   Right knee xray today     Right Ankle - Pain     7/10         History of Present Illness  69-year-old female presents with complaints of chronic bilateral knee pain.  Her knees have bothered her on and off for years.  However, her pain has become more consistent over the past 6 months, particularly in the right knee.  Her pain can reach 10/10 at times, but today it is manageable at 4-5/10.  She denies any recent trauma or injury.  She has not undergone injections or physical therapy for this as of yet.  Pain increases with prolonged weight-bearing and ambulation.  She performs stationary bicycle exercises regularly which is helpful.  On another note, she does report mild intermittent right ankle pain that she attributes to walking funny when her knee is flared up.  This is not very bothersome today.         Review of Systems  Review of Systems   Constitutional:  Negative for chills and fever.   Respiratory:  Negative for shortness of breath.    Cardiovascular:  Negative for chest pain.   All other systems reviewed and are negative.       Past Medical History  Past Medical History:   Diagnosis Date    Arthritis     Digestive disorder     Hypertension     Insulin resistance     Left renal stone 7/18/2023       Past Surgical History  Past Surgical History:   Procedure Laterality Date    ADENOIDECTOMY      CHOLECYSTECTOMY      COLONOSCOPY N/A 6/11/2021    Procedure: COLONOSCOPY;  Surgeon: Serenity Ramey MD;  Location: [facility];  Service: Endoscopy;  Laterality: N/A;    DILATION AND CURETTAGE OF UTERUS  10/2018    ESOPHAGOGASTRODUODENOSCOPY N/A 3/23/2022    Procedure: EGD (ESOPHAGOGASTRODUODENOSCOPY);  Surgeon: Abby Green MD;  Location: [facility];   Service: Endoscopy;  Laterality: N/A;       Allergies  Review of patient's allergies indicates:  No Known Allergies    Family History  Family History   Problem Relation Name Age of Onset    Hypertension Mother      Heart failure Mother      Heart disease Sister      Hyperlipidemia Sister      Hypertension Sister      Diabetes Brother         Vital Signs  There were no vitals filed for this visit.  There is no height or weight on file to calculate BMI.    Physical Exam  Physical Exam  Constitutional:       General: She is not in acute distress.     Appearance: Normal appearance. She is not ill-appearing.   Pulmonary:      Effort: Pulmonary effort is normal. No respiratory distress.   Musculoskeletal:      Right knee: Deformity (varus) and crepitus present. No swelling, effusion, erythema or ecchymosis. Normal range of motion. Tenderness present over the medial joint line. No LCL laxity or MCL laxity. Normal pulse.      Left knee: Deformity (varus) and crepitus present. No swelling, effusion, erythema or ecchymosis. Normal range of motion. Tenderness present over the medial joint line. No LCL laxity or MCL laxity.Normal pulse.      Right lower leg: Normal.      Left lower leg: Normal.      Comments: Strength full in BLE grossly.   Neurovascular status grossly intact.   Compartments soft.    Neurological:      General: No focal deficit present.      Mental Status: She is alert and oriented to person, place, and time.      Sensory: Sensation is intact.      Motor: Motor function is intact.   Psychiatric:         Mood and Affect: Mood normal.         Thought Content: Thought content normal.         Judgment: Judgment normal.              Imaging  X-ray Knee Ortho Right with Flexion  Narrative: EXAMINATION:  XR KNEE ORTHO RIGHT WITH FLEXION    CLINICAL HISTORY:  Pain in right knee    TECHNIQUE:  AP standing views of both knees, AP flexion views of both knees, lateral view of the right knee and Merchant views of both  knees    COMPARISON:  None    FINDINGS:  There is mild-to-moderate joint space narrowing and marginal osteophyte formation seen involving the medial compartment of either knee.  Minimal degenerative involving the right patellofemoral compartment.  No joint effusion.  No acute fracture or dislocation.  Impression: 1.  As above    Electronically signed by: Edward Gomez DO  Date:    02/07/2025  Time:    12:10        ASSESSMENT: 69 y.o. female  with:   1. Primary osteoarthritis of right knee  -     Ambulatory Referral/Consult to Physical/Occupational Therapy; Future; Expected date: 02/14/2025  -     celecoxib (CELEBREX) 100 MG capsule; Take 1 capsule (100 mg total) by mouth 2 (two) times daily as needed for Pain.  Dispense: 60 capsule; Refill: 1    2. Primary osteoarthritis of left knee  -     Ambulatory Referral/Consult to Physical/Occupational Therapy; Future; Expected date: 02/14/2025  -     celecoxib (CELEBREX) 100 MG capsule; Take 1 capsule (100 mg total) by mouth 2 (two) times daily as needed for Pain.  Dispense: 60 capsule; Refill: 1    3. Chronic pain of right knee    4. Chronic pain of left knee         PLAN:  Data:  I reviewed available old/outside records.   I independently visualized xray images today.   Interpretation:  X-rays of the bilateral knees taken today at [facility] demonstrate the following:  Right knee:  There is moderate joint space narrowing in the medial compartment with some sclerosis.  There is no acute fracture or dislocation.  There are multiple small osteophytes.  Kellgren Juan C grade 3 arthritis  Left knee:  There is moderate joint space narrowing in the medial compartment with sclerosis.  There are multiple small osteophytes.  There is no acute fracture or dislocation.  Kellgren Juan C grade 3 arthritis.  Advanced imaging:  None at this time   PT/OT:  Start physical therapy for knees.  Medications:  Start Celebrex 100 BID PRN  DME and weightbearing status:  Hinged knee brace  provided for right knee today.  Under the direction of Dion Martínez PA-C, 15 minutes were spent sizing, fitting, and educating for durable medical equipment application today by Dion Martínez PA-C.  CPT 57200.  Injections/Procedures:  Consider intra-articular steroid injections for the knees  Referral:  None at this time  Education:  I had a long discussion with the patient about the causes, treatments, and prognosis/natural history for their symptoms. We discussed effective ways to improve symptoms as well as what types of activities may make the symptoms/prognosis worse. We discussed signs and symptoms and other reasons to return to clinic sooner.   Return to clinic:  Follow up in about 6 weeks (around 3/21/2025).     This is a patient with a chronic orthopedic diagnosis whose overall, ongoing care is being managed and monitored by me and our Orthopedic clinic.  As such, since 2024,  is the appropriate add-on code to accompany the other E/M billing for this visit.       Dion Martínez PA-C  Orthopedic Surgery

## 2025-02-18 ENCOUNTER — CLINICAL SUPPORT (OUTPATIENT)
Dept: REHABILITATION | Facility: HOSPITAL | Age: 70
End: 2025-02-18
Payer: MEDICARE

## 2025-02-18 DIAGNOSIS — M17.12 PRIMARY OSTEOARTHRITIS OF LEFT KNEE: ICD-10-CM

## 2025-02-18 DIAGNOSIS — Z74.09 DECREASED FUNCTIONAL MOBILITY AND ENDURANCE: ICD-10-CM

## 2025-02-18 DIAGNOSIS — R29.898 DECREASED STRENGTH OF LOWER EXTREMITY: Primary | ICD-10-CM

## 2025-02-18 DIAGNOSIS — M17.11 PRIMARY OSTEOARTHRITIS OF RIGHT KNEE: ICD-10-CM

## 2025-02-18 PROBLEM — M54.42 ACUTE BILATERAL LOW BACK PAIN WITH BILATERAL SCIATICA: Status: RESOLVED | Noted: 2023-10-25 | Resolved: 2025-02-18

## 2025-02-18 PROBLEM — M54.41 ACUTE BILATERAL LOW BACK PAIN WITH BILATERAL SCIATICA: Status: RESOLVED | Noted: 2023-10-25 | Resolved: 2025-02-18

## 2025-02-18 PROCEDURE — 97110 THERAPEUTIC EXERCISES: CPT

## 2025-02-18 PROCEDURE — 97163 PT EVAL HIGH COMPLEX 45 MIN: CPT

## 2025-02-18 NOTE — PROGRESS NOTES
Outpatient Rehab    Physical Therapy Evaluation    Patient Name: Cesario Sahu  MRN: [id number]  YOB: 1955  Today's Date: 2/18/2025    Therapy Diagnosis:   Encounter Diagnoses   Name Primary?    Primary osteoarthritis of right knee     Primary osteoarthritis of left knee     Decreased strength of lower extremity Yes    Decreased functional mobility and endurance      Physician: Dion Martínez PA*    Physician Orders: Eval and Treat  Medical Diagnosis:   M17.11 (ICD-10-CM) - Primary osteoarthritis of right knee   M17.12 (ICD-10-CM) - Primary osteoarthritis of left knee       Visit # / Visits Authorized:  1 / 1   Date of Evaluation:  2/18/2025   Insurance Authorization Period: 02/07/2025 to 02/07/2026  Plan of Care Certification:  2/18/2025 to 05/13/2025      Time In: 1005   Time Out: 1045  Total Time: 40   Total Billable Time: 40    Intake Outcome Measure for FOTO Survey    Therapist reviewed FOTO scores for Cesario Sahu on 2/18/2025.   FOTO report - see Media section or FOTO account episode details.     Intake Score: 40%         Subjective   History of Present Illness  Cesario is a 69 y.o. female who reports to physical therapy with a chief concern of Bilateral Knee Pain, Right > Left. According to the patient's chart, Cesario has a past medical history of Arthritis, Digestive disorder, Hypertension, Insulin resistance, and Left renal stone. Cesario has a past surgical history that includes Adenoidectomy; Cholecystectomy; Dilation and curettage of uterus (10/2018); Colonoscopy (N/A, 6/11/2021); and Esophagogastroduodenoscopy (N/A, 3/23/2022).    The patient reports a medical diagnosis of M17.11 (ICD-10-CM) - Primary osteoarthritis of right knee  M17.12 (ICD-10-CM) - Primary osteoarthritis of left knee.    Diagnostic tests related to this condition: X-ray.        History of Present Condition/Illness: Patient reports she began to experience intense right sided knee pain about 6-7  months ago. Patient states both of her knees will hurt on and off but that recently her right knee has been bothering her a lot. Patient states she knows arthritis runs in her family and she is concerned about her pain. Patient states she eventually went to her doctor for this and was prescribed a medication similar to Celebrex that has helped her pain significantly. Patient states cycling always helps her pain, but that she has not been able to walk as much. Patient states she would like to try PHYSICAL THERAPY to see if she can continue having pain relief and be able to gain strength to return to prior level of function.    Pain     Patient reports a current pain level of 2/10. Pain at best is reported as 1/10. Pain at worst is reported as 10/10.   Pain Qualities: Aching, Sharp, Throbbing  Pain-Relieving Factors: Exercise, Medications - prescription, Sleeping  Pain-Aggravating Factors: Walking, Stair climbing, Sitting, Standing, Cooking, Lifting         Employment      Patient works part time      Past Medical History/Physical Systems Review:   Cesario Sahu  has a past medical history of Arthritis, Digestive disorder, Hypertension, Insulin resistance, and Left renal stone.    Cesario Sahu  has a past surgical history that includes Adenoidectomy; Cholecystectomy; Dilation and curettage of uterus (10/2018); Colonoscopy (N/A, 6/11/2021); and Esophagogastroduodenoscopy (N/A, 3/23/2022).    Cesario has a current medication list which includes the following prescription(s): albuterol, amlodipine, ascorbic acid, aspirin, atorvastatin, buspirone, celecoxib, diltiazem hcl, estradiol, ibuprofen, lactobacillus acidophilus, loratadine, metoprolol succinate, ofloxacin, olopatadine, pantoprazole, systane (propylene glycol), promethazine-dextromethorphan, sertraline, tobramycin-dexamethasone 0.3-0.1%, and valacyclovir.    Review of patient's allergies indicates:  No Known Allergies     Objective       Knee Range of  Motion   Right Knee   Active (deg) Passive (deg) Pain   Flexion 120       Extension 0           Left Knee   Active (deg) Passive (deg) Pain   Flexion 120       Extension 0                          Hip Strength - Planes of Motion   Right Strength Right Pain Left Strength Left  Pain   Flexion (L2) 4   4     Extension 3+   3+     ABduction 4-   4-     ADduction           Internal Rotation           External Rotation               Knee Strength   Right Strength Right Pain Left Strength Left  Pain   Flexion (S2) 4   4     Prone Flexion 4   4     Extension (L3) 4   4                    Gait Analysis  Gait Pattern: Antalgic                   Treatment:     CPT?  Intervention?  Performed??   Today?   2/18/2025   Duration / Intensity?    MT?  ?  ?  ?    TE?  Patient educated on plan of care and current condition as well as importance of keeping knee joint mobile. ? x Patient verbalized  understanding?    ?  Nu-step?  ?  6 minutes Level 3?    ?  Recumbent Bike    8 minutes level 1    ?  Tailgaters?    3 minutes 3lbs?      Gastroc stretch                   NMR?  Quad Sets?    3 x 10 bilateral?    ?  Short Arc Quads?    3 x 10 bilateral?3lbs    ?  Long Arc Quads?    3 x 10 bilateral?3lbs    ?  Supine ball squeezes?    3 minutes 3 second holds?    ?  Supine clamshells?    3 minutes 3 second holds black band?    ?  ?Bridges    2 x 10?      SLR    2x10      Prone hip extension                   TA?  Sit to stands from elevated mat without use of upper extremities?    2 x 10?    ?  Matrix Leg Press    3 minutes 65lbs    PLAN?  ?  ?  ?      ?   CPT Codes available for Billing:??   (00)?minutes of Manual therapy (MT) to improve pain and ROM.?   (10)?minutes of Therapeutic Exercise (TE) to develop strength, endurance, range of motion, and flexibility.?   (00)?minutes of Neuromuscular Re-Education (NMR)? to improve: Balance, Coordination, Kinesthetic, Sense, Proprioception, and Posture.?   (00)?minutes of Therapeutic Activities (TA) to  improve functional performance.?   Vasopneumatic Device Therapy () for management of swelling/edema. (26832)?   Unattended Electrical Stimulation (ES) for muscle performance or pain modulation.?   BFR: Blood flow restriction applied during exercise    Assessment & Plan   Assessment  Cesario presents with a condition of High complexity.   Will Comorbidities Impact Care: Yes  Arthritis, Back pain, BMI over 30, High Blood Pressure, Osteoporosis    Functional Limitations: Activity tolerance, Ambulating on uneven surfaces, Completing self-care activities, Decreased ambulation distance/endurance, Disrupted sleep pattern, Functional mobility, Gait limitations, Getting off the floor, Pain with ADLs/IADLs, Squatting  Impairments: Abnormal gait, Activity intolerance, Impaired physical strength    Patient Goal for Therapy (PT): to decrease pain and return to prior level of function  Prognosis: Good    Plan  From a physical therapy perspective, the patient would benefit from: Skilled Rehab Services    Planned therapy interventions include: Therapeutic exercise, Therapeutic activities, Neuromuscular re-education, Manual therapy, and ADLs/IADLs.    Planned modalities to include: Cryotherapy (cold pack), Electrical stimulation - attended, Electrical stimulation - passive/unattended, Thermotherapy (hot pack), Ultrasound, and Mechanical traction.        Visit Frequency: 2 times Per Week for 12 Weeks.       This plan was discussed with Patient.   Discussion participants: Agreed Upon Plan of Care             Patient's spiritual, cultural, and educational needs considered and patient agreeable to plan of care and goals.           Goals:   Active       Long Term Goals       Patient will demonstrate improved function as indicated by a score of greater than or equal to 58 out of 100 on FOTO.          (Progressing)       Start:  02/18/25    Expected End:  05/13/25            Patient will improve strength to at least 4+/5 grossly,  in  order to improve functional independence and quality of life.   (Progressing)       Start:  02/18/25    Expected End:  05/13/25               Short Term Goals       Patient will demonstrate improved function as indicated by a score of greater than or equal to 45 out of 100 on FOTO.          (Progressing)       Start:  02/18/25    Expected End:  04/01/25            Patient will improve strength by 1/2 a grade, in order to progress towards independence with functional activities.          (Progressing)       Start:  02/18/25    Expected End:  04/01/25                Katrina Damian, PT

## 2025-02-19 ENCOUNTER — TELEPHONE (OUTPATIENT)
Dept: OPHTHALMOLOGY | Facility: CLINIC | Age: 70
End: 2025-02-19
Payer: MEDICARE

## 2025-02-19 NOTE — TELEPHONE ENCOUNTER
----- Message from Tia sent at 2/19/2025 11:23 AM CST -----  Who Called: PtWhat is the request in detail: Requesting call back to discuss switching appt to annual as well. Please adviseCan the clinic reply by [facility]? Porsha Call Back Number: [phone] Information:

## 2025-02-25 ENCOUNTER — CLINICAL SUPPORT (OUTPATIENT)
Dept: REHABILITATION | Facility: HOSPITAL | Age: 70
End: 2025-02-25
Payer: MEDICARE

## 2025-02-25 DIAGNOSIS — R29.898 DECREASED STRENGTH OF LOWER EXTREMITY: Primary | ICD-10-CM

## 2025-02-25 DIAGNOSIS — Z74.09 DECREASED FUNCTIONAL MOBILITY AND ENDURANCE: ICD-10-CM

## 2025-02-25 PROCEDURE — 97110 THERAPEUTIC EXERCISES: CPT

## 2025-02-25 PROCEDURE — 97112 NEUROMUSCULAR REEDUCATION: CPT

## 2025-02-28 ENCOUNTER — CLINICAL SUPPORT (OUTPATIENT)
Dept: REHABILITATION | Facility: HOSPITAL | Age: 70
End: 2025-02-28
Payer: MEDICARE

## 2025-02-28 DIAGNOSIS — Z74.09 DECREASED FUNCTIONAL MOBILITY AND ENDURANCE: ICD-10-CM

## 2025-02-28 DIAGNOSIS — R29.898 DECREASED STRENGTH OF LOWER EXTREMITY: Primary | ICD-10-CM

## 2025-02-28 PROCEDURE — 97530 THERAPEUTIC ACTIVITIES: CPT

## 2025-02-28 PROCEDURE — 97110 THERAPEUTIC EXERCISES: CPT

## 2025-02-28 PROCEDURE — 97112 NEUROMUSCULAR REEDUCATION: CPT

## 2025-02-28 NOTE — PROGRESS NOTES
Outpatient Rehab    Physical Therapy Visit    Patient Name: Cesario Sahu  MRN: [id number]  YOB: 1955  Encounter Date: 2/28/2025    Therapy Diagnosis:   Encounter Diagnoses   Name Primary?    Decreased strength of lower extremity Yes    Decreased functional mobility and endurance        Physician: Dion Martínez PA*    Physician Orders: Eval and Treat  Medical Diagnosis:   M17.11 (ICD-10-CM) - Primary osteoarthritis of right knee   M17.12 (ICD-10-CM) - Primary osteoarthritis of left knee         Visit # / Visits Authorized:  2 / 16  Date of Evaluation:  2/18/2025   Insurance Authorization Period: 02/07/2025 to 02/07/2026  Plan of Care Certification:  2/18/2025 to 05/13/2025      Time In: 1010   Time Out: 1045  Total Time: 35   Total Billable Time: 35    FOTO:  Intake Score:  %  Survey Score 1:  %  Survey Score 2:  %         Subjective   Patient states she was initially sore in her bilateral knees the night after her session, however patient states she had really good days following. Patient states she is considering getting back into her walking program if she continues to feel better..  Pain reported as 2/10.      Objective            Treatment:  CPT?  Intervention  Performed??   Today?   2/28/2025    Duration / Intensity?    MT?  ?  ?  ?    TE?  Patient educated on plan of care and current condition as well as importance of keeping knee joint mobile. ?  Patient verbalized  understanding?    ?  Nu-step?  ?  6 minutes Level 3?    ?  Recumbent Bike           x 6 minutes level 1    ?  Tailgaters?           x 3 minutes 3lbs?      Gastroc stretch                   NMR?  Quad Sets?    3 x 10 bilateral?    ?  Short Arc Quads?             x 3 x 10 bilateral?3lbs    ?  Long Arc Quads?              x 3 x 10 bilateral?3lbs    ?  Supine ball squeezes?             x 3 minutes 3 second holds?    ?  Supine clamshells?              x 3 minutes 3 second holds black band?    ?  ?Bridges             x 3 x 10?       SLR              x 3 x 10  bilateral     Prone hip extension                   TA?  Sit to stands from elevated mat without use of upper extremities?              x 3 x 10?    ?  Matrix Leg Press             x 4 minutes 75lbs    PLAN?  ? side steps, step ups, sidelying clamshells ?  ?       CPT Codes available for Billing:    (00) minutes of Manual therapy (MT) to improve pain and ROM.   (09) minutes of Therapeutic Exercise (TE) to develop strength, endurance, range of motion, and flexibility.   (18) minutes of Neuromuscular Re-Education (NMR)? to improve: Balance, Coordination, Kinesthetic, Sense, Proprioception, and Posture.   (08) minutes of Therapeutic Activities (TA) to improve functional performance.   Vasopneumatic Device Therapy () for management of swelling/edema. (72072)   Unattended Electrical Stimulation (ES) for muscle performance or pain modulation.   BFR: Blood flow restriction applied during exercise     Assessment & Plan   Assessment: Patient tolerated today's session well. Verbal and tactile cueing to decrease compensatory patterns. Encoruaged HEP as tolerable.       Patient will continue to benefit from skilled outpatient physical therapy to address the deficits listed in the problem list box on initial evaluation, provide pt/family education and to maximize pt's level of independence in the home and community environment.     Patient's spiritual, cultural, and educational needs considered and patient agreeable to plan of care and goals.           Plan: Continue Plan of Care (POC) and progress per patient tolerance. See treatment section for details on planned progressions next session.    Goals:   Active       Long Term Goals       Patient will demonstrate improved function as indicated by a score of greater than or equal to 58 out of 100 on FOTO.          (Progressing)       Start:  02/18/25    Expected End:  05/13/25            Patient will improve strength to at least 4+/5 grossly,  in  order to improve functional independence and quality of life.   (Progressing)       Start:  02/18/25    Expected End:  05/13/25               Short Term Goals       Patient will demonstrate improved function as indicated by a score of greater than or equal to 45 out of 100 on FOTO.          (Progressing)       Start:  02/18/25    Expected End:  04/01/25            Patient will improve strength by 1/2 a grade, in order to progress towards independence with functional activities.          (Progressing)       Start:  02/18/25    Expected End:  04/01/25                Katrina Damian, PT

## 2025-03-04 ENCOUNTER — PATIENT MESSAGE (OUTPATIENT)
Dept: SURGERY | Facility: CLINIC | Age: 70
End: 2025-03-04
Payer: MEDICARE

## 2025-03-05 ENCOUNTER — OFFICE VISIT (OUTPATIENT)
Dept: OPHTHALMOLOGY | Facility: CLINIC | Age: 70
End: 2025-03-05
Payer: MEDICARE

## 2025-03-05 DIAGNOSIS — H43.811 PVD (POSTERIOR VITREOUS DETACHMENT), RIGHT EYE: ICD-10-CM

## 2025-03-05 DIAGNOSIS — H40.013 OPEN ANGLE WITH BORDERLINE FINDINGS AND LOW GLAUCOMA RISK IN BOTH EYES: ICD-10-CM

## 2025-03-05 DIAGNOSIS — H17.9 CORNEAL SCARRING: ICD-10-CM

## 2025-03-05 DIAGNOSIS — B00.52 HERPES SIMPLEX VIRUS (HSV) STROMAL KERATITIS OF LEFT EYE: Primary | ICD-10-CM

## 2025-03-05 PROCEDURE — 92134 CPTRZ OPH DX IMG PST SGM RTA: CPT | Mod: S$GLB,,, | Performed by: STUDENT IN AN ORGANIZED HEALTH CARE EDUCATION/TRAINING PROGRAM

## 2025-03-05 PROCEDURE — 92014 COMPRE OPH EXAM EST PT 1/>: CPT | Mod: S$GLB,,, | Performed by: STUDENT IN AN ORGANIZED HEALTH CARE EDUCATION/TRAINING PROGRAM

## 2025-03-05 PROCEDURE — 92025 CPTRIZED CORNEAL TOPOGRAPHY: CPT | Mod: S$GLB,,, | Performed by: STUDENT IN AN ORGANIZED HEALTH CARE EDUCATION/TRAINING PROGRAM

## 2025-03-05 PROCEDURE — 1159F MED LIST DOCD IN RCRD: CPT | Mod: CPTII,S$GLB,, | Performed by: STUDENT IN AN ORGANIZED HEALTH CARE EDUCATION/TRAINING PROGRAM

## 2025-03-05 PROCEDURE — 1160F RVW MEDS BY RX/DR IN RCRD: CPT | Mod: CPTII,S$GLB,, | Performed by: STUDENT IN AN ORGANIZED HEALTH CARE EDUCATION/TRAINING PROGRAM

## 2025-03-05 PROCEDURE — 99999 PR PBB SHADOW E&M-EST. PATIENT-LVL III: CPT | Mod: PBBFAC,,, | Performed by: STUDENT IN AN ORGANIZED HEALTH CARE EDUCATION/TRAINING PROGRAM

## 2025-03-05 RX ORDER — PREDNISOLONE SODIUM PHOSPHATE 10 MG/ML
1 SOLUTION/ DROPS OPHTHALMIC 4 TIMES DAILY
Qty: 5 ML | Refills: 1 | Status: SHIPPED | OUTPATIENT
Start: 2025-03-05 | End: 2025-04-04

## 2025-03-05 NOTE — PROGRESS NOTES
HPI     Eye Exam            Comments: Vision changes since last eye exam?: Yes, she states its her   dry eyes  Any eye pain today: Yes, 6/10  Other ocular symptoms: Floaters, Headaches, Dry Eyes  Last A1C 5.6              Last edited by Byron Louie on 3/5/2025  8:11 AM.            Assessment /Plan     For exam results, see Encounter Report.    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 Will start steroids to reduce scarring   Prednisolone Sodium QID OS     PVD (posterior vitreous detachment), right eye  Acute PVD OD No tears or breaks were seen after careful retinal evaluation. Discussed retinal detachment signs and symptoms including flashes of lights, floaters, perceived curtains or veils. Advised to patient to monitor visual status including increase in flashes and floaters, or the development of visual field changes including curtain and /or veils. Advised patient to RTC urgently if these symptoms occur. Explained the need for follow up exams to the patient even if there are no changes in the symptoms.      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 Discussed importance of follow up and completion of indicated studies as well as the risk of blindness from untreated/undiagnosed glaucoma.        RTC 1 mo for PVD recheck, DFE OD only       Patient requesting medication refill for tramadol     Last seen by PCP: 04/12/18  Next office visit: no future appt   Last Lab work: 04/23/18  Medication last refilled: per PDMP last dispensed on 12/22/16 -- however per [facility] states it was prescribed on 02/02/17. PCP please review PDMP    Refill pending MD approval.

## 2025-03-06 ENCOUNTER — PATIENT MESSAGE (OUTPATIENT)
Dept: OPHTHALMOLOGY | Facility: CLINIC | Age: 70
End: 2025-03-06
Payer: MEDICARE

## 2025-03-07 ENCOUNTER — CLINICAL SUPPORT (OUTPATIENT)
Dept: REHABILITATION | Facility: HOSPITAL | Age: 70
End: 2025-03-07
Payer: MEDICARE

## 2025-03-07 DIAGNOSIS — Z74.09 DECREASED FUNCTIONAL MOBILITY AND ENDURANCE: ICD-10-CM

## 2025-03-07 DIAGNOSIS — R29.898 DECREASED STRENGTH OF LOWER EXTREMITY: Primary | ICD-10-CM

## 2025-03-07 PROCEDURE — 97530 THERAPEUTIC ACTIVITIES: CPT

## 2025-03-07 PROCEDURE — 97112 NEUROMUSCULAR REEDUCATION: CPT

## 2025-03-07 NOTE — PROGRESS NOTES
Outpatient Rehab    Physical Therapy Visit    Patient Name: Cesario Sahu  MRN: [id number]  YOB: 1955  Encounter Date: 3/7/2025    Therapy Diagnosis:   Encounter Diagnoses   Name Primary?    Decreased strength of lower extremity Yes    Decreased functional mobility and endurance          Physician: Dion Martínez PA*    Physician Orders: Eval and Treat  Medical Diagnosis:   M17.11 (ICD-10-CM) - Primary osteoarthritis of right knee   M17.12 (ICD-10-CM) - Primary osteoarthritis of left knee         Visit # / Visits Authorized:  3 / 16  Date of Evaluation:  2/18/2025   Insurance Authorization Period: 02/07/2025 to 02/07/2026  Plan of Care Certification:  2/18/2025 to 05/13/2025      Time In: 1105   Time Out: 1150  Total Time: 45   Total Billable Time: 25    FOTO:  Intake Score:  %  Survey Score 1:  %  Survey Score 2:  %         Subjective   Patient states she has a little pain after therapy but then it decreases the day after. Patient says she has not started walking on her own just yet..  Pain reported as 2/10.      Objective            Treatment:  CPT?  Intervention  Performed??   Today?   3/7/2025    Duration / Intensity?    MT?  ?  ?  ?    TE?  Patient educated on plan of care and current condition as well as importance of keeping knee joint mobile. ?  Patient verbalized  understanding?    ?  Nu-step?  ?  6 minutes Level 3?    ?  Recumbent Bike           x 6 minutes level 1    ?  Tailgaters?           x 3 minutes 4lbs?      Gastroc stretch                   NMR?  Quad Sets?    3 x 10 bilateral?    ?  Short Arc Quads?             x 3 x 10 bilateral 4lbs    ?  Long Arc Quads?              x 3 x 10 bilateral 4lbs    ?  Supine ball squeezes?             x 3 minutes 3 second holds?    ?  Supine clamshells?              - 3 minutes 3 second holds black band?     Sidelying Clamshells             x 2 x 10 bilateral   ?  ?Bridges             x 3 x 10?      SLR              x 3 x 10  bilateral      Prone hip extension                   TA?  Sit to stands from elevated mat without use of upper extremities?              x 3 x 10?    ?  Matrix Leg Press             x 4 minutes 75lbs    PLAN?  ? side steps, step ups, sidelying clamshells ?  ?       CPT Codes available for Billing:    (00) minutes of Manual therapy (MT) to improve pain and ROM.   (09) minutes of Therapeutic Exercise (TE) to develop strength, endurance, range of motion, and flexibility.   (28) minutes of Neuromuscular Re-Education (NMR)? to improve: Balance, Coordination, Kinesthetic, Sense, Proprioception, and Posture.   (08) minutes of Therapeutic Activities (TA) to improve functional performance.   Vasopneumatic Device Therapy () for management of swelling/edema. (14471)   Unattended Electrical Stimulation (ES) for muscle performance or pain modulation.   BFR: Blood flow restriction applied during exercise     Assessment & Plan   Assessment: Progressed patient with sidelying clamshells as well as increased resistance on quadriceps strengthening exercises with good tolerance. Cueing given to decrease compensatory patterns.       Patient will continue to benefit from skilled outpatient physical therapy to address the deficits listed in the problem list box on initial evaluation, provide pt/family education and to maximize pt's level of independence in the home and community environment.     Patient's spiritual, cultural, and educational needs considered and patient agreeable to plan of care and goals.           Plan: Continue Plan of Care (POC) and progress per patient tolerance. See treatment section for details on planned progressions next session.    Goals:   Active       Long Term Goals       Patient will demonstrate improved function as indicated by a score of greater than or equal to 58 out of 100 on FOTO.          (Progressing)       Start:  02/18/25    Expected End:  05/13/25            Patient will improve strength to at least 4+/5  grossly,  in order to improve functional independence and quality of life.   (Progressing)       Start:  02/18/25    Expected End:  05/13/25               Short Term Goals       Patient will demonstrate improved function as indicated by a score of greater than or equal to 45 out of 100 on FOTO.          (Progressing)       Start:  02/18/25    Expected End:  04/01/25            Patient will improve strength by 1/2 a grade, in order to progress towards independence with functional activities.          (Progressing)       Start:  02/18/25    Expected End:  04/01/25                Katrina Damian, PT

## 2025-03-10 ENCOUNTER — TELEPHONE (OUTPATIENT)
Dept: CARDIOLOGY | Facility: HOSPITAL | Age: 70
End: 2025-03-10
Payer: MEDICARE

## 2025-03-11 ENCOUNTER — HOSPITAL ENCOUNTER (OUTPATIENT)
Dept: RADIOLOGY | Facility: HOSPITAL | Age: 70
Discharge: HOME | End: 2025-03-11
Attending: INTERNAL MEDICINE
Payer: MEDICARE

## 2025-03-11 ENCOUNTER — HOSPITAL ENCOUNTER (OUTPATIENT)
Dept: CARDIOLOGY | Facility: HOSPITAL | Age: 70
Discharge: HOME | End: 2025-03-11
Attending: INTERNAL MEDICINE
Payer: MEDICARE

## 2025-03-11 DIAGNOSIS — R94.39 ABNORMAL STRESS ECG WITH TREADMILL: ICD-10-CM

## 2025-03-11 DIAGNOSIS — R94.31 ABNORMAL ELECTROCARDIOGRAM (ECG) (EKG): ICD-10-CM

## 2025-03-11 LAB
CV STRESS BASE HR: 68 BPM
DIASTOLIC BLOOD PRESSURE: 81 MMHG
NUC REST EJECTION FRACTION: 62
NUC STRESS EJECTION FRACTION: 67 %
OHS CV CPX 85 PERCENT MAX PREDICTED HEART RATE MALE: 128
OHS CV CPX ESTIMATED METS: 7
OHS CV CPX MAX PREDICTED HEART RATE: 151
OHS CV CPX PATIENT IS FEMALE: 1
OHS CV CPX PATIENT IS MALE: 0
OHS CV CPX PEAK DIASTOLIC BLOOD PRESSURE: 89 MMHG
OHS CV CPX PEAK HEAR RATE: 123 BPM
OHS CV CPX PEAK RATE PRESSURE PRODUCT: NORMAL
OHS CV CPX PEAK SYSTOLIC BLOOD PRESSURE: 159 MMHG
OHS CV CPX PERCENT MAX PREDICTED HEART RATE ACHIEVED: 85
OHS CV CPX RATE PRESSURE PRODUCT PRESENTING: NORMAL
OHS CV INITIAL DOSE: 10 MCG/KG/MIN
OHS CV PEAK DOSE: 30 MCG/KG/MIN
STRESS ECHO POST EXERCISE DUR MIN: 6 MINUTES
STRESS ST DEPRESSION: 1.5 MM
SYSTOLIC BLOOD PRESSURE: 222 MMHG

## 2025-03-11 PROCEDURE — 78452 HT MUSCLE IMAGE SPECT MULT: CPT

## 2025-03-11 PROCEDURE — 93017 CV STRESS TEST TRACING ONLY: CPT

## 2025-03-11 PROCEDURE — 78452 HT MUSCLE IMAGE SPECT MULT: CPT | Mod: 26,,, | Performed by: INTERNAL MEDICINE

## 2025-03-11 PROCEDURE — A9502 TC99M TETROFOSMIN: HCPCS | Performed by: INTERNAL MEDICINE

## 2025-03-11 PROCEDURE — 93016 CV STRESS TEST SUPVJ ONLY: CPT | Mod: ,,, | Performed by: INTERNAL MEDICINE

## 2025-03-11 PROCEDURE — 93018 CV STRESS TEST I&R ONLY: CPT | Mod: ,,, | Performed by: INTERNAL MEDICINE

## 2025-03-11 RX ADMIN — TETROFOSMIN 10 MILLICURIE: 1.38 INJECTION, POWDER, LYOPHILIZED, FOR SOLUTION INTRAVENOUS at 08:03

## 2025-03-11 RX ADMIN — TETROFOSMIN 30 MILLICURIE: 1.38 INJECTION, POWDER, LYOPHILIZED, FOR SOLUTION INTRAVENOUS at 11:03

## 2025-03-12 ENCOUNTER — TELEPHONE (OUTPATIENT)
Dept: CARDIOLOGY | Facility: CLINIC | Age: 70
End: 2025-03-12
Payer: MEDICARE

## 2025-03-12 ENCOUNTER — RESULTS FOLLOW-UP (OUTPATIENT)
Dept: CARDIOLOGY | Facility: HOSPITAL | Age: 70
End: 2025-03-12

## 2025-03-12 NOTE — TELEPHONE ENCOUNTER
LVM for pt to call back In regards to test results.               ----- Message from Rene Vanessa MD sent at 3/12/2025  4:02 PM CDT -----  The nuclear stress test is normal. No ischemia  Continue current Rx.   F/U as scheduled    ----- Message -----  From: Alton Juan MD  Sent: 3/11/2025   4:59 PM CDT  To: Rene Vanessa MD

## 2025-03-13 ENCOUNTER — PATIENT MESSAGE (OUTPATIENT)
Dept: CARDIOLOGY | Facility: CLINIC | Age: 70
End: 2025-03-13
Payer: MEDICARE

## 2025-03-14 ENCOUNTER — CLINICAL SUPPORT (OUTPATIENT)
Dept: REHABILITATION | Facility: HOSPITAL | Age: 70
End: 2025-03-14
Payer: MEDICARE

## 2025-03-14 DIAGNOSIS — Z74.09 DECREASED FUNCTIONAL MOBILITY AND ENDURANCE: ICD-10-CM

## 2025-03-14 DIAGNOSIS — R29.898 DECREASED STRENGTH OF LOWER EXTREMITY: Primary | ICD-10-CM

## 2025-03-14 PROCEDURE — 97530 THERAPEUTIC ACTIVITIES: CPT

## 2025-03-14 PROCEDURE — 97112 NEUROMUSCULAR REEDUCATION: CPT

## 2025-03-14 NOTE — PROGRESS NOTES
Outpatient Rehab    Physical Therapy Visit    Patient Name: Cesario Sahu  MRN: [id number]  YOB: 1955  Encounter Date: 3/14/2025    Therapy Diagnosis:   Encounter Diagnoses   Name Primary?    Decreased strength of lower extremity Yes    Decreased functional mobility and endurance          Physician: Dion Martínez PA*    Physician Orders: Eval and Treat  Medical Diagnosis:   M17.11 (ICD-10-CM) - Primary osteoarthritis of right knee   M17.12 (ICD-10-CM) - Primary osteoarthritis of left knee         Visit # / Visits Authorized:  4 / 16  Date of Evaluation:  2/18/2025   Insurance Authorization Period: 02/07/2025 to 02/07/2026  Plan of Care Certification:  2/18/2025 to 05/13/2025      Time In: 0803   Time Out: 0856  Total Time: 53   Total Billable Time: 25     FOTO:  Intake Score:  %  Survey Score 1:  %  Survey Score 2:  %         Subjective   Patient reports increase pain on Monday after wearing a short stacked heel to [employer]..  Pain reported as 4/10. R knee    Objective            Treatment:  CPT?  Intervention  Performed??   Today?   3/14/2025    Duration / Intensity?    MT?  ?  ?  ?    TE?  Patient educated on plan of care and current condition as well as importance of keeping knee joint mobile. ?  Patient verbalized  understanding?    ?  Nu-step?  ?x  6 minutes Level 4    ?  Recumbent Bike           6 minutes level 1    ?  Tailgaters?           x 3 minutes 5lbs?      Gastroc stretch  incline board  x  3 x 30 secs             NMR?  Quad Sets?    3 x 10 bilateral?    ?  Short Arc Quads?             x 3 x 10 bilateral 5lbs    ?  Long Arc Quads?              x 3 x 10 billateral 5 bs    ?  Supine ball squeezes?             x 3 minutes 3 second holds?    ?  Supine clamshells?               3 minutes 3 second holds black band?     Sidelying Clamshells              2 x 10 bilateral   ?  ?Bridges             x 3 x 10?      SLR              x 3 x 10  bilateral     Prone hip extension            side  stepping and marches walking in // bars with no UE support  x                       TA?  Sit to stands from elevated mat  7.5# KB with core engaged             x 3 x 10?     Negotiation of 4 steps using HR's for support x 4                           ?  Matrix Leg Press              4 minutes 75lbs    PLAN?  ? side steps, step ups, sidelying clamshells ?  ?       CPT Codes available for Billing:    (00) minutes of Manual therapy (MT) to improve pain and ROM.   NC) minutes of Therapeutic Exercise (TE) to develop strength, endurance, range of motion, and flexibility.   15 minutes of Neuromuscular Re-Education (NMR)? to improve: Balance, Coordination, Kinesthetic, Sense, Proprioception, and Posture.   10 minutes of Therapeutic Activities (TA) to improve functional performance.   Vasopneumatic Device Therapy () for management of swelling/edema. (02560)   Unattended Electrical Stimulation (ES) for muscle performance or pain modulation.   BFR: Blood flow restriction applied during exercise     Assessment & Plan   Assessment: Patient tolerated treatment well and reported she felt better after the session.       Patient will continue to benefit from skilled outpatient physical therapy to address the deficits listed in the problem list box on initial evaluation, provide pt/family education and to maximize pt's level of independence in the home and community environment.     Patient's spiritual, cultural, and educational needs considered and patient agreeable to plan of care and goals.           Plan: Continue with POC    Goals:   Active       Long Term Goals       Patient will demonstrate improved function as indicated by a score of greater than or equal to 58 out of 100 on FOTO.          (Progressing)       Start:  02/18/25    Expected End:  05/13/25            Patient will improve strength to at least 4+/5 grossly,  in order to improve functional independence and quality of life.   (Progressing)       Start:  02/18/25     Expected End:  05/13/25               Short Term Goals       Patient will demonstrate improved function as indicated by a score of greater than or equal to 45 out of 100 on FOTO.          (Progressing)       Start:  02/18/25    Expected End:  04/01/25            Patient will improve strength by 1/2 a grade, in order to progress towards independence with functional activities.          (Progressing)       Start:  02/18/25    Expected End:  04/01/25                Kymberly Bravo, PT

## 2025-03-17 ENCOUNTER — OFFICE VISIT (OUTPATIENT)
Dept: GASTROENTEROLOGY | Facility: CLINIC | Age: 70
End: 2025-03-17
Payer: MEDICARE

## 2025-03-17 VITALS
HEART RATE: 68 BPM | SYSTOLIC BLOOD PRESSURE: 111 MMHG | HEIGHT: 63 IN | BODY MASS INDEX: 30.31 KG/M2 | WEIGHT: 171.06 LBS | DIASTOLIC BLOOD PRESSURE: 71 MMHG

## 2025-03-17 DIAGNOSIS — K58.1 IRRITABLE BOWEL SYNDROME WITH CONSTIPATION: Primary | ICD-10-CM

## 2025-03-17 PROCEDURE — 3074F SYST BP LT 130 MM HG: CPT | Mod: CPTII,S$GLB,, | Performed by: NURSE PRACTITIONER

## 2025-03-17 PROCEDURE — 1159F MED LIST DOCD IN RCRD: CPT | Mod: CPTII,S$GLB,, | Performed by: NURSE PRACTITIONER

## 2025-03-17 PROCEDURE — 1160F RVW MEDS BY RX/DR IN RCRD: CPT | Mod: CPTII,S$GLB,, | Performed by: NURSE PRACTITIONER

## 2025-03-17 PROCEDURE — 3288F FALL RISK ASSESSMENT DOCD: CPT | Mod: CPTII,S$GLB,, | Performed by: NURSE PRACTITIONER

## 2025-03-17 PROCEDURE — 99213 OFFICE O/P EST LOW 20 MIN: CPT | Mod: S$GLB,,, | Performed by: NURSE PRACTITIONER

## 2025-03-17 PROCEDURE — 1126F AMNT PAIN NOTED NONE PRSNT: CPT | Mod: CPTII,S$GLB,, | Performed by: NURSE PRACTITIONER

## 2025-03-17 PROCEDURE — 99999 PR PBB SHADOW E&M-EST. PATIENT-LVL V: CPT | Mod: PBBFAC,,, | Performed by: NURSE PRACTITIONER

## 2025-03-17 PROCEDURE — 3078F DIAST BP <80 MM HG: CPT | Mod: CPTII,S$GLB,, | Performed by: NURSE PRACTITIONER

## 2025-03-17 PROCEDURE — 3008F BODY MASS INDEX DOCD: CPT | Mod: CPTII,S$GLB,, | Performed by: NURSE PRACTITIONER

## 2025-03-17 PROCEDURE — 1101F PT FALLS ASSESS-DOCD LE1/YR: CPT | Mod: CPTII,S$GLB,, | Performed by: NURSE PRACTITIONER

## 2025-03-17 NOTE — PROGRESS NOTES
"Clinic Follow Up:  [facility] Follow Up Note    Reason for Follow Up:  The encounter diagnosis was Irritable bowel syndrome with constipation.    PCP: Madison Patino       HPI:  This is a 69 y.o. female here for follow up of IBS  Chronic issue. Has been on multiple different constipation medications in the past.     Linzess- ineffective  Lactulsoe-- not able to tolerate  Colace-- ineffective   Tulance-- insurance denied.  Ibsrela- did not get    Review of Systems    Medical History:  Past Medical History:   Diagnosis Date    Arthritis     Digestive disorder     Hypertension     Insulin resistance     Left renal stone 7/18/2023       Surgical History:   Past Surgical History:   Procedure Laterality Date    ADENOIDECTOMY      CHOLECYSTECTOMY      COLONOSCOPY N/A 6/11/2021    Procedure: COLONOSCOPY;  Surgeon: Serenity Ramey MD;  Location: [facility];  Service: Endoscopy;  Laterality: N/A;    DILATION AND CURETTAGE OF UTERUS  10/2018    ESOPHAGOGASTRODUODENOSCOPY N/A 3/23/2022    Procedure: EGD (ESOPHAGOGASTRODUODENOSCOPY);  Surgeon: Abby Green MD;  Location: [facility];  Service: Endoscopy;  Laterality: N/A;       Family History:   Family History   Problem Relation Name Age of Onset    Hypertension Mother      Heart failure Mother      Heart disease Sister      Hyperlipidemia Sister      Hypertension Sister      Diabetes Brother         Social History:   Social History[1]    Allergies: Review of patient's allergies indicates:  No Known Allergies    Home Medications:  Medications Ordered Prior to Encounter[2]    /71 (BP Location: Right arm, Patient Position: Sitting)   Pulse 68   Ht 5' 3" (1.6 m)   Wt 77.6 kg (171 lb 1.2 oz)   BMI 30.30 kg/m²   Body mass index is 30.3 kg/m².  Physical Exam    Labs: Pertinent labs reviewed.  CRC Screening:     Assessment:   1. Irritable bowel syndrome with constipation        Recommendations:   High fiber diet and water intake  Can use Miralax " 1-2 times daily  Can also use smooth move tea when needed.     Irritable bowel syndrome with constipation    Return to Clinic:  Follow up if symptoms worsen or fail to improve.    Thank you for the opportunity to participate in the care of this patient.  CHEO Burch             [1]   Social History  Tobacco Use    Smoking status: Never    Smokeless tobacco: Never   Substance Use Topics    Alcohol use: Yes     Comment: Socially    Drug use: Never   [2]   Current Outpatient Medications on File Prior to Visit   Medication Sig Dispense Refill    albuterol (VENTOLIN HFA) 90 mcg/actuation inhaler Inhale 2 puffs into the lungs every 6 (six) hours as needed for Wheezing (cough). Rescue 6.7 g 0    amLODIPine (NORVASC) 5 MG tablet Take 1 tablet (5 mg total) by mouth once daily. 90 tablet 1    ascorbic acid (VITAMIN C ORAL)       aspirin (ECOTRIN) 81 MG EC tablet Take 81 mg by mouth once daily.      atorvastatin (LIPITOR) 10 MG tablet Take 1 tablet (10 mg total) by mouth every evening. 90 tablet 2    busPIRone (BUSPAR) 5 MG Tab Take 1 tablet (5 mg total) by mouth 2 (two) times daily. 60 tablet 11    celecoxib (CELEBREX) 100 MG capsule Take 1 capsule (100 mg total) by mouth 2 (two) times daily as needed for Pain. 60 capsule 1    diltiazem HCl (DILTIAZEM 2% - LIDOCAINE 5% CREAM) Apply 1 application  topically 3 (three) times daily. 30 g 1    estradioL (ESTRACE) 0.01 % (0.1 mg/gram) vaginal cream Place 1 g vaginally twice a week. 42.5 g 6    ibuprofen (ADVIL,MOTRIN) 800 MG tablet Take 1 tablet (800 mg total) by mouth every 6 (six) hours as needed for Pain. 30 tablet 2    Lactobacillus acidophilus (PROBIOTIC ORAL)       loratadine (CLARITIN) 10 mg tablet Take 1 tablet (10 mg total) by mouth once daily. 30 tablet 0    metoprolol succinate (TOPROL-XL) 25 MG 24 hr tablet Take 1 tablet by mouth once daily 90 tablet 3    ofloxacin (FLOXIN) 0.3 % otic solution Place 10 drops into the right ear once daily. 5 mL 0    olopatadine  (PATANOL) 0.1 % ophthalmic solution Place 1 drop into both eyes 2 (two) times daily as needed for Allergies. 5 mL 0    pantoprazole (PROTONIX) 40 MG tablet Take 1 tablet (40 mg total) by mouth once daily. 90 tablet 3    peg 400-propylene glycol (SYSTANE, PROPYLENE GLYCOL,) 0.4-0.3 % Drop Apply 1 drop to eye every 6 (six) hours as needed. 10 mL 0    prednisoLONE sodium phosphate (INFLAMASE FORTE) 1 % Drop Place 1 drop into the left eye 4 (four) times daily. 5 mL 1    promethazine-dextromethorphan (PROMETHAZINE-DM) 6.25-15 mg/5 mL Syrp Take 5 mLs by mouth every 4 (four) hours as needed (cough). 118 mL 0    sertraline (ZOLOFT) 25 MG tablet Take 1 tablet (25 mg total) by mouth once daily. 30 tablet 11    tobramycin-dexAMETHasone 0.3-0.1% (TOBRADEX) 0.3-0.1 % DrpS Place 1 drop into both eyes every 6 (six) hours.      valACYclovir (VALTREX) 1000 MG tablet Take 1 tablet (1,000 mg total) by mouth 3 (three) times daily. for 7 days 21 tablet 0     No current facility-administered medications on file prior to visit.

## 2025-03-18 ENCOUNTER — CLINICAL SUPPORT (OUTPATIENT)
Dept: REHABILITATION | Facility: HOSPITAL | Age: 70
End: 2025-03-18
Payer: MEDICARE

## 2025-03-18 DIAGNOSIS — Z74.09 DECREASED FUNCTIONAL MOBILITY AND ENDURANCE: ICD-10-CM

## 2025-03-18 DIAGNOSIS — R29.898 DECREASED STRENGTH OF LOWER EXTREMITY: Primary | ICD-10-CM

## 2025-03-18 PROCEDURE — 97112 NEUROMUSCULAR REEDUCATION: CPT

## 2025-03-18 PROCEDURE — 97530 THERAPEUTIC ACTIVITIES: CPT

## 2025-03-18 PROCEDURE — 97110 THERAPEUTIC EXERCISES: CPT

## 2025-03-18 NOTE — PROGRESS NOTES
Outpatient Rehab    Physical Therapy Progress Note    Patient Name: Cesario Sahu  MRN: [id number]  YOB: 1955  Encounter Date: 3/18/2025    Therapy Diagnosis:   Encounter Diagnoses   Name Primary?    Decreased strength of lower extremity Yes    Decreased functional mobility and endurance          Physician: Dion Martínez PA*    Physician Orders: Eval and Treat  Medical Diagnosis:   M17.11 (ICD-10-CM) - Primary osteoarthritis of right knee   M17.12 (ICD-10-CM) - Primary osteoarthritis of left knee         Visit # / Visits Authorized:  5 / 16  Date of Evaluation:  2/18/2025   Insurance Authorization Period: 02/07/2025 to 02/07/2026  Plan of Care Certification:  2/18/2025 to 05/25/2025      Time In: 1100   Time Out: 1155  Total Time: 55   Total Billable Time: 45     FOTO:  Intake Score: 40%  Survey Score 1: 36%  Survey Score 2:  %         Subjective   Patient states since starting physical therapy she is still having some pain, but it is not as frequent and overall patient states she feels stronger..  Pain reported as 3/10.      Objective            *Denotes change since initial evaluation, re-tested on 03/18/2025    Right Knee    Active (deg) Passive (deg) Pain   Flexion 120       Extension 0             Left Knee    Active (deg) Passive (deg) Pain   Flexion 120       Extension 0                Hip Strength - Planes of Motion    Right Strength Right Pain Left Strength Left  Pain   Flexion (L2) 4+*   4+*     Extension 4*   4*     ABduction 4+*   4+*     ADduction           Internal Rotation           External Rotation                 Knee Strength    Right Strength Right Pain Left Strength Left  Pain   Flexion (S2) 4+*   4+*     Prone Flexion 4+*   4+*     Extension (L3) 5*   5*                  Treatment:  CPT?  Intervention  Performed??   Today?   3/18/2025    Duration / Intensity?    MT?  ?  ?  ?    TE?  Patient educated on plan of care and current condition as well as importance of keeping  knee joint mobile. ?  Patient verbalized  understanding?    ?  Nu-step?  ? 6 minutes Level 4    ?  Recumbent Bike             x 6 minutes level 1    ?  Tailgaters?              x 3 minutes 5lbs?      Gastroc stretch  incline board            x  2 minutes bilateral      objectives re-assessed            x  5 minutes   NMR?  Quad Sets?    3 x 10 bilateral?    ?  Short Arc Quads?              3 x 10 bilateral 5lbs    ?  Long Arc Quads?              x 3 x 10 billateral 5 bs    ?  Supine ball squeezes?              3 minutes 3 second holds?    ?  Supine clamshells?               3 minutes 3 second holds black band?     Sidelying Clamshells              2 x 10 bilateral   ?  ?Bridges             x 3 x 10?      SLR              x 3 x 10  bilateral     Prone hip extension            side stepping and marches walking in // bars with no UE support            x  3 minutes with yellow band                     TA?  Sit to stands from elevated mat  7.5# KB with core engaged             x 3 x 10?     Negotiation of 4 steps using HR's for support x 4                           ?  Matrix Leg Press              4 minutes 75lbs    PLAN?  ? side steps, step ups, sidelying clamshells ?  ?       CPT Codes available for Billing:    (00) minutes of Manual therapy (MT) to improve pain and ROM.   (16) minutes of Therapeutic Exercise (TE) to develop strength, endurance, range of motion, and flexibility.   (29) minutes of Neuromuscular Re-Education (NMR)? to improve: Balance, Coordination, Kinesthetic, Sense, Proprioception, and Posture.   (10) minutes of Therapeutic Activities (TA) to improve functional performance.   Vasopneumatic Device Therapy () for management of swelling/edema. (70541)   Unattended Electrical Stimulation (ES) for muscle performance or pain modulation.   BFR: Blood flow restriction applied during exercise     Assessment & Plan   Assessment: Since starting physical therapy patient demosntrates moderate to signficant  improvemenets in bilateral global knee strength. Patient does demosntrate an increase in functional limiations per FOTO score retaken today, however, this may be due to pateint having better understanding of limitations since starting physical therapy. Because of her overall improvements, patient may continue to benefit from skilled therapy to continue addressing her remaining deficits and improve her overall quality of life.       Patient will continue to benefit from skilled outpatient physical therapy to address the deficits listed in the problem list box on initial evaluation, provide pt/family education and to maximize pt's level of independence in the home and community environment.     Patient's spiritual, cultural, and educational needs considered and patient agreeable to plan of care and goals.           Plan: Continue with POC    Goals:   Active       Long Term Goals       Patient will demonstrate improved function as indicated by a score of greater than or equal to 58 out of 100 on FOTO.          (Progressing)       Start:  02/18/25    Expected End:  05/13/25            Patient will improve strength to at least 4+/5 grossly,  in order to improve functional independence and quality of life.   (Progressing)       Start:  02/18/25    Expected End:  05/13/25               Short Term Goals       Patient will demonstrate improved function as indicated by a score of greater than or equal to 45 out of 100 on FOTO.          (Progressing)       Start:  02/18/25    Expected End:  04/01/25            Patient will improve strength by 1/2 a grade, in order to progress towards independence with functional activities.          (Met)       Start:  02/18/25    Expected End:  04/01/25    Resolved:  03/18/25             Katrina Damian, PT

## 2025-03-19 ENCOUNTER — OFFICE VISIT (OUTPATIENT)
Dept: SURGERY | Facility: CLINIC | Age: 70
End: 2025-03-19
Payer: MEDICARE

## 2025-03-19 VITALS
WEIGHT: 171.44 LBS | BODY MASS INDEX: 30.38 KG/M2 | HEART RATE: 61 BPM | SYSTOLIC BLOOD PRESSURE: 124 MMHG | HEIGHT: 63 IN | DIASTOLIC BLOOD PRESSURE: 71 MMHG | OXYGEN SATURATION: 97 %

## 2025-03-19 DIAGNOSIS — K64.4 EXTERNAL HEMORRHOIDS: ICD-10-CM

## 2025-03-19 DIAGNOSIS — K60.2 ANAL FISSURE: Primary | ICD-10-CM

## 2025-03-19 PROCEDURE — 99999 PR PBB SHADOW E&M-EST. PATIENT-LVL IV: CPT | Mod: PBBFAC,,,

## 2025-03-19 PROCEDURE — 3074F SYST BP LT 130 MM HG: CPT | Mod: CPTII,S$GLB,,

## 2025-03-19 PROCEDURE — 3078F DIAST BP <80 MM HG: CPT | Mod: CPTII,S$GLB,,

## 2025-03-19 PROCEDURE — 99213 OFFICE O/P EST LOW 20 MIN: CPT | Mod: S$GLB,,,

## 2025-03-19 PROCEDURE — 3008F BODY MASS INDEX DOCD: CPT | Mod: CPTII,S$GLB,,

## 2025-03-19 PROCEDURE — 1159F MED LIST DOCD IN RCRD: CPT | Mod: CPTII,S$GLB,,

## 2025-03-19 PROCEDURE — 1125F AMNT PAIN NOTED PAIN PRSNT: CPT | Mod: CPTII,S$GLB,,

## 2025-03-19 NOTE — PROGRESS NOTES
History & Physical    SUBJECTIVE:     CC: external hemorrhoids  Ref: Rosemarie Meeks MD    History of Present Illness:  Patient is a 69 y.o. female presents with rectal discomfort/pain/bleeding. Reports external tissue per rectum that swells and becomes painful at times. Has been worse/more frequent in past 2 months. Has had problems with hemorrhoids for years. Has blood with wiping occasionally. Discomfort worse than bleeding. Warm baths help. Reports long hx of constipation, has a BM every other day, does strain and has hard stools, alternates miralax and metamucil daily, uses stool softeners prn, sits on toilet for >5 mins at a time, drinks >64 oz water daily. Colonoscopy 06/2021 with polyps and diverticulosis. Denies famhx of CRC, IBD, polyps. Takes 81mg aspirin daily for prevention. Denies nausea, vomiting, fevers, chills, abdominal pain, hematochezia, melena.       Interval History:   Since the last clinic visit, the patient originally was doing well.  Was no longer having any rectal pain or bleeding.  Was using topical diltiazem p.r.n..  However, about 2 weeks ago, she had an episode of hard stool and straining which she believes onset reoccurrence of symptoms.  Rectal pain and bleeding was worse with bowel movements and lasted for about 4 days.  Now having some discomfort.  Has tried Metamucil in the past and felt like it made her more constipated.  She manages constipation with daily MiraLax, stool softeners, adding fiber to diet, drinking at least 64 oz of water daily.    Chief Complaint   Patient presents with    Anal Fissure    Rectal Pain    Rectal Bleeding       Review of patient's allergies indicates:  No Known Allergies    Current Outpatient Medications   Medication Sig Dispense Refill    albuterol (VENTOLIN HFA) 90 mcg/actuation inhaler Inhale 2 puffs into the lungs every 6 (six) hours as needed for Wheezing (cough). Rescue 6.7 g 0    amLODIPine (NORVASC) 5 MG tablet Take 1 tablet (5 mg total) by  mouth once daily. 90 tablet 1    ascorbic acid (VITAMIN C ORAL)       aspirin (ECOTRIN) 81 MG EC tablet Take 81 mg by mouth once daily.      atorvastatin (LIPITOR) 10 MG tablet Take 1 tablet (10 mg total) by mouth every evening. 90 tablet 2    busPIRone (BUSPAR) 5 MG Tab Take 1 tablet (5 mg total) by mouth 2 (two) times daily. 60 tablet 11    celecoxib (CELEBREX) 100 MG capsule Take 1 capsule (100 mg total) by mouth 2 (two) times daily as needed for Pain. 60 capsule 1    estradioL (ESTRACE) 0.01 % (0.1 mg/gram) vaginal cream Place 1 g vaginally twice a week. 42.5 g 6    ibuprofen (ADVIL,MOTRIN) 800 MG tablet Take 1 tablet (800 mg total) by mouth every 6 (six) hours as needed for Pain. 30 tablet 2    Lactobacillus acidophilus (PROBIOTIC ORAL)       loratadine (CLARITIN) 10 mg tablet Take 1 tablet (10 mg total) by mouth once daily. 30 tablet 0    metoprolol succinate (TOPROL-XL) 25 MG 24 hr tablet Take 1 tablet by mouth once daily 90 tablet 3    ofloxacin (FLOXIN) 0.3 % otic solution Place 10 drops into the right ear once daily. 5 mL 0    olopatadine (PATANOL) 0.1 % ophthalmic solution Place 1 drop into both eyes 2 (two) times daily as needed for Allergies. 5 mL 0    pantoprazole (PROTONIX) 40 MG tablet Take 1 tablet (40 mg total) by mouth once daily. 90 tablet 3    peg 400-propylene glycol (SYSTANE, PROPYLENE GLYCOL,) 0.4-0.3 % Drop Apply 1 drop to eye every 6 (six) hours as needed. 10 mL 0    prednisoLONE sodium phosphate (INFLAMASE FORTE) 1 % Drop Place 1 drop into the left eye 4 (four) times daily. 5 mL 1    promethazine-dextromethorphan (PROMETHAZINE-DM) 6.25-15 mg/5 mL Syrp Take 5 mLs by mouth every 4 (four) hours as needed (cough). 118 mL 0    sertraline (ZOLOFT) 25 MG tablet Take 1 tablet (25 mg total) by mouth once daily. 30 tablet 11    tobramycin-dexAMETHasone 0.3-0.1% (TOBRADEX) 0.3-0.1 % DrpS Place 1 drop into both eyes every 6 (six) hours.      diltiazem HCl (DILTIAZEM 2% - LIDOCAINE 5% CREAM) Apply 1  application  topically 3 (three) times daily. 30 g 1    valACYclovir (VALTREX) 1000 MG tablet Take 1 tablet (1,000 mg total) by mouth 3 (three) times daily. for 7 days 21 tablet 0     No current facility-administered medications for this visit.       Past Medical History:   Diagnosis Date    Arthritis     Digestive disorder     Hypertension     Insulin resistance     Left renal stone 7/18/2023     Past Surgical History:   Procedure Laterality Date    ADENOIDECTOMY      CHOLECYSTECTOMY      COLONOSCOPY N/A 6/11/2021    Procedure: COLONOSCOPY;  Surgeon: Serenity Ramey MD;  Location: [facility];  Service: Endoscopy;  Laterality: N/A;    DILATION AND CURETTAGE OF UTERUS  10/2018    ESOPHAGOGASTRODUODENOSCOPY N/A 3/23/2022    Procedure: EGD (ESOPHAGOGASTRODUODENOSCOPY);  Surgeon: Abby Green MD;  Location: [facility];  Service: Endoscopy;  Laterality: N/A;     Family History   Problem Relation Name Age of Onset    Hypertension Mother      Heart failure Mother      Heart disease Sister      Hyperlipidemia Sister      Hypertension Sister      Diabetes Brother       Social History     Tobacco Use    Smoking status: Never    Smokeless tobacco: Never   Substance Use Topics    Alcohol use: Yes     Comment: Socially    Drug use: Never        Review of Systems:  Review of Systems   Constitutional:  Negative for activity change, appetite change, chills, diaphoresis, fatigue, fever and unexpected weight change.   HENT:  Negative for congestion, ear pain, sore throat and trouble swallowing.    Eyes:  Negative for pain, redness and itching.   Respiratory:  Negative for cough, shortness of breath and wheezing.    Cardiovascular:  Negative for chest pain, palpitations and leg swelling.   Gastrointestinal:  Positive for anal bleeding, constipation and rectal pain. Negative for abdominal distention, abdominal pain, blood in stool, diarrhea, nausea and vomiting.   Endocrine: Negative for cold intolerance, heat intolerance and  "polyuria.   Genitourinary:  Negative for dysuria, flank pain, frequency and hematuria.   Musculoskeletal:  Negative for gait problem, joint swelling and neck pain.   Skin:  Negative for color change, rash and wound.   Allergic/Immunologic: Negative for environmental allergies and immunocompromised state.   Neurological:  Negative for dizziness, speech difficulty, weakness and numbness.   Psychiatric/Behavioral:  Negative for agitation, confusion and hallucinations.        OBJECTIVE:     Vital Signs (Most Recent)  Pulse: 61 (03/19/25 1101)  BP: 124/71 (03/19/25 1101)  SpO2: 97 % (03/19/25 1101)  5' 3" (1.6 m)  77.7 kg (171 lb 6.5 oz)     Physical Exam:  Physical Exam  Vitals reviewed. Exam conducted with a chaperone present.   Constitutional:       General: She is not in acute distress.     Appearance: Normal appearance. She is well-developed. She is not ill-appearing or toxic-appearing.   HENT:      Head: Normocephalic and atraumatic.      Right Ear: External ear normal.      Left Ear: External ear normal.      Nose: Nose normal.   Cardiovascular:      Rate and Rhythm: Normal rate.   Pulmonary:      Effort: Pulmonary effort is normal. No respiratory distress.   Abdominal:      General: Abdomen is flat. There is no distension.      Palpations: Abdomen is soft.      Tenderness: There is no abdominal tenderness.   Genitourinary:     Comments: Anorectal: +anterior midline anal fissure. TTP. +mildly enlarged left posterior & left anterior external hemorrhoids - soft/NT/NT. No other external masses or lesions. KARISHMA deferred.   Musculoskeletal:         General: Normal range of motion.      Cervical back: Normal range of motion and neck supple.   Skin:     General: Skin is warm and dry.   Neurological:      Mental Status: She is alert and oriented to person, place, and time.   Psychiatric:         Mood and Affect: Mood normal.         Behavior: Behavior normal.         Diagnostic Results:  Colonoscopy 06/2021 - polyps, " diverticulosis, repeat recommended 7 years    ASSESSMENT/PLAN:     69 y.o. with rectal pain and bleeding consistent with anal fissure & hemorrhoids    - patient had episode of pain and rectal heating about 2 weeks ago that lasted for about 3 days.  Symptoms occurred after straining constipation.  She was previously using topical diltiazem p.r.n..  On exam, still has a fissure as well as external hemorrhoids.  Recommend retrial topical diltiazem as this has helped in the past.  Refill given.  Discussed importance of avoiding constipation and strain. Recommend trail of different brand of fiber and make sure she is drinking at least 64 oz water daily  - keep f/u with GI for medication management of constipation   - Discussed causes and treatment of anal fissures including soaks/sitz baths, topical diltiazem, botox injection, and LIS. Recommended treatment with high-fiber diet and topical diltiazem and patient agreeable to this approach.   - Discussed that a minimum I would recommend behavioral, lifestyle and medication modifications to improve bowel habits. Usual bowel management handout given to patient. This includes a stool softener twice per day, fiber powder supplementation daily, drinking at least 64oz of water/day, avoiding straining with bowel movements, spending less than 5 min on toilet per bowel movement, eating a high fiber diet, using miralax as needed to achieve a bowel movement daily and using wet wipes to wipe after bowel movements when irritated.   - Usual bowel management sheet and compound pharmacy list distributed.  - RTC 6 weeks      Jnenifer Jean-Batpiste PA-C  Colon and Rectal Surgery  [facility]

## 2025-03-21 ENCOUNTER — CLINICAL SUPPORT (OUTPATIENT)
Dept: REHABILITATION | Facility: HOSPITAL | Age: 70
End: 2025-03-21
Payer: MEDICARE

## 2025-03-21 DIAGNOSIS — R29.898 DECREASED STRENGTH OF LOWER EXTREMITY: Primary | ICD-10-CM

## 2025-03-21 DIAGNOSIS — Z74.09 DECREASED FUNCTIONAL MOBILITY AND ENDURANCE: ICD-10-CM

## 2025-03-21 PROCEDURE — 97530 THERAPEUTIC ACTIVITIES: CPT

## 2025-03-21 NOTE — PROGRESS NOTES
Outpatient Rehab    Physical Therapy Visit    Patient Name: Cesario Sahu  MRN: [id number]  YOB: 1955  Encounter Date: 3/21/2025    Therapy Diagnosis:   Encounter Diagnoses   Name Primary?    Decreased strength of lower extremity Yes    Decreased functional mobility and endurance          Physician: Dion Martínez PA*    Physician Orders: Eval and Treat  Medical Diagnosis:   M17.11 (ICD-10-CM) - Primary osteoarthritis of right knee   M17.12 (ICD-10-CM) - Primary osteoarthritis of left knee         Visit # / Visits Authorized:  6 / 16  Date of Evaluation:  2/18/2025   Insurance Authorization Period: 02/07/2025 to 02/07/2026  Plan of Care Certification:  2/18/2025 to 05/25/2025      Time In: 1010   Time Out: 1100  Total Time: 50   Total Billable Time: 10    FOTO:  Intake Score:  %  Survey Score 1:  %  Survey Score 2:  %         Subjective   Patient reports she continues to feel sore the night after therapy, but good the next day. Patient states she is agreeable to being progressed today..  Pain reported as 3/10.      Objective            Treatment:  CPT?  Intervention  Performed??   Today?   3/21/2025    Duration / Intensity?    MT?  ?  ?  ?    TE?  Patient educated on plan of care and current condition as well as importance of keeping knee joint mobile. ?  Patient verbalized  understanding?    ?  Nu-step?  ? 6 minutes Level 4    ?  Recumbent Bike             x 6 minutes level 1    ?  Tailgaters?              x 3 minutes 5lbs?      Gastroc stretch  incline board            x  2 minutes bilateral      objectives re-assessed              5 minutes   NMR?  Quad Sets?    3 x 10 bilateral?    ?  Short Arc Quads?              3 x 10 bilateral 5lbs    ?  Long Arc Quads?              x 3 x 10 billateral 5 bs    ?  Supine ball squeezes?              3 minutes 3 second holds?    ?  Supine clamshells?               3 minutes 3 second holds black band?     Sidelying Clamshells              2 x 10  bilateral   ?  ?Bridges             x 3 x 10?      SLR              x 3 x 10  bilateral     Prone hip extension             x  3 x 10 bilateral      side stepping and marches walking in // bars with no UE support            x  3 minutes with yellow band                     TA?  Sit to stands from elevated mat  7.5# KB with core engaged             x 3 x 10?     Negotiation of 4 steps using HR's for support             -     Treadmill ambulation  1 minute warm up to speed  5 minutes at 2.0 mph no hand support                           X               x 6 minutes total   ?  Matrix Leg Press             x 4 minutes 75lbs    PLAN?  ? side steps, step ups, sidelying clamshells ?  ?       CPT Codes available for Billing:    (00) minutes of Manual therapy (MT) to improve pain and ROM.   (11) minutes of Therapeutic Exercise (TE) to develop strength, endurance, range of motion, and flexibility.   (25) minutes of Neuromuscular Re-Education (NMR)? to improve: Balance, Coordination, Kinesthetic, Sense, Proprioception, and Posture.   (14) minutes of Therapeutic Activities (TA) to improve functional performance.   Vasopneumatic Device Therapy () for management of swelling/edema. (92420)   Unattended Electrical Stimulation (ES) for muscle performance or pain modulation.   BFR: Blood flow restriction applied during exercise     Assessment & Plan   Assessment: Progressed patient with treadmill ambulation to which patient tolerated well, but required moderate cueing for sammy and step length. When cued to increase sammy and decrease step length, patient was able to obtain more normalized gait pattern and patient reported feeling more confident as well.       Patient will continue to benefit from skilled outpatient physical therapy to address the deficits listed in the problem list box on initial evaluation, provide pt/family education and to maximize pt's level of independence in the home and community environment.      Patient's spiritual, cultural, and educational needs considered and patient agreeable to plan of care and goals.           Plan: Continue with POC    Goals:   Active       Long Term Goals       Patient will demonstrate improved function as indicated by a score of greater than or equal to 58 out of 100 on FOTO.          (Progressing)       Start:  02/18/25    Expected End:  05/13/25            Patient will improve strength to at least 4+/5 grossly,  in order to improve functional independence and quality of life.   (Progressing)       Start:  02/18/25    Expected End:  05/13/25               Short Term Goals       Patient will demonstrate improved function as indicated by a score of greater than or equal to 45 out of 100 on FOTO.          (Progressing)       Start:  02/18/25    Expected End:  04/01/25            Patient will improve strength by 1/2 a grade, in order to progress towards independence with functional activities.          (Met)       Start:  02/18/25    Expected End:  04/01/25    Resolved:  03/18/25             Katrina Damian, PT

## 2025-03-25 ENCOUNTER — CLINICAL SUPPORT (OUTPATIENT)
Dept: REHABILITATION | Facility: HOSPITAL | Age: 70
End: 2025-03-25
Payer: MEDICARE

## 2025-03-25 DIAGNOSIS — Z74.09 DECREASED FUNCTIONAL MOBILITY AND ENDURANCE: ICD-10-CM

## 2025-03-25 DIAGNOSIS — R29.898 DECREASED STRENGTH OF LOWER EXTREMITY: Primary | ICD-10-CM

## 2025-03-25 PROCEDURE — 97530 THERAPEUTIC ACTIVITIES: CPT

## 2025-03-25 PROCEDURE — 97112 NEUROMUSCULAR REEDUCATION: CPT

## 2025-03-25 PROCEDURE — 97110 THERAPEUTIC EXERCISES: CPT

## 2025-03-25 NOTE — PROGRESS NOTES
Outpatient Rehab    Physical Therapy Visit    Patient Name: Cesario Sahu  MRN: [id number]  YOB: 1955  Encounter Date: 3/25/2025    Therapy Diagnosis:   Encounter Diagnoses   Name Primary?    Decreased strength of lower extremity Yes    Decreased functional mobility and endurance            Physician: Dion Martínez PA*    Physician Orders: Eval and Treat  Medical Diagnosis:   M17.11 (ICD-10-CM) - Primary osteoarthritis of right knee   M17.12 (ICD-10-CM) - Primary osteoarthritis of left knee         Visit # / Visits Authorized:  7 / 16  Date of Evaluation:  2/18/2025   Insurance Authorization Period: 02/07/2025 to 02/07/2026  Plan of Care Certification:  2/18/2025 to 05/25/2025      Time In: 1100   Time Out: 1145  Total Time: 45   Total Billable Time: 40    FOTO:  Intake Score:  %  Survey Score 1:  %  Survey Score 2:  %         Subjective   Patient states she continues to feel good when she is moving, but still sometimes wakes up with her right knee bothering her alot..  Pain reported as 3/10.      Objective            Treatment:  CPT?  Intervention  Performed??   Today?   3/25/2025    Duration / Intensity?    MT?  ?  ?  ?    TE?  Patient educated on plan of care and current condition as well as importance of keeping knee joint mobile. ?  Patient verbalized  understanding?    ?  Nu-step?  ? 6 minutes Level 4    ?  Recumbent Bike             x 6 minutes level 1    ?  Tailgaters?              x 3 minutes 5lbs?      Gastroc stretch  incline board              2 minutes bilateral      objectives re-assessed              5 minutes   NMR?  Quad Sets?    3 x 10 bilateral?    ?  Short Arc Quads?              3 x 10 bilateral 5lbs    ?  Long Arc Quads?              x 3 x 10 billateral 5 bs    ?  Supine ball squeezes?              3 minutes 3 second holds?    ?  Supine clamshells?               3 minutes 3 second holds black band?     Sidelying Clamshells              2 x 10 bilateral   ?  ?Bridges              x 3 x 10?      SLR              x 3 x 10  bilateral     Prone hip extension             -  3 x 10 bilateral      side stepping and marches walking in // bars with no UE support            x  3 minutes with yellow band                     TA?  Sit to stands from elevated mat  7.5# KB with core engaged             x 3 x 10?     Negotiation of 4 steps using HR's for support             -     Treadmill ambulation  1 minute warm up to speed  6 minutes at 2.0 mph no hand support                           X               x 6 minutes total   ?  Matrix Leg Press             x 4 minutes 75lbs    PLAN?  ? side steps, step ups, sidelying clamshells ?  ?       CPT Codes available for Billing:    (00) minutes of Manual therapy (MT) to improve pain and ROM.   (09) minutes of Therapeutic Exercise (TE) to develop strength, endurance, range of motion, and flexibility.   (21) minutes of Neuromuscular Re-Education (NMR)? to improve: Balance, Coordination, Kinesthetic, Sense, Proprioception, and Posture.   (15) minutes of Therapeutic Activities (TA) to improve functional performance.   Vasopneumatic Device Therapy () for management of swelling/edema. (81490)   Unattended Electrical Stimulation (ES) for muscle performance or pain modulation.   BFR: Blood flow restriction applied during exercise     Assessment & Plan   Assessment: Increased time on treadmill walking today with good tolerance. Cueing given throughout session to decrease compensatory patterns. Contunued encoruagement of HEP.       Patient will continue to benefit from skilled outpatient physical therapy to address the deficits listed in the problem list box on initial evaluation, provide pt/family education and to maximize pt's level of independence in the home and community environment.     Patient's spiritual, cultural, and educational needs considered and patient agreeable to plan of care and goals.           Plan: Continue with POC    Goals:   Active        Long Term Goals       Patient will demonstrate improved function as indicated by a score of greater than or equal to 58 out of 100 on FOTO.          (Progressing)       Start:  02/18/25    Expected End:  05/13/25            Patient will improve strength to at least 4+/5 grossly,  in order to improve functional independence and quality of life.   (Progressing)       Start:  02/18/25    Expected End:  05/13/25               Short Term Goals       Patient will demonstrate improved function as indicated by a score of greater than or equal to 45 out of 100 on FOTO.          (Progressing)       Start:  02/18/25    Expected End:  04/01/25            Patient will improve strength by 1/2 a grade, in order to progress towards independence with functional activities.          (Met)       Start:  02/18/25    Expected End:  04/01/25    Resolved:  03/18/25             Katrina Damian, PT

## 2025-03-26 ENCOUNTER — CLINICAL SUPPORT (OUTPATIENT)
Dept: REHABILITATION | Facility: HOSPITAL | Age: 70
End: 2025-03-26
Payer: MEDICARE

## 2025-03-26 DIAGNOSIS — Z74.09 DECREASED FUNCTIONAL MOBILITY AND ENDURANCE: ICD-10-CM

## 2025-03-26 DIAGNOSIS — R29.898 DECREASED STRENGTH OF LOWER EXTREMITY: Primary | ICD-10-CM

## 2025-03-26 PROCEDURE — 97112 NEUROMUSCULAR REEDUCATION: CPT

## 2025-03-26 PROCEDURE — 97530 THERAPEUTIC ACTIVITIES: CPT

## 2025-03-26 NOTE — PROGRESS NOTES
Outpatient Rehab    Physical Therapy Visit    Patient Name: Cesario Sahu  MRN: [id number]  YOB: 1955  Encounter Date: 3/26/2025    Therapy Diagnosis:   Encounter Diagnoses   Name Primary?    Decreased strength of lower extremity Yes    Decreased functional mobility and endurance        Physician: Dion Martínez PA*    Physician Orders: Eval and Treat  Medical Diagnosis:   M17.11 (ICD-10-CM) - Primary osteoarthritis of right knee   M17.12 (ICD-10-CM) - Primary osteoarthritis of left knee         Visit # / Visits Authorized:  8 / 16  Date of Evaluation:  2/18/2025   Insurance Authorization Period: 02/07/2025 to 02/07/2026  Plan of Care Certification:  2/18/2025 to 05/25/2025      Time In: 1105   Time Out: 1135  Total Time: 30   Total Billable Time: 25    FOTO:  Intake Score:  %  Survey Score 1:  %  Survey Score 2:  %         Subjective   Patient states her left knee is bothering her a little more today but reports afetr treadmill walking that this dissipated..  Pain reported as 3/10. R knee    Objective            Treatment:  CPT?  Intervention  Performed??   Today?   3/26/2025    Duration / Intensity?    MT?  ?  ?  ?    TE?  Patient educated on plan of care and current condition as well as importance of keeping knee joint mobile. ?  Patient verbalized  understanding?    ?  Nu-step?  ? 6 minutes Level 4    ?  Recumbent Bike             x 6 minutes level 1    ?  Tailgaters?              x 3 minutes 5lbs?      Gastroc stretch  incline board              2 minutes bilateral      objectives re-assessed              5 minutes   NMR?  Quad Sets?    3 x 10 bilateral?    ?  Short Arc Quads?              3 x 10 bilateral 5lbs    ?  Long Arc Quads?              - 3 x 10 billateral 5 bs    ?  Supine ball squeezes?              3 minutes 3 second holds?    ?  Supine clamshells?               3 minutes 3 second holds black band?     Sidelying Clamshells              2 x 10 bilateral   ?  ?Bridges              x 3 x 10?      SLR              x 3 x 10  bilateral     Prone hip extension             -  3 x 10 bilateral      side stepping and marches walking in // bars with no UE support            x  3 minutes with red band                     TA?  Sit to stands from elevated mat  7.5# KB with core engaged            - 3 x 10?     Negotiation of 4 steps using HR's for support             -     Treadmill ambulation  1 minute warm up to speed  6 minutes at 3.0 mph no hand support                           X               x 6 minutes total   ?  Matrix Leg Press             x 3 minutes 75lbs    PLAN?  ? side steps, step ups, sidelying clamshells ?  ?       CPT Codes available for Billing:    (00) minutes of Manual therapy (MT) to improve pain and ROM.   (09) minutes of Therapeutic Exercise (TE) to develop strength, endurance, range of motion, and flexibility.   (11) minutes of Neuromuscular Re-Education (NMR)? to improve: Balance, Coordination, Kinesthetic, Sense, Proprioception, and Posture.   (10) minutes of Therapeutic Activities (TA) to improve functional performance.   Vasopneumatic Device Therapy () for management of swelling/edema. (72373)   Unattended Electrical Stimulation (ES) for muscle performance or pain modulation.   BFR: Blood flow restriction applied during exercise     Assessment & Plan   Assessment: Patient tolerated today's session well and was able to tolerate increased speed on treadmill walking with good tolerance. Plan to discharge patient at next visit pending no new changes in status.       Patient will continue to benefit from skilled outpatient physical therapy to address the deficits listed in the problem list box on initial evaluation, provide pt/family education and to maximize pt's level of independence in the home and community environment.     Patient's spiritual, cultural, and educational needs considered and patient agreeable to plan of care and goals.           Plan: Continue with  POC    Goals:   Active       Long Term Goals       Patient will demonstrate improved function as indicated by a score of greater than or equal to 58 out of 100 on FOTO.          (Progressing)       Start:  02/18/25    Expected End:  05/13/25            Patient will improve strength to at least 4+/5 grossly,  in order to improve functional independence and quality of life.   (Progressing)       Start:  02/18/25    Expected End:  05/13/25               Short Term Goals       Patient will demonstrate improved function as indicated by a score of greater than or equal to 45 out of 100 on FOTO.          (Progressing)       Start:  02/18/25    Expected End:  04/01/25            Patient will improve strength by 1/2 a grade, in order to progress towards independence with functional activities.          (Met)       Start:  02/18/25    Expected End:  04/01/25    Resolved:  03/18/25             Katrina Damian PT

## 2025-04-01 ENCOUNTER — CLINICAL SUPPORT (OUTPATIENT)
Dept: REHABILITATION | Facility: HOSPITAL | Age: 70
End: 2025-04-01
Payer: MEDICARE

## 2025-04-01 DIAGNOSIS — Z74.09 DECREASED FUNCTIONAL MOBILITY AND ENDURANCE: ICD-10-CM

## 2025-04-01 DIAGNOSIS — R29.898 DECREASED STRENGTH OF LOWER EXTREMITY: Primary | ICD-10-CM

## 2025-04-01 PROCEDURE — 97110 THERAPEUTIC EXERCISES: CPT

## 2025-04-01 PROCEDURE — 97112 NEUROMUSCULAR REEDUCATION: CPT

## 2025-04-01 NOTE — PROGRESS NOTES
Outpatient Rehab    Physical Therapy Discharge      Patient Name: Cesario Sahu  MRN: [id number]  YOB: 1955  Encounter Date: 4/1/2025    Therapy Diagnosis:   Encounter Diagnoses   Name Primary?    Decreased strength of lower extremity Yes    Decreased functional mobility and endurance          Physician: Dion Martínez PA*    Physician Orders: Eval and Treat  Medical Diagnosis:   M17.11 (ICD-10-CM) - Primary osteoarthritis of right knee   M17.12 (ICD-10-CM) - Primary osteoarthritis of left knee         Visit # / Visits Authorized:  9 / 16  Date of Evaluation:  2/18/2025   Insurance Authorization Period: 02/07/2025 to 02/07/2026  Plan of Care Certification:  2/18/2025 to 05/25/2025      Time In: 1100   Time Out: 1150  Total Time: 50   Total Billable Time: 45    FOTO:  Intake Score: 40%  Survey Score 1: 36%  Survey Score 2: 40%         Subjective   Patient states her knee pain has been waxing and waning but patient reports she is agreeable to being discharged..  Pain reported as 3/10. R knee    Objective            *Denotes change since initial evaluation, re-tested on 04/01/2025     Right Knee    Active (deg) Passive (deg) Pain   Flexion 120       Extension 0             Left Knee    Active (deg) Passive (deg) Pain   Flexion 120       Extension 0                Hip Strength - Planes of Motion    Right Strength Right Pain Left Strength Left  Pain   Flexion (L2) 5*   5*     Extension 5*   5*     ABduction 5*   5*     ADduction           Internal Rotation           External Rotation                 Knee Strength    Right Strength Right Pain Left Strength Left  Pain   Flexion (S2) 5*   5*     Prone Flexion 5*   5*     Extension (L3) 5*   5*             Treatment:  CPT?  Intervention  Performed??   Today?   4/1/2025    Duration / Intensity?    MT?  ?  ?  ?    TE?  Patient educated on plan of care and current condition as well as importance of keeping knee joint mobile. ?  Patient verbalized   understanding?    ?  Nu-step?  ? 6 minutes Level 4    ?  Recumbent Bike             x 6 minutes level 1    ?  Tailgaters?              x 3 minutes 5lbs?      Gastroc stretch incline board              2 minutes bilateral      objectives re-assessed            x  5 minutes   NMR?  Quad Sets?    3 x 10 bilateral?    ?  Short Arc Quads?             x 3 x 10 bilateral 5lbs    ?  Long Arc Quads?              x 3 x 10 billateral 5 bs    ?  Supine ball squeezes?              3 minutes 3 second holds?    ?  Supine clamshells?               3 minutes 3 second holds black band?     Sidelying Clamshells              2 x 10 bilateral   ?  ?Bridges             x 3 x 10?      SLR              x 3 x 10  bilateral     Prone hip extension             x  3 x 10 bilateral      side stepping and marches walking in // bars with no UE support            x  3 minutes with red band                     TA?  Sit to stands from elevated mat  7.5# KB with core engaged            - 3 x 10?     Negotiation of 4 steps using HR's for support             -     Treadmill ambulation  1 minute warm up to speed  6 minutes at 3.0 mph no hand support                                           6 minutes total   ?  Matrix Leg Press              3 minutes 75lbs    PLAN?  ? side steps, step ups, sidelying clamshells ?  ?       CPT Codes available for Billing:    (00) minutes of Manual therapy (MT) to improve pain and ROM.   (14) minutes of Therapeutic Exercise (TE) to develop strength, endurance, range of motion, and flexibility.   (36) minutes of Neuromuscular Re-Education (NMR)? to improve: Balance, Coordination, Kinesthetic, Sense, Proprioception, and Posture.   (00) minutes of Therapeutic Activities (TA) to improve functional performance.   Vasopneumatic Device Therapy () for management of swelling/edema. ([id number])   Unattended Electrical Stimulation (ES) for muscle performance or pain modulation.   BFR: Blood flow restriction applied during exercise      Assessment & Plan   Assessment: Since initial evaluation patient demonstrates significant improvements in bilateral global lower extremity strength. Patient without signnificant change in functional outcomes per FOTO scores. Because of this, patient is ready to transition to independent phase of rehabilitation.         Patient's spiritual, cultural, and educational needs considered and patient agreeable to plan of care and goals.           Plan: Patient is discharged from physical therapy.    Goals:   Active       Long Term Goals       Patient will demonstrate improved function as indicated by a score of greater than or equal to 58 out of 100 on FOTO.          (Unable to Meet)       Start:  02/18/25    Expected End:  05/13/25            Patient will improve strength to at least 4+/5 grossly,  in order to improve functional independence and quality of life.   (Met)       Start:  02/18/25    Expected End:  05/13/25    Resolved:  04/01/25            Short Term Goals       Patient will demonstrate improved function as indicated by a score of greater than or equal to 45 out of 100 on FOTO.          (Unable to Meet)       Start:  02/18/25    Expected End:  04/01/25            Patient will improve strength by 1/2 a grade, in order to progress towards independence with functional activities.          (Met)       Start:  02/18/25    Expected End:  04/01/25    Resolved:  03/18/25             Katrina Damian PT

## 2025-04-04 ENCOUNTER — OFFICE VISIT (OUTPATIENT)
Dept: FAMILY MEDICINE | Facility: CLINIC | Age: 70
End: 2025-04-04
Payer: MEDICARE

## 2025-04-04 VITALS
DIASTOLIC BLOOD PRESSURE: 76 MMHG | WEIGHT: 170 LBS | TEMPERATURE: 97 F | BODY MASS INDEX: 30.12 KG/M2 | OXYGEN SATURATION: 99 % | SYSTOLIC BLOOD PRESSURE: 118 MMHG | HEIGHT: 63 IN | HEART RATE: 74 BPM

## 2025-04-04 DIAGNOSIS — K14.0 ACUTE GLOSSITIS: Primary | ICD-10-CM

## 2025-04-04 DIAGNOSIS — M25.561 RIGHT KNEE PAIN, UNSPECIFIED CHRONICITY: ICD-10-CM

## 2025-04-04 DIAGNOSIS — R35.0 URINARY FREQUENCY: ICD-10-CM

## 2025-04-04 DIAGNOSIS — E66.3 OVERWEIGHT (BMI 25.0-29.9): ICD-10-CM

## 2025-04-04 DIAGNOSIS — B00.52 HERPES SIMPLEX VIRUS (HSV) STROMAL KERATITIS OF LEFT EYE: ICD-10-CM

## 2025-04-04 DIAGNOSIS — E21.3 HYPERPARATHYROIDISM: ICD-10-CM

## 2025-04-04 DIAGNOSIS — E78.2 MIXED HYPERLIPIDEMIA: ICD-10-CM

## 2025-04-04 DIAGNOSIS — H65.199 ACUTE MIDDLE EAR EFFUSION, UNSPECIFIED LATERALITY: ICD-10-CM

## 2025-04-04 DIAGNOSIS — F41.9 ANXIETY: ICD-10-CM

## 2025-04-04 DIAGNOSIS — K12.0 APHTHOUS ULCER OF MOUTH: ICD-10-CM

## 2025-04-04 DIAGNOSIS — H17.9 CORNEAL SCARRING: ICD-10-CM

## 2025-04-04 DIAGNOSIS — H92.02 ACUTE OTALGIA, LEFT: ICD-10-CM

## 2025-04-04 DIAGNOSIS — I10 ESSENTIAL HYPERTENSION: ICD-10-CM

## 2025-04-04 LAB
BILIRUB UR QL STRIP.AUTO: NEGATIVE
CLARITY UR: CLEAR
COLOR UR AUTO: COLORLESS
GLUCOSE UR QL STRIP: NEGATIVE
HGB UR QL STRIP: NEGATIVE
KETONES UR QL STRIP: NEGATIVE
LEUKOCYTE ESTERASE UR QL STRIP: NEGATIVE
NITRITE UR QL STRIP: NEGATIVE
PH UR STRIP: 7 [PH]
PROT UR QL STRIP: NEGATIVE
SP GR UR STRIP: <1.005
UROBILINOGEN UR STRIP-ACNC: NEGATIVE EU/DL

## 2025-04-04 PROCEDURE — 99214 OFFICE O/P EST MOD 30 MIN: CPT | Mod: S$GLB,,,

## 2025-04-04 PROCEDURE — 99999 PR PBB SHADOW E&M-EST. PATIENT-LVL V: CPT | Mod: PBBFAC,,,

## 2025-04-04 PROCEDURE — 1159F MED LIST DOCD IN RCRD: CPT | Mod: CPTII,S$GLB,,

## 2025-04-04 PROCEDURE — 3288F FALL RISK ASSESSMENT DOCD: CPT | Mod: CPTII,S$GLB,,

## 2025-04-04 PROCEDURE — 1160F RVW MEDS BY RX/DR IN RCRD: CPT | Mod: CPTII,S$GLB,,

## 2025-04-04 PROCEDURE — 3074F SYST BP LT 130 MM HG: CPT | Mod: CPTII,S$GLB,,

## 2025-04-04 PROCEDURE — 1126F AMNT PAIN NOTED NONE PRSNT: CPT | Mod: CPTII,S$GLB,,

## 2025-04-04 PROCEDURE — 1101F PT FALLS ASSESS-DOCD LE1/YR: CPT | Mod: CPTII,S$GLB,,

## 2025-04-04 PROCEDURE — 3008F BODY MASS INDEX DOCD: CPT | Mod: CPTII,S$GLB,,

## 2025-04-04 PROCEDURE — 3078F DIAST BP <80 MM HG: CPT | Mod: CPTII,S$GLB,,

## 2025-04-04 PROCEDURE — 81003 URINALYSIS AUTO W/O SCOPE: CPT

## 2025-04-04 RX ORDER — SERTRALINE HYDROCHLORIDE 50 MG/1
50 TABLET, FILM COATED ORAL DAILY
Qty: 30 TABLET | Refills: 5 | Status: SHIPPED | OUTPATIENT
Start: 2025-04-04 | End: 2025-10-01

## 2025-04-04 RX ORDER — DICLOFENAC SODIUM 10 MG/G
2 GEL TOPICAL 2 TIMES DAILY PRN
Qty: 100 G | Refills: 2 | Status: SHIPPED | OUTPATIENT
Start: 2025-04-04

## 2025-04-04 RX ORDER — LIDOCAINE HYDROCHLORIDE 20 MG/ML
SOLUTION OROPHARYNGEAL
Qty: 100 ML | Refills: 2 | Status: SHIPPED | OUTPATIENT
Start: 2025-04-04

## 2025-04-04 RX ORDER — CHLORHEXIDINE GLUCONATE ORAL RINSE 1.2 MG/ML
SOLUTION DENTAL
Qty: 473 ML | Refills: 1 | Status: SHIPPED | OUTPATIENT
Start: 2025-04-04

## 2025-04-04 NOTE — PROGRESS NOTES
Cesario Sahu  2025  [id number]    Madison Patino MD  Patient Care Team:  Madison Patino MD as PCP - General (Family Medicine)  Carter Lea MD (Obstetrics and Gynecology)     Subjective      Chief Complaint:  Chief Complaint   Patient presents with    Mouth Lesions     Pt states mouth is sore and it hurts to eat sometimes    Otalgia       History of Present Illness:    Ms. Sahu presents today for follow-up on multiple concerns including oral lesions, ear discomfort, and depression.    ORAL HEALTH:  She reports painful lesions in her mouth and on her tongue that have worsened over the last couple of weeks, particularly painful when eating. Previous treatment included a dental rinse and paste which have . She currently manages symptoms by gargling with water. She expresses concern about an upcoming dental appointment due to these lesions.    DEPRESSION AND ANXIETY:  She reports experiencing depression following the loss of her mother in September. She takes Sertraline and Buspirone at lowest doses for depression and anxiety management. The medications are well-tolerated but not providing adequate symptom relief.    ENT:  She reports left ear pain and discomfort. States she recently got over a sinus infection after completing prescribed medications, she still has pain to her ear.     GENITOURINARY:  She reports increased urinary frequency without associated pain or discomfort during urination.    MUSCULOSKELETAL:  She has arthritis affecting both knees with pain that is worse in the morning but improves with movement. She recently completed physical therapy. Previous X-ray showed minimal bone-on-bone changes in bilateral knees.      Review of Systems   Constitutional:  Negative for fever.   HENT:  Positive for ear discharge, ear pain and mouth sores. Negative for nasal congestion, facial swelling and sore throat.    Respiratory:  Negative for shortness of breath.    Cardiovascular:   Negative for chest pain.   Gastrointestinal:  Positive for reflux. Negative for constipation, diarrhea, nausea and vomiting.   Genitourinary:  Positive for frequency. Negative for dysuria, flank pain and hematuria.   Musculoskeletal:  Positive for arthralgias.   Psychiatric/Behavioral:  Positive for depressed mood. Negative for suicidal ideas. The patient is nervous/anxious.         History:  Past Medical History:   Diagnosis Date    Arthritis     Digestive disorder     Hypertension     Insulin resistance     Left renal stone 7/18/2023     Past Surgical History:   Procedure Laterality Date    ADENOIDECTOMY      CHOLECYSTECTOMY      COLONOSCOPY N/A 6/11/2021    Procedure: COLONOSCOPY;  Surgeon: Serenity Ramey MD;  Location: [facility];  Service: Endoscopy;  Laterality: N/A;    DILATION AND CURETTAGE OF UTERUS  10/2018    ESOPHAGOGASTRODUODENOSCOPY N/A 3/23/2022    Procedure: EGD (ESOPHAGOGASTRODUODENOSCOPY);  Surgeon: Abby Green MD;  Location: [facility];  Service: Endoscopy;  Laterality: N/A;     Family History   Problem Relation Name Age of Onset    Hypertension Mother      Heart failure Mother      Heart disease Sister      Hyperlipidemia Sister      Hypertension Sister      Diabetes Brother       Social History[1]     Review of patient's allergies indicates:  No Known Allergies    **The following were reviewed at this visit: active problem list, medication list, allergies, family history, social history, and health maintenance.    Medications:  Medications Ordered Prior to Encounter[2]    **Medications have been reviewed and reconciled with patient at this visit.            Objective      Vitals:    04/04/25 1052   BP: 118/76   Pulse: 74   Temp: 96.8 °F (36 °C)      Body mass index is 30.11 kg/m².    Wt Readings from Last 3 Encounters:   04/04/25 77.1 kg (169 lb 15.6 oz)   03/19/25 77.7 kg (171 lb 6.5 oz)   03/17/25 77.6 kg (171 lb 1.2 oz)     Temp Readings from Last 3 Encounters:   04/04/25 96.8 °F (36  °C) (Tympanic)   12/26/24 98.1 °F (36.7 °C) (Tympanic)   10/02/24 97.3 °F (36.3 °C) (Tympanic)     BP Readings from Last 3 Encounters:   04/04/25 118/76   03/19/25 124/71   03/17/25 111/71     Pulse Readings from Last 3 Encounters:   04/04/25 74   03/19/25 61   03/17/25 68         Laboratory Reviewed ({Yes)  Lab Results   Component Value Date    WBC 10.31 07/03/2024    HGB 12.6 07/03/2024    HCT 38.5 07/03/2024     07/03/2024    CHOL 210 (H) 05/22/2024    TRIG 78 05/22/2024    HDL 61 05/22/2024    ALT 22 07/03/2024    AST 20 07/03/2024     07/03/2024    K 3.8 07/03/2024     07/03/2024    CREATININE 0.8 07/03/2024    BUN 12 07/03/2024    CO2 24 07/03/2024    TSH 1.180 05/22/2024    HGBA1C 5.6 05/22/2024       Physical Exam  Vitals reviewed.   Constitutional:       Appearance: Normal appearance.   HENT:      Head: Normocephalic and atraumatic.      Right Ear: No middle ear effusion. Tympanic membrane is not injected.      Left Ear: A middle ear effusion is present. Tympanic membrane is injected. Tympanic membrane is not bulging.      Nose: Nose normal.      Mouth/Throat:      Mouth: Mucous membranes are moist.      Tongue: Lesions present.      Pharynx: Oropharynx is clear. Uvula midline. No pharyngeal swelling or posterior oropharyngeal erythema.   Eyes:      Extraocular Movements: Extraocular movements intact.      Conjunctiva/sclera: Conjunctivae normal.      Pupils: Pupils are equal, round, and reactive to light.   Cardiovascular:      Rate and Rhythm: Normal rate and regular rhythm.      Pulses: Normal pulses.      Heart sounds: Normal heart sounds.   Pulmonary:      Effort: Pulmonary effort is normal.      Breath sounds: Normal breath sounds.   Abdominal:      General: Bowel sounds are normal.      Palpations: Abdomen is soft.      Tenderness: There is no right CVA tenderness or left CVA tenderness.   Musculoskeletal:         General: No swelling. Normal range of motion.      Cervical back:  Normal range of motion.      Comments: Ambulatory without problems   Neurological:      General: No focal deficit present.      Mental Status: She is alert and oriented to person, place, and time.   Psychiatric:         Mood and Affect: Mood normal.         Behavior: Behavior normal.         Thought Content: Thought content normal.         Judgment: Judgment normal.               Assessment      Cesario was seen today for mouth lesions and otalgia.    Diagnoses and all orders for this visit:    Acute glossitis  -     chlorhexidine (PERIDEX) 0.12 % solution; Swish and spit 15 ml orally twice daily as needed for mouth ulcers  -     LIDOcaine viscous HCl 2% (LIDOCAINE VISCOUS) 2 % Soln; Swish and spit 5-10 ml orally every 6 hours as needed for mouth pain    Aphthous ulcer of mouth  -     chlorhexidine (PERIDEX) 0.12 % solution; Swish and spit 15 ml orally twice daily as needed for mouth ulcers  -     LIDOcaine viscous HCl 2% (LIDOCAINE VISCOUS) 2 % Soln; Swish and spit 5-10 ml orally every 6 hours as needed for mouth pain    Anxiety  -     sertraline (ZOLOFT) 50 MG tablet; Take 1 tablet (50 mg total) by mouth once daily.    Urinary frequency  -     Urinalysis    Acute otalgia, left  -     ciprofloxacin-dexAMETHasone 0.3-0.1% (CIPRODEX) 0.3-0.1 % DrpS; Place 4 drops into the left ear 2 (two) times daily. for 10 days    Acute middle ear effusion, unspecified laterality  -     ciprofloxacin-dexAMETHasone 0.3-0.1% (CIPRODEX) 0.3-0.1 % DrpS; Place 4 drops into the left ear 2 (two) times daily. for 10 days    Right knee pain, unspecified chronicity  -     diclofenac sodium (VOLTAREN) 1 % Gel; Apply 2 g topically 2 (two) times daily as needed (knee pain).    Essential hypertension    Mixed hyperlipidemia    Hyperparathyroidism    Overweight (BMI 25.0-29.9)            Plan    1) mouth rinse to pharmacy  2) increase dosage of zoloft to 50mg daily; continue buspirone   3) ciprodex  4) R.I.C.E  5) diclofenac sodium gel PRN.  Continue celebrex  6) U/A r/o UTI.          Follow up: Follow up if symptoms worsen or fail to improve.    **After visit summary was printed and given to patient upon discharge today.  Patient goals and care plan are included in After Visit Summary.    I have spent a total of 35 minutes on this visit. This includes face to face time and non-face to face time preparing to see the patient (eg, review of tests), obtaining and/or reviewing separately obtained history, documenting clinical information in the electronic or other health record, independently interpreting results and communicating results to the patient/family/caregiver, or care coordinator.      **This note was generated with the assistance of ambient listening technology. Verbal consent was obtained by the patient and accompanying visitor(s) for the recording of patient appointment to facilitate this note. I attest to having reviewed and edited the generated note for accuracy, though some syntax or spelling errors may persist. Please contact the author of this note for any clarification.         Rylie Duarte, MSN, APRN, FNP-C         [1]   Social History  Socioeconomic History    Marital status:     Number of children: 3   Occupational History     Comment: financial     Tobacco Use    Smoking status: Never    Smokeless tobacco: Never   Substance and Sexual Activity    Alcohol use: Yes     Comment: Socially    Drug use: Never    Sexual activity: Yes     Partners: Male   Social History Narrative    The patient remarried in June 2018.  She has 3 adult children.  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     Social Drivers of Health     Financial Resource Strain: Low Risk  (3/12/2025)    Overall Financial Resource Strain (CARDIA)     Difficulty of Paying Living Expenses: Not very hard   Recent Concern: Financial Resource Strain - Medium Risk (12/31/2024)    Overall Financial Resource Strain (CARDIA)     Difficulty of Paying  Living Expenses: Somewhat hard   Food Insecurity: No Food Insecurity (3/12/2025)    Hunger Vital Sign     Worried About Running Out of Food in the Last Year: Never true     Ran Out of Food in the Last Year: Never true   Transportation Needs: No Transportation Needs (3/12/2025)    PRAPARE - Transportation     Lack of Transportation (Medical): No     Lack of Transportation (Non-Medical): No   Physical Activity: Sufficiently Active (3/12/2025)    Exercise Vital Sign     Days of Exercise per Week: 5 days     Minutes of Exercise per Session: 40 min   Stress: Stress Concern Present (3/12/2025)    [employer] - Occupational Stress Questionnaire     Feeling of Stress : Rather much   Housing Stability: Low Risk  (3/12/2025)    Housing Stability Vital Sign     Unable to Pay for Housing in the Last Year: No     Homeless in the Last Year: No   [2]   Current Outpatient Medications on File Prior to Visit   Medication Sig Dispense Refill    albuterol (VENTOLIN HFA) 90 mcg/actuation inhaler Inhale 2 puffs into the lungs every 6 (six) hours as needed for Wheezing (cough). Rescue 6.7 g 0    ascorbic acid (VITAMIN C ORAL)       aspirin (ECOTRIN) 81 MG EC tablet Take 81 mg by mouth once daily.      atorvastatin (LIPITOR) 10 MG tablet Take 1 tablet (10 mg total) by mouth every evening. 90 tablet 2    busPIRone (BUSPAR) 5 MG Tab Take 1 tablet (5 mg total) by mouth 2 (two) times daily. 60 tablet 11    celecoxib (CELEBREX) 100 MG capsule Take 1 capsule (100 mg total) by mouth 2 (two) times daily as needed for Pain. 60 capsule 1    estradioL (ESTRACE) 0.01 % (0.1 mg/gram) vaginal cream Place 1 g vaginally twice a week. 42.5 g 6    ibuprofen (ADVIL,MOTRIN) 800 MG tablet Take 1 tablet (800 mg total) by mouth every 6 (six) hours as needed for Pain. 30 tablet 2    Lactobacillus acidophilus (PROBIOTIC ORAL)       loratadine (CLARITIN) 10 mg tablet Take 1 tablet (10 mg total) by mouth once daily. 30 tablet 0     metoprolol succinate (TOPROL-XL) 25 MG 24 hr tablet Take 1 tablet by mouth once daily 90 tablet 3    olopatadine (PATANOL) 0.1 % ophthalmic solution Place 1 drop into both eyes 2 (two) times daily as needed for Allergies. 5 mL 0    pantoprazole (PROTONIX) 40 MG tablet Take 1 tablet (40 mg total) by mouth once daily. 90 tablet 3    [DISCONTINUED] ofloxacin (FLOXIN) 0.3 % otic solution Place 10 drops into the right ear once daily. 5 mL 0    diltiazem HCl (DILTIAZEM 2% - LIDOCAINE 5% CREAM) Apply 1 application  topically 3 (three) times daily. (Patient not taking: Reported on 4/4/2025) 30 g 1    peg 400-propylene glycol (SYSTANE, PROPYLENE GLYCOL,) 0.4-0.3 % Drop Apply 1 drop to eye every 6 (six) hours as needed. 10 mL 0    promethazine-dextromethorphan (PROMETHAZINE-DM) 6.25-15 mg/5 mL Syrp Take 5 mLs by mouth every 4 (four) hours as needed (cough). (Patient not taking: Reported on 4/4/2025) 118 mL 0    tobramycin-dexAMETHasone 0.3-0.1% (TOBRADEX) 0.3-0.1 % DrpS Place 1 drop into both eyes every 6 (six) hours.      valACYclovir (VALTREX) 1000 MG tablet Take 1 tablet (1,000 mg total) by mouth 3 (three) times daily. for 7 days (Patient not taking: Reported on 4/4/2025) 21 tablet 0     No current facility-administered medications on file prior to visit.

## 2025-04-07 RX ORDER — PREDNISOLONE SODIUM PHOSPHATE 10 MG/ML
1 SOLUTION/ DROPS OPHTHALMIC 4 TIMES DAILY
Qty: 5 ML | Refills: 0 | Status: SHIPPED | OUTPATIENT
Start: 2025-04-07 | End: 2025-04-09 | Stop reason: SDUPTHER

## 2025-04-07 NOTE — TELEPHONE ENCOUNTER
Please see the attached refill request.    Disregard last refill request Dr. Nixon ordered it last month.

## 2025-04-07 NOTE — TELEPHONE ENCOUNTER
Please see the attached refill request.    Not seeing in the Patients medication list where she is currently using Pred.

## 2025-04-08 ENCOUNTER — PATIENT MESSAGE (OUTPATIENT)
Dept: FAMILY MEDICINE | Facility: CLINIC | Age: 70
End: 2025-04-08
Payer: MEDICARE

## 2025-04-08 RX ORDER — AMLODIPINE BESYLATE 5 MG/1
5 TABLET ORAL DAILY
Qty: 90 TABLET | Refills: 1 | Status: SHIPPED | OUTPATIENT
Start: 2025-04-08

## 2025-04-08 NOTE — TELEPHONE ENCOUNTER
No care due was identified.  [facility] Embedded Care Due Messages. Reference number: [id number].   4/08/2025 1:57:53 PM CDT

## 2025-04-08 NOTE — TELEPHONE ENCOUNTER
Refill Decision Note   Cesario Sahu  is requesting a refill authorization.  Brief Assessment and Rationale for Refill:  Approve     Medication Therapy Plan:         Comments:     Note composed:3:19 PM 04/08/2025             Appointments     Last Visit   10/2/2024 Madison Patino MD   Next Visit   5/22/2025 Madison Patino MD

## 2025-04-09 ENCOUNTER — OFFICE VISIT (OUTPATIENT)
Dept: OPHTHALMOLOGY | Facility: CLINIC | Age: 70
End: 2025-04-09
Payer: MEDICARE

## 2025-04-09 DIAGNOSIS — B00.52 HERPES SIMPLEX VIRUS (HSV) STROMAL KERATITIS OF LEFT EYE: ICD-10-CM

## 2025-04-09 DIAGNOSIS — H17.9 CORNEAL SCARRING: ICD-10-CM

## 2025-04-09 DIAGNOSIS — H43.811 PVD (POSTERIOR VITREOUS DETACHMENT), RIGHT EYE: ICD-10-CM

## 2025-04-09 DIAGNOSIS — B00.52 HERPES SIMPLEX VIRUS (HSV) STROMAL KERATITIS OF LEFT EYE: Primary | ICD-10-CM

## 2025-04-09 PROCEDURE — 1159F MED LIST DOCD IN RCRD: CPT | Mod: CPTII,S$GLB,, | Performed by: STUDENT IN AN ORGANIZED HEALTH CARE EDUCATION/TRAINING PROGRAM

## 2025-04-09 PROCEDURE — 1160F RVW MEDS BY RX/DR IN RCRD: CPT | Mod: CPTII,S$GLB,, | Performed by: STUDENT IN AN ORGANIZED HEALTH CARE EDUCATION/TRAINING PROGRAM

## 2025-04-09 PROCEDURE — 99999 PR PBB SHADOW E&M-EST. PATIENT-LVL III: CPT | Mod: PBBFAC,,, | Performed by: STUDENT IN AN ORGANIZED HEALTH CARE EDUCATION/TRAINING PROGRAM

## 2025-04-09 PROCEDURE — 99214 OFFICE O/P EST MOD 30 MIN: CPT | Mod: S$GLB,,, | Performed by: STUDENT IN AN ORGANIZED HEALTH CARE EDUCATION/TRAINING PROGRAM

## 2025-04-09 RX ORDER — PREDNISOLONE SODIUM PHOSPHATE 10 MG/ML
1 SOLUTION/ DROPS OPHTHALMIC 4 TIMES DAILY
Qty: 5 ML | Refills: 1 | Status: SHIPPED | OUTPATIENT
Start: 2025-04-09 | End: 2025-05-09

## 2025-04-09 RX ORDER — CIPROFLOXACIN AND DEXAMETHASONE 3; 1 MG/ML; MG/ML
4 SUSPENSION/ DROPS AURICULAR (OTIC) 2 TIMES DAILY
Qty: 7.5 ML | Refills: 2 | Status: SHIPPED | OUTPATIENT
Start: 2025-04-09 | End: 2025-04-19

## 2025-04-09 RX ORDER — PREDNISOLONE SODIUM PHOSPHATE 10 MG/ML
1 SOLUTION/ DROPS OPHTHALMIC 4 TIMES DAILY
Qty: 5 ML | Refills: 1 | Status: SHIPPED | OUTPATIENT
Start: 2025-04-09 | End: 2025-04-09 | Stop reason: SDUPTHER

## 2025-04-09 NOTE — PROGRESS NOTES
HPI     Eye Problem     Additional comments: Patient reports for 1 month PVD follow up. States   floaters have dissipated since last visit. Also had HSV OS, was given   Prednisolone Sodium QID OS- last used about 1 week ago. States she feels   the left eye is sore since stopping drops.           Comments    1. HTN  2. Cataracts OU  3. KCS  4. Refractive error    *Will need prophylaxis valtrex            Last edited by Jon Burnette on 4/9/2025  9:52 AM.            Assessment /Plan     For exam results, see Encounter Report.    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 Discussed retinal detachment signs and symptoms including flashes of lights, floaters, perceived curtains or veils. Advised to patient to monitor visual status including increase in flashes and floaters, or the development of visual field changes including curtain and /or veils. Advised patient to RTC urgently if these symptoms occur. Explained the need for follow up exams to the patient even if there are no changes in the symptoms.        RTC 1M DFE for PVD OD and R/C corneal scarring

## 2025-04-15 RX ORDER — METOPROLOL SUCCINATE 25 MG/1
25 TABLET, EXTENDED RELEASE ORAL
Qty: 90 TABLET | Refills: 3 | Status: SHIPPED | OUTPATIENT
Start: 2025-04-15

## 2025-05-06 ENCOUNTER — OFFICE VISIT (OUTPATIENT)
Dept: FAMILY MEDICINE | Facility: CLINIC | Age: 70
End: 2025-05-06
Attending: FAMILY MEDICINE
Payer: MEDICARE

## 2025-05-06 VITALS
OXYGEN SATURATION: 98 % | HEIGHT: 63 IN | WEIGHT: 172.19 LBS | HEART RATE: 53 BPM | BODY MASS INDEX: 30.51 KG/M2 | SYSTOLIC BLOOD PRESSURE: 124 MMHG | DIASTOLIC BLOOD PRESSURE: 72 MMHG | TEMPERATURE: 97 F

## 2025-05-06 DIAGNOSIS — M54.50 ACUTE BILATERAL LOW BACK PAIN WITHOUT SCIATICA: Primary | ICD-10-CM

## 2025-05-06 DIAGNOSIS — N39.0 URINARY TRACT INFECTION WITHOUT HEMATURIA, SITE UNSPECIFIED: ICD-10-CM

## 2025-05-06 DIAGNOSIS — E66.811 OBESITY (BMI 30.0-34.9): ICD-10-CM

## 2025-05-06 LAB
BILIRUB SERPL-MCNC: NEGATIVE MG/DL
BLOOD URINE, POC: NORMAL
CLARITY, POC UA: CLEAR
COLOR, POC UA: YELLOW
GLUCOSE UR QL STRIP: NEGATIVE
KETONES UR QL STRIP: NEGATIVE
LEUKOCYTE ESTERASE URINE, POC: NORMAL
NITRITE, POC UA: NEGATIVE
PH, POC UA: 7.5
PROTEIN, POC: NORMAL
SPECIFIC GRAVITY, POC UA: 1.01
UROBILINOGEN, POC UA: 0.2

## 2025-05-06 PROCEDURE — 3288F FALL RISK ASSESSMENT DOCD: CPT | Mod: CPTII,S$GLB,, | Performed by: FAMILY MEDICINE

## 2025-05-06 PROCEDURE — 3074F SYST BP LT 130 MM HG: CPT | Mod: CPTII,S$GLB,, | Performed by: FAMILY MEDICINE

## 2025-05-06 PROCEDURE — 1125F AMNT PAIN NOTED PAIN PRSNT: CPT | Mod: CPTII,S$GLB,, | Performed by: FAMILY MEDICINE

## 2025-05-06 PROCEDURE — 3078F DIAST BP <80 MM HG: CPT | Mod: CPTII,S$GLB,, | Performed by: FAMILY MEDICINE

## 2025-05-06 PROCEDURE — 3008F BODY MASS INDEX DOCD: CPT | Mod: CPTII,S$GLB,, | Performed by: FAMILY MEDICINE

## 2025-05-06 PROCEDURE — 81002 URINALYSIS NONAUTO W/O SCOPE: CPT | Mod: S$GLB,,, | Performed by: FAMILY MEDICINE

## 2025-05-06 PROCEDURE — 99214 OFFICE O/P EST MOD 30 MIN: CPT | Mod: S$GLB,,, | Performed by: FAMILY MEDICINE

## 2025-05-06 PROCEDURE — G2211 COMPLEX E/M VISIT ADD ON: HCPCS | Mod: S$GLB,,, | Performed by: FAMILY MEDICINE

## 2025-05-06 PROCEDURE — 1101F PT FALLS ASSESS-DOCD LE1/YR: CPT | Mod: CPTII,S$GLB,, | Performed by: FAMILY MEDICINE

## 2025-05-06 PROCEDURE — 99999 PR PBB SHADOW E&M-EST. PATIENT-LVL V: CPT | Mod: PBBFAC,,, | Performed by: FAMILY MEDICINE

## 2025-05-06 RX ORDER — SULFAMETHOXAZOLE AND TRIMETHOPRIM 800; 160 MG/1; MG/1
1 TABLET ORAL 2 TIMES DAILY
Qty: 14 TABLET | Refills: 0 | Status: SHIPPED | OUTPATIENT
Start: 2025-05-06 | End: 2025-05-13

## 2025-05-06 NOTE — PROGRESS NOTES
Cesario Sahu    Chief Complaint   Patient presents with    Pain in knees and lower back        History of Present Illness:   Ms. Sahu comes in today with complaint of having pain in knees and states pain worsened after having physical therapy for 5 weeks.  She states she has been told she has arthritis in both of her knees.  She states she takes Celebrex 100 mg twice daily with help but uses Voltaren 1% gel without help for pain.  She states she rides stationary bike 5 times a week, 30 minutes each time.    She also complains of having intermittent pain across her lower back since last week.  She states pain varies in intensity but does not radiate into her extremities.  She states she urinates a lot but drinks a lot of water.  She states pain at back is 5-6/10 on pain scale now but 9/10 on pain scale this morning.  She states she takes Back and Body Pain Relief and Tylenol with little help.    Otherwise, she denies having fever, chills, fatigue, appetite changes; shortness of breath, cough, wheezing; chest pain, palpitations, leg swelling; abdominal pain, nausea, vomiting, diarrhea, constipation; other unusual urinary symptoms; polydipsia, polyphagia, polyuria, hot or cold intolerance; numbness, headache; anxiety, depression, homicidal or suicidal thoughts.     She saw ÁNGEL Martínez on February 7, 2025 for primary osteoarthritis of right knee, primary osteoarthritis of left knee, chronic pain of right knee, chronic pain of left knee.        Urine dipstick ordered today -   Glucose, UA negative  Bilirubin, POC negative  Ketones, UA negative  Spec Grav UA 1.010  Blood, UA trace  pH, UA 7.5  Protein, POC trace  Urobilinogen, UA 0.2  Nitrite, UA negative  WBC, UA trace  Color, UA Yellow  Clarity, UA Clear      01/06/2023 Lumbar spine x-ray  FINDINGS:  There are 5 conventional lumbar type vertebral bodies present.  Lumbar vertebral body heights are adequately preserved.  There is loss of lumbar disc  height at L1-2, L2-3, L3-4, and L5-S1 disc levels.  There is moderate endplate sclerosis with marginal osteophyte formation present throughout the entire lumbar spine.  There is moderate facet arthrosis at L4-5 and L5-S1.   Surgical staples present about the right upper quadrant.   Impression:   1. Advanced lumbar spondylitic change present.  Loss of lumbar disc height, endplate sclerosis, and facet arthrosis, particularly about the lower lumbar spine at L4-5 and L5-S1.        Labs:                     WBC                      10.31               07/03/2024                 HGB                      12.6                07/03/2024                 HCT                      38.5                07/03/2024                 PLT                      292                 07/03/2024                 CHOL                     210 (H)             05/22/2024                 TRIG                     78                  05/22/2024                 HDL                      61                  05/22/2024                 ALT                      22                  07/03/2024                 AST                      20                  07/03/2024                 NA                       140                 07/03/2024                 K                        3.8                 07/03/2024                 CL                       106                 07/03/2024                 CREATININE               0.8                 07/03/2024                 BUN                      12                  07/03/2024                 CO2                      24                  07/03/2024                 TSH                      1.180               05/22/2024                 HGBA1C                   5.6                 05/22/2024   4                 CALCIUM                  10.2                07/03/2024                 ANIONGAP                 10                  07/03/2024                 EGFRNORACEVR             >60                 07/03/2024                           Current Outpatient Medications   Medication Sig    albuterol (VENTOLIN HFA) 90 mcg/actuation inhaler Inhale 2 puffs into the lungs every 6 (six) hours as needed for Wheezing (cough). Rescue    amLODIPine (NORVASC) 5 MG tablet Take 1 tablet (5 mg total) by mouth once daily    ascorbic acid (VITAMIN C ORAL)     aspirin (ECOTRIN) 81 MG EC tablet Take 81 mg by mouth once daily.    atorvastatin (LIPITOR) 10 MG tablet Take 1 tablet (10 mg total) by mouth every evening.    busPIRone (BUSPAR) 5 MG Tab Take 1 tablet (5 mg total) by mouth 2 (two) times daily.    celecoxib (CELEBREX) 100 MG capsule Take 1 capsule (100 mg total) by mouth 2 (two) times daily as needed for Pain.    diclofenac sodium (VOLTAREN) 1 % Gel Apply 2 g topically 2 (two) times daily as needed (knee pain).    diltiazem HCl (DILTIAZEM 2% - LIDOCAINE 5% CREAM) Apply 1 application  topically 3 (three) times daily.    estradioL (ESTRACE) 0.01 % (0.1 mg/gram) vaginal cream Place 1 g vaginally twice a week.    ibuprofen (ADVIL,MOTRIN) 800 MG tablet Take 1 tablet (800 mg total) by mouth every 6 (six) hours as needed for Pain.    Lactobacillus acidophilus (PROBIOTIC ORAL)     LIDOcaine viscous HCl 2% (LIDOCAINE VISCOUS) 2 % Soln Swish and spit 5-10 ml orally every 6 hours as needed for mouth pain    metoprolol succinate (TOPROL-XL) 25 MG 24 hr tablet Take 1 tablet by mouth once daily    olopatadine (PATANOL) 0.1 % ophthalmic solution Place 1 drop into both eyes 2 (two) times daily as needed for Allergies.    pantoprazole (PROTONIX) 40 MG tablet Take 1 tablet (40 mg total) by mouth once daily.    sertraline (ZOLOFT) 50 MG tablet Take 1 tablet (50 mg total) by mouth once daily.    chlorhexidine (PERIDEX) 0.12 % solution Swish and spit 15 ml orally twice daily as needed for mouth ulcers (Patient not taking: Reported on 5/6/2025)    loratadine (CLARITIN) 10 mg tablet Take 1 tablet (10 mg total) by mouth once daily. (Patient not taking: Reported on  5/6/2025)    peg 400-propylene glycol (SYSTANE, PROPYLENE GLYCOL,) 0.4-0.3 % Drop Apply 1 drop to eye every 6 (six) hours as needed. (Patient not taking: Reported on 5/6/2025)    promethazine-dextromethorphan (PROMETHAZINE-DM) 6.25-15 mg/5 mL Syrp Take 5 mLs by mouth every 4 (four) hours as needed (cough). (Patient not taking: Reported on 5/6/2025)    tobramycin-dexAMETHasone 0.3-0.1% (TOBRADEX) 0.3-0.1 % DrpS Place 1 drop into both eyes every 6 (six) hours. (Patient not taking: Reported on 5/6/2025)    valACYclovir (VALTREX) 1000 MG tablet Take 1 tablet (1,000 mg total) by mouth 3 (three) times daily. for 7 days (Patient not taking: Reported on 5/6/2025)       Review of Systems   Constitutional:  Negative for activity change, appetite change, chills, fatigue and fever.   Respiratory:  Negative for cough, shortness of breath and wheezing.    Cardiovascular:  Negative for chest pain, palpitations and leg swelling.   Gastrointestinal:  Negative for abdominal pain, constipation, diarrhea, nausea and vomiting.   Endocrine: Negative for cold intolerance, heat intolerance, polydipsia, polyphagia and polyuria.   Genitourinary:  Positive for frequency. Negative for decreased urine volume, difficulty urinating, dysuria, hematuria and urgency.   Musculoskeletal:  Positive for back pain and myalgias.   Neurological:  Negative for numbness and headaches.   Psychiatric/Behavioral:  Negative for dysphoric mood and suicidal ideas. The patient is not nervous/anxious.         Negative for homicidal ideas.       Objective:  Physical Exam  Vitals reviewed.   Constitutional:       General: She is not in acute distress.     Appearance: Normal appearance. She is obese. She is not ill-appearing, toxic-appearing or diaphoretic.      Comments: Pleasant.   Cardiovascular:      Rate and Rhythm: Normal rate and regular rhythm.      Pulses: Normal pulses.      Heart sounds: No murmur heard.  Pulmonary:      Effort: Pulmonary effort is normal.  No respiratory distress.      Breath sounds: Normal breath sounds. No wheezing.   Abdominal:      General: Bowel sounds are normal. There is no distension.      Palpations: Abdomen is soft. There is no mass.      Tenderness: There is no abdominal tenderness. There is no right CVA tenderness, left CVA tenderness, guarding or rebound.   Musculoskeletal:         General: Tenderness present. No swelling. Normal range of motion.      Cervical back: Normal range of motion and neck supple. No tenderness.      Comments: She is ambulatory without problems. Tender at right low back but non tender at mid to left low back with full range of motion noted. Tender at right knee and non tender at left knee with full range of motion noted.   Lymphadenopathy:      Cervical: No cervical adenopathy.   Skin:     General: Skin is warm.   Neurological:      General: No focal deficit present.      Mental Status: She is alert and oriented to person, place, and time.   Psychiatric:         Mood and Affect: Mood normal.         Behavior: Behavior normal.         Thought Content: Thought content normal.         Judgment: Judgment normal.         ASSESSMENT:  1. Acute bilateral low back pain without sciatica    2. Urinary tract infection without hematuria, site unspecified    3. Obesity (BMI 30.0-34.9)        PLAN:  Cesario was seen today for pain in knees and lower back .    Diagnoses and all orders for this visit:    Acute bilateral low back pain without sciatica  -     POCT URINE DIPSTICK WITHOUT MICROSCOPE    Urinary tract infection without hematuria, site unspecified  -     sulfamethoxazole-trimethoprim 800-160mg (BACTRIM DS) 800-160 mg Tab; Take 1 tablet by mouth 2 (two) times daily. for 7 days    Obesity (BMI 30.0-34.9)        Continue current medications, follow low sodium, low cholesterol, low carb diet, daily walks.  Bactrim ds twice daily for 7 days; medication precautions discussed with patient.  Keep follow up with specialists  (including get back in with orthopedist for issues with knee(s).  Follow up if symptoms worsen or fail to improve.

## 2025-05-13 ENCOUNTER — OFFICE VISIT (OUTPATIENT)
Dept: OPHTHALMOLOGY | Facility: CLINIC | Age: 70
End: 2025-05-13
Payer: MEDICARE

## 2025-05-13 DIAGNOSIS — B00.52 HERPES SIMPLEX VIRUS (HSV) STROMAL KERATITIS OF LEFT EYE: Primary | ICD-10-CM

## 2025-05-13 DIAGNOSIS — H43.811 PVD (POSTERIOR VITREOUS DETACHMENT), RIGHT EYE: ICD-10-CM

## 2025-05-13 DIAGNOSIS — H17.9 CORNEAL SCARRING: ICD-10-CM

## 2025-05-13 PROCEDURE — 99999 PR PBB SHADOW E&M-EST. PATIENT-LVL III: CPT | Mod: PBBFAC,,, | Performed by: STUDENT IN AN ORGANIZED HEALTH CARE EDUCATION/TRAINING PROGRAM

## 2025-05-13 PROCEDURE — 1160F RVW MEDS BY RX/DR IN RCRD: CPT | Mod: CPTII,S$GLB,, | Performed by: STUDENT IN AN ORGANIZED HEALTH CARE EDUCATION/TRAINING PROGRAM

## 2025-05-13 PROCEDURE — 99214 OFFICE O/P EST MOD 30 MIN: CPT | Mod: S$GLB,,, | Performed by: STUDENT IN AN ORGANIZED HEALTH CARE EDUCATION/TRAINING PROGRAM

## 2025-05-13 PROCEDURE — 1159F MED LIST DOCD IN RCRD: CPT | Mod: CPTII,S$GLB,, | Performed by: STUDENT IN AN ORGANIZED HEALTH CARE EDUCATION/TRAINING PROGRAM

## 2025-05-13 NOTE — PROGRESS NOTES
HPI    Patient states vision is doing well and is using drops. Denies any pain or   discomfort.  No other ocular complaints     1. HTN  2. Cataracts OU  3. KCS  4. Refractive error  Last edited by Byron Louie on 5/13/2025  9:08 AM.            Assessment /Plan     For exam results, see Encounter Report.    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 Discussed retinal detachment signs and symptoms including flashes of lights, floaters, perceived curtains or veils. Advised to patient to monitor visual status including increase in flashes and floaters, or the development of visual field changes including curtain and /or veils. Advised patient to RTC urgently if these symptoms occur. Explained the need for follow up exams to the patient even if there are no changes in the symptoms.        RTC 2M DFE OD

## 2025-05-26 ENCOUNTER — LAB VISIT (OUTPATIENT)
Dept: LAB | Facility: HOSPITAL | Age: 70
End: 2025-05-26
Attending: FAMILY MEDICINE
Payer: MEDICARE

## 2025-05-26 ENCOUNTER — OFFICE VISIT (OUTPATIENT)
Dept: FAMILY MEDICINE | Facility: CLINIC | Age: 70
End: 2025-05-26
Attending: FAMILY MEDICINE
Payer: MEDICARE

## 2025-05-26 VITALS
RESPIRATION RATE: 18 BRPM | SYSTOLIC BLOOD PRESSURE: 122 MMHG | DIASTOLIC BLOOD PRESSURE: 78 MMHG | HEIGHT: 63 IN | HEART RATE: 55 BPM | WEIGHT: 172.63 LBS | BODY MASS INDEX: 30.59 KG/M2 | OXYGEN SATURATION: 97 % | TEMPERATURE: 97 F

## 2025-05-26 DIAGNOSIS — E66.811 OBESITY (BMI 30.0-34.9): ICD-10-CM

## 2025-05-26 DIAGNOSIS — E88.810 INSULIN RESISTANCE SYNDROME: ICD-10-CM

## 2025-05-26 DIAGNOSIS — Z00.00 ANNUAL PHYSICAL EXAM: Primary | ICD-10-CM

## 2025-05-26 DIAGNOSIS — M17.11 PRIMARY OSTEOARTHRITIS OF RIGHT KNEE: ICD-10-CM

## 2025-05-26 DIAGNOSIS — E78.2 MIXED HYPERLIPIDEMIA: ICD-10-CM

## 2025-05-26 DIAGNOSIS — I10 ESSENTIAL HYPERTENSION: ICD-10-CM

## 2025-05-26 DIAGNOSIS — M85.80 OSTEOPENIA, UNSPECIFIED LOCATION: ICD-10-CM

## 2025-05-26 DIAGNOSIS — M17.12 PRIMARY OSTEOARTHRITIS OF LEFT KNEE: ICD-10-CM

## 2025-05-26 DIAGNOSIS — Z78.0 POSTMENOPAUSAL: ICD-10-CM

## 2025-05-26 DIAGNOSIS — Z79.899 LONG TERM USE OF DRUG: ICD-10-CM

## 2025-05-26 DIAGNOSIS — I70.0 AORTIC ATHEROSCLEROSIS: ICD-10-CM

## 2025-05-26 DIAGNOSIS — K21.9 LARYNGOPHARYNGEAL REFLUX (LPR): ICD-10-CM

## 2025-05-26 LAB
ABSOLUTE EOSINOPHIL (OHS): 0.28 K/UL
ABSOLUTE MONOCYTE (OHS): 0.57 K/UL (ref 0.3–1)
ABSOLUTE NEUTROPHIL COUNT (OHS): 4.08 K/UL (ref 1.8–7.7)
ALBUMIN SERPL BCP-MCNC: 4 G/DL (ref 3.5–5.2)
ALP SERPL-CCNC: 92 UNIT/L (ref 40–150)
ALT SERPL W/O P-5'-P-CCNC: 15 UNIT/L (ref 10–44)
ANION GAP (OHS): 9 MMOL/L (ref 8–16)
AST SERPL-CCNC: 18 UNIT/L (ref 11–45)
BASOPHILS # BLD AUTO: 0.05 K/UL
BASOPHILS NFR BLD AUTO: 0.6 %
BILIRUB SERPL-MCNC: 0.3 MG/DL (ref 0.1–1)
BUN SERPL-MCNC: 12 MG/DL (ref 8–23)
CALCIUM SERPL-MCNC: 9.6 MG/DL (ref 8.7–10.5)
CHLORIDE SERPL-SCNC: 104 MMOL/L (ref 95–110)
CHOLEST SERPL-MCNC: 221 MG/DL (ref 120–199)
CHOLEST/HDLC SERPL: 2.9 {RATIO} (ref 2–5)
CO2 SERPL-SCNC: 25 MMOL/L (ref 23–29)
CREAT SERPL-MCNC: 0.8 MG/DL (ref 0.5–1.4)
EAG (OHS): 111 MG/DL (ref 68–131)
ERYTHROCYTE [DISTWIDTH] IN BLOOD BY AUTOMATED COUNT: 13.4 % (ref 11.5–14.5)
GFR SERPLBLD CREATININE-BSD FMLA CKD-EPI: >60 ML/MIN/1.73/M2
GLUCOSE SERPL-MCNC: 87 MG/DL (ref 70–110)
HBA1C MFR BLD: 5.5 % (ref 4–5.6)
HCT VFR BLD AUTO: 39.6 % (ref 37–48.5)
HDLC SERPL-MCNC: 75 MG/DL (ref 40–75)
HDLC SERPL: 33.9 % (ref 20–50)
HGB BLD-MCNC: 12.6 GM/DL (ref 12–16)
IMM GRANULOCYTES # BLD AUTO: 0.02 K/UL (ref 0–0.04)
IMM GRANULOCYTES NFR BLD AUTO: 0.2 % (ref 0–0.5)
LDLC SERPL CALC-MCNC: 130.8 MG/DL (ref 63–159)
LYMPHOCYTES # BLD AUTO: 3.13 K/UL (ref 1–4.8)
MCH RBC QN AUTO: 29.2 PG (ref 27–31)
MCHC RBC AUTO-ENTMCNC: 31.8 G/DL (ref 32–36)
MCV RBC AUTO: 92 FL (ref 82–98)
NONHDLC SERPL-MCNC: 146 MG/DL
NUCLEATED RBC (/100WBC) (OHS): 0 /100 WBC
PLATELET # BLD AUTO: 322 K/UL (ref 150–450)
PMV BLD AUTO: 9.5 FL (ref 9.2–12.9)
POTASSIUM SERPL-SCNC: 4.3 MMOL/L (ref 3.5–5.1)
PROT SERPL-MCNC: 8 GM/DL (ref 6–8.4)
RBC # BLD AUTO: 4.32 M/UL (ref 4–5.4)
RELATIVE EOSINOPHIL (OHS): 3.4 %
RELATIVE LYMPHOCYTE (OHS): 38.5 % (ref 18–48)
RELATIVE MONOCYTE (OHS): 7 % (ref 4–15)
RELATIVE NEUTROPHIL (OHS): 50.3 % (ref 38–73)
SODIUM SERPL-SCNC: 138 MMOL/L (ref 136–145)
TRIGL SERPL-MCNC: 76 MG/DL (ref 30–150)
TSH SERPL-ACNC: 1.17 UIU/ML (ref 0.4–4)
WBC # BLD AUTO: 8.13 K/UL (ref 3.9–12.7)

## 2025-05-26 PROCEDURE — 36415 COLL VENOUS BLD VENIPUNCTURE: CPT | Mod: PO

## 2025-05-26 PROCEDURE — 80061 LIPID PANEL: CPT

## 2025-05-26 PROCEDURE — 1160F RVW MEDS BY RX/DR IN RCRD: CPT | Mod: CPTII,S$GLB,, | Performed by: FAMILY MEDICINE

## 2025-05-26 PROCEDURE — 3288F FALL RISK ASSESSMENT DOCD: CPT | Mod: CPTII,S$GLB,, | Performed by: FAMILY MEDICINE

## 2025-05-26 PROCEDURE — 84443 ASSAY THYROID STIM HORMONE: CPT

## 2025-05-26 PROCEDURE — 80053 COMPREHEN METABOLIC PANEL: CPT

## 2025-05-26 PROCEDURE — 3008F BODY MASS INDEX DOCD: CPT | Mod: CPTII,S$GLB,, | Performed by: FAMILY MEDICINE

## 2025-05-26 PROCEDURE — 3074F SYST BP LT 130 MM HG: CPT | Mod: CPTII,S$GLB,, | Performed by: FAMILY MEDICINE

## 2025-05-26 PROCEDURE — 99397 PER PM REEVAL EST PAT 65+ YR: CPT | Mod: S$GLB,,, | Performed by: FAMILY MEDICINE

## 2025-05-26 PROCEDURE — 3044F HG A1C LEVEL LT 7.0%: CPT | Mod: CPTII,S$GLB,, | Performed by: FAMILY MEDICINE

## 2025-05-26 PROCEDURE — 99999 PR PBB SHADOW E&M-EST. PATIENT-LVL V: CPT | Mod: PBBFAC,,, | Performed by: FAMILY MEDICINE

## 2025-05-26 PROCEDURE — 1159F MED LIST DOCD IN RCRD: CPT | Mod: CPTII,S$GLB,, | Performed by: FAMILY MEDICINE

## 2025-05-26 PROCEDURE — 85025 COMPLETE CBC W/AUTO DIFF WBC: CPT

## 2025-05-26 PROCEDURE — 1101F PT FALLS ASSESS-DOCD LE1/YR: CPT | Mod: CPTII,S$GLB,, | Performed by: FAMILY MEDICINE

## 2025-05-26 PROCEDURE — 83036 HEMOGLOBIN GLYCOSYLATED A1C: CPT

## 2025-05-26 PROCEDURE — 1125F AMNT PAIN NOTED PAIN PRSNT: CPT | Mod: CPTII,S$GLB,, | Performed by: FAMILY MEDICINE

## 2025-05-26 PROCEDURE — 3078F DIAST BP <80 MM HG: CPT | Mod: CPTII,S$GLB,, | Performed by: FAMILY MEDICINE

## 2025-05-26 RX ORDER — FAMOTIDINE 20 MG/1
20 TABLET, FILM COATED ORAL 2 TIMES DAILY PRN
COMMUNITY

## 2025-05-26 RX ORDER — CELECOXIB 100 MG/1
100 CAPSULE ORAL 2 TIMES DAILY PRN
Qty: 180 CAPSULE | Refills: 1 | Status: SHIPPED | OUTPATIENT
Start: 2025-05-26

## 2025-05-26 RX ORDER — ATORVASTATIN CALCIUM 10 MG/1
10 TABLET, FILM COATED ORAL NIGHTLY
Qty: 90 TABLET | Refills: 3 | Status: SHIPPED | OUTPATIENT
Start: 2025-05-26

## 2025-05-26 RX ORDER — PANTOPRAZOLE SODIUM 40 MG/1
40 TABLET, DELAYED RELEASE ORAL DAILY
Qty: 90 TABLET | Refills: 3 | Status: SHIPPED | OUTPATIENT
Start: 2025-05-26 | End: 2026-05-26

## 2025-05-26 NOTE — PROGRESS NOTES
Cesario Sahu    Chief Complaint   Patient presents with    Annual Exam       History of Present Illness:   HPI    HISTORY OF PRESENT ILLNESS: Cesario Sahu is a 69 y.o. female who comes in today for annual wellness examination.  She states she is fasting and took medication today.    She states she exercises 5 to 6 times per week, 30 to 45 minutes each time by stationary bike and tries to monitor diet.  She states she does not perform monthly BSE.    She states she does not perform home blood pressure checks.    She states she took Metformin years ago and states it caused her to have nausea.  She states she does not take Protonix for reflux but occasionally takes Famotidine prn.    She complains of having knee pain during the day and stretches/moves and takes Celebrex with help.       END OF LIFE DECISION: She does not have a living will. She does not desire life support.    SCREENINGS:  Cholesterol:  May 22, 2024.  FFS/Colonoscopy: June 1, 2021 - benign colon polyps, diverticulosis with 7-year repeat advised.  Mammogram: August 23, 2024 - okay.  GYN Exam: July 24, 2024 with GYN NP LYNSEY Luu and scheduled for July 25, 2025.  Dexa Scan: July 13, 2022 - osteopenia with 3-year repeat advised.  Eye Exam: May 13, 2025 with Dr. VENTURA Nixon.  Dental Exam:  April 2025 per patient.   PPD: Never.  HCVAb: July 3, 2024.  HIVAb: July 3, 2024.      Immunization History   Administered Date(s) Administered    COVID-19 Vaccine 02/28/2021, 03/28/2021, 10/29/2021    COVID-19, MRNA, LN-S, PF (MODERNA FULL 0.5 ML DOSE) 02/28/2021, 03/28/2021, 10/29/2021    Influenza (FLUAD) - Quadrivalent - Adjuvanted - PF *Preferred* (65+) 10/22/2020, 10/22/2020, 09/23/2022    Influenza - Trivalent - Fluad - Adjuvanted - PF (65 years and older 10/02/2024    Pneumococcal Conjugate - 20 Valent 09/23/2022    Zoster Recombinant 10/22/2020, 10/22/2020, 01/12/2023   RSV: Never. Advised patient available at local pharmacy.  Tdap: > 10 years ago.   Advised patient insurance-covered benefit at local pharmacy.    Current Outpatient Medications   Medication Sig    albuterol (VENTOLIN HFA) 90 mcg/actuation inhaler Inhale 2 puffs into the lungs every 6 (six) hours as needed for Wheezing (cough). Rescue    amLODIPine (NORVASC) 5 MG tablet Take 1 tablet (5 mg total) by mouth once daily    ascorbic acid (VITAMIN C ORAL)     aspirin (ECOTRIN) 81 MG EC tablet Take 81 mg by mouth once daily.    atorvastatin (LIPITOR) 10 MG tablet Take 1 tablet (10 mg total) by mouth every evening.    busPIRone (BUSPAR) 5 MG Tab Take 1 tablet (5 mg total) by mouth 2 (two) times daily.    celecoxib (CELEBREX) 100 MG capsule Take 1 capsule (100 mg total) by mouth 2 (two) times daily as needed for Pain.    chlorhexidine (PERIDEX) 0.12 % solution Swish and spit 15 ml orally twice daily as needed for mouth ulcers    diclofenac sodium (VOLTAREN) 1 % Gel Apply 2 g topically 2 (two) times daily as needed (knee pain).    diltiazem HCl (DILTIAZEM 2% - LIDOCAINE 5% CREAM) Apply 1 application  topically 3 (three) times daily.    estradioL (ESTRACE) 0.01 % (0.1 mg/gram) vaginal cream Place 1 g vaginally twice a week.    famotidine (PEPCID) 20 MG tablet Take 20 mg by mouth 2 (two) times daily as needed for Heartburn.    ibuprofen (ADVIL,MOTRIN) 800 MG tablet Take 1 tablet (800 mg total) by mouth every 6 (six) hours as needed for Pain.    Lactobacillus acidophilus (PROBIOTIC ORAL)     LIDOcaine viscous HCl 2% (LIDOCAINE VISCOUS) 2 % Soln Swish and spit 5-10 ml orally every 6 hours as needed for mouth pain    metoprolol succinate (TOPROL-XL) 25 MG 24 hr tablet Take 1 tablet by mouth once daily    sertraline (ZOLOFT) 50 MG tablet Take 1 tablet (50 mg total) by mouth once daily.    pantoprazole (PROTONIX) 40 MG tablet Take 1 tablet (40 mg total) by mouth once daily. (Patient not taking: Reported on 5/26/2025)     Review of Systems   Constitutional:  Negative for activity change, appetite change, chills,  fatigue, fever and unexpected weight change.   HENT:  Negative for congestion, ear discharge, ear pain, hearing loss, mouth sores, nosebleeds, postnasal drip, rhinorrhea, sinus pressure, sneezing, sore throat, trouble swallowing and voice change.    Eyes:  Negative for photophobia, pain, discharge, redness, itching and visual disturbance.   Respiratory:  Negative for cough, chest tightness, shortness of breath and wheezing.    Cardiovascular:  Negative for chest pain, palpitations and leg swelling.   Gastrointestinal:  Positive for nausea. Negative for abdominal pain, blood in stool, constipation, diarrhea and vomiting.   Endocrine: Negative for cold intolerance, heat intolerance, polydipsia, polyphagia and polyuria.   Genitourinary:  Negative for difficulty urinating, dysuria, flank pain, frequency, hematuria, menstrual problem, urgency, vaginal bleeding and vaginal discharge.   Musculoskeletal:  Positive for arthralgias. Negative for back pain, gait problem, joint swelling, myalgias and neck pain.   Skin:  Negative for color change, pallor and rash.   Neurological:  Negative for dizziness, tremors, seizures, weakness, numbness and headaches.   Hematological:  Negative for adenopathy. Does not bruise/bleed easily.   Psychiatric/Behavioral:  Negative for dysphoric mood, sleep disturbance and suicidal ideas. The patient is not nervous/anxious.         Negative for homicidal ideas.       Objective:  Physical Exam  Vitals reviewed.   Constitutional:       General: She is not in acute distress.     Appearance: Normal appearance. She is well-developed. She is obese. She is not ill-appearing, toxic-appearing or diaphoretic.      Comments: Pleasant.   HENT:      Head: Normocephalic and atraumatic.      Right Ear: Tympanic membrane, ear canal and external ear normal. There is no impacted cerumen.      Left Ear: Tympanic membrane, ear canal and external ear normal. There is no impacted cerumen.      Nose: Nose normal. No  congestion or rhinorrhea.      Mouth/Throat:      Mouth: Mucous membranes are moist.      Pharynx: Oropharynx is clear. No oropharyngeal exudate or posterior oropharyngeal erythema.   Eyes:      General:         Right eye: No discharge.         Left eye: No discharge.      Extraocular Movements: Extraocular movements intact.      Conjunctiva/sclera: Conjunctivae normal.      Pupils: Pupils are equal, round, and reactive to light.      Comments: She wears glasses.   Neck:      Thyroid: No thyromegaly.      Vascular: No carotid bruit or JVD.   Cardiovascular:      Rate and Rhythm: Normal rate and regular rhythm.      Pulses:           Dorsalis pedis pulses are 3+ on the right side and 3+ on the left side.      Heart sounds: Normal heart sounds. No murmur heard.     No friction rub. No gallop.   Pulmonary:      Effort: Pulmonary effort is normal. No respiratory distress.      Breath sounds: Normal breath sounds. No wheezing.   Chest:      Comments: Not examined.  Abdominal:      General: Bowel sounds are normal. There is no distension.      Palpations: Abdomen is soft. There is no mass.      Tenderness: There is no abdominal tenderness. There is no right CVA tenderness, left CVA tenderness, guarding or rebound.   Genitourinary:     Comments: Not examined.  Musculoskeletal:         General: No swelling or tenderness. Normal range of motion.      Cervical back: Normal range of motion and neck supple. No rigidity or tenderness.      Comments: She is ambulatory without problems.   Feet:      Right foot:      Protective Sensation: 5 sites tested.  5 sites sensed.      Skin integrity: No ulcer or skin breakdown.      Left foot:      Protective Sensation: 5 sites tested.  5 sites sensed.      Skin integrity: No ulcer or skin breakdown.   Lymphadenopathy:      Cervical: No cervical adenopathy.   Skin:     General: Skin is warm.      Findings: No rash.   Neurological:      General: No focal deficit present.      Mental Status:  She is alert and oriented to person, place, and time.      Deep Tendon Reflexes: Reflexes are normal and symmetric.   Psychiatric:         Mood and Affect: Mood normal.         Behavior: Behavior normal.         Thought Content: Thought content normal.         Judgment: Judgment normal.         ASSESSMENT:  1. Annual physical exam    2. Essential hypertension    3. Mixed hyperlipidemia    4. Aortic atherosclerosis    5. Insulin resistance syndrome    6. Laryngopharyngeal reflux (LPR)    7. Osteopenia, unspecified location    8. Primary osteoarthritis of right knee    9. Primary osteoarthritis of left knee    10. Long term use of drug    11. Obesity (BMI 30.0-34.9)    12. Postmenopausal        PLAN:  Cesario was seen today for annual exam.    Diagnoses and all orders for this visit:    Annual physical exam    Essential hypertension - stable on medication  -     Hemoglobin A1C; Future  -     TSH; Future  -     Comprehensive Metabolic Panel; Future  -     Lipid Panel; Future  -     CBC Auto Differential; Future    Mixed hyperlipidemia - on medication  -     atorvastatin (LIPITOR) 10 MG tablet; Take 1 tablet (10 mg total) by mouth every evening.  -     Hemoglobin A1C; Future  -     Comprehensive Metabolic Panel; Future  -     Lipid Panel; Future    Aortic atherosclerosis - managed with medication, diet and exercise    Insulin resistance syndrome - managed with diet and exercise  -     Hemoglobin A1C; Future    Laryngopharyngeal reflux (LPR)  -     pantoprazole (PROTONIX) 40 MG tablet; Take 1 tablet (40 mg total) by mouth once daily.    Osteopenia, unspecified location  -     DXA Bone Density Axial Skeleton 1 or more sites; Future    Primary osteoarthritis of right knee  -     celecoxib (CELEBREX) 100 MG capsule; Take 1 capsule (100 mg total) by mouth 2 (two) times daily as needed for Pain.  -     Ambulatory referral/consult to Orthopedics; Future    Primary osteoarthritis of left knee  -     celecoxib (CELEBREX) 100 MG  capsule; Take 1 capsule (100 mg total) by mouth 2 (two) times daily as needed for Pain.  -     Ambulatory referral/consult to Orthopedics; Future    Long term use of drug  -     Hemoglobin A1C; Future    Obesity (BMI 30.0-34.9) - managed with diet and exercise  -     Hemoglobin A1C; Future    Postmenopausal  -     DXA Bone Density Axial Skeleton 1 or more sites; Future      Patient advised to call for results.  Continue current medications, follow low sodium, low cholesterol, low carb diet, daily walks, monthly BSE.  Prescription refills as noted above.  Keep follow up with specialists.  Flu shot this fall.  Follow up in about 26 weeks (around 11/24/2025) for hypertension follow up.    40 minutes of total time spent on the encounter, which includes face to face time and non-face to face time preparing to see the patient (eg, review of tests), Obtaining and/or reviewing separately obtained history, Documenting clinical information in the electronic or other health record, Independently interpreting results (not separately reported) and communicating results to the patient/family/caregiver, or Care coordination (not separately reported).      This note is  generated with speech recognition software and is subject to transcription error and sound alike phrases that may be missed by proofreading.

## 2025-06-02 ENCOUNTER — TELEPHONE (OUTPATIENT)
Dept: PHARMACY | Facility: CLINIC | Age: 70
End: 2025-06-02
Payer: MEDICARE

## 2025-06-02 ENCOUNTER — PATIENT MESSAGE (OUTPATIENT)
Dept: FAMILY MEDICINE | Facility: CLINIC | Age: 70
End: 2025-06-02
Payer: MEDICARE

## 2025-06-02 ENCOUNTER — TELEPHONE (OUTPATIENT)
Dept: FAMILY MEDICINE | Facility: CLINIC | Age: 70
End: 2025-06-02
Payer: MEDICARE

## 2025-06-11 ENCOUNTER — RESULTS FOLLOW-UP (OUTPATIENT)
Dept: FAMILY MEDICINE | Facility: CLINIC | Age: 70
End: 2025-06-11

## 2025-07-21 ENCOUNTER — TELEPHONE (OUTPATIENT)
Dept: CARDIOLOGY | Facility: CLINIC | Age: 70
End: 2025-07-21
Payer: MEDICARE

## 2025-07-21 ENCOUNTER — OFFICE VISIT (OUTPATIENT)
Dept: INTERNAL MEDICINE | Facility: CLINIC | Age: 70
End: 2025-07-21
Payer: MEDICARE

## 2025-07-21 VITALS
HEIGHT: 63 IN | WEIGHT: 175.25 LBS | TEMPERATURE: 97 F | SYSTOLIC BLOOD PRESSURE: 122 MMHG | HEART RATE: 64 BPM | DIASTOLIC BLOOD PRESSURE: 70 MMHG | RESPIRATION RATE: 18 BRPM | OXYGEN SATURATION: 98 % | BODY MASS INDEX: 31.05 KG/M2

## 2025-07-21 DIAGNOSIS — E21.3 HYPERPARATHYROIDISM: ICD-10-CM

## 2025-07-21 DIAGNOSIS — E66.811 OBESITY (BMI 30.0-34.9): ICD-10-CM

## 2025-07-21 DIAGNOSIS — Z00.00 ENCOUNTER FOR MEDICARE ANNUAL WELLNESS EXAM: Primary | ICD-10-CM

## 2025-07-21 DIAGNOSIS — K58.1 IRRITABLE BOWEL SYNDROME WITH CONSTIPATION: ICD-10-CM

## 2025-07-21 DIAGNOSIS — Z12.31 SCREENING MAMMOGRAM FOR BREAST CANCER: ICD-10-CM

## 2025-07-21 DIAGNOSIS — I49.3 PVC (PREMATURE VENTRICULAR CONTRACTION): ICD-10-CM

## 2025-07-21 DIAGNOSIS — Z86.0100 HISTORY OF COLONIC POLYPS: ICD-10-CM

## 2025-07-21 DIAGNOSIS — K21.9 GASTROESOPHAGEAL REFLUX DISEASE WITHOUT ESOPHAGITIS: ICD-10-CM

## 2025-07-21 DIAGNOSIS — I10 ESSENTIAL HYPERTENSION: ICD-10-CM

## 2025-07-21 DIAGNOSIS — E88.819 INSULIN RESISTANCE: ICD-10-CM

## 2025-07-21 DIAGNOSIS — I70.0 AORTIC ATHEROSCLEROSIS: ICD-10-CM

## 2025-07-21 DIAGNOSIS — F41.9 ANXIETY: ICD-10-CM

## 2025-07-21 PROCEDURE — 1159F MED LIST DOCD IN RCRD: CPT | Mod: CPTII,S$GLB,, | Performed by: NURSE PRACTITIONER

## 2025-07-21 PROCEDURE — 1126F AMNT PAIN NOTED NONE PRSNT: CPT | Mod: CPTII,S$GLB,, | Performed by: NURSE PRACTITIONER

## 2025-07-21 PROCEDURE — 1158F ADVNC CARE PLAN TLK DOCD: CPT | Mod: CPTII,S$GLB,, | Performed by: NURSE PRACTITIONER

## 2025-07-21 PROCEDURE — 1160F RVW MEDS BY RX/DR IN RCRD: CPT | Mod: CPTII,S$GLB,, | Performed by: NURSE PRACTITIONER

## 2025-07-21 PROCEDURE — 99999 PR PBB SHADOW E&M-EST. PATIENT-LVL IV: CPT | Mod: PBBFAC,,, | Performed by: NURSE PRACTITIONER

## 2025-07-21 PROCEDURE — 3074F SYST BP LT 130 MM HG: CPT | Mod: CPTII,S$GLB,, | Performed by: NURSE PRACTITIONER

## 2025-07-21 PROCEDURE — 1170F FXNL STATUS ASSESSED: CPT | Mod: CPTII,S$GLB,, | Performed by: NURSE PRACTITIONER

## 2025-07-21 PROCEDURE — 3044F HG A1C LEVEL LT 7.0%: CPT | Mod: CPTII,S$GLB,, | Performed by: NURSE PRACTITIONER

## 2025-07-21 PROCEDURE — 3078F DIAST BP <80 MM HG: CPT | Mod: CPTII,S$GLB,, | Performed by: NURSE PRACTITIONER

## 2025-07-21 PROCEDURE — G0439 PPPS, SUBSEQ VISIT: HCPCS | Mod: S$GLB,,, | Performed by: NURSE PRACTITIONER

## 2025-07-21 NOTE — PATIENT INSTRUCTIONS
Counseling and Referral of Other Preventative  (Italic type indicates deductible and co-insurance are waived)    Patient Name: Cesario Sahu  Today's Date: 7/21/2025    Health Maintenance       Date Due Completion Date    TETANUS VACCINE Never done ---    RSV Vaccine (Age 60+ and Pregnant patients) (1 - Risk 60-74 years 1-dose series) Never done ---    COVID-19 Vaccine (7 - 2024-25 season) 09/01/2024 10/29/2021    Mammogram 08/23/2025 8/23/2024    Influenza Vaccine (1) 09/01/2025 10/2/2024    High Dose Statin 07/21/2026 7/21/2025    DEXA Scan 07/13/2027 7/13/2022    Hemoglobin A1c (Diabetic Prevention Screening) 05/26/2028 5/26/2025    Colorectal Cancer Screening 06/11/2028 6/11/2021    Lipid Panel 05/26/2030 5/26/2025        Orders Placed This Encounter   Procedures    Mammo Digital Screening Bilat    Ambulatory referral/consult to Cardiology     The following information is provided to all patients.  This information is to help you find resources for any of the problems found today that may be affecting your health:                  Living healthy guide: www.[facility].[place].gov      Understanding Diabetes: www.diabetes.org      Eating healthy: www.cdc.gov/healthyweight      CDC home safety checklist: www.cdc.gov/steadi/patient.html      Agency on Aging: www.goea.[place].[facility]      Alcoholics anonymous (AA): www.aa.org      Physical Activity: www.janet.nih.gov/ln8cryc      Tobacco use: www.quitwithusla.org

## 2025-07-21 NOTE — PROGRESS NOTES
"  Cesario Sahu presented for a follow-up Medicare AWV today. The following components were reviewed and updated:    Medical history  Family History  Social history  Allergies and Current Medications  Health Risk Assessment  Health Maintenance  Care Team    **See Completed Assessments for Annual Wellness visit with in the encounter summary    The following assessments were completed:  Depression Screening  Cognitive function Screening  Timed Get Up Test  Whisper Test      Opioid documentation:      Patient does not have a current opioid prescription.          Vitals:    07/21/25 1031   BP: 122/70   Patient Position: Sitting   Pulse: 64   Resp: 18   Temp: 97.3 °F (36.3 °C)   SpO2: 98%   Weight: 79.5 kg (175 lb 4.3 oz)   Height: 5' 3" (1.6 m)     Body mass index is 31.05 kg/m².       Physical Exam  Constitutional:       Appearance: Normal appearance.   HENT:      Head: Normocephalic and atraumatic.   Eyes:      Pupils: Pupils are equal, round, and reactive to light.   Cardiovascular:      Rate and Rhythm: Normal rate and regular rhythm.   Pulmonary:      Effort: Pulmonary effort is normal.      Breath sounds: Normal breath sounds.   Musculoskeletal:         General: Normal range of motion.   Skin:     General: Skin is warm.   Neurological:      General: No focal deficit present.      Mental Status: She is alert and oriented to person, place, and time.   Psychiatric:         Mood and Affect: Mood normal.         Behavior: Behavior normal.         Thought Content: Thought content normal.         Judgment: Judgment normal.       Current Outpatient Medications   Medication Instructions    albuterol (VENTOLIN HFA) 90 mcg/actuation inhaler 2 puffs, Inhalation, Every 6 hours PRN, Rescue    amLODIPine (NORVASC) 5 mg, Oral, Daily    ascorbic acid (VITAMIN C ORAL) No dose, route, or frequency recorded.    aspirin (ECOTRIN) 81 mg, Daily    atorvastatin (LIPITOR) 10 mg, Oral, Nightly    busPIRone (BUSPAR) 5 mg, Oral, 2 times " daily    celecoxib (CELEBREX) 100 mg, Oral, 2 times daily PRN    chlorhexidine (PERIDEX) 0.12 % solution Swish and spit 15 ml orally twice daily as needed for mouth ulcers    diclofenac sodium (VOLTAREN) 2 g, Topical (Top), 2 times daily PRN    diltiazem HCl (DILTIAZEM 2% - LIDOCAINE 5% CREAM) 1 application , Topical (Top), 3 times daily    estradioL (ESTRACE) 1 g, Vaginal, Twice weekly    famotidine (PEPCID) 20 mg, 2 times daily PRN    ibuprofen (ADVIL,MOTRIN) 800 mg, Oral, Every 6 hours PRN    Lactobacillus acidophilus (PROBIOTIC ORAL) No dose, route, or frequency recorded.    LIDOcaine viscous HCl 2% (LIDOCAINE VISCOUS) 2 % Soln Swish and spit 5-10 ml orally every 6 hours as needed for mouth pain    metoprolol succinate (TOPROL-XL) 25 mg, Oral    pantoprazole (PROTONIX) 40 mg, Oral, Daily    sertraline (ZOLOFT) 50 mg, Oral, Daily         Diagnoses and health risks identified today and associated recommendations/orders:  1. Encounter for Medicare annual wellness exam    2. Screening mammogram for breast cancer   Mammo Digital Screening Bilat scheduled    3. Aortic atherosclerosis  Chronic and Stable in Lipitor. Continue current treatment plan as previously prescribed with your PCP    4. Essential hypertension  Chronic and Stable on Toprol and  Norvasc. Continue current treatment plan as previously prescribed with your card  5. Anxiety  Chronic and Stable on Zoloft. Continue current treatment plan as previously prescribed with your PCP    6. Gastroesophageal reflux disease without esophagitis  Chronic and Stable on  Protonix with diet modifcation. Continue current treatment plan as previously prescribed with your PCP    7. Hyperparathyroidism  ,Chronic and Stable. Continue current treatment plan as previously prescribed with your PCP    8. Insulin resistance  Discussed and recommend  low fat/carb/chol diet. Cardio exercise as tolerated. Life style modifications.    9. Irritable bowel syndrome with  constipation  Chronic and Stable on  Protonix with diet modifcation. Continue current treatment plan as previously prescribed with your PCP    10. Obesity (BMI 30.0-34.9)  Body mass index is 31.05 kg/m².  Discussed and recommend  low fat/carb/chol diet. Cardio exercise as tolerated. Life style modifications    11. PVC (premature ventricular contraction)  Ambulatory referral/consult to Cardiology  Chronic and Stable on Toprol.f/u with card md  12. Personal history of colonic polyps  Next colonlscopy due on 6/11/2028  Follwed by PCP    I offered to discuss advanced care planning, including how to pick a person who would make decisions for you if you were unable to make them for yourself, called a health care power of , and what kind of decisions you might make such as use of life sustaining treatments such as ventilators and tube feeding when faced with a life limiting illness recorded on a living will that they will need to know. (How you want to be cared for as you near the end of your natural life)     X Patient is interested in learning more about how to make advanced directives.  I provided them paperwork and offered to discuss this with them.    Provided Shirline with a 5-10 year written screening schedule and personal prevention plan. Recommendations were developed using the USPSTF age appropriate recommendations. Education, counseling, and referrals were provided as needed.  After Visit Summary printed and given to patient which includes a list of additional screenings\tests needed.    Follow up in about 1 year (around 7/21/2026).    Kimi Louie NP

## 2025-07-21 NOTE — PROGRESS NOTES
"  Cesario Sahu presented for a  Medicare AWV and comprehensive Health Risk Assessment today. The following components were reviewed and updated:    Medical history  Family History  Social history  Allergies and Current Medications  Health Risk Assessment  Health Maintenance  Care Team         ** See Completed Assessments for Annual Wellness Visit within the encounter summary.**         The following assessments were completed:  Living Situation  CAGE  Depression Screening  Timed Get Up and Go  Whisper Test  Cognitive Function Screening  Nutrition Screening  ADL Screening  PAQ Screening      Opioid documentation:      Patient {does/does not have a current opioid prescription:51354}     Vitals:    07/21/25 1031   BP: 122/70   Patient Position: Sitting   Pulse: 64   Resp: 18   Temp: 97.3 °F (36.3 °C)   SpO2: 98%   Weight: 79.5 kg (175 lb 4.3 oz)   Height: 5' 3" (1.6 m)     Body mass index is 31.05 kg/m².  Physical Exam          Diagnoses and health risks identified today and associated recommendations/orders:    There are no diagnoses linked to this encounter.    Provided Cesario with a 5-10 year written screening schedule and personal prevention plan. Recommendations were developed using the [place]PSTF age appropriate recommendations. Education, counseling, and referrals were provided as needed. After Visit Summary printed and given to patient which includes a list of additional screenings\tests needed.    No follow-ups on file.    Kimi Louie NP    "

## 2025-07-23 ENCOUNTER — OFFICE VISIT (OUTPATIENT)
Dept: OPHTHALMOLOGY | Facility: CLINIC | Age: 70
End: 2025-07-23
Payer: MEDICARE

## 2025-07-23 DIAGNOSIS — H17.9 CORNEAL SCARRING: Primary | ICD-10-CM

## 2025-07-23 DIAGNOSIS — H43.811 PVD (POSTERIOR VITREOUS DETACHMENT), RIGHT EYE: ICD-10-CM

## 2025-07-23 PROCEDURE — 99214 OFFICE O/P EST MOD 30 MIN: CPT | Mod: S$GLB,,, | Performed by: STUDENT IN AN ORGANIZED HEALTH CARE EDUCATION/TRAINING PROGRAM

## 2025-07-23 PROCEDURE — 1159F MED LIST DOCD IN RCRD: CPT | Mod: CPTII,S$GLB,, | Performed by: STUDENT IN AN ORGANIZED HEALTH CARE EDUCATION/TRAINING PROGRAM

## 2025-07-23 PROCEDURE — 3044F HG A1C LEVEL LT 7.0%: CPT | Mod: CPTII,S$GLB,, | Performed by: STUDENT IN AN ORGANIZED HEALTH CARE EDUCATION/TRAINING PROGRAM

## 2025-07-23 PROCEDURE — 1160F RVW MEDS BY RX/DR IN RCRD: CPT | Mod: CPTII,S$GLB,, | Performed by: STUDENT IN AN ORGANIZED HEALTH CARE EDUCATION/TRAINING PROGRAM

## 2025-07-23 PROCEDURE — 99999 PR PBB SHADOW E&M-EST. PATIENT-LVL I: CPT | Mod: PBBFAC,,, | Performed by: STUDENT IN AN ORGANIZED HEALTH CARE EDUCATION/TRAINING PROGRAM

## 2025-07-23 NOTE — PROGRESS NOTES
HPI    The patient states her left eye has been watering but they have gotten   better.   Last edited by Sarah Nixon MD on 7/23/2025 10:10 AM.            Assessment /Plan     For exam results, see Encounter Report.    Corneal scarring- ATs QID for watering and comfort    PVD (posterior vitreous detachment), right eye- No tears or breaks were seen after careful retinal evaluation.   RT/RD precautions    Discussed retinal detachment signs and symptoms including flashes of lights, floaters, perceived curtains or veils. Advised to patient to monitor visual status including increase in flashes and floaters, or the development of visual field changes including curtain and /or veils. Advised patient to RTC urgently if these symptoms occur. Explained the need for follow up exams to the patient even if there are no changes in the symptoms.      RTC 1 year DFE or PRN

## 2025-07-25 ENCOUNTER — APPOINTMENT (OUTPATIENT)
Dept: RADIOLOGY | Facility: HOSPITAL | Age: 70
End: 2025-07-25
Attending: FAMILY MEDICINE
Payer: MEDICARE

## 2025-07-25 ENCOUNTER — OFFICE VISIT (OUTPATIENT)
Dept: OBSTETRICS AND GYNECOLOGY | Facility: CLINIC | Age: 70
End: 2025-07-25
Payer: MEDICARE

## 2025-07-25 VITALS
SYSTOLIC BLOOD PRESSURE: 125 MMHG | BODY MASS INDEX: 30.79 KG/M2 | DIASTOLIC BLOOD PRESSURE: 78 MMHG | HEIGHT: 63 IN | WEIGHT: 173.75 LBS

## 2025-07-25 DIAGNOSIS — M85.80 OSTEOPENIA, UNSPECIFIED LOCATION: ICD-10-CM

## 2025-07-25 DIAGNOSIS — Z01.419 ENCOUNTER FOR ANNUAL ROUTINE GYNECOLOGICAL EXAMINATION: Primary | ICD-10-CM

## 2025-07-25 DIAGNOSIS — Z78.0 POSTMENOPAUSAL: ICD-10-CM

## 2025-07-25 DIAGNOSIS — N95.2 VAGINAL ATROPHY: ICD-10-CM

## 2025-07-25 PROCEDURE — 77080 DXA BONE DENSITY AXIAL: CPT | Mod: TC

## 2025-07-25 PROCEDURE — 99999 PR PBB SHADOW E&M-EST. PATIENT-LVL III: CPT | Mod: PBBFAC,,,

## 2025-07-25 PROCEDURE — 77080 DXA BONE DENSITY AXIAL: CPT | Mod: 26,,, | Performed by: STUDENT IN AN ORGANIZED HEALTH CARE EDUCATION/TRAINING PROGRAM

## 2025-07-25 RX ORDER — ESTRADIOL 0.1 MG/G
1 CREAM VAGINAL
Qty: 42.5 G | Refills: 6 | Status: SHIPPED | OUTPATIENT
Start: 2025-07-28

## 2025-07-25 NOTE — PROGRESS NOTES
Subjective:       Patient ID: Cesario Sahu is a 70 y.o. female.    Chief Complaint:  Well Woman and Annual Exam      History of Present Illness  HPI    Annual Exam-Postmenopausal  Patient presents for annual exam. The patient has complaints today of vaginal dryness. The patient is sexually active, . GYN screening history: last pap: approximate date 21 and was normal and last mammogram: approximate date 24 and was normal. Annual mammogram scheduled. The patient is taking hormone replacement therapy. Patient using vaginal estrace 2-3x a week and doing well.  Patient denies post-menopausal vaginal bleeding. The patient wears seatbelts: yes. The patient participates in regular exercise: yes. Has the patient ever been transfused or tattooed?: no. The patient reports that there is not domestic violence in her life.      Dexa scan today  Colonoscopy 2021, repeat in 7 years     GYN & OB History  No LMP recorded. Patient is postmenopausal.   Date of Last Pap: 2021    OB History    Para Term  AB Living   3 3 3   3   SAB IAB Ectopic Multiple Live Births       3      # Outcome Date GA Lbr Atilio/2nd Weight Sex Type Anes PTL Lv   3 Term      Vag-Spont   LEXI   2 Term      Vag-Spont   LEXI   1 Term      Vag-Spont   LEXI       Review of Systems  Review of Systems   Constitutional:  Negative for appetite change, fatigue, fever and unexpected weight change.   Eyes:  Negative for visual disturbance.   Cardiovascular:  Negative for chest pain.   Gastrointestinal:  Negative for abdominal pain, bloating, constipation, diarrhea, nausea and vomiting.   Genitourinary:  Positive for vaginal dryness. Negative for bladder incontinence, decreased libido, dysmenorrhea, dyspareunia, dysuria, flank pain, frequency, genital sores, hot flashes, menorrhagia, menstrual problem, pelvic pain, urgency, vaginal bleeding, vaginal discharge, vaginal pain, postcoital bleeding, postmenopausal bleeding and vaginal odor.    Integumentary:  Negative for rash, acne, mole/lesion, breast mass, nipple discharge, breast skin changes and breast tenderness.   Neurological:  Negative for syncope, headaches and memory loss.   Hematological:  Negative for adenopathy. Does not bruise/bleed easily.   Psychiatric/Behavioral:  Negative for sleep disturbance.    All other systems reviewed and are negative.  Breast: Positive for breast self exam.Negative for asymmetry, lump, mass, nipple discharge, skin changes and tenderness          Objective:      Physical Exam:   Constitutional: She is oriented to person, place, and time. She appears well-developed and well-nourished.    HENT:   Head: Normocephalic and atraumatic.   Nose: Nose normal.    Eyes: Pupils are equal, round, and reactive to light. Conjunctivae and EOM are normal.     Cardiovascular:  Normal rate and regular rhythm.             Pulmonary/Chest: Effort normal. She has no decreased breath sounds. She has no rhonchi. Right breast exhibits no inverted nipple, no mass, no nipple discharge, no tenderness and no bleeding. Left breast exhibits no inverted nipple, no mass, no nipple discharge, no tenderness and no bleeding. Breasts are symmetrical.        Abdominal: Soft. There is no abdominal tenderness.     Genitourinary:    Inguinal canal, uterus, right adnexa and left adnexa normal.      Pelvic exam was performed with patient supine.   The external female genitalia was normal.   No external genitalia lesions identified,Genitalia hair distrobution normal .     Labial bartholins normal.Cervix is normal. Right adnexum displays no mass and no tenderness. Left adnexum displays no mass and no tenderness. No vaginal discharge, tenderness or bleeding in the vagina. Vaginal atrophy noted. Cervix exhibits no motion tenderness, no discharge and no tenderness.    pap smear not completedUerus contour normal  Uterus is not tender. Normal urethral meatus.          Musculoskeletal: Normal range of motion and  moves all extremeties.       Neurological: She is alert and oriented to person, place, and time.    Skin: Skin is warm and dry.    Psychiatric: She has a normal mood and affect. Her speech is normal and behavior is normal. Mood, judgment and thought content normal.             Assessment:        1. Encounter for annual routine gynecological examination    2. Vaginal atrophy               Plan:   Continue annual well woman exam.  Pap no longer indicated. Patient was counseled today on ASCCP screening guidelines (no longer indicated in pt over 65 with normal pap history) and recommendations for annual pelvic exams and clinical breast exams, yearly mammograms; and to see her PCP for other healthcare maintenance.       Diagnosis and orders this visit:  Encounter for annual routine gynecological examination    Vaginal atrophy  -     estradioL (ESTRACE) 0.01 % (0.1 mg/gram) vaginal cream; Place 1 g vaginally twice a week.  Dispense: 42.5 g; Refill: 6   - recommended [facility] vaginal moisturizer          Brenda Luu, KRISTINA

## 2025-08-14 ENCOUNTER — PATIENT MESSAGE (OUTPATIENT)
Dept: OBSTETRICS AND GYNECOLOGY | Facility: CLINIC | Age: 70
End: 2025-08-14
Payer: MEDICARE

## 2025-08-25 ENCOUNTER — TELEPHONE (OUTPATIENT)
Dept: CARDIOLOGY | Facility: CLINIC | Age: 70
End: 2025-08-25
Payer: MEDICARE

## 2025-08-26 ENCOUNTER — HOSPITAL ENCOUNTER (OUTPATIENT)
Dept: RADIOLOGY | Facility: HOSPITAL | Age: 70
Discharge: HOME | End: 2025-08-26
Attending: NURSE PRACTITIONER
Payer: MEDICARE

## 2025-08-26 VITALS — WEIGHT: 173.75 LBS | HEIGHT: 63 IN | BODY MASS INDEX: 30.79 KG/M2

## 2025-08-26 DIAGNOSIS — I49.3 PVC (PREMATURE VENTRICULAR CONTRACTION): ICD-10-CM

## 2025-08-26 PROCEDURE — 77063 BREAST TOMOSYNTHESIS BI: CPT | Mod: 26,,, | Performed by: RADIOLOGY

## 2025-08-26 PROCEDURE — 77063 BREAST TOMOSYNTHESIS BI: CPT | Mod: TC

## 2025-08-26 PROCEDURE — 77067 SCR MAMMO BI INCL CAD: CPT | Mod: 26,,, | Performed by: RADIOLOGY
